# Patient Record
Sex: MALE | Race: WHITE | NOT HISPANIC OR LATINO | Employment: OTHER | URBAN - METROPOLITAN AREA
[De-identification: names, ages, dates, MRNs, and addresses within clinical notes are randomized per-mention and may not be internally consistent; named-entity substitution may affect disease eponyms.]

---

## 2017-03-28 ENCOUNTER — HISTORICAL (OUTPATIENT)
Dept: ADMINISTRATIVE | Facility: HOSPITAL | Age: 82
End: 2017-03-28

## 2017-03-28 LAB
% SATURATION: 27 %
ALBUMIN SERPL-MCNC: 3.5 G/DL (ref 3.1–4.7)
ALP SERPL-CCNC: 87 IU/L (ref 40–104)
ALT (SGPT): 16 IU/L (ref 3–33)
AST SERPL-CCNC: 24 IU/L (ref 10–40)
BILIRUB SERPL-MCNC: 0.9 MG/DL (ref 0.3–1)
BUN SERPL-MCNC: 16 MG/DL (ref 8–20)
CALCIUM SERPL-MCNC: 8.7 MG/DL (ref 7.7–10.4)
CHLORIDE: 103 MMOL/L (ref 98–110)
CO2 SERPL-SCNC: 29.8 MMOL/L (ref 22.8–31.6)
CREATININE: 0.94 MG/DL (ref 0.6–1.4)
FERRITIN SERPL-MCNC: 86 NG/ML (ref 37–201)
GLUCOSE: 91 MG/DL (ref 70–99)
IRON: 69 MCG/DL (ref 32–176)
POTASSIUM SERPL-SCNC: 3.8 MMOL/L (ref 3.5–5)
PROT SERPL-MCNC: 7.1 G/DL (ref 6–8.2)
SODIUM: 138 MMOL/L (ref 134–144)
TOTAL IRON BINDING CAPACITY: 258 MCG/DL (ref 177–435)

## 2017-07-18 ENCOUNTER — HISTORICAL (OUTPATIENT)
Dept: ADMINISTRATIVE | Facility: HOSPITAL | Age: 82
End: 2017-07-18

## 2017-07-18 ENCOUNTER — OFFICE VISIT (OUTPATIENT)
Dept: HEMATOLOGY/ONCOLOGY | Facility: CLINIC | Age: 82
End: 2017-07-18
Payer: MEDICARE

## 2017-07-18 VITALS
HEART RATE: 87 BPM | SYSTOLIC BLOOD PRESSURE: 170 MMHG | HEIGHT: 66 IN | WEIGHT: 146.69 LBS | RESPIRATION RATE: 18 BRPM | DIASTOLIC BLOOD PRESSURE: 89 MMHG | BODY MASS INDEX: 23.58 KG/M2 | TEMPERATURE: 98 F

## 2017-07-18 DIAGNOSIS — N18.2 ANEMIA IN STAGE 2 CHRONIC KIDNEY DISEASE: ICD-10-CM

## 2017-07-18 DIAGNOSIS — D63.1 ANEMIA IN STAGE 2 CHRONIC KIDNEY DISEASE: ICD-10-CM

## 2017-07-18 DIAGNOSIS — D63.8 ANEMIA OF OTHER CHRONIC DISEASE: ICD-10-CM

## 2017-07-18 DIAGNOSIS — D50.0 IRON DEFICIENCY ANEMIA SECONDARY TO BLOOD LOSS (CHRONIC): ICD-10-CM

## 2017-07-18 DIAGNOSIS — K92.2 HEMORRHAGE OF GASTROINTESTINAL TRACT, UNSPECIFIED: ICD-10-CM

## 2017-07-18 DIAGNOSIS — D50.9 IRON DEFICIENCY ANEMIA, UNSPECIFIED: ICD-10-CM

## 2017-07-18 LAB
% SATURATION: 27 %
ALBUMIN SERPL-MCNC: 3.4 G/DL (ref 3.1–4.7)
ALP SERPL-CCNC: 100 IU/L (ref 40–104)
ALT (SGPT): 14 IU/L (ref 3–33)
AST SERPL-CCNC: 20 IU/L (ref 10–40)
BASOPHILS NFR BLD: 0.1 K/UL (ref 0–0.2)
BASOPHILS NFR BLD: 1 %
BILIRUB SERPL-MCNC: 0.9 MG/DL (ref 0.3–1)
BUN SERPL-MCNC: 18 MG/DL (ref 8–20)
CALCIUM SERPL-MCNC: 8.7 MG/DL (ref 7.7–10.4)
CHLORIDE: 102 MMOL/L (ref 98–110)
CO2 SERPL-SCNC: 29.7 MMOL/L (ref 22.8–31.6)
CREATININE: 0.85 MG/DL (ref 0.6–1.4)
EOSINOPHIL NFR BLD: 0.3 K/UL (ref 0–0.7)
EOSINOPHIL NFR BLD: 3.6 %
ERYTHROCYTE [DISTWIDTH] IN BLOOD BY AUTOMATED COUNT: 13.8 % (ref 12.5–14.5)
FERRITIN SERPL-MCNC: 103 NG/ML (ref 37–201)
GLUCOSE: 98 MG/DL (ref 70–99)
GRAN #: 3.7 K/UL (ref 1.4–6.5)
GRAN%: 51.8 %
HCT VFR BLD AUTO: 43.8 % (ref 39–55)
HGB BLD-MCNC: 14.4 G/DL (ref 14–16)
IMMATURE GRANS (ABS): 0 K/UL (ref 0–1)
IMMATURE GRANULOCYTES: 0.1 %
IRON: 65 MCG/DL (ref 32–176)
LYMPH #: 2.2 K/UL (ref 1.2–3.4)
LYMPH%: 31.1 %
MCH RBC QN AUTO: 32.3 PG (ref 25–35)
MCHC RBC AUTO-ENTMCNC: 32.9 G/DL (ref 31–36)
MCV RBC AUTO: 98.2 FL (ref 80–100)
MONO #: 0.9 K/UL (ref 0.1–0.6)
MONO%: 12.4 %
NUCLEATED RBCS: 0 %
NUCLEATED RED BLOOD CELLS: 0 /100 WBC
PERFORMED BY:: ABNORMAL
PLATELET # BLD AUTO: 195 K/UL (ref 140–440)
PMV BLD AUTO: 9.4 FL (ref 8.8–12.7)
POTASSIUM SERPL-SCNC: 3.8 MMOL/L (ref 3.5–5)
PROT SERPL-MCNC: 7 G/DL (ref 6–8.2)
RBC # BLD AUTO: 4.46 M/UL (ref 4.3–5.9)
SODIUM: 139 MMOL/L (ref 134–144)
TOTAL IRON BINDING CAPACITY: 238 MCG/DL (ref 177–435)
WBC # BLD: 7.2 K/UL (ref 5–10)

## 2017-07-18 PROCEDURE — 1126F AMNT PAIN NOTED NONE PRSNT: CPT | Mod: ,,, | Performed by: INTERNAL MEDICINE

## 2017-07-18 PROCEDURE — 99213 OFFICE O/P EST LOW 20 MIN: CPT | Mod: ,,, | Performed by: INTERNAL MEDICINE

## 2017-07-18 PROCEDURE — 1159F MED LIST DOCD IN RCRD: CPT | Mod: ,,, | Performed by: INTERNAL MEDICINE

## 2017-07-18 RX ORDER — ISOSORBIDE MONONITRATE 60 MG/1
TABLET, EXTENDED RELEASE ORAL
COMMUNITY
Start: 2017-06-22 | End: 2019-08-28

## 2017-07-18 NOTE — PROGRESS NOTES
"   [facility] Hematology/Oncology  PROGRESS NOTE      Subjective:       Patient ID:   NAME: Marck Ramos : 1921     89 y.o. male    Referring Doc: Hood Gannon MD  Other Physicians: Ramesh Mares Albright    Chief Complaint:  Anemia f/u    History of Present Illness:     Patient returns today for a regularly scheduled follow-up visit.  The patient is doing ok with no new issues. He is feeling "pretty good"; no CP, SOB, HA's or N/V            ROS:   GEN: normal without any fever, night sweats or weight loss  HEENT: normal with no HA's, sore throat, stiff neck, changes in vision  CV: normal with no CP, SOB, PND, CARRERO or orthopnea  PULM: normal with no SOB, cough, hemoptysis, sputum or pleuritic pain  GI: normal with no abdominal pain, nausea, vomiting, constipation, diarrhea, melanotic stools, BRBPR, or hematemesis  : normal with no hematuria, dysuria  BREAST: normal with no mass, discharge, pain  SKIN: normal with no rash, erythema, bruising, or swelling    Allergies:  Review of patient's allergies indicates:   Allergen Reactions    Sulfa (sulfonamide antibiotics)      Patient can not recall reaction        Medications:    Current Outpatient Prescriptions:     amlodipine (NORVASC) 5 MG tablet, Take 5 mg by mouth once daily., Disp: , Rfl:     aspirin (ECOTRIN) 81 MG EC tablet, Take 81 mg by mouth once daily.  , Disp: , Rfl:     betaxolol (KERLONE) 10 mg tablet, 10 mg once daily. , Disp: , Rfl:     clopidogrel (PLAVIX) 75 mg tablet, Take 75 mg by mouth once daily., Disp: , Rfl:     ferrous sulfate 325 mg (65 mg iron) Tab tablet, Take 325 mg by mouth daily with breakfast., Disp: , Rfl:     isosorbide dinitrate (ISORDIL) 20 MG tablet, Take 20 mg by mouth 2 (two) times daily. 1/2 tablet twice daily, Disp: , Rfl:     isosorbide mononitrate (IMDUR) 60 MG 24 hr tablet, , Disp: , Rfl:     KLOR-CON M20 20 mEq tablet, Take 20 mEq by mouth once daily. , Disp: , Rfl:     losartan (COZAAR) 50 MG tablet, " "Take 50 mg by mouth 2 (two) times daily., Disp: , Rfl:     magnesium 250 mg Tab, Take 1 tablet by mouth once daily., Disp: , Rfl:     pantoprazole (PROTONIX) 40 MG tablet, Take 40 mg by mouth once daily., Disp: , Rfl:     simvastatin (ZOCOR) 20 MG tablet, Take 20 mg by mouth every evening. , Disp: , Rfl:     sotalol (BETAPACE) 80 MG tablet, Take 80 mg by mouth 2 (two) times daily. , Disp: , Rfl:     tamsulosin (FLOMAX) 0.4 mg Cp24, Take 0.4 mg by mouth once daily., Disp: , Rfl:     PMHx/PSHx Updates:  See patient's last visit with me on 3/28/17.  See H&P on  1/20/16        Pathology:  No matching staging information was found for the patient.          Objective:     Vitals:  Blood pressure (!) 170/89, pulse 87, temperature 97.5 °F (36.4 °C), resp. rate 18, height 5' 6" (1.676 m), weight 66.5 kg (146 lb 11.2 oz).    Physical Examination:   GEN: no apparent distress, comfortable; AAOx3  HEAD: atraumatic and normocephalic  EYES: no pallor, no icterus, PERRLA  ENT: OMM, no pharyngeal erythema, external ears WNL; no nasal discharge; no thrush  NECK: no masses, thyroid normal, trachea midline, no LAD/LN's, supple  CV: RRR with no murmur; normal pulse; normal S1 and S2; no pedal edema  CHEST: Normal respiratory effort; CTAB; normal breath sounds; no wheeze or crackles  ABDOM: nontender and nondistended; soft; normal bowel sounds; no rebound/guarding  MUSC/Skeletal: ROM normal; no crepitus; joints normal; no deformities or arthropathy  EXTREM: no clubbing, cyanosis, inflammation or swelling  SKIN: no rashes, lesions, ulcers, petechiae or subcutaneous nodules  : no juarez  NEURO: grossly intact; motor/sensory WNL; AAOx3; no tremors  PSYCH: normal mood, affect and behavior  LYMPH: normal cervical, supraclavicular, axillary and groin LN's            Labs:           Sodium   Date Value Ref Range Status   03/28/2017 138 134 - 144 mmol/L      Potassium   Date Value Ref Range Status   03/28/2017 3.8 3.5 - 5.0 mmol/L  "     Chloride   Date Value Ref Range Status   03/28/2017 103 98 - 110 mmol/L      CO2   Date Value Ref Range Status   03/28/2017 29.8 22.8 - 31.6 mmol/L      Glucose   Date Value Ref Range Status   03/28/2017 91 70 - 99 mg/dL      BUN, Bld   Date Value Ref Range Status   03/28/2017 16 8 - 20 mg/dL      Creatinine   Date Value Ref Range Status   03/28/2017 0.94 0.60 - 1.40 mg/dL      Calcium   Date Value Ref Range Status   03/28/2017 8.7 7.7 - 10.4 mg/dL      Total Protein   Date Value Ref Range Status   03/28/2017 7.1 6.0 - 8.2 g/dL      Albumin   Date Value Ref Range Status   03/28/2017 3.5 3.1 - 4.7 g/dL      Total Bilirubin   Date Value Ref Range Status   03/28/2017 0.9 0.3 - 1.0 mg/dL      Alkaline Phosphatase   Date Value Ref Range Status   03/28/2017 87 40 - 104 IU/L      AST   Date Value Ref Range Status   03/28/2017 24 10 - 40 IU/L            Radiology/Diagnostic Studies:        I have reviewed all available lab results and radiology reports.    Assessment/Plan:   (1) 89 y.o. male with diagnosis of chronic anemia  - multifactorial in etiology  - underlying iron deficiency issues, anemia of chronic disorders and anemia of chronic renal  - prior chronic GI blood loss issues as well  - s/p IV iron in past  - followed by Dr Batista with GI  - last available labs are from 3/2017 and hgb was normal as was the iron      (2) CAD and CHF - followed by Dr Mares    (3) Noncompliance issues with blood work    PLAN:  1. Check up to date labs and encouraged checking it every 6 months  2. F/u with PCP and GI  3. RTC 6 months  Fax note to Suzan Piper and Jagdeep    I spent over 15 mins with the patient, of which over half was face to face with the patient. Reviewing materials, labs, reports and studies. Making treatment and analytical decisions. Ordering necessary labs, tests and studies.      Discussion:     I have explained all of the above in detail and the patient understands all of the current  recommendation(s). I have answered all of their questions to the best of my ability and to their complete satisfaction.   The patient is to continue with the current management plan.    RTC in  6 months          Electronically signed by Teodoro Mueller MD

## 2017-07-18 NOTE — LETTER
July 18, 2017      Hood Gannon MD  [address]  [address]  [facility] [address]           [facility] Hematology Oncology  [address]  [address]  [facility] [address]  Phone: [phone]  Fax: [phone]          Patient: Marck Ramos   MR Number: [id number]   YOB: 1921   Date of Visit: 7/18/2017       Dear Dr. Hood Gannon:    Thank you for referring Marck Ramos to me for evaluation. Attached you will find relevant portions of my assessment and plan of care.    If you have questions, please do not hesitate to call me. I look forward to following Marck Ramos along with you.    Sincerely,    Teodoro Mueller MD    Enclosure  CC:  No Recipients    If you would like to receive this communication electronically, please contact [email or web address] or [phone] to request more information on [employer] Link access.    For providers and/or their staff who would like to refer a patient to [facility], please contact us through our one-stop-shop provider referral line, [facility], at [phone].    If you feel you have received this communication in error or would no longer like to receive these types of communications, please e-mail [email or web address]

## 2017-08-21 ENCOUNTER — HISTORICAL (OUTPATIENT)
Dept: ADMINISTRATIVE | Facility: HOSPITAL | Age: 82
End: 2017-08-21

## 2017-08-21 LAB
% SATURATION: 25 %
ALBUMIN SERPL-MCNC: 3.5 G/DL (ref 3.1–4.7)
ALP SERPL-CCNC: 81 IU/L (ref 40–104)
ALT (SGPT): 13 IU/L (ref 3–33)
AST SERPL-CCNC: 20 IU/L (ref 10–40)
BASOPHILS NFR BLD: 0.1 K/UL (ref 0–0.2)
BASOPHILS NFR BLD: 0.9 %
BILIRUB SERPL-MCNC: 0.7 MG/DL (ref 0.3–1)
BUN SERPL-MCNC: 17 MG/DL (ref 8–20)
CALCIUM SERPL-MCNC: 8.4 MG/DL (ref 7.7–10.4)
CHLORIDE: 105 MMOL/L (ref 98–110)
CO2 SERPL-SCNC: 26 MMOL/L (ref 22.8–31.6)
CREATININE: 0.86 MG/DL (ref 0.6–1.4)
EOSINOPHIL NFR BLD: 0.3 K/UL (ref 0–0.7)
EOSINOPHIL NFR BLD: 4.4 %
ERYTHROCYTE [DISTWIDTH] IN BLOOD BY AUTOMATED COUNT: 13.8 % (ref 12.5–14.5)
FERRITIN SERPL-MCNC: 81 NG/ML (ref 37–201)
GLUCOSE: 160 MG/DL (ref 70–99)
GRAN #: 4.3 K/UL (ref 1.4–6.5)
GRAN%: 55.7 %
HCT VFR BLD AUTO: 44.2 % (ref 39–55)
HGB BLD-MCNC: 14.9 G/DL (ref 14–16)
IMMATURE GRANS (ABS): 0 K/UL (ref 0–1)
IMMATURE GRANULOCYTES: 0.3 %
IRON: 59 MCG/DL (ref 32–176)
LYMPH #: 2.4 K/UL (ref 1.2–3.4)
LYMPH%: 30.6 %
MCH RBC QN AUTO: 32.7 PG (ref 25–35)
MCHC RBC AUTO-ENTMCNC: 33.7 G/DL (ref 31–36)
MCV RBC AUTO: 97.1 FL (ref 80–100)
MONO #: 0.6 K/UL (ref 0.1–0.6)
MONO%: 8.1 %
NUCLEATED RBCS: 0 %
NUCLEATED RED BLOOD CELLS: 0 /100 WBC
PERFORMED BY:: NORMAL
PLATELET # BLD AUTO: 189 K/UL (ref 140–440)
PMV BLD AUTO: 9.7 FL (ref 8.8–12.7)
POTASSIUM SERPL-SCNC: 3.8 MMOL/L (ref 3.5–5)
PROT SERPL-MCNC: 6.8 G/DL (ref 6–8.2)
RBC # BLD AUTO: 4.55 M/UL (ref 4.3–5.9)
SODIUM: 138 MMOL/L (ref 134–144)
TOTAL IRON BINDING CAPACITY: 234 MCG/DL (ref 177–435)
WBC # BLD: 7.8 K/UL (ref 5–10)

## 2017-09-21 ENCOUNTER — HISTORICAL (OUTPATIENT)
Dept: ADMINISTRATIVE | Facility: HOSPITAL | Age: 82
End: 2017-09-21

## 2017-09-21 LAB
% SATURATION: 25 %
ALBUMIN SERPL-MCNC: 3.3 G/DL (ref 3.1–4.7)
ALP SERPL-CCNC: 82 IU/L (ref 40–104)
ALT (SGPT): 13 IU/L (ref 3–33)
AST SERPL-CCNC: 19 IU/L (ref 10–40)
BASOPHILS NFR BLD: 0.1 K/UL (ref 0–0.2)
BASOPHILS NFR BLD: 0.8 %
BILIRUB SERPL-MCNC: 0.8 MG/DL (ref 0.3–1)
BUN SERPL-MCNC: 18 MG/DL (ref 8–20)
CALCIUM SERPL-MCNC: 8.5 MG/DL (ref 7.7–10.4)
CHLORIDE: 104 MMOL/L (ref 98–110)
CO2 SERPL-SCNC: 30 MMOL/L (ref 22.8–31.6)
CREATININE: 0.88 MG/DL (ref 0.6–1.4)
EOSINOPHIL NFR BLD: 0.3 K/UL (ref 0–0.7)
EOSINOPHIL NFR BLD: 4.4 %
ERYTHROCYTE [DISTWIDTH] IN BLOOD BY AUTOMATED COUNT: 14.3 % (ref 12.5–14.5)
FERRITIN SERPL-MCNC: 84 NG/ML (ref 37–201)
GLUCOSE: 104 MG/DL (ref 70–99)
GRAN #: 3.5 K/UL (ref 1.4–6.5)
GRAN%: 47.5 %
HCT VFR BLD AUTO: 41.8 % (ref 39–55)
HGB BLD-MCNC: 13.9 G/DL (ref 14–16)
IMMATURE GRANS (ABS): 0 K/UL (ref 0–1)
IMMATURE GRANULOCYTES: 0.1 %
IRON: 58 MCG/DL (ref 32–176)
LYMPH #: 2.7 K/UL (ref 1.2–3.4)
LYMPH%: 35.9 %
MCH RBC QN AUTO: 32.6 PG (ref 25–35)
MCHC RBC AUTO-ENTMCNC: 33.3 G/DL (ref 31–36)
MCV RBC AUTO: 97.9 FL (ref 80–100)
MONO #: 0.8 K/UL (ref 0.1–0.6)
MONO%: 11.3 %
NUCLEATED RBCS: 0 %
NUCLEATED RED BLOOD CELLS: 0 /100 WBC
PERFORMED BY:: ABNORMAL
PLATELET # BLD AUTO: 197 K/UL (ref 140–440)
PMV BLD AUTO: 9.6 FL (ref 8.8–12.7)
POTASSIUM SERPL-SCNC: 4 MMOL/L (ref 3.5–5)
PROT SERPL-MCNC: 6.8 G/DL (ref 6–8.2)
RBC # BLD AUTO: 4.27 M/UL (ref 4.3–5.9)
SODIUM: 139 MMOL/L (ref 134–144)
TOTAL IRON BINDING CAPACITY: 234 MCG/DL (ref 177–435)
WBC # BLD: 7.4 K/UL (ref 5–10)

## 2017-10-27 ENCOUNTER — HISTORICAL (OUTPATIENT)
Dept: ADMINISTRATIVE | Facility: HOSPITAL | Age: 82
End: 2017-10-27

## 2017-10-27 LAB
% SATURATION: 22 %
ALBUMIN SERPL-MCNC: 3.1 G/DL (ref 3.1–4.7)
ALP SERPL-CCNC: 74 IU/L (ref 40–104)
ALT (SGPT): 11 IU/L (ref 3–33)
AST SERPL-CCNC: 22 IU/L (ref 10–40)
BASOPHILS NFR BLD: 0.1 K/UL (ref 0–0.2)
BASOPHILS NFR BLD: 0.6 %
BILIRUB SERPL-MCNC: 0.8 MG/DL (ref 0.3–1)
BUN SERPL-MCNC: 21 MG/DL (ref 8–20)
CALCIUM SERPL-MCNC: 8.4 MG/DL (ref 7.7–10.4)
CHLORIDE: 103 MMOL/L (ref 98–110)
CO2 SERPL-SCNC: 25.9 MMOL/L (ref 22.8–31.6)
CREATININE: 1.01 MG/DL (ref 0.6–1.4)
EOSINOPHIL NFR BLD: 0.5 K/UL (ref 0–0.7)
EOSINOPHIL NFR BLD: 6.3 %
ERYTHROCYTE [DISTWIDTH] IN BLOOD BY AUTOMATED COUNT: 13.8 % (ref 12.5–14.5)
GLUCOSE: 172 MG/DL (ref 70–99)
GRAN #: 4.6 K/UL (ref 1.4–6.5)
GRAN%: 57.5 %
HCT VFR BLD AUTO: 40.8 % (ref 39–55)
HGB BLD-MCNC: 13.7 G/DL (ref 14–16)
IMMATURE GRANS (ABS): 0 K/UL (ref 0–1)
IMMATURE GRANULOCYTES: 0.3 %
IRON: 47 MCG/DL (ref 32–176)
LYMPH #: 2 K/UL (ref 1.2–3.4)
LYMPH%: 24.7 %
MCH RBC QN AUTO: 32.5 PG (ref 25–35)
MCHC RBC AUTO-ENTMCNC: 33.6 G/DL (ref 31–36)
MCV RBC AUTO: 96.7 FL (ref 80–100)
MONO #: 0.8 K/UL (ref 0.1–0.6)
MONO%: 10.6 %
NUCLEATED RBCS: 0 %
NUCLEATED RED BLOOD CELLS: 0 /100 WBC
PERFORMED BY:: ABNORMAL
PLATELET # BLD AUTO: 244 K/UL (ref 140–440)
PMV BLD AUTO: 9.1 FL (ref 8.8–12.7)
POTASSIUM SERPL-SCNC: 3.6 MMOL/L (ref 3.5–5)
PROT SERPL-MCNC: 6.8 G/DL (ref 6–8.2)
RBC # BLD AUTO: 4.22 M/UL (ref 4.3–5.9)
SODIUM: 138 MMOL/L (ref 134–144)
TOTAL IRON BINDING CAPACITY: 211 MCG/DL (ref 177–435)
WBC # BLD: 7.9 K/UL (ref 5–10)

## 2017-11-21 LAB
ALBUMIN SERPL-MCNC: 3.1 G/DL (ref 3.1–4.7)
ALP SERPL-CCNC: 74 IU/L (ref 40–104)
ALT (SGPT): 12 IU/L (ref 3–33)
AST SERPL-CCNC: 20 IU/L (ref 10–40)
BASOPHILS NFR BLD: 0.1 K/UL (ref 0–0.2)
BASOPHILS NFR BLD: 0.8 %
BILIRUB SERPL-MCNC: 0.7 MG/DL (ref 0.3–1)
BUN SERPL-MCNC: 13 MG/DL (ref 8–20)
CALCIUM SERPL-MCNC: 8.2 MG/DL (ref 7.7–10.4)
CHLORIDE: 103 MMOL/L (ref 98–110)
CO2 SERPL-SCNC: 26 MMOL/L (ref 22.8–31.6)
CREATININE: 0.92 MG/DL (ref 0.6–1.4)
EOSINOPHIL NFR BLD: 0.4 K/UL (ref 0–0.7)
EOSINOPHIL NFR BLD: 5.6 %
ERYTHROCYTE [DISTWIDTH] IN BLOOD BY AUTOMATED COUNT: 14.4 % (ref 12.5–14.5)
FERRITIN SERPL-MCNC: 97 NG/ML (ref 37–201)
GLUCOSE: 128 MG/DL (ref 70–99)
GRAN #: 3.7 K/UL (ref 1.4–6.5)
GRAN%: 48.4 %
HCT VFR BLD AUTO: 43.1 % (ref 39–55)
HGB BLD-MCNC: 14.4 G/DL (ref 14–16)
IMMATURE GRANS (ABS): 0 K/UL (ref 0–1)
IMMATURE GRANULOCYTES: 0.4 %
IRON: 51 MCG/DL (ref 32–176)
LYMPH #: 2.6 K/UL (ref 1.2–3.4)
LYMPH%: 34.3 %
MCH RBC QN AUTO: 32.7 PG (ref 25–35)
MCHC RBC AUTO-ENTMCNC: 33.4 G/DL (ref 31–36)
MCV RBC AUTO: 98 FL (ref 80–100)
MONO #: 0.8 K/UL (ref 0.1–0.6)
MONO%: 10.5 %
NUCLEATED RBCS: 0 %
NUCLEATED RED BLOOD CELLS: 0 /100 WBC
PERFORMED BY:: ABNORMAL
PLATELET # BLD AUTO: 163 K/UL (ref 140–440)
PMV BLD AUTO: 10.5 FL (ref 8.8–12.7)
POTASSIUM SERPL-SCNC: 3.3 MMOL/L (ref 3.5–5)
PROT SERPL-MCNC: 6.4 G/DL (ref 6–8.2)
RBC # BLD AUTO: 4.4 M/UL (ref 4.3–5.9)
SODIUM: 138 MMOL/L (ref 134–144)
WBC # BLD: 7.7 K/UL (ref 5–10)

## 2018-01-16 ENCOUNTER — OFFICE VISIT (OUTPATIENT)
Dept: HEMATOLOGY/ONCOLOGY | Facility: CLINIC | Age: 83
End: 2018-01-16
Payer: MEDICARE

## 2018-01-16 VITALS
HEART RATE: 76 BPM | DIASTOLIC BLOOD PRESSURE: 86 MMHG | WEIGHT: 144.63 LBS | RESPIRATION RATE: 18 BRPM | SYSTOLIC BLOOD PRESSURE: 175 MMHG | HEIGHT: 66 IN | BODY MASS INDEX: 23.24 KG/M2 | TEMPERATURE: 98 F

## 2018-01-16 DIAGNOSIS — D63.8 ANEMIA, CHRONIC DISEASE: Primary | ICD-10-CM

## 2018-01-16 DIAGNOSIS — D63.1 ANEMIA IN STAGE 2 CHRONIC KIDNEY DISEASE: ICD-10-CM

## 2018-01-16 DIAGNOSIS — N18.2 ANEMIA IN STAGE 2 CHRONIC KIDNEY DISEASE: ICD-10-CM

## 2018-01-16 DIAGNOSIS — D50.9 IRON DEFICIENCY ANEMIA, UNSPECIFIED IRON DEFICIENCY ANEMIA TYPE: ICD-10-CM

## 2018-01-16 DIAGNOSIS — K92.2 GASTROINTESTINAL HEMORRHAGE, UNSPECIFIED GASTROINTESTINAL HEMORRHAGE TYPE: ICD-10-CM

## 2018-01-16 DIAGNOSIS — D50.0 IRON DEFICIENCY ANEMIA SECONDARY TO BLOOD LOSS (CHRONIC): ICD-10-CM

## 2018-01-16 PROCEDURE — 99213 OFFICE O/P EST LOW 20 MIN: CPT | Mod: ,,, | Performed by: INTERNAL MEDICINE

## 2018-01-16 NOTE — LETTER
January 16, 2018      Hood Gannon MD  [address]  [address]  [facility] [address]           [facility] Hematology Oncology  [address]  [address]  [facility] [address]  Phone: [phone]  Fax: [phone]          Patient: Marck Ramos   MR Number: [id number]   YOB: 1921   Date of Visit: 1/16/2018       Dear Dr. Hood Gannon:    Thank you for referring Marck Ramos to me for evaluation. Attached you will find relevant portions of my assessment and plan of care.    If you have questions, please do not hesitate to call me. I look forward to following Marck Ramos along with you.    Sincerely,    Teodoro Mueller MD    Enclosure  CC:  No Recipients    If you would like to receive this communication electronically, please contact [email or web address] or [phone] to request more information on [employer] Link access.    For providers and/or their staff who would like to refer a patient to [facility], please contact us through our one-stop-shop provider referral line, [facility], at [phone].    If you feel you have received this communication in error or would no longer like to receive these types of communications, please e-mail [email or web address]

## 2018-01-16 NOTE — PROGRESS NOTES
"   [facility] Hematology/Oncology  PROGRESS NOTE      Subjective:       Patient ID:   NAME: Marck Ramos : 1921     89 y.o. male    Referring Doc: Jagdeep  Other Physicians: Ramesh Mares Albright    Chief Complaint:  Anemia f/u    History of Present Illness:     Patient returns today for a regularly scheduled follow-up visit.  The patient is doing ok with no new issues. He is feeling "good"; no CP, SOB, HA's or N/V. He was hospitalized since last visit with dehydration. He is here by himself.             ROS:   GEN: normal without any fever, night sweats or weight loss  HEENT: normal with no HA's, sore throat, stiff neck, changes in vision  CV: normal with no CP, SOB, PND, CARRERO or orthopnea  PULM: normal with no SOB, cough, hemoptysis, sputum or pleuritic pain  GI: normal with no abdominal pain, nausea, vomiting, constipation, diarrhea, melanotic stools, BRBPR, or hematemesis  : normal with no hematuria, dysuria  BREAST: normal with no mass, discharge, pain  SKIN: normal with no rash, erythema, bruising, or swelling    Allergies:  Review of patient's allergies indicates:   Allergen Reactions    Sulfa (sulfonamide antibiotics)      Patient can not recall reaction        Medications:    Current Outpatient Prescriptions:     amlodipine (NORVASC) 5 MG tablet, Take 5 mg by mouth once daily., Disp: , Rfl:     aspirin (ECOTRIN) 81 MG EC tablet, Take 81 mg by mouth once daily.  , Disp: , Rfl:     betaxolol (KERLONE) 10 mg tablet, 10 mg once daily. , Disp: , Rfl:     clopidogrel (PLAVIX) 75 mg tablet, Take 75 mg by mouth once daily., Disp: , Rfl:     ferrous sulfate 325 mg (65 mg iron) Tab tablet, Take 325 mg by mouth daily with breakfast., Disp: , Rfl:     isosorbide dinitrate (ISORDIL) 20 MG tablet, Take 20 mg by mouth 2 (two) times daily. 1/2 tablet twice daily, Disp: , Rfl:     isosorbide mononitrate (IMDUR) 60 MG 24 hr tablet, , Disp: , Rfl:     KLOR-CON M20 20 mEq tablet, Take 20 mEq by mouth once " "daily. , Disp: , Rfl:     losartan (COZAAR) 50 MG tablet, Take 50 mg by mouth 2 (two) times daily., Disp: , Rfl:     magnesium 250 mg Tab, Take 1 tablet by mouth once daily., Disp: , Rfl:     pantoprazole (PROTONIX) 40 MG tablet, Take 40 mg by mouth once daily., Disp: , Rfl:     simvastatin (ZOCOR) 20 MG tablet, Take 20 mg by mouth every evening. , Disp: , Rfl:     sotalol (BETAPACE) 80 MG tablet, Take 80 mg by mouth 2 (two) times daily. , Disp: , Rfl:     tamsulosin (FLOMAX) 0.4 mg Cp24, Take 0.4 mg by mouth once daily., Disp: , Rfl:     PMHx/PSHx Updates:  See patient's last visit with me on 7/18/17.  See H&P on  1/20/16        Pathology:  No matching staging information was found for the patient.          Objective:     Vitals:  Blood pressure (!) 175/86, pulse 76, temperature 97.6 °F (36.4 °C), resp. rate 18, height 5' 6" (1.676 m), weight 65.6 kg (144 lb 9.6 oz).    Physical Examination:   GEN: no apparent distress, comfortable; AAOx3  HEAD: atraumatic and normocephalic  EYES: no pallor, no icterus, PERRLA  ENT: OMM, no pharyngeal erythema, external ears WNL; no nasal discharge; no thrush  NECK: no masses, thyroid normal, trachea midline, no LAD/LN's, supple  CV: RRR with no murmur; normal pulse; normal S1 and S2; no pedal edema  CHEST: Normal respiratory effort; CTAB; normal breath sounds; no wheeze or crackles  ABDOM: nontender and nondistended; soft; normal bowel sounds; no rebound/guarding  MUSC/Skeletal: ROM normal; no crepitus; joints normal; no deformities or arthropathy  EXTREM: no clubbing, cyanosis, inflammation or swelling  SKIN: no rashes, lesions, ulcers, petechiae or subcutaneous nodules  : no juarez  NEURO: grossly intact; motor/sensory WNL; AAOx3; no tremors  PSYCH: normal mood, affect and behavior  LYMPH: normal cervical, supraclavicular, axillary and groin LN's            Labs:     12/28/2017      Sodium   Date Value Ref Range Status   11/21/2017 138 134 - 144 mmol/L      Potassium "   Date Value Ref Range Status   11/21/2017 3.3 (L) 3.5 - 5.0 mmol/L      Chloride   Date Value Ref Range Status   11/21/2017 103 98 - 110 mmol/L      CO2   Date Value Ref Range Status   11/21/2017 26.0 22.8 - 31.6 mmol/L      Glucose   Date Value Ref Range Status   11/21/2017 128 (H) 70 - 99 mg/dL      BUN, Bld   Date Value Ref Range Status   11/21/2017 13 8 - 20 mg/dL      Creatinine   Date Value Ref Range Status   11/21/2017 0.92 0.60 - 1.40 mg/dL      Calcium   Date Value Ref Range Status   11/21/2017 8.2 7.7 - 10.4 mg/dL      Total Protein   Date Value Ref Range Status   11/21/2017 6.4 6.0 - 8.2 g/dL      Albumin   Date Value Ref Range Status   11/21/2017 3.1 3.1 - 4.7 g/dL      Total Bilirubin   Date Value Ref Range Status   11/21/2017 0.7 0.3 - 1.0 mg/dL      Alkaline Phosphatase   Date Value Ref Range Status   11/21/2017 74 40 - 104 IU/L      AST   Date Value Ref Range Status   11/21/2017 20 10 - 40 IU/L      Lab Results   Component Value Date    IRON 51 11/21/2017    TIBC 211 10/27/2017    FERRITIN 97 11/21/2017           Radiology/Diagnostic Studies:        I have reviewed all available lab results and radiology reports.    Assessment/Plan:   (1) 89 y.o. male with diagnosis of chronic anemia  - multifactorial in etiology  - underlying iron deficiency issues, anemia of chronic disorders and anemia of chronic renal  - prior chronic GI blood loss issues as well  - s/p IV iron in past  - followed by Dr Batista with GI  - last available labs are from 12/28/2017 and hgb was at 12.6 and fairly adequate and was normal at 14.3 just prior to him getting IVF  - last iron was 51 in Nov 2017    (2) CAD and CHF - followed by Dr Mares    (3) Noncompliance issues with blood work    PLAN:  1. Check up to date labs and encouraged checking it every 3 months  2. F/u with PCP and GI  3. RTC 6 months  Fax note to Dr Batista, Suzan and Jagdeep    I spent over 15 mins with the patient, of which over half was face to face with  the patient. Reviewing materials, labs, reports and studies. Making treatment and analytical decisions. Ordering necessary labs, tests and studies.      Discussion:     I have explained all of the above in detail and the patient understands all of the current recommendation(s). I have answered all of their questions to the best of my ability and to their complete satisfaction.   The patient is to continue with the current management plan.    RTC in  6 months          Electronically signed by Teodoro Mueller MD

## 2018-01-19 LAB
% SATURATION: 27 %
ALBUMIN SERPL-MCNC: 3.4 G/DL (ref 3.1–4.7)
ALP SERPL-CCNC: 84 IU/L (ref 40–104)
ALT (SGPT): 13 IU/L (ref 3–33)
AST SERPL-CCNC: 22 IU/L (ref 10–40)
BASOPHILS NFR BLD: 0.1 K/UL (ref 0–0.2)
BASOPHILS NFR BLD: 1 %
BILIRUB SERPL-MCNC: 0.6 MG/DL (ref 0.3–1)
BUN SERPL-MCNC: 18 MG/DL (ref 8–20)
CALCIUM SERPL-MCNC: 8.4 MG/DL (ref 7.7–10.4)
CHLORIDE: 103 MMOL/L (ref 98–110)
CO2 SERPL-SCNC: 26.7 MMOL/L (ref 22.8–31.6)
CREATININE: 0.9 MG/DL (ref 0.6–1.4)
EOSINOPHIL NFR BLD: 0.3 K/UL (ref 0–0.7)
EOSINOPHIL NFR BLD: 4.2 %
ERYTHROCYTE [DISTWIDTH] IN BLOOD BY AUTOMATED COUNT: 14.5 % (ref 11.7–14.9)
FERRITIN SERPL-MCNC: 53 NG/ML (ref 37–201)
GLUCOSE: 141 MG/DL (ref 70–99)
GRAN #: 3.9 K/UL (ref 1.4–6.5)
GRAN%: 47.8 %
HCT VFR BLD AUTO: 41.4 % (ref 39–55)
HGB BLD-MCNC: 13.7 G/DL (ref 14–16)
IMMATURE GRANS (ABS): 0 K/UL (ref 0–1)
IMMATURE GRANULOCYTES: 0.2 %
IRON: 73 MCG/DL (ref 32–176)
LYMPH #: 3 K/UL (ref 1.2–3.4)
LYMPH%: 36.1 %
MCH RBC QN AUTO: 32.1 PG (ref 25–35)
MCHC RBC AUTO-ENTMCNC: 33.1 G/DL (ref 31–36)
MCV RBC AUTO: 97 FL (ref 80–100)
MONO #: 0.9 K/UL (ref 0.1–0.6)
MONO%: 10.7 %
NUCLEATED RBCS: 0 %
PLATELET # BLD AUTO: 198 K/UL (ref 140–440)
PMV BLD AUTO: 9.8 FL (ref 8.8–12.7)
POTASSIUM SERPL-SCNC: 3.9 MMOL/L (ref 3.5–5)
PROT SERPL-MCNC: 6.8 G/DL (ref 6–8.2)
RBC # BLD AUTO: 4.27 M/UL (ref 4.3–5.9)
SODIUM: 137 MMOL/L (ref 134–144)
TOTAL IRON BINDING CAPACITY: 267 MCG/DL (ref 177–435)
WBC # BLD AUTO: 8.2 K/UL (ref 5–10)

## 2018-02-19 LAB
% SATURATION: 32 %
ALBUMIN SERPL-MCNC: 3.4 G/DL (ref 3.1–4.7)
ALP SERPL-CCNC: 72 IU/L (ref 40–104)
ALT (SGPT): 11 IU/L (ref 3–33)
AST SERPL-CCNC: 22 IU/L (ref 10–40)
BASOPHILS NFR BLD: 0.1 K/UL (ref 0–0.2)
BASOPHILS NFR BLD: 0.9 %
BILIRUB SERPL-MCNC: 1 MG/DL (ref 0.3–1)
BUN SERPL-MCNC: 17 MG/DL (ref 8–20)
CALCIUM SERPL-MCNC: 8.4 MG/DL (ref 7.7–10.4)
CHLORIDE: 102 MMOL/L (ref 98–110)
CO2 SERPL-SCNC: 25.1 MMOL/L (ref 22.8–31.6)
CREATININE: 0.87 MG/DL (ref 0.6–1.4)
EOSINOPHIL NFR BLD: 0.3 K/UL (ref 0–0.7)
EOSINOPHIL NFR BLD: 4.9 %
ERYTHROCYTE [DISTWIDTH] IN BLOOD BY AUTOMATED COUNT: 14.4 % (ref 11.7–14.9)
FERRITIN SERPL-MCNC: 57 NG/ML (ref 37–201)
GLUCOSE: 165 MG/DL (ref 70–99)
GRAN #: 3.6 K/UL (ref 1.4–6.5)
GRAN%: 52.8 %
HCT VFR BLD AUTO: 44.2 % (ref 39–55)
HGB BLD-MCNC: 14.6 G/DL (ref 14–16)
IMMATURE GRANS (ABS): 0 K/UL (ref 0–1)
IMMATURE GRANULOCYTES: 0.4 %
IRON: 81 MCG/DL (ref 32–176)
LYMPH #: 2.2 K/UL (ref 1.2–3.4)
LYMPH%: 31.6 %
MCH RBC QN AUTO: 31.9 PG (ref 25–35)
MCHC RBC AUTO-ENTMCNC: 33 G/DL (ref 31–36)
MCV RBC AUTO: 96.7 FL (ref 80–100)
MONO #: 0.6 K/UL (ref 0.1–0.6)
MONO%: 9.4 %
NUCLEATED RBCS: 0 %
PLATELET # BLD AUTO: 190 K/UL (ref 140–440)
PMV BLD AUTO: 9.8 FL (ref 8.8–12.7)
POTASSIUM SERPL-SCNC: 3.4 MMOL/L (ref 3.5–5)
PROT SERPL-MCNC: 6.9 G/DL (ref 6–8.2)
RBC # BLD AUTO: 4.57 M/UL (ref 4.3–5.9)
SODIUM: 136 MMOL/L (ref 134–144)
TOTAL IRON BINDING CAPACITY: 257 MCG/DL (ref 177–435)
WBC # BLD AUTO: 6.8 K/UL (ref 5–10)

## 2018-03-19 LAB
% SATURATION: 40 %
ALBUMIN SERPL-MCNC: 2.9 G/DL (ref 3.1–4.7)
ALP SERPL-CCNC: 65 IU/L (ref 40–104)
ALT (SGPT): 11 IU/L (ref 3–33)
AST SERPL-CCNC: 18 IU/L (ref 10–40)
BASOPHILS NFR BLD: 0 K/UL (ref 0–0.2)
BASOPHILS NFR BLD: 0.5 %
BILIRUB SERPL-MCNC: 0.7 MG/DL (ref 0.3–1)
BUN SERPL-MCNC: 25 MG/DL (ref 8–20)
CALCIUM SERPL-MCNC: 8.5 MG/DL (ref 7.7–10.4)
CHLORIDE: 103 MMOL/L (ref 98–110)
CO2 SERPL-SCNC: 29.1 MMOL/L (ref 22.8–31.6)
CREATININE: 1.01 MG/DL (ref 0.6–1.4)
EOSINOPHIL NFR BLD: 0.4 K/UL (ref 0–0.7)
EOSINOPHIL NFR BLD: 4.7 %
ERYTHROCYTE [DISTWIDTH] IN BLOOD BY AUTOMATED COUNT: 14.1 % (ref 11.7–14.9)
FERRITIN SERPL-MCNC: 73 NG/ML (ref 37–201)
GLUCOSE: 144 MG/DL (ref 70–99)
GRAN #: 4.4 K/UL (ref 1.4–6.5)
GRAN%: 53.5 %
HCT VFR BLD AUTO: 42.8 % (ref 39–55)
HGB BLD-MCNC: 14 G/DL (ref 14–16)
IMMATURE GRANS (ABS): 0 K/UL (ref 0–1)
IMMATURE GRANULOCYTES: 0.2 %
IRON: 92 MCG/DL (ref 32–176)
LYMPH #: 2.5 K/UL (ref 1.2–3.4)
LYMPH%: 30.5 %
MCH RBC QN AUTO: 32.3 PG (ref 25–35)
MCHC RBC AUTO-ENTMCNC: 32.7 G/DL (ref 31–36)
MCV RBC AUTO: 98.8 FL (ref 80–100)
MONO #: 0.9 K/UL (ref 0.1–0.6)
MONO%: 10.6 %
NUCLEATED RBCS: 0 %
PLATELET # BLD AUTO: 187 K/UL (ref 140–440)
PMV BLD AUTO: 10 FL (ref 8.8–12.7)
POTASSIUM SERPL-SCNC: 3.5 MMOL/L (ref 3.5–5)
PROT SERPL-MCNC: 6.4 G/DL (ref 6–8.2)
RBC # BLD AUTO: 4.33 M/UL (ref 4.3–5.9)
SODIUM: 139 MMOL/L (ref 134–144)
TOTAL IRON BINDING CAPACITY: 229 MCG/DL (ref 177–435)
WBC # BLD AUTO: 8.3 K/UL (ref 5–10)

## 2018-04-30 LAB
HCT VFR BLD AUTO: 44.6 % (ref 39–55)
HGB BLD-MCNC: 14.8 G/DL (ref 14–16)
MCH RBC QN AUTO: 32.9 PG (ref 25–35)
MCHC RBC AUTO-ENTMCNC: 33.2 G/DL (ref 31–36)
MCV RBC AUTO: 99.1 FL (ref 80–100)
NUCLEATED RBCS: 0 %
PLATELET # BLD AUTO: 201 K/UL (ref 140–440)
RBC # BLD AUTO: 4.5 M/UL (ref 4.3–5.9)
WBC # BLD AUTO: 7.5 K/UL (ref 5–10)

## 2018-07-30 ENCOUNTER — OFFICE VISIT (OUTPATIENT)
Dept: HEMATOLOGY/ONCOLOGY | Facility: CLINIC | Age: 83
End: 2018-07-30
Payer: MEDICARE

## 2018-07-30 VITALS
HEART RATE: 86 BPM | TEMPERATURE: 98 F | DIASTOLIC BLOOD PRESSURE: 91 MMHG | SYSTOLIC BLOOD PRESSURE: 176 MMHG | HEIGHT: 65 IN | BODY MASS INDEX: 23.27 KG/M2 | WEIGHT: 139.69 LBS

## 2018-07-30 DIAGNOSIS — D63.1 ANEMIA IN STAGE 2 CHRONIC KIDNEY DISEASE: ICD-10-CM

## 2018-07-30 DIAGNOSIS — D50.9 IRON DEFICIENCY ANEMIA, UNSPECIFIED IRON DEFICIENCY ANEMIA TYPE: Primary | ICD-10-CM

## 2018-07-30 DIAGNOSIS — D63.8 ANEMIA, CHRONIC DISEASE: ICD-10-CM

## 2018-07-30 DIAGNOSIS — N18.2 ANEMIA IN STAGE 2 CHRONIC KIDNEY DISEASE: ICD-10-CM

## 2018-07-30 DIAGNOSIS — D50.0 IRON DEFICIENCY ANEMIA SECONDARY TO BLOOD LOSS (CHRONIC): ICD-10-CM

## 2018-07-30 DIAGNOSIS — K92.2 GASTROINTESTINAL HEMORRHAGE, UNSPECIFIED GASTROINTESTINAL HEMORRHAGE TYPE: ICD-10-CM

## 2018-07-30 LAB
% SATURATION: 26 %
ALBUMIN SERPL-MCNC: 3.3 G/DL (ref 3.1–4.7)
ALP SERPL-CCNC: 78 IU/L (ref 40–104)
ALT (SGPT): 13 IU/L (ref 3–33)
AST SERPL-CCNC: 20 IU/L (ref 10–40)
BASOPHILS NFR BLD: 0.1 K/UL (ref 0–0.2)
BASOPHILS NFR BLD: 0.6 %
BILIRUB SERPL-MCNC: 0.9 MG/DL (ref 0.3–1)
BUN SERPL-MCNC: 19 MG/DL (ref 8–20)
CALCIUM SERPL-MCNC: 8.6 MG/DL (ref 7.7–10.4)
CHLORIDE: 97 MMOL/L (ref 98–110)
CO2 SERPL-SCNC: 28.1 MMOL/L (ref 22.8–31.6)
CREATININE: 0.93 MG/DL (ref 0.6–1.4)
EOSINOPHIL NFR BLD: 0.3 K/UL (ref 0–0.7)
EOSINOPHIL NFR BLD: 3.7 %
ERYTHROCYTE [DISTWIDTH] IN BLOOD BY AUTOMATED COUNT: 13.4 % (ref 11.7–14.9)
FERRITIN SERPL-MCNC: 83 NG/ML (ref 37–201)
GLUCOSE: 97 MG/DL (ref 70–99)
GRAN #: 4.4 K/UL (ref 1.4–6.5)
GRAN%: 52.5 %
HCT VFR BLD AUTO: 41.7 % (ref 39–55)
HGB BLD-MCNC: 14 G/DL (ref 14–16)
IMMATURE GRANS (ABS): 0 K/UL (ref 0–1)
IMMATURE GRANULOCYTES: 0.4 %
IRON: 60 MCG/DL (ref 32–176)
LYMPH #: 2.7 K/UL (ref 1.2–3.4)
LYMPH%: 31.7 %
MCH RBC QN AUTO: 33.3 PG (ref 25–35)
MCHC RBC AUTO-ENTMCNC: 33.6 G/DL (ref 31–36)
MCV RBC AUTO: 99 FL (ref 80–100)
MONO #: 0.9 K/UL (ref 0.1–0.6)
MONO%: 11.1 %
NUCLEATED RBCS: 0 %
PLATELET # BLD AUTO: 210 K/UL (ref 140–440)
PMV BLD AUTO: 9.9 FL (ref 8.8–12.7)
POTASSIUM SERPL-SCNC: 3.9 MMOL/L (ref 3.5–5)
PROT SERPL-MCNC: 6.9 G/DL (ref 6–8.2)
RBC # BLD AUTO: 4.21 M/UL (ref 4.3–5.9)
SODIUM: 136 MMOL/L (ref 134–144)
TOTAL IRON BINDING CAPACITY: 236 MCG/DL (ref 177–435)
WBC # BLD AUTO: 8.5 K/UL (ref 5–10)

## 2018-07-30 PROCEDURE — 99213 OFFICE O/P EST LOW 20 MIN: CPT | Mod: ,,, | Performed by: INTERNAL MEDICINE

## 2018-07-30 NOTE — PROGRESS NOTES
"   [facility] Hematology/Oncology  PROGRESS NOTE      Subjective:       Patient ID:   NAME: Marck Ramos : 1921     89 y.o. male    Referring Doc: Jagdeep  Other Physicians: Ramesh Mares Albright    Chief Complaint:  Anemia f/u    History of Present Illness:     Patient returns today for a regularly scheduled follow-up visit.  The patient is doing ok with no new issues. He is feeling "good for 89"; no CP, SOB, HA's or N/V. He is here by himself.             ROS:   GEN: normal without any fever, night sweats or weight loss  HEENT: normal with no HA's, sore throat, stiff neck, changes in vision  CV: normal with no CP, SOB, PND, CARRERO or orthopnea  PULM: normal with no SOB, cough, hemoptysis, sputum or pleuritic pain  GI: normal with no abdominal pain, nausea, vomiting, constipation, diarrhea, melanotic stools, BRBPR, or hematemesis  : normal with no hematuria, dysuria  BREAST: normal with no mass, discharge, pain  SKIN: normal with no rash, erythema, bruising, or swelling    Allergies:  Review of patient's allergies indicates:   Allergen Reactions    Sulfa (sulfonamide antibiotics)      Patient can not recall reaction        Medications:    Current Outpatient Prescriptions:     amlodipine (NORVASC) 5 MG tablet, Take 5 mg by mouth once daily., Disp: , Rfl:     aspirin (ECOTRIN) 81 MG EC tablet, Take 81 mg by mouth once daily.  , Disp: , Rfl:     betaxolol (KERLONE) 10 mg tablet, 10 mg once daily. , Disp: , Rfl:     clopidogrel (PLAVIX) 75 mg tablet, Take 75 mg by mouth once daily., Disp: , Rfl:     ferrous sulfate 325 mg (65 mg iron) Tab tablet, Take 325 mg by mouth daily with breakfast., Disp: , Rfl:     isosorbide dinitrate (ISORDIL) 20 MG tablet, Take 20 mg by mouth 2 (two) times daily. 1/2 tablet twice daily, Disp: , Rfl:     isosorbide mononitrate (IMDUR) 60 MG 24 hr tablet, , Disp: , Rfl:     KLOR-CON M20 20 mEq tablet, Take 20 mEq by mouth once daily. , Disp: , Rfl:     losartan (COZAAR) 50 MG " "tablet, Take 50 mg by mouth 2 (two) times daily., Disp: , Rfl:     magnesium 250 mg Tab, Take 1 tablet by mouth once daily., Disp: , Rfl:     pantoprazole (PROTONIX) 40 MG tablet, Take 40 mg by mouth once daily., Disp: , Rfl:     simvastatin (ZOCOR) 20 MG tablet, Take 20 mg by mouth every evening. , Disp: , Rfl:     sotalol (BETAPACE) 80 MG tablet, Take 80 mg by mouth 2 (two) times daily. , Disp: , Rfl:     tamsulosin (FLOMAX) 0.4 mg Cp24, Take 0.4 mg by mouth once daily., Disp: , Rfl:     PMHx/PSHx Updates:  See patient's last visit with me on 1/16/2018  See H&P on  1/20/16        Pathology:  Cancer Staging  No matching staging information was found for the patient.          Objective:     Vitals:  Blood pressure (!) 176/91, pulse 86, temperature 97.5 °F (36.4 °C), height 5' 5" (1.651 m), weight 63.4 kg (139 lb 11.2 oz).    Physical Examination:   GEN: no apparent distress, comfortable; AAOx3  HEAD: atraumatic and normocephalic  EYES: no pallor, no icterus, PERRLA  ENT: OMM, no pharyngeal erythema, external ears WNL; no nasal discharge; no thrush  NECK: no masses, thyroid normal, trachea midline, no LAD/LN's, supple  CV: RRR with no murmur; normal pulse; normal S1 and S2; no pedal edema  CHEST: Normal respiratory effort; CTAB; normal breath sounds; no wheeze or crackles  ABDOM: nontender and nondistended; soft; normal bowel sounds; no rebound/guarding  MUSC/Skeletal: ROM normal; no crepitus; joints normal; no deformities or arthropathy  EXTREM: no clubbing, cyanosis, inflammation or swelling  SKIN: no rashes, lesions, ulcers, petechiae or subcutaneous nodules  : no juarez  NEURO: grossly intact; motor/sensory WNL; AAOx3; no tremors  PSYCH: normal mood, affect and behavior  LYMPH: normal cervical, supraclavicular, axillary and groin LN's            Labs:       5/14/2018 on chart      4/30/2018      Sodium   Date Value Ref Range Status   04/30/2018 138 134 - 144 mmol/L      Potassium   Date Value Ref Range " Status   04/30/2018 3.5 3.5 - 5.0 mmol/L      Chloride   Date Value Ref Range Status   04/30/2018 100 98 - 110 mmol/L      CO2   Date Value Ref Range Status   04/30/2018 31.7 (H) 22.8 - 31.6 mmol/L      Glucose   Date Value Ref Range Status   04/30/2018 171 (H) 70 - 99 mg/dL      BUN, Bld   Date Value Ref Range Status   04/30/2018 18 8 - 20 mg/dL      Creatinine   Date Value Ref Range Status   04/30/2018 0.92 0.60 - 1.40 mg/dL      Calcium   Date Value Ref Range Status   04/30/2018 8.4 7.7 - 10.4 mg/dL      Total Protein   Date Value Ref Range Status   04/30/2018 6.6 6.0 - 8.2 g/dL      Albumin   Date Value Ref Range Status   04/30/2018 3.4 3.1 - 4.7 g/dL      Total Bilirubin   Date Value Ref Range Status   04/30/2018 0.9 0.3 - 1.0 mg/dL      Alkaline Phosphatase   Date Value Ref Range Status   04/30/2018 73 40 - 104 IU/L      AST   Date Value Ref Range Status   04/30/2018 19 10 - 40 IU/L      Lab Results   Component Value Date    IRON 95 04/30/2018    TIBC 250 04/30/2018    FERRITIN 73 03/19/2018     Lab Results   Component Value Date    WBC 7.5 04/30/2018    HGB 14.8 04/30/2018    HCT 44.6 04/30/2018    MCV 99.1 04/30/2018     04/30/2018           Radiology/Diagnostic Studies:        I have reviewed all available lab results and radiology reports.    Assessment/Plan:   (1) 89 y.o. male with diagnosis of chronic anemia  - multifactorial in etiology  - underlying iron deficiency issues, anemia of chronic disorders and anemia of chronic renal  - prior chronic GI blood loss issues as well  - s/p IV iron in past  - followed by Dr Batista with GI  - last available labs are from May 2018 and hgb was at 13.0 and fairly adequate   - last iron was 95    (2) CAD and CHF - followed by Dr Mares    (3) Noncompliance issues with blood work    1. 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 Anemia, chronic disease     4. 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 Gastrointestinal hemorrhage, unspecified gastrointestinal hemorrhage type           PLAN:  1. Check up to date labs and encouraged checking it every 3 months - due Aug 2018  2. F/u with PCP and GI  3. RTC 6 months    Fax note to Suzan Piper and Jagdeep    I spent over 15 mins with the patient, of which over half was face to face with the patient. Reviewing materials, labs, reports and studies. Making treatment and analytical decisions. Ordering necessary labs, tests and studies.      Discussion:     I have explained all of the above in detail and the patient understands all of the current recommendation(s). I have answered all of their questions to the best of my ability and to their complete satisfaction.   The patient is to continue with the current management plan.    RTC in  6 months          Electronically signed by Teodoro Mueller MD

## 2018-07-30 NOTE — LETTER
July 30, 2018      Hood Gannon MD  [address]  [address]  [facility]  [place] [address]           [facility] - Hematology Oncology  [address]  [address]  [place] [address]  Phone: [phone]  Fax: [phone]          Patient: Marck Ramos   MR Number: [id number]   YOB: 1921   Date of Visit: 7/30/2018       Dear Dr. Hood Gannon:    Thank you for referring Marck Ramos to me for evaluation. Attached you will find relevant portions of my assessment and plan of care.    If you have questions, please do not hesitate to call me. I look forward to following Marck Ramos along with you.    Sincerely,    Teodoro Mueller MD    Enclosure  CC:  No Recipients    If you would like to receive this communication electronically, please contact [email or web address] or [phone] to request more information on [employer] Link access.    For providers and/or their staff who would like to refer a patient to [facility], please contact us through our one-stop-shop provider referral line, [facility], at [phone].    If you feel you have received this communication in error or would no longer like to receive these types of communications, please e-mail [email or web address]

## 2018-08-28 ENCOUNTER — HISTORICAL (OUTPATIENT)
Dept: ADMINISTRATIVE | Facility: HOSPITAL | Age: 83
End: 2018-08-28

## 2018-08-29 LAB
HCT VFR BLD AUTO: 30.7 % (ref 39–55)
HGB BLD-MCNC: 10.5 G/DL (ref 14–16)

## 2018-08-30 ENCOUNTER — OUTSIDE PLACE OF SERVICE (OUTPATIENT)
Dept: UROLOGY | Facility: CLINIC | Age: 83
End: 2018-08-30
Payer: MEDICARE

## 2018-08-30 LAB
BUN SERPL-MCNC: 24 MG/DL (ref 8–20)
CALCIUM SERPL-MCNC: 7.1 MG/DL (ref 7.7–10.4)
CHLORIDE: 98 MMOL/L (ref 98–110)
CO2 SERPL-SCNC: 22.6 MMOL/L (ref 22.8–31.6)
CREATININE: 1 MG/DL (ref 0.6–1.4)
GLUCOSE: 118 MG/DL (ref 70–99)
HCT VFR BLD AUTO: 24.4 % (ref 39–55)
HGB BLD-MCNC: 8.2 G/DL (ref 14–16)
MCH RBC QN AUTO: 32.9 PG (ref 25–35)
MCHC RBC AUTO-ENTMCNC: 33.6 G/DL (ref 31–36)
MCV RBC AUTO: 98 FL (ref 80–100)
NUCLEATED RBCS: 0 %
PLATELET # BLD AUTO: 130 K/UL (ref 140–440)
POTASSIUM SERPL-SCNC: 3.4 MMOL/L (ref 3.5–5)
RBC # BLD AUTO: 2.49 M/UL (ref 4.3–5.9)
SODIUM: 132 MMOL/L (ref 134–144)
WBC # BLD AUTO: 9.7 K/UL (ref 5–10)

## 2018-08-30 PROCEDURE — 99356 PR PROLONGED SERV,INPATIENT,1ST HR: CPT | Mod: ,,, | Performed by: UROLOGY

## 2018-08-30 PROCEDURE — 52000 CYSTOURETHROSCOPY: CPT | Mod: ,,, | Performed by: UROLOGY

## 2018-08-30 PROCEDURE — 99223 1ST HOSP IP/OBS HIGH 75: CPT | Mod: 25,,, | Performed by: UROLOGY

## 2018-08-31 ENCOUNTER — OUTSIDE PLACE OF SERVICE (OUTPATIENT)
Dept: UROLOGY | Facility: CLINIC | Age: 83
End: 2018-08-31
Payer: MEDICARE

## 2018-08-31 ENCOUNTER — TELEPHONE (OUTPATIENT)
Dept: UROLOGY | Facility: CLINIC | Age: 83
End: 2018-08-31

## 2018-08-31 LAB
HCT VFR BLD AUTO: 24.8 % (ref 39–55)
HGB BLD-MCNC: 8.7 G/DL (ref 14–16)
MCH RBC QN AUTO: 33.3 PG (ref 25–35)
MCHC RBC AUTO-ENTMCNC: 35.1 G/DL (ref 31–36)
MCV RBC AUTO: 95 FL (ref 80–100)
NUCLEATED RBCS: 0 %
PLATELET # BLD AUTO: 127 K/UL (ref 140–440)
RBC # BLD AUTO: 2.61 M/UL (ref 4.3–5.9)
WBC # BLD AUTO: 11.6 K/UL (ref 5–10)

## 2018-08-31 PROCEDURE — 99233 SBSQ HOSP IP/OBS HIGH 50: CPT | Mod: ,,, | Performed by: UROLOGY

## 2018-08-31 NOTE — TELEPHONE ENCOUNTER
Follow up appts scheduled.  Called [facility].  appt dates/time/location given to Skip/Nurse.

## 2018-08-31 NOTE — TELEPHONE ENCOUNTER
Pt had postop retention after L hip fracture repair 8/29 at [facility]. Reportedly on 8/27 presentation to er from fall, juarez placement caused significant gross hematuria. When removed 8/30 also noted to be bloody. Pt chronically anticoagulated. Nursing attempted juarez replacement multiple times but could not pass juarez.    Required bedside cystoscopic juarez placement 2/2 prostatic urethral trauma/false passage to place juarez on 8/30/18. Juarez should remain minimum 7-10 days. Can be scheduled for nurse visit for juarez removal at/after 10 days, and MD follow up 3-4 weeks later to reassess voiding

## 2018-09-01 LAB
HCT VFR BLD AUTO: 24 % (ref 39–55)
HGB BLD-MCNC: 8.4 G/DL (ref 14–16)
MCH RBC QN AUTO: 33.5 PG (ref 25–35)
MCHC RBC AUTO-ENTMCNC: 35 G/DL (ref 31–36)
MCV RBC AUTO: 95.6 FL (ref 80–100)
NUCLEATED RBCS: 0 %
PLATELET # BLD AUTO: 147 K/UL (ref 140–440)
RBC # BLD AUTO: 2.51 M/UL (ref 4.3–5.9)
WBC # BLD AUTO: 9.3 K/UL (ref 5–10)

## 2018-09-05 ENCOUNTER — HOSPITAL ENCOUNTER (INPATIENT)
Facility: HOSPITAL | Age: 83
LOS: 15 days | Discharge: HOME | DRG: 559 | End: 2018-09-20
Attending: PHYSICAL MEDICINE & REHABILITATION | Admitting: PHYSICAL MEDICINE & REHABILITATION
Payer: MEDICARE

## 2018-09-05 DIAGNOSIS — S72.92XA FRACTURE OF LEFT FEMUR: ICD-10-CM

## 2018-09-05 PROCEDURE — 12800000 HC REHAB SEMI-PRIVATE ROOM

## 2018-09-05 PROCEDURE — 25000003 PHARM REV CODE 250: Performed by: PHYSICAL MEDICINE & REHABILITATION

## 2018-09-05 PROCEDURE — 99900035 HC TECH TIME PER 15 MIN (STAT)

## 2018-09-05 RX ORDER — TAMSULOSIN HYDROCHLORIDE 0.4 MG/1
0.4 CAPSULE ORAL DAILY
Status: DISCONTINUED | OUTPATIENT
Start: 2018-09-06 | End: 2018-09-20 | Stop reason: HOSPADM

## 2018-09-05 RX ORDER — PANTOPRAZOLE SODIUM 40 MG/1
40 TABLET, DELAYED RELEASE ORAL DAILY
Status: DISCONTINUED | OUTPATIENT
Start: 2018-09-06 | End: 2018-09-20 | Stop reason: HOSPADM

## 2018-09-05 RX ORDER — POTASSIUM CHLORIDE 20 MEQ/1
20 TABLET, EXTENDED RELEASE ORAL DAILY
Status: DISCONTINUED | OUTPATIENT
Start: 2018-09-06 | End: 2018-09-20 | Stop reason: HOSPADM

## 2018-09-05 RX ORDER — ALBUTEROL SULFATE 2.5 MG/.5ML
2.5 SOLUTION RESPIRATORY (INHALATION) EVERY 4 HOURS PRN
Status: DISCONTINUED | OUTPATIENT
Start: 2018-09-05 | End: 2018-09-20 | Stop reason: HOSPADM

## 2018-09-05 RX ORDER — FERROUS SULFATE 325(65) MG
325 TABLET, DELAYED RELEASE (ENTERIC COATED) ORAL 2 TIMES DAILY
Status: DISCONTINUED | OUTPATIENT
Start: 2018-09-05 | End: 2018-09-20 | Stop reason: HOSPADM

## 2018-09-05 RX ORDER — AMLODIPINE BESYLATE 5 MG/1
5 TABLET ORAL DAILY
Status: DISCONTINUED | OUTPATIENT
Start: 2018-09-06 | End: 2018-09-13

## 2018-09-05 RX ORDER — TRAMADOL HYDROCHLORIDE 50 MG/1
50 TABLET ORAL EVERY 8 HOURS PRN
Status: DISCONTINUED | OUTPATIENT
Start: 2018-09-05 | End: 2018-09-20 | Stop reason: HOSPADM

## 2018-09-05 RX ORDER — ONDANSETRON 4 MG/1
4 TABLET, ORALLY DISINTEGRATING ORAL EVERY 6 HOURS PRN
Status: DISCONTINUED | OUTPATIENT
Start: 2018-09-05 | End: 2018-09-20 | Stop reason: HOSPADM

## 2018-09-05 RX ORDER — DOCUSATE SODIUM 100 MG/1
100 CAPSULE, LIQUID FILLED ORAL 2 TIMES DAILY
Status: DISCONTINUED | OUTPATIENT
Start: 2018-09-05 | End: 2018-09-20 | Stop reason: HOSPADM

## 2018-09-05 RX ORDER — ALBUTEROL SULFATE 90 UG/1
2 AEROSOL, METERED RESPIRATORY (INHALATION) EVERY 4 HOURS PRN
Status: DISCONTINUED | OUTPATIENT
Start: 2018-09-05 | End: 2018-09-05 | Stop reason: CLARIF

## 2018-09-05 RX ORDER — ASPIRIN 81 MG/1
81 TABLET ORAL DAILY
Status: DISCONTINUED | OUTPATIENT
Start: 2018-09-06 | End: 2018-09-12

## 2018-09-05 RX ORDER — LANOLIN ALCOHOL/MO/W.PET/CERES
400 CREAM (GRAM) TOPICAL ONCE
Status: DISCONTINUED | OUTPATIENT
Start: 2018-09-06 | End: 2018-09-06

## 2018-09-05 RX ORDER — CLOPIDOGREL BISULFATE 75 MG/1
75 TABLET ORAL DAILY
Status: DISCONTINUED | OUTPATIENT
Start: 2018-09-06 | End: 2018-09-20 | Stop reason: HOSPADM

## 2018-09-05 RX ORDER — SOTALOL HYDROCHLORIDE 80 MG/1
80 TABLET ORAL 2 TIMES DAILY
Status: DISCONTINUED | OUTPATIENT
Start: 2018-09-05 | End: 2018-09-20 | Stop reason: HOSPADM

## 2018-09-05 RX ORDER — ISOSORBIDE MONONITRATE 30 MG/1
60 TABLET, EXTENDED RELEASE ORAL DAILY
Status: DISCONTINUED | OUTPATIENT
Start: 2018-09-06 | End: 2018-09-20 | Stop reason: HOSPADM

## 2018-09-05 RX ORDER — FUROSEMIDE 40 MG/1
40 TABLET ORAL DAILY
Status: DISCONTINUED | OUTPATIENT
Start: 2018-09-06 | End: 2018-09-20 | Stop reason: HOSPADM

## 2018-09-05 RX ORDER — SIMVASTATIN 10 MG/1
20 TABLET, FILM COATED ORAL NIGHTLY
Status: DISCONTINUED | OUTPATIENT
Start: 2018-09-05 | End: 2018-09-20 | Stop reason: HOSPADM

## 2018-09-05 RX ORDER — LOSARTAN POTASSIUM 25 MG/1
50 TABLET ORAL 2 TIMES DAILY
Status: DISCONTINUED | OUTPATIENT
Start: 2018-09-05 | End: 2018-09-20 | Stop reason: HOSPADM

## 2018-09-05 RX ORDER — LANOLIN ALCOHOL/MO/W.PET/CERES
400 CREAM (GRAM) TOPICAL ONCE
Status: DISCONTINUED | OUTPATIENT
Start: 2018-09-06 | End: 2018-09-05

## 2018-09-05 RX ADMIN — SIMVASTATIN 20 MG: 10 TABLET, FILM COATED ORAL at 08:09

## 2018-09-05 RX ADMIN — FERROUS SULFATE TAB EC 325 MG (65 MG FE EQUIVALENT) 325 MG: 325 (65 FE) TABLET DELAYED RESPONSE at 08:09

## 2018-09-05 RX ADMIN — SOTALOL HYDROCHLORIDE 80 MG: 80 TABLET ORAL at 08:09

## 2018-09-05 RX ADMIN — LOSARTAN POTASSIUM 50 MG: 25 TABLET, FILM COATED ORAL at 08:09

## 2018-09-05 RX ADMIN — DOCUSATE SODIUM 100 MG: 100 CAPSULE, LIQUID FILLED ORAL at 08:09

## 2018-09-05 NOTE — NURSING
Alert, oriented, family at bedside rehab unit and fall prevention explained. Verbalized understanding

## 2018-09-06 PROCEDURE — 12800000 HC REHAB SEMI-PRIVATE ROOM

## 2018-09-06 PROCEDURE — 97162 PT EVAL MOD COMPLEX 30 MIN: CPT

## 2018-09-06 PROCEDURE — 97165 OT EVAL LOW COMPLEX 30 MIN: CPT

## 2018-09-06 PROCEDURE — 97535 SELF CARE MNGMENT TRAINING: CPT

## 2018-09-06 PROCEDURE — 97110 THERAPEUTIC EXERCISES: CPT

## 2018-09-06 PROCEDURE — 25000003 PHARM REV CODE 250: Performed by: PHYSICAL MEDICINE & REHABILITATION

## 2018-09-06 PROCEDURE — 99900035 HC TECH TIME PER 15 MIN (STAT)

## 2018-09-06 RX ORDER — LANOLIN ALCOHOL/MO/W.PET/CERES
400 CREAM (GRAM) TOPICAL DAILY
Status: DISCONTINUED | OUTPATIENT
Start: 2018-09-06 | End: 2018-09-20 | Stop reason: HOSPADM

## 2018-09-06 RX ORDER — POLYETHYLENE GLYCOL 3350 17 G/17G
17 POWDER, FOR SOLUTION ORAL DAILY
Status: DISCONTINUED | OUTPATIENT
Start: 2018-09-07 | End: 2018-09-20 | Stop reason: HOSPADM

## 2018-09-06 RX ORDER — FUROSEMIDE 20 MG/1
20 TABLET ORAL DAILY
Status: ON HOLD | COMMUNITY
End: 2018-09-19 | Stop reason: HOSPADM

## 2018-09-06 RX ADMIN — SIMVASTATIN 20 MG: 10 TABLET, FILM COATED ORAL at 09:09

## 2018-09-06 RX ADMIN — LOSARTAN POTASSIUM 50 MG: 25 TABLET, FILM COATED ORAL at 08:09

## 2018-09-06 RX ADMIN — Medication 400 MG: at 08:09

## 2018-09-06 RX ADMIN — TAMSULOSIN HYDROCHLORIDE 0.4 MG: 0.4 CAPSULE ORAL at 08:09

## 2018-09-06 RX ADMIN — ASPIRIN 81 MG: 81 TABLET, COATED ORAL at 08:09

## 2018-09-06 RX ADMIN — FERROUS SULFATE TAB EC 325 MG (65 MG FE EQUIVALENT) 325 MG: 325 (65 FE) TABLET DELAYED RESPONSE at 09:09

## 2018-09-06 RX ADMIN — DOCUSATE SODIUM 100 MG: 100 CAPSULE, LIQUID FILLED ORAL at 08:09

## 2018-09-06 RX ADMIN — PANTOPRAZOLE SODIUM 40 MG: 40 TABLET, DELAYED RELEASE ORAL at 08:09

## 2018-09-06 RX ADMIN — ISOSORBIDE MONONITRATE 60 MG: 30 TABLET, EXTENDED RELEASE ORAL at 08:09

## 2018-09-06 RX ADMIN — CLOPIDOGREL BISULFATE 75 MG: 75 TABLET ORAL at 08:09

## 2018-09-06 RX ADMIN — AMLODIPINE BESYLATE 5 MG: 5 TABLET ORAL at 08:09

## 2018-09-06 RX ADMIN — FUROSEMIDE 40 MG: 40 TABLET ORAL at 08:09

## 2018-09-06 RX ADMIN — SOTALOL HYDROCHLORIDE 80 MG: 80 TABLET ORAL at 09:09

## 2018-09-06 RX ADMIN — FERROUS SULFATE TAB EC 325 MG (65 MG FE EQUIVALENT) 325 MG: 325 (65 FE) TABLET DELAYED RESPONSE at 08:09

## 2018-09-06 RX ADMIN — SOTALOL HYDROCHLORIDE 80 MG: 80 TABLET ORAL at 08:09

## 2018-09-06 RX ADMIN — POTASSIUM CHLORIDE 20 MEQ: 1500 TABLET, EXTENDED RELEASE ORAL at 08:09

## 2018-09-06 RX ADMIN — LOSARTAN POTASSIUM 50 MG: 25 TABLET, FILM COATED ORAL at 09:09

## 2018-09-06 RX ADMIN — DOCUSATE SODIUM 100 MG: 100 CAPSULE, LIQUID FILLED ORAL at 09:09

## 2018-09-06 NOTE — PT/OT/SLP EVAL
Occupational Therapy  Evaluation    Marck Ramos   MRN: [id number]   Admitting Diagnosis: Left hip intertrochanteric fracture needing ORIF.        OT Date of Treatment: 09/06/18   OT Start Time: 0812  OT Stop Time: 0952  OT Total Time (min): 100 min    Billable Minutes:  Evaluation 10 and Self Care/Home Management 90  Total Minutes: 100      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    Iron deficiency anemia, unspecified 7/18/2017      Past Surgical History:   Procedure Laterality Date    FRACTURE SURGERY         Referring physician: Dr. Gonzalez  Date referred to OT: 9/6/2018    General Precautions: Standard, fall  Orthopedic Precautions: LLE partial weight bearing  Braces: N/A          Patient History:  Living Environment  Lives With: child(niki), adult  Living Arrangements: house  Home Accessibility: stairs to enter home  Home Layout: Able to live on 1st floor  Number of Stairs to Enter Home: 1  Stair Railings at Home: none  Transportation Available: family or friend will provide  Living Environment Comment: Pt lives with 71 y/o daughter in [facility].   Equipment Currently Used at Home: bath bench, walker, rolling    Prior level of function:   Bed Mobility/Transfers: independent  Grooming: independent  Bathing: independent  Upper Body Dressing: independent  Lower Body Dressing: independent  Toileting: independent  Home Management Skills: independent  Driving License: Yes  Mode of Transportation: Car, Family  Occupation: Retired     Dominant hand: right    Subjective:  Communicated with nurse Smith prior to session.    Chief Complaint: pain in L hip  Patient/Family stated goals: to get home  soon    Pain/Comfort  Pain Rating 1: 0/10  Location - Side 1: Left  Location - Orientation 1: generalized  Location 1: hip  Pain Addressed 1: Nurse notified, Distraction, Reposition  Pain Rating Post-Intervention 1: 6/10    Objective:  Patient found with: juarez catheter    Cognitive Exam:  Oriented to: x3  Follows Commands/attention: Follows two-step commands  Communication: clear/fluent  Memory:  No Deficits noted  Safety awareness/insight to disability: intact  Coping skills/emotional control: Appropriate to situation    Visual/perceptual:  Intact    Physical Exam:  Postural examination/scapula alignment:    -       Rounded shoulders  -       Forward head  Skin integrity: wound dressing to L hip  Edema: None noted     Sensation:      -       Intact    Upper Extremity Range of Motion:  Right Upper Extremity: WFL  Left Upper Extremity: WFL    Upper Extremity Strength:  Right Upper Extremity: 3+/5  Left Upper Extremity: 3+/5   Strength: 3+/5      Fine motor coordination:      -       Intact    Gross motor coordination: WFL     Functional Mobility:  Bed Mobility:   Supine to sit: Moderate Assistance   Sit to supine: Activity did not occur   Rolling: Minimal Assistance   Scooting: Contact Guard Assistance    Transfers:  Sit>Stand: Min A with RW  Bed>Wheechair: Min A with RW  Toilet Transfer: Min A with RW  Shower Bench: Mod A with RW    Feeding:  Patient performed feeding with Independent with.    Grooming:  Patient performed face washing with Supervision or Set-up Assistance at sitting at sink.  Patient performed oral hygeine with Supervision or Set-up Assistance at sitting at sink.    Bathing:  Patient performed bathing with Moderate Assistance with grab bar, Handheld shower head while seated on shower chair.    UE Dressing:  Patient performed UE Dressing with Modified Independent seated in Wheelchair.    LE Dressing:  Patient don/doffed socks with Maximum Assistance while supine in bed, and again in w/c after  shower.  Patient performed don/doffed adult brief with Moderate Assistance.  Patient performed don/doffed pants with Moderate Assistance.    Toileting:  Pt performed toileting with Stand-by Assistance with Drop arm commode at toilet.    Balance:   Static Sit: GOOD-: Takes MODERATE challenges from all directions but inconsistently  Dynamic Sit:  FAIR+: Maintains balance through MINIMAL excursions of active trunk motion  Static Stand: POOR+: Needs MINIMAL assist to maintain  Dynamic stand: POOR+: Needs MIN (minimal ) assist during gait      Additional Treatment:  OT instructed patient on observing TTWB precautions during transfers and ambulation.   OT ed patient on safety with walker use for functional mobility with cues for hand placement & sequencing.       Patient left up in chair with all lines intact, call button in reach, chair alarm on and nurse notified    Assessment:  Marck Ramos is a 89 y.o. male. Patient required min A for most transfers, requiring cues for weight bearing on LLE. Patient was very cooperative and motivated to participate in evaluation. Patient required additional cueing for thorough washing and rinsing during showering.     Rehab potential is good    Activity tolerance: Good    Discharge recommendations: home     Equipment recommendations: none     GOALS:   Multidisciplinary Problems     Occupational Therapy Goals        Problem: Occupational Therapy Goal    Goal Priority Disciplines Outcome Interventions   Occupational Therapy Goal     OT, PT/OT Ongoing (interventions implemented as appropriate)    Description:  Goals to be met by: 9/25/2018     Patient will increase functional independence with ADLs by performing:    LE Dressing with Modified [place] and Assistive Devices as needed.  Grooming while standing at sink with Modified [place].  Toileting from toilet with [place] for hygiene and clothing management.   Bathing from  shower chair/bench with Modified  [place].  Supine to sit with Modified [place].  Stand pivot transfers with Modified [place] and maintaining weight-bearing precaution(s).  Toilet transfer to toilet with Modified [place] and maintaining weight-bearing precaution(s).  Upper extremity exercise program x15 reps per handout, with independence.                      PLAN: Patient to be seen 5 x/week to address the above listed problems via self-care/home management, therapeutic activities, therapeutic exercises, community/work re-entry  Plan of Care expires: 09/25/18  Plan of Care reviewed with: patient         Larry Chambers, OT  09/06/2018

## 2018-09-06 NOTE — PT/OT/SLP EVAL
PhysicalTherapy   Evaluation    Marck Ramos   MRN: [id number]     PT Received On: 09/06/18  PT Start Time: 1300     PT Stop Time: 1430    PT Total Time (min): 90 min       Billable Minutes:  Evaluation 60 and Therapeutic Exercise 30  Total Minutes: 90    Diagnosis: Left hip intertrochanteric fracture needing ORIF    PT Diagnosis: Impaired functional mobility and gait disturbance  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    Iron deficiency anemia, unspecified 7/18/2017      Past Surgical History:   Procedure Laterality Date    FRACTURE SURGERY         Referring physician: Dr. Gonzalez  Date referred to PT: 9/5/2018    General Precautions: Standard, fall  Orthopedic Precautions: LLE partial weight bearing   Braces: N/A          Patient History:  Lives With: child(niki), adult(daughter)  Living Arrangements: house  Home Accessibility: stairs to enter home  Home Layout: Able to live on 1st floor  Number of Stairs to Enter Home: 1  Transportation Available: car, family or friend will provide  Equipment Currently Used at Home: shower chair    DME owned (not currently used): rolling walker    Previous Level of Function:  Ambulation Skills: independent  Transfer Skills: independent  ADL Skills: independent  Work/Leisure Activity: independent    Subjective:  Communicated with nurse Luis and ZEINAB Juarez prior to session.    Chief Complaint: L hip pain with movement  Patient goals: to get back to walking and get home  Family goals: Not present    Pain/Comfort  Pain Rating 1: 7/10(during gait assessment)  Location - Side 1: Left  Location 1: hip  Pain Addressed 1: Reposition, Distraction, Cessation of  Activity  Pain Rating Post-Intervention 1: 0/10    Objective:  Patient found in bed with HOB slightly elevated.   Patient found with: juarez catheter    Cognitive Exam:  Oriented to: Person, Place and Situation  Follows Commands/attention: Follows multistep  commands  Communication: clear/fluent  Safety awareness/insight to disability: intact    Physical Exam:  Postural examination/scapula alignment:    -       Rounded shoulders  -       Forward head    Skin integrity: Visible skin intact  Edema: Mild L LE    Sensation:      -       Intact  light/touch L LE    Upper Extremity Range of Motion:  Refer to OT evaluation for UE ROM.    Upper Extremity Strength:  Refer to OT evaluation for UE strength.    Lower Extremity Range of Motion:  Right Lower Extremity: WFL  Left Lower Extremity: impaired approximately 50% hip flexion    Lower Extremity Strength:  Right Lower Extremity: WFL  Left Lower Extremity: grossly 3-/5     Fine motor coordination:  See OT exam    Gross motor coordination: WFL    Functional Mobility:    Bed Mobility :   Supine to sit: Maximum Assistance   Sit to supine: Maximum Assistance   Rolling: Moderate Assistance   Scooting: Moderate Assistance    Transfers:  Sit to stand:Maximum Assistance with Rolling Walker  vc  Bed <> Chair:  Step Transfer with Moderate Assistance with Rolling Walker & vc    Wheelchair:  Pt propelled Standard wheelchair x 300 feet on Level tile with  Bilateral upper extremity with Stand-by Assistance.     Gait:  Patient ambulated PWB: left lower extremity 20 & 15  feet on level tile with Rolling Walker with Minimal Assistance.  Pt with demonstarting a  3-point gait and swing-to gait with decreased alondra, increased time in double stance, decreased velocity of limb motion, decreased step length, decreased stride length, decreased swing-to-stance ratio and decreased weight-shifting ability.Impairments contributing to gait deviations include impaired balance, pain and decreased  strength    Stairs:  Not Tested due to PWB L LE      Balance:   Static Sit: GOOD-: Takes MODERATE challenges from all directions but inconsistently  Dynamic Sit:  FAIR+: Maintains balance through MINIMAL excursions of active trunk motion  Static Stand: 0: Needs MAXIMAL assist to maintain   Dynamic stand: POOR: Needs MOD (moderate) assist during gait    Additional Treatment:  Pt performed B LE there ex sitting x 20 reps with 2#R wt and green TB, with vc for proper execution and joint protection: ap, laq, marching, hip abd/add, gs/qs, ham curls (red TB).      Patient left HOB elevated with call button in reach, bed alarm on, nurse notified and CNA  present.    Assessment:  Marck Ramos is a 89 y.o. male with a medical diagnosis of Left hip intertrochanteric fracture needing ORIF.  He presents with impaired functional mobility, impaired activity tolerance, gait disturbance due to PWB L LE, pain and impaired strength.  He would benefit from inpatient PT to increase safety and independence with transfers and gait prior to discharge home.    Rehab potential is good.    Activity tolerance: Fair    Plan: bed mobility initiated, transfer training iniated, gait training, balance training, ROM, stretching, strengthening, postural re-education, wheelchair management/propulsion, cold pack and group therapeutic procedures.    Discharge recommendations: home, outpatient PT     Equipment recommendations: (To be determined)    GOALS:   Multidisciplinary Problems     Physical Therapy Goals        Problem: Physical Therapy Goal    Goal Priority Disciplines Outcome Goal Variances Interventions   Physical Therapy Goal     PT, PT/OT Ongoing (interventions implemented as appropriate)     Description:  Goals to be met by: 2018     Patient will increase functional independence with mobility by performin. Supine to sit with Stand-by Assistance  2. Sit to supine with Stand-by Assistance  3. Sit to stand transfer with Stand-by  Assistance  4. Bed to chair transfer with Stand-by Assistance using Rolling Walker  5. Gait  x 150 feet with Stand-by Assistance using Rolling Walker.   6. Wheelchair propulsion x 200 feet with Modified [place] using bilateral uppper extremities  7. Ascend/Descend 6 inch curb step with Contact Guard Assistance using Rolling Walker.                      PLAN:    Patient to be seen daily to address the above listed problems via gait training, therapeutic activities, therapeutic exercises, therapeutic groups, wheelchair management/training  Plan of Care expires: 09/21/18  Plan of Care reviewed with: patient          Patricia Mack, PT 9/6/2018

## 2018-09-06 NOTE — PLAN OF CARE
09/06/18 0940   Aerosol Therapy   $ Aerosol Therapy Charges PRN treatment not required   Respiratory Treatment Status PRN treatment not required

## 2018-09-06 NOTE — PLAN OF CARE
Problem: Physical Therapy Goal  Goal: Physical Therapy Goal  Goals to be met by: 2018     Patient will increase functional independence with mobility by performin. Supine to sit with Stand-by Assistance  2. Sit to supine with Stand-by Assistance  3. Sit to stand transfer with Stand-by Assistance  4. Bed to chair transfer with Stand-by Assistance using Rolling Walker  5. Gait  x 150 feet with Stand-by Assistance using Rolling Walker.   6. Wheelchair propulsion x 200 feet with Modified [place] using bilateral uppper extremities  7. Ascend/Descend 6 inch curb step with Contact Guard Assistance using Rolling Walker.    Outcome: Ongoing (interventions implemented as appropriate)    PT evaluation completed and POC initiated.

## 2018-09-06 NOTE — PLAN OF CARE
Problem: Fall Risk (Adult)  Goal: Absence of Falls  Patient will demonstrate the desired outcomes by discharge/transition of care.  Outcome: Ongoing (interventions implemented as appropriate)  No incident of injury or fall.

## 2018-09-06 NOTE — PLAN OF CARE
Problem: Patient Care Overview  Goal: Plan of Care Review  Outcome: Ongoing (interventions implemented as appropriate)  Pain is well controlled, safety maintained, slept well. No overnight issues or events.

## 2018-09-06 NOTE — H&P
[facility]  Physical Medicine & Rehab  History & Physical    Patient Name: Marck Ramos  MRN: [id number]  Admission Date: 9/5/2018  Attending Physician: Grey Gonzalez MD   Primary Care Provider: Hood Gannon MD    Subjective:     Principal Problem:  Left hip intertrochanteric fracture needing ORIF.    HPI:   89-year-old gentleman fell in his  yard trying to hold his dog lay down there for  few hours before he was found by his neighbors, called the ambulance and presented to the emergency room.  Patient had a workup done consistent with a left hip intertrochanteric fracture needing a left hip ORIF done on 09/29/2018 by Dr. Kuhn.  Patient's postop course complicated with acute blood loss anemia needing blood transfusion, urinary retention needing cystoscopy with catheter placement, hematuria, encephalopathy, pain control issues,dependency with  ADLs, transfers, mobility is transferred down to us for further rehabilitation.    Past Medical History:  Significant for coronary artery disease, cardiac arrhythmia needing pacemaker implant, hypertension, hyperlipidemia, peptic ulcer disease, paroxysmal atrial fibrillation, peripheral vascular disease, cataract surgery.    Social History:   Lives in a house with one-step entry along with his daughter was very independent and active ambulating without any assistive devices, taking care of himself, driving.    Family History:  Non-contributory.    Review of Systems: Non-Contributory.    Current Medications:   Current Facility-Administered Medications:     albuterol sulfate nebulizer solution 2.5 mg, 2.5 mg, Nebulization, Q4H PRN, Grey Gonzalez MD    amLODIPine tablet 5 mg, 5 mg, Oral, Daily, Grey Gonzalez MD, 5 mg at 09/06/18 0841    aspirin EC tablet 81 mg, 81 mg, Oral, Daily, Grey Gonzalez MD, 81 mg at 09/06/18 0841    clopidogrel tablet 75 mg, 75 mg, Oral, Daily, Grey Gonzalez MD, 75 mg at 09/06/18 0840     docusate sodium capsule 100 mg, 100 mg, Oral, BID, Gollamudi H. MD Carlos, 100 mg at 09/06/18 0840    ferrous sulfate EC tablet 325 mg, 325 mg, Oral, BID, Gollamudi H. MD Carlos, 325 mg at 09/06/18 0840    furosemide tablet 40 mg, 40 mg, Oral, Daily, Gollamudi H. MD Carlos, 40 mg at 09/06/18 0841    isosorbide mononitrate 24 hr tablet 60 mg, 60 mg, Oral, Daily, Gollamudi H. MD Carlos, 60 mg at 09/06/18 0840    losartan tablet 50 mg, 50 mg, Oral, BID, Gollamudi H. MD Carlos, 50 mg at 09/06/18 0839    magnesium oxide tablet 400 mg, 400 mg, Oral, Daily, Gollamudi H. MD Carlos, 400 mg at 09/06/18 0840    ondansetron disintegrating tablet 4 mg, 4 mg, Oral, Q6H PRN, Gollamudi H. MD Carlos    pantoprazole EC tablet 40 mg, 40 mg, Oral, Daily, Gollamudi H. MD Carlos, 40 mg at 09/06/18 0841    potassium chloride SA CR tablet 20 mEq, 20 mEq, Oral, Daily, Gollamudi H. MD Carlos, 20 mEq at 09/06/18 0839    simvastatin tablet 20 mg, 20 mg, Oral, QHS, Gollamudi H. MD Carlos, 20 mg at 09/05/18 2054    sotalol tablet 80 mg, 80 mg, Oral, BID, Gollamudi H. MD Carlos, 80 mg at 09/06/18 0840    tamsulosin 24 hr capsule 0.4 mg, 0.4 mg, Oral, Daily, Gollamudi H. MD Carlos, 0.4 mg at 09/06/18 0839    traMADol tablet 50 mg, 50 mg, Oral, Q8H PRN, Gollamudi H. MD Carlos    Allergies:    Review of patient's allergies indicates:   Allergen Reactions    Sulfa (sulfonamide antibiotics)      Patient can not recall reaction        Physical Exam:   Alert, oriented x3, voices no complaints, follows commands well.  Head and neck is atraumatic, normocephalic, no jugular venous distention noted.    Lungs are clear to auscultation.    Heart with a regular rate and rhythm.    Abdomen is soft, nontender, bowel sounds noted.  Bilateral upper and right lower extremity with functional active to passive range of motion with a good motor strength noted.  Left lower extremity hip incision with postsurgical dressing in place.  Neurovascular intact.   Minimal edema noted.  No calf tenderness noted.  Functional examination wise patient needs assistance with bed mobility, transfers, ADLs, ambulation.      Assessment/Plan:     Marck Ramos is a 89 y.o. male being admitted to inpatient rehabilitation on 9/5/2018 for   Left hip fractures needing ORIF with impaired mobility and ADLs.    postop acute blood loss anemia.    Postop encephalopathy.    Postop urinary retention needing Garcia catheter placement.  Hematuria.  Hypertension.    Paroxysmal atrial fibrillation.    Cardiac arrhythmia.    Peripheral vascular disease.    Coronary artery disease.        Plan: will have PT, OT, nursing evaluation 3 hr per day for range of motion, PREs bed mobility, safe functional transfers, ADL, cognitive,   Adaptive device evaluation, monitor his cardiac arrhythmia, bladder and bowel training, pain control, family training, gait training and be able to discharge him home.        Grey Gonzalez MD  Department of Physical Medicine & Rehab   [facility]    Post Admission Assessment:    Marck Ramos is a 89 y.o. male being admitted to inpatient rehabilitation on 9/5/2018 for  Left hip fractures with impaired mobility and ADLs.   Patient is appropriate for inpatient rehabilitation and is expected to tolerate 3 hours of therapy a day or 15 hours of therapy a week.  Patient is expected to benefit from the following therapy services: Physical Therapy, Occupational Therapy  and nursing evaluation.  Goals:  Supervision to Mod I with Mobility, ADLs, Transfers using LRAD   Precautions:  Fall.  ELOS: 10-14 days   Disposition: Home with family     I have had the opportunity to examine the patient within 24 hours of admission and have reviewed the Pre-Admission Assessment and find it consistent with my examination and evaluation of the patient. I confirm that this patient is appropriate for admission and treatment in this inpatient rehabilitation hospital, needs intense  interdisciplinary rehabilitation care under my direction and is expected to achieve meaningful goals within a reasonable period of time that are consistent with the planned discharge disposition.     The patient will be admitted for inpatient comprehensive interdisciplinary rehabilitation to address the impairments and medical conditions listed above while assessing equipment needs and compensatory strategies, with coordinated interdisciplinary services that will include physical therapy, occupational therapy, and close monitoring and treatment with 24-hour rehabilitative nursing. This interdisciplinary program will be performed under the direction of a physiatrist.

## 2018-09-07 PROCEDURE — 97535 SELF CARE MNGMENT TRAINING: CPT

## 2018-09-07 PROCEDURE — 97530 THERAPEUTIC ACTIVITIES: CPT

## 2018-09-07 PROCEDURE — 97110 THERAPEUTIC EXERCISES: CPT

## 2018-09-07 PROCEDURE — 97116 GAIT TRAINING THERAPY: CPT

## 2018-09-07 PROCEDURE — 25000003 PHARM REV CODE 250: Performed by: PHYSICAL MEDICINE & REHABILITATION

## 2018-09-07 PROCEDURE — 99900035 HC TECH TIME PER 15 MIN (STAT)

## 2018-09-07 PROCEDURE — 12800000 HC REHAB SEMI-PRIVATE ROOM

## 2018-09-07 PROCEDURE — 97542 WHEELCHAIR MNGMENT TRAINING: CPT

## 2018-09-07 RX ORDER — LACTULOSE 10 G/15ML
20 SOLUTION ORAL 2 TIMES DAILY PRN
Status: DISCONTINUED | OUTPATIENT
Start: 2018-09-07 | End: 2018-09-20 | Stop reason: HOSPADM

## 2018-09-07 RX ADMIN — CLOPIDOGREL BISULFATE 75 MG: 75 TABLET ORAL at 09:09

## 2018-09-07 RX ADMIN — LOSARTAN POTASSIUM 50 MG: 25 TABLET, FILM COATED ORAL at 08:09

## 2018-09-07 RX ADMIN — ASPIRIN 81 MG: 81 TABLET, COATED ORAL at 09:09

## 2018-09-07 RX ADMIN — FERROUS SULFATE TAB EC 325 MG (65 MG FE EQUIVALENT) 325 MG: 325 (65 FE) TABLET DELAYED RESPONSE at 08:09

## 2018-09-07 RX ADMIN — LOSARTAN POTASSIUM 50 MG: 25 TABLET, FILM COATED ORAL at 09:09

## 2018-09-07 RX ADMIN — SIMVASTATIN 20 MG: 10 TABLET, FILM COATED ORAL at 08:09

## 2018-09-07 RX ADMIN — TAMSULOSIN HYDROCHLORIDE 0.4 MG: 0.4 CAPSULE ORAL at 09:09

## 2018-09-07 RX ADMIN — ISOSORBIDE MONONITRATE 60 MG: 30 TABLET, EXTENDED RELEASE ORAL at 09:09

## 2018-09-07 RX ADMIN — PANTOPRAZOLE SODIUM 40 MG: 40 TABLET, DELAYED RELEASE ORAL at 09:09

## 2018-09-07 RX ADMIN — AMLODIPINE BESYLATE 5 MG: 5 TABLET ORAL at 09:09

## 2018-09-07 RX ADMIN — SOTALOL HYDROCHLORIDE 80 MG: 80 TABLET ORAL at 09:09

## 2018-09-07 RX ADMIN — FERROUS SULFATE TAB EC 325 MG (65 MG FE EQUIVALENT) 325 MG: 325 (65 FE) TABLET DELAYED RESPONSE at 09:09

## 2018-09-07 RX ADMIN — POLYETHYLENE GLYCOL (3350) 17 G: 17 POWDER, FOR SOLUTION ORAL at 09:09

## 2018-09-07 RX ADMIN — DOCUSATE SODIUM 100 MG: 100 CAPSULE, LIQUID FILLED ORAL at 09:09

## 2018-09-07 RX ADMIN — LACTULOSE 20 G: 20 SOLUTION ORAL at 04:09

## 2018-09-07 RX ADMIN — Medication 400 MG: at 09:09

## 2018-09-07 RX ADMIN — FUROSEMIDE 40 MG: 40 TABLET ORAL at 09:09

## 2018-09-07 RX ADMIN — SOTALOL HYDROCHLORIDE 80 MG: 80 TABLET ORAL at 08:09

## 2018-09-07 RX ADMIN — POTASSIUM CHLORIDE 20 MEQ: 1500 TABLET, EXTENDED RELEASE ORAL at 09:09

## 2018-09-07 RX ADMIN — DOCUSATE SODIUM 100 MG: 100 CAPSULE, LIQUID FILLED ORAL at 08:09

## 2018-09-07 NOTE — PT/OT/SLP PROGRESS
Physical Therapy         Treatment        Marck Ramos   MRN: [id number]     PT Received On: 09/07/18  Total Time (min): 75        Billable Minutes:  Gait Training 25, Therapeutic Activity 15, Therapeutic Exercise 20 and Train/Wheelchair Management 15  Total Minutes: 70    Treatment Type: Treatment  PT/PTA: PTA             General Precautions: Standard, fall  Orthopedic Precautions: Orthopedic Precautions : LLE partial weight bearing   Braces: Braces: N/A         Subjective:  Communicated with nurse prior to session.    Pain/Comfort  Pain Rating 1: 0/10    Objective:  Patient found in WC at bedside, with Patient found with: juarez catheter    Balance:   Static Stand: POOR+: Needs MINIMAL assist to maintain with RW support  Dynamic stand: POOR+: Needs MIN (minimal ) assist during gait with RW support    Transfer Training:  Sit to stand:Minimal Assistance and Moderate Assistance with Rolling Walker x6    Wheelchair Training:  Pt propelled Standard wheelchair x 2x150' feet on Level tile with  Bilateral upper extremity with Stand-by Assistance.     Gait Training:  Patient gait trained PWB: left lower extremity 2x55'  feet on level tile with Rolling Walker with Minimal Assistance.  Pt with demonstarting a  swing-to gait with decreased alondra, increased time in double stance, decreased velocity of limb motion, decreased step length, decreased stride length and decreased swing-to-stance ratio.Impairments contributing to gait deviations include impaired balance, decreased strength and weight bear restrictions of PWB L    Additional Treatment:  TA for donning L FRANKO hose in sit and in stand with RW use.  SciFit Lv1 5' with UE to decrease L LE use  Sit ex 3x10 2# R: LAQ, march, heel raises, toe raises, ball squeeze, hip abd with Yellow T-Band    Activity Tolerance:  Patient limited by fatigue    Patient left up in chair with call button in reach, chair alarm on and nurse notified.    Assessment:  Marck Ramos is a 89 y.o.  male with a medical diagnosis of <principal problem not specified>. He presents with improved mobility.    Rehab potential is .    Activity tolerance: Good    Discharge recommendations:       Equipment recommendations:       GOALS:   Multidisciplinary Problems     Physical Therapy Goals        Problem: Physical Therapy Goal    Goal Priority Disciplines Outcome Goal Variances Interventions   Physical Therapy Goal     PT, PT/OT Ongoing (interventions implemented as appropriate)     Description:  Goals to be met by: 2018     Patient will increase functional independence with mobility by performin. Supine to sit with Stand-by Assistance  2. Sit to supine with Stand-by Assistance  3. Sit to stand transfer with Stand-by Assistance  4. Bed to chair transfer with Stand-by Assistance using Rolling Walker  5. Gait  x 150 feet with Stand-by Assistance using Rolling Walker.   6. Wheelchair propulsion x 200 feet with Modified [place] using bilateral uppper extremities  7. Ascend/Descend 6 inch curb step with Contact Guard Assistance using Rolling Walker.                      PLAN:    Patient to be seen daily  to address the above listed problems via gait training, therapeutic activities, therapeutic exercises, therapeutic groups, wheelchair management/training  Plan of Care expires: 18  Plan of Care reviewed with: patient         Casi ROUSSEAU Adelaida, PTA 2018

## 2018-09-07 NOTE — PLAN OF CARE
Problem: Patient Care Overview  Goal: Plan of Care Review  Outcome: Ongoing (interventions implemented as appropriate)  Sleeping on rounds. Able to shift weight. No c/o pain. Will monitor.

## 2018-09-07 NOTE — PROGRESS NOTES
Spoke with patient to complete assessment.  Patient is a  and lives at home with his daughter living with him. Patient was independent and still driving PTA.  His daughter is retired and in fair health and should be able to help patient as needed.  Patient also has a supportive son living nearby who is retired and supportive. Patient has a rolling walker and shower bench at home. He has not had any home health in the past 2 years. Patient plans to return home at discharge. SW will follow and assist with discharge planning as needed.

## 2018-09-07 NOTE — PLAN OF CARE
Problem: Physical Therapy Goal  Goal: Physical Therapy Goal  Goals to be met by: 2018     Patient will increase functional independence with mobility by performin. Supine to sit with Stand-by Assistance  2. Sit to supine with Stand-by Assistance  3. Sit to stand transfer with Stand-by Assistance  4. Bed to chair transfer with Stand-by Assistance using Rolling Walker  5. Gait  x 150 feet with Stand-by Assistance using Rolling Walker.   6. Wheelchair propulsion x 200 feet with Modified [place] using bilateral uppper extremities  7. Ascend/Descend 6 inch curb step with Contact Guard Assistance using Rolling Walker.     Outcome: Ongoing (interventions implemented as appropriate)  Amb with RW L PWB 2x55' min assist/CGA with WC to follow

## 2018-09-07 NOTE — PATIENT CARE CONFERENCE
Weekly Staffing Report      Date Admitted: 9/5/2018 :   Staffing Date: 9/7/2018     Patient Active Problem List   Diagnosis    Hypertension    Hyperlipidemia    Iron deficiency anemia, unspecified    Iron deficiency anemia secondary to blood loss (chronic)    Anemia in stage 2 chronic kidney disease    Hemorrhage of gastrointestinal tract    Anemia, chronic disease    Fracture of left femur          Team Members Present:  Physician Team Member: Dr Gonzalez  Nursing Team Member: Alessandro Tello RN  Case Management Team Member: Maddie Valdez CM/SW  PT Team Member: Patricia Mack PT  OT Team Member: Laney Washington OT    Nursing  Skin: Intact  Bowel: Continent  Bladder:juarez catheter  Diet: Regular  Appetite: good   Pain Level: 0/10    Physical Therapy  Supine to Sit:  maximal assist  Sit to Stand: maximal assist  Gait: 0-25 feet moderate assist Rolling walker  Wheelchair Mobility: 300 feet supervision  Stairs: N/A      Occupational Therapy  Feeding: independent  Grooming:supervision  UED: supervision  LED: maximal assist  Bathing:moderate assist   Toileting:standy by assistance  Toilet Transfer:  minimal assist  Tub Transfer:  moderate assist            Goals:  Sup. To SBA.      Tolerates 3 hours of therapy: Yes    Discharge Destination:   Home with outpatient therapy.    Estimated Length of Stay:  2 weeks.

## 2018-09-07 NOTE — PT/OT/SLP PROGRESS
"Occupational Therapy  Treatment    Marck Ramos   MRN: [id number]   Admitting Diagnosis: Left hip intertrochanteric fracture         OT Date of Treatment: 09/07/18   Total Time (min): 90 min      Billable Minutes:  Self Care/Home Management 55, Therapeutic Activity 20 and Therapeutic Exercise 15  Total Minutes: 90    General Precautions: Standard, fall  Orthopedic Precautions: LLE partial weight bearing  Braces: N/A         Subjective:  Communicated with RN prior to session.  Pt seated in wheelchair upon arrival.  Pt stated "I really don't feel for a shower today."    Pain/Comfort  Pain Rating 1: 0/10  Pain Rating Post-Intervention 1: 0/10    Objective:  Patient found with: juarez catheter    Functional Mobility:    Transfer Training:  Sit<>Stand: Min A with RW     Grooming:  Patient performed oral hygeine with Supervision or Set-up Assistance seated in wheelchair at sink.    UE Dressing:  Patient doffed scrub shirt and donned T-shirt with mod I while seated in w/c.    LE Dressing:  Pt required mod A to doff scrub pants and don pajama pants. Pt required more assistance mainly because juarez catheter. OT assisted pt with pulling feet through underwear and pants hole and raising up to his knees. From there, pt was able to pull clothing to upper thighs. Pt then stood with min A with RW, requiring min A to maintain balance while pt pulled underwear and pants over hips.     Pt donned/doffed socks seated in w/c with sock aid and reacher requiring min A and demonstration for technique.            Balance:   Static Sit: GOOD-: Takes MODERATE challenges from all directions but inconsistently  Dynamic Sit:  FAIR+: Maintains balance through MINIMAL excursions of active trunk motion  Static Stand: POOR+: Needs MINIMAL assist to maintain  Dynamic stand: POOR+: Needs MIN (minimal ) assist during gait    Additional Treatment:  Pt performed the following BUE exercises:  -Elbow flexion (3x10) using medium grade theraband  -Shoulder " flexion (3x10) using 3lbs weight bar    OT educated pt on use of adaptive equipment for LB dressing & safe item retrieval with reacher with demonstration provided.       Patient left up in chair with all lines intact and call button in reach    ASSESSMENT:  Marck Ramos is a 89 y.o. male. Pt demonstrated good effort and activity tolerance with BUE exercises. Good retention with sequencing of LE dressing with AD after receiving demonstration and cues. Pt would continue to benefit from skilled OT to increase independence with ADLs.     Rehab potential is good    Activity tolerance: Good    Discharge recommendations: home, outpatient PT     Equipment recommendations: (TBD)     GOALS:   Multidisciplinary Problems     Occupational Therapy Goals        Problem: Occupational Therapy Goal    Goal Priority Disciplines Outcome Interventions   Occupational Therapy Goal     OT, PT/OT Ongoing (interventions implemented as appropriate)    Description:  Goals to be met by: 9/25/2018     Patient will increase functional independence with ADLs by performing:    LE Dressing with Modified [place] and Assistive Devices as needed.  Grooming while standing at sink with Modified [place].  Toileting from toilet with [place] for hygiene and clothing management.   Bathing from  shower chair/bench with Modified [place].  Supine to sit with Modified [place].  Stand pivot transfers with Modified [place] and maintaining weight-bearing precaution(s).  Toilet transfer to toilet with Modified [place] and maintaining weight-bearing precaution(s).  Upper extremity exercise program x15 reps per handout, with independence.                      Plan:  Patient to be seen 6 x/week to address the above listed problems via self-care/home management, therapeutic activities, therapeutic exercises, community/work re-entry  Plan of Care expires: 09/25/18  Plan of Care reviewed with: patient         Larry Chambers,  OT  09/07/2018

## 2018-09-07 NOTE — PROGRESS NOTES
"REHAB FOLLOWUP NOTE    Marck Ramos is a 89 y.o. male patient.    Chief Complaint:   Left hip intertrochanteric fracture needing ORIF.    History: 89-year-old gentleman fell in his  yard trying to hold his dog lay down there for  few hours before he was found by his neighbors, called the ambulance and presented to the emergency room.  Patient had a workup done consistent with a left hip intertrochanteric fracture needing a left hip ORIF done on 09/29/2018 by Dr. Kuhn.  Patient's postop course complicated with acute blood loss anemia needing blood transfusion, urinary retention needing cystoscopy with catheter placement, hematuria, encephalopathy, pain control issues,dependency with  ADLs, transfers, mobility is transferred down to us for further rehabilitation.         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    Iron deficiency anemia, unspecified 7/18/2017       Temp: 97.3 °F (36.3 °C) (09/07/18 0547)  Pulse: 61 (09/07/18 0547)  Resp: 18 (09/07/18 0547)  BP: (!) 163/67 (09/07/18 0547)  SpO2: 97 % (09/07/18 0547)  Weight: 66.1 kg (145 lb 11.2 oz) (09/05/18 1800)  Height: 5' 6" (167.6 cm) (09/05/18 1800)    Lab Results   Component Value Date    WBC 9.3 09/01/2018    HGB 8.4 (L) 09/01/2018    HCT 24.0 (L) 09/01/2018     09/01/2018    AST 20 07/30/2018     (L) 08/30/2018    K 3.4 (L) 08/30/2018    CL 98 08/30/2018    CREATININE 1.00 08/30/2018    BUN 24 (H) 08/30/2018    CO2 22.6 (L) 08/30/2018    PSA 1.88 06/29/2012       Physical Examination:  Alert, oriented x3,   Voices no complaints.   lungs are clear to auscultation.  Heart with a regular rate and rhythm.    Bilateral upper and " right lower extremity with functional active to passive range of motion with good motor strength noted.  Left lower extremity hip incision dressing changed, clean and dry, hematoma noted around the incision site.  Continent of bowel movement.  Garcia catheter in place.  Needs assistance with basic ADLs, transfers, mobility.      Impression:  Left hip intertrochanteric fracture needing ORIF.  Postop acute blood loss anemia.    Postop encephalopathy.    Postop urinary retention needing Garcia catheter placement.    Hypertension.    Paroxysmal atrial fibrillation.    Peripheral vascular disease.    Coronary artery disease.    Recommendations:    Continue current PT, OT, nursing care.          Grey Gonzalez MD  9/7/2018

## 2018-09-07 NOTE — PLAN OF CARE
Overall Plan of Care      Marck Ramos   MRN: [id number]  Admit Date/Time: 9/5/2018  5:21 PM   Admitting Diagnosis:   Left hip intertrochanteric fracture needing ORIF.    Estimated Length of Stay (days):   Two weeks.    1.  Medical Prognosis:     I have reviewed each team member's Treatment Plan and the current list of barriers/impairments and limitations for this patient.  I approve the goals and recommendations outlined in the Transdisciplinary Plan of Care     1. Assessment:      Medical Prognosis       [ x  ]  Improvement expected in admitting condition, with associated             Functional improvement       [   ] Medical condition is chronic or deteriorating, but the patient          Should have functional improvement       [   ]  Other:        Medical/Nursing Interventions:    [ x ]  Fall Prevention Program                                 [x  ]  Adequate Nutrition/Hydration    [x  ]  Monitor Skin Condition                                   [ x ]  Adaptive Equipment    [x  ]  Implement Rehab Therapy Goals                  [x  ]  Bowel and Bladder Program    [x  ]  Monitor Surgical Site Healing                         [  ]  Manage Swallowing disorder    [  ]  Manage Swallowing Disorder                         [ x ]  Pain Management    [x  ]  Effectiveness of Medications                          [x  ]  Patient / Family Education    [  ]  Manage Risk of UTI (Garcia Care)                   [  ]  Diabetic Teaching, Blood Sugar                                                                                      Checks / Insulin Administration    [  ]  Peg Tube Feeds                                             [x  ]  Frequent Vitals/Neuro                                                                                       Assessments and/or Changes     [  ] Trach Care/Education           [  ]  Ostomy Care/Education      Therapy Plan:    Physical Therapy:  Intensity (hrs/day): 1-1.5  Frequency (day/wk:) 7  Estimated  Discharge Date: 09/21/18     PT Treatment Plan:  Plan: gait training, therapeutic activities, therapeutic exercises, therapeutic groups, wheelchair management/training      Occupational Therapy:  Intensity (hrs/day): 1-1.5  Frequency (day/wk:) 7  Estimated Discharge Date:  09/25/18    OT Treatment Plan:  OT Plan: self-care/home management, therapeutic activities, therapeutic exercises, community/work re-entry        Therapy Recommendations:    Therapy Discharge Recommendations: home, outpatient PT     Therapy Equipment Recommendations: (To be determined)          Anticipated Functional Outcome:    [  ]  Independent    [  ]  Modified Independent    [x  ]  Requiring Supervision / Assistance      Discharge Planning:    Discharge Disposition:    [  ]  Home with Home Health    [x  ]  Home with Oupatient    [  ]  Assisted Living Facility      Team Goal:    To enable the patient's safe return to the home or a community based environment upon discharge.       Grey Gonzalez MD  09/07/2018  8:54 AM

## 2018-09-07 NOTE — PLAN OF CARE
Problem: Occupational Therapy Goal  Goal: Occupational Therapy Goal  Goals to be met by: 9/25/2018     Patient will increase functional independence with ADLs by performing:    LE Dressing with Modified [place] and Assistive Devices as needed.  Grooming while standing at sink with Modified [place].  Toileting from toilet with [place] for hygiene and clothing management.   Bathing from  shower chair/bench with Modified [place].  Supine to sit with Modified [place].  Stand pivot transfers with Modified [place] and maintaining weight-bearing precaution(s).  Toilet transfer to toilet with Modified [place] and maintaining weight-bearing precaution(s).  Upper extremity exercise program x15 reps per handout, with independence.     Outcome: Ongoing (interventions implemented as appropriate)  Goals remain appropriate.

## 2018-09-07 NOTE — PROGRESS NOTES
"REHAB FOLLOWUP NOTE    Marck Ramos is a 89 y.o. male patient.    Chief Complaint:   Left hip intertrochanteric fracture needing ORIF.    History: 89-year-old gentleman fell in his  yard trying to hold his dog lay down there for  few hours before he was found by his neighbors, called the ambulance and presented to the emergency room.  Patient had a workup done consistent with a left hip intertrochanteric fracture needing a left hip ORIF done on 09/29/2018 by Dr. Kuhn.  Patient's postop course complicated with acute blood loss anemia needing blood transfusion, urinary retention needing cystoscopy with catheter placement, hematuria, encephalopathy, pain control issues,dependency with  ADLs, transfers, mobility is transferred down to us for further rehabilitation.         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    Iron deficiency anemia, unspecified 7/18/2017       Temp: 97.5 °F (36.4 °C) (09/07/18 0912)  Pulse: 73 (09/07/18 0912)  Resp: 20 (09/07/18 0912)  BP: (!) 150/72 (09/07/18 0912)  SpO2: 96 % (09/07/18 0912)  Weight: 66.1 kg (145 lb 11.2 oz) (09/05/18 1800)  Height: 5' 6" (167.6 cm) (09/05/18 1800)    Lab Results   Component Value Date    WBC 9.3 09/01/2018    HGB 8.4 (L) 09/01/2018    HCT 24.0 (L) 09/01/2018     09/01/2018    AST 20 07/30/2018     (L) 08/30/2018    K 3.4 (L) 08/30/2018    CL 98 08/30/2018    CREATININE 1.00 08/30/2018    BUN 24 (H) 08/30/2018    CO2 22.6 (L) 08/30/2018    PSA 1.88 06/29/2012       Physical Examination:  Alert, oriented x3,   Voices no complaints.   lungs are clear to auscultation.  Heart with a regular rate and rhythm.    Bilateral upper and " right lower extremity with functional active to passive range of motion with good motor strength noted.  Left lower extremity hip incision dressing changed, clean and dry, hematoma noted around the incision site.  Continent of bowel movement.  Garcia catheter in place.  Needs assistance with basic ADLs, transfers, mobility.      Impression:  Left hip intertrochanteric fracture needing ORIF.  Postop acute blood loss anemia.    Postop encephalopathy.    Postop urinary retention needing Garcia catheter placement.    Hypertension.    Paroxysmal atrial fibrillation.    Peripheral vascular disease.    Coronary artery disease.    Recommendations:    Continue current PT, OT, nursing care.    Check labs on Monday.        Grey Gonzalez MD  9/7/2018

## 2018-09-07 NOTE — NURSING
Alert and oriented. No c/o pain. Has original dressing to left hip. TFN nailing on 9/1. Due to fracture of left femur. Bruises and cuts noted to arms which resulted from fall. +1 edema noted to left lower leg and foot. + pedal pulses. Took po meds. Has juarez in place. Will monitor.

## 2018-09-08 LAB
ALBUMIN SERPL BCP-MCNC: 2.3 G/DL
ALP SERPL-CCNC: 77 U/L
ALT SERPL W/O P-5'-P-CCNC: 10 U/L
ANION GAP SERPL CALC-SCNC: 9 MMOL/L
AST SERPL-CCNC: 25 U/L
BACTERIA #/AREA URNS HPF: ABNORMAL /HPF
BASOPHILS # BLD AUTO: 0.1 K/UL
BASOPHILS NFR BLD: 1.1 %
BILIRUB SERPL-MCNC: 1.4 MG/DL
BILIRUB UR QL STRIP: NEGATIVE
BUN SERPL-MCNC: 13 MG/DL
CALCIUM SERPL-MCNC: 8 MG/DL
CHLORIDE SERPL-SCNC: 93 MMOL/L
CLARITY UR: ABNORMAL
CO2 SERPL-SCNC: 25 MMOL/L
COLOR UR: ABNORMAL
CREAT SERPL-MCNC: 0.7 MG/DL
DIFFERENTIAL METHOD: ABNORMAL
EOSINOPHIL # BLD AUTO: 0.3 K/UL
EOSINOPHIL NFR BLD: 4.5 %
ERYTHROCYTE [DISTWIDTH] IN BLOOD BY AUTOMATED COUNT: 16.2 %
EST. GFR  (AFRICAN AMERICAN): >60 ML/MIN/1.73 M^2
EST. GFR  (NON AFRICAN AMERICAN): >60 ML/MIN/1.73 M^2
GLUCOSE SERPL-MCNC: 128 MG/DL
GLUCOSE UR QL STRIP: NEGATIVE
HCT VFR BLD AUTO: 31.7 %
HGB BLD-MCNC: 10.6 G/DL
HGB UR QL STRIP: ABNORMAL
KETONES UR QL STRIP: NEGATIVE
LEUKOCYTE ESTERASE UR QL STRIP: ABNORMAL
LYMPHOCYTES # BLD AUTO: 1.6 K/UL
LYMPHOCYTES NFR BLD: 21.3 %
MCH RBC QN AUTO: 33 PG
MCHC RBC AUTO-ENTMCNC: 33.5 G/DL
MCV RBC AUTO: 98 FL
MICROSCOPIC COMMENT: ABNORMAL
MONOCYTES # BLD AUTO: 0.9 K/UL
MONOCYTES NFR BLD: 11.8 %
NEUTROPHILS # BLD AUTO: 4.5 K/UL
NEUTROPHILS NFR BLD: 61.3 %
NITRITE UR QL STRIP: NEGATIVE
OSMOLALITY SERPL: 266 MOSM/KG
PH UR STRIP: 7 [PH] (ref 5–8)
PLATELET # BLD AUTO: 400 K/UL
PMV BLD AUTO: 7.2 FL
POTASSIUM SERPL-SCNC: 3.7 MMOL/L
PROT SERPL-MCNC: 5.4 G/DL
PROT UR QL STRIP: NEGATIVE
RBC # BLD AUTO: 3.22 M/UL
RBC #/AREA URNS HPF: >100 /HPF (ref 0–4)
SODIUM SERPL-SCNC: 127 MMOL/L
SODIUM UR-SCNC: 78 MMOL/L
SP GR UR STRIP: 1.01 (ref 1–1.03)
URN SPEC COLLECT METH UR: ABNORMAL
UROBILINOGEN UR STRIP-ACNC: NEGATIVE EU/DL
WBC # BLD AUTO: 7.4 K/UL
WBC #/AREA URNS HPF: 3 /HPF (ref 0–5)

## 2018-09-08 PROCEDURE — 97530 THERAPEUTIC ACTIVITIES: CPT

## 2018-09-08 PROCEDURE — 81000 URINALYSIS NONAUTO W/SCOPE: CPT

## 2018-09-08 PROCEDURE — 97110 THERAPEUTIC EXERCISES: CPT

## 2018-09-08 PROCEDURE — 83935 ASSAY OF URINE OSMOLALITY: CPT

## 2018-09-08 PROCEDURE — 83930 ASSAY OF BLOOD OSMOLALITY: CPT

## 2018-09-08 PROCEDURE — 99900035 HC TECH TIME PER 15 MIN (STAT)

## 2018-09-08 PROCEDURE — 12800000 HC REHAB SEMI-PRIVATE ROOM

## 2018-09-08 PROCEDURE — 97116 GAIT TRAINING THERAPY: CPT

## 2018-09-08 PROCEDURE — 80053 COMPREHEN METABOLIC PANEL: CPT

## 2018-09-08 PROCEDURE — 84300 ASSAY OF URINE SODIUM: CPT

## 2018-09-08 PROCEDURE — 85025 COMPLETE CBC W/AUTO DIFF WBC: CPT

## 2018-09-08 PROCEDURE — 97542 WHEELCHAIR MNGMENT TRAINING: CPT

## 2018-09-08 PROCEDURE — 25000003 PHARM REV CODE 250: Performed by: PHYSICAL MEDICINE & REHABILITATION

## 2018-09-08 PROCEDURE — 36415 COLL VENOUS BLD VENIPUNCTURE: CPT

## 2018-09-08 PROCEDURE — 97535 SELF CARE MNGMENT TRAINING: CPT

## 2018-09-08 RX ADMIN — POLYETHYLENE GLYCOL (3350) 17 G: 17 POWDER, FOR SOLUTION ORAL at 08:09

## 2018-09-08 RX ADMIN — DOCUSATE SODIUM 100 MG: 100 CAPSULE, LIQUID FILLED ORAL at 08:09

## 2018-09-08 RX ADMIN — LOSARTAN POTASSIUM 50 MG: 25 TABLET, FILM COATED ORAL at 08:09

## 2018-09-08 RX ADMIN — SOTALOL HYDROCHLORIDE 80 MG: 80 TABLET ORAL at 08:09

## 2018-09-08 RX ADMIN — FERROUS SULFATE TAB EC 325 MG (65 MG FE EQUIVALENT) 325 MG: 325 (65 FE) TABLET DELAYED RESPONSE at 08:09

## 2018-09-08 RX ADMIN — Medication 400 MG: at 08:09

## 2018-09-08 RX ADMIN — TAMSULOSIN HYDROCHLORIDE 0.4 MG: 0.4 CAPSULE ORAL at 08:09

## 2018-09-08 RX ADMIN — FUROSEMIDE 40 MG: 40 TABLET ORAL at 08:09

## 2018-09-08 RX ADMIN — AMLODIPINE BESYLATE 5 MG: 5 TABLET ORAL at 08:09

## 2018-09-08 RX ADMIN — PANTOPRAZOLE SODIUM 40 MG: 40 TABLET, DELAYED RELEASE ORAL at 08:09

## 2018-09-08 RX ADMIN — CLOPIDOGREL BISULFATE 75 MG: 75 TABLET ORAL at 08:09

## 2018-09-08 RX ADMIN — POTASSIUM CHLORIDE 20 MEQ: 1500 TABLET, EXTENDED RELEASE ORAL at 08:09

## 2018-09-08 RX ADMIN — ASPIRIN 81 MG: 81 TABLET, COATED ORAL at 08:09

## 2018-09-08 RX ADMIN — ISOSORBIDE MONONITRATE 60 MG: 30 TABLET, EXTENDED RELEASE ORAL at 08:09

## 2018-09-08 RX ADMIN — SIMVASTATIN 20 MG: 10 TABLET, FILM COATED ORAL at 08:09

## 2018-09-08 NOTE — PT/OT/SLP PROGRESS
Physical Therapy         Treatment        Marck Ramos   MRN: [id number]     PT Received On: 09/08/18  Total Time (min): 120        Billable Minutes:  Gait [id number], Therapeutic Activity 20, Therapeutic Exercise 55 and Train/Wheelchair Management 10, MHP 10  Total Minutes: 120    Treatment Type: Treatment  PT/PTA: PTA     PTA Visit Number: 2       General Precautions: Standard, fall  Orthopedic Precautions: Orthopedic Precautions : LLE partial weight bearing   Braces:           Subjective:  Communicated with patient and nursing prior to session.    Pain/Comfort  Pain Rating 1: 6/10  Location - Side 1: Left  Location - Orientation 1: transverse  Location 1: hip  Pain Addressed 1: Reposition, Distraction(hot pack after therex)  Pain Rating Post-Intervention 1: 4/10    Objective:  Patient found supine in bed, agreeable to treatment, with      Functional Mobility:  Bed Mobility:   Supine to sit: Minimal Assistance, with A for L LE   Sit to supine: Activity did not occur   Rolling: Minimal Assistance to roll to R side   Scooting: Moderate Assistance with A for L side    Balance:   Static Sit: FAIR: Maintains without assist, but unable to take any challenges   Dynamic Sit:  FAIR+: Maintains balance through MINIMAL excursions of active trunk motion  Static Stand: POOR+: Needs MINIMAL assist to maintain  Dynamic stand: POOR+: Needs MIN (minimal ) assist during gait    Transfer Training:  Sit to stand:Minimal Assistance and Moderate Assistance with Rolling Walker    Bed <> Chair:  Stand Pivot with Minimal Assistance with Rolling Walker VC's for PWB on L LE  Chair <> Mat: Stand Pivot with Minimal Assistance with  Rolling Walker x2    Wheelchair Training:  Pt propelled Standard wheelchair x 300 feet on Level tile with  Bilateral upper extremity and Right lower extremity with Contact Guard Assistance.     Gait Training:  Patient gait trained PWB: left lower extremity 85+100ft    on level tile with Rolling Walker with Minimal  Assistance.  Pt with demonstarting a  swing-to gait with decreased laondra, increased time in double stance, decreased velocity of limb motion, decreased step length, decreased toe-to-floor clearance and decreased weight-shifting ability.Impairments contributing to gait deviations include impaired coordination, decreased flexibility, impaired motor control, pain, decreased ROM, decreased strength and UE fatigue; several standing rests with GT.      Additional Treatment:  SciFIt L1 10' with B UE's and cues to PWB thru L LE  Manual hamstring stretch and calf stretch in sitting 3/30s R/L  Sitting TE, 3x10, 2# R LE/0# L LE, RTB: AP, LAQ, march, hip abd, Hip ER, ball squeeze  Supine TE: Heel slide and hip abd/add with sliding board for L LE x 10, no sliding board for R LE, SAQ 3 x 10  Standing TE: x 10: hip flex, marching, hip abd/add, hip ext, HS curl    Activity Tolerance:  Patient tolerated treatment well    Patient left up in chair with OT present.    Assessment:  Marck Ramos is a 89 y.o. male with a medical diagnosis of <principal problem not specified>. He presents with decrease cognitive status, decreased functional strength, decreased functional mobility, decreased weight bearing L LE, decreased endurance, decrease safety awareness.    Rehab potential is good.    Activity tolerance: Good    Discharge recommendations:       Equipment recommendations:       GOALS:   Multidisciplinary Problems     Physical Therapy Goals        Problem: Physical Therapy Goal    Goal Priority Disciplines Outcome Goal Variances Interventions   Physical Therapy Goal     PT, PT/OT Ongoing (interventions implemented as appropriate)     Description:  Goals to be met by: 2018     Patient will increase functional independence with mobility by performin. Supine to sit with Stand-by Assistance  2. Sit to supine with Stand-by Assistance  3. Sit to stand transfer with Stand-by Assistance  4. Bed to chair transfer with Stand-by  Assistance using Rolling Walker  5. Gait  x 150 feet with Stand-by Assistance using Rolling Walker.   6. Wheelchair propulsion x 200 feet with Modified [place] using bilateral uppper extremities  7. Ascend/Descend 6 inch curb step with Contact Guard Assistance using Rolling Walker.                      PLAN:    Patient to be seen daily  to address the above listed problems via gait training, therapeutic activities, therapeutic exercises, therapeutic groups, wheelchair management/training  Plan of Care expires: 09/21/18  Plan of Care reviewed with: patient         Kareybrandan Diasey, PTA 9/8/2018

## 2018-09-08 NOTE — PLAN OF CARE
Problem: Patient Care Overview  Goal: Plan of Care Review  Outcome: Ongoing (interventions implemented as appropriate)  Patient alert and oriented x 4.  Continent of bowel, large bm passed.  Garcia catheter intact and patent.  Denies pain or complaint.  Moderate assist to turn and reposition.  Resting quietly through shift.  NAD noted, free of falls.

## 2018-09-08 NOTE — PT/OT/SLP PROGRESS
Occupational Therapy  Treatment    Marck Ramos   MRN: [id number]   Admitting Diagnosis: s/p L hip ORIF     OT Date of Treatment: 09/08/18   Total Time (min): 65 min    Billable Minutes:  Self Care/Home Management 65  Total Minutes: 65    General Precautions: Standard, fall  Orthopedic Precautions: LLE partial weight bearing  Braces: N/A    Subjective:  Communicated with nsg (Pat) prior to session.    Pain/Comfort  Pain Rating 1: 0/10  Location - Side 1: Left  Location 1: hip  Pain Rating Post-Intervention 1: 0/10    Objective:  Patient found with: juarez catheter(chair alarm)    Functional Mobility:  Bed Mobility: Min A sit<>supine for controlling the L LE     Transfer Training:   CGA for w/c<>TTB and w/c<>bed    Feeding:  Mod I as presented on tray, including opening of utensils, packets, etc.    Bathing: mod A seated on TTB in shower - assist for washing and drying marina LE and jovani-care    UE Dressing: doff/don t-shirt w/ CGA (to smooth out wrinkles); min A for doff/don button-up long-sleeve shirt.    LE Dressing:  Doffed shoes w/ max A (attempted using reacher, but unable). Did not don shoes as end of session.  Doffed socks w/ reacher and mod A; total A for L LE FRANKO.  Doffed pants w/ reacher and min A (CGA for stand balance to pull down pants and underwear), also assist for managing juarez.  After showering, donned boxers w/ reacher and mod A (for threading feet; CGA to hike).  Donned FRANKO w/ total A, OT donned socks w/ total A due to time constraints.  Pt donned pants w/ reacher and min A.    Balance:   Static Sit: FAIR+: Able to take MINIMAL challenges from all directions in shower and at EOB, though he did require cues to sit up tall at EOB  Static Stand: CGA w/ RW.    Patient left up in chair eating lunch with all lines intact, call button in reach, chair alarm on and nsg ([facility]) notified    ASSESSMENT:  Marck Ramos is a 89 y.o. male with a medical diagnosis of L hip fx, s/p ORIF. He presented needing min-mod  A for ADLs today w/ difficulty in problem-solving how to use AE for LBD and fatigue after showering and dressing. Nonetheless, good participation and effort given. Mr. Ramos can continue to benefit from skilled OT as part of a multidisciplinary approach to increase his independence and safety with all activities for best potential to return to his PLOF.     Rehab potential is good    Activity tolerance: Fair    Discharge recommendations:       Equipment recommendations: (TBD)     GOALS:   Multidisciplinary Problems     Occupational Therapy Goals        Problem: Occupational Therapy Goal    Goal Priority Disciplines Outcome Interventions   Occupational Therapy Goal     OT, PT/OT Ongoing (interventions implemented as appropriate)    Description:  Goals to be met by: 9/25/2018     Patient will increase functional independence with ADLs by performing:    LE Dressing with Modified Angie and Assistive Devices as needed.  Grooming while standing at sink with Modified Angie.  Toileting from toilet with Angie for hygiene and clothing management.   Bathing from  shower chair/bench with Modified Angie.  Supine to sit with Modified Angie.  Stand pivot transfers with Modified Angie and maintaining weight-bearing precaution(s).  Toilet transfer to toilet with Modified Angie and maintaining weight-bearing precaution(s).  Upper extremity exercise program x15 reps per handout, with independence.                      Plan:  Patient to be seen 6 x/week to address the above listed problems via self-care/home management, community/work re-entry, therapeutic activities, therapeutic exercises, therapeutic groups, cognitive retraining, wheelchair management/training  Plan of Care expires: 09/25/18  Plan of Care reviewed with: patient         Summer Judd, OT  09/08/2018

## 2018-09-08 NOTE — PLAN OF CARE
Problem: Physical Therapy Goal  Goal: Physical Therapy Goal  Goals to be met by: 2018     Patient will increase functional independence with mobility by performin. Supine to sit with Stand-by Assistance  2. Sit to supine with Stand-by Assistance  3. Sit to stand transfer with Stand-by Assistance  4. Bed to chair transfer with Stand-by Assistance using Rolling Walker  5. Gait  x 150 feet with Stand-by Assistance using Rolling Walker.   6. Wheelchair propulsion x 200 feet with Modified [place] using bilateral uppper extremities  7. Ascend/Descend 6 inch curb step with Contact Guard Assistance using Rolling Walker.     Outcome: Ongoing (interventions implemented as appropriate)  Patient performed transfers, strengthening therex, WC mobility, and gait training to meet goals set in POC

## 2018-09-09 LAB
BNP SERPL-MCNC: 637 PG/ML
OSMOLALITY UR: 357 MOSM/KG

## 2018-09-09 PROCEDURE — 25000003 PHARM REV CODE 250: Performed by: PHYSICAL MEDICINE & REHABILITATION

## 2018-09-09 PROCEDURE — 12800000 HC REHAB SEMI-PRIVATE ROOM

## 2018-09-09 PROCEDURE — 99900035 HC TECH TIME PER 15 MIN (STAT)

## 2018-09-09 PROCEDURE — 83880 ASSAY OF NATRIURETIC PEPTIDE: CPT

## 2018-09-09 PROCEDURE — 36415 COLL VENOUS BLD VENIPUNCTURE: CPT

## 2018-09-09 PROCEDURE — 97110 THERAPEUTIC EXERCISES: CPT

## 2018-09-09 PROCEDURE — 97530 THERAPEUTIC ACTIVITIES: CPT

## 2018-09-09 PROCEDURE — 25000003 PHARM REV CODE 250: Performed by: INTERNAL MEDICINE

## 2018-09-09 PROCEDURE — 97542 WHEELCHAIR MNGMENT TRAINING: CPT

## 2018-09-09 RX ORDER — SODIUM CHLORIDE 9 MG/ML
INJECTION, SOLUTION INTRAVENOUS CONTINUOUS
Status: DISCONTINUED | OUTPATIENT
Start: 2018-09-09 | End: 2018-09-10

## 2018-09-09 RX ADMIN — LOSARTAN POTASSIUM 50 MG: 25 TABLET, FILM COATED ORAL at 08:09

## 2018-09-09 RX ADMIN — TAMSULOSIN HYDROCHLORIDE 0.4 MG: 0.4 CAPSULE ORAL at 08:09

## 2018-09-09 RX ADMIN — ASPIRIN 81 MG: 81 TABLET, COATED ORAL at 08:09

## 2018-09-09 RX ADMIN — TRAMADOL HYDROCHLORIDE 50 MG: 50 TABLET, FILM COATED ORAL at 11:09

## 2018-09-09 RX ADMIN — ISOSORBIDE MONONITRATE 60 MG: 30 TABLET, EXTENDED RELEASE ORAL at 08:09

## 2018-09-09 RX ADMIN — SOTALOL HYDROCHLORIDE 80 MG: 80 TABLET ORAL at 08:09

## 2018-09-09 RX ADMIN — POTASSIUM CHLORIDE 20 MEQ: 1500 TABLET, EXTENDED RELEASE ORAL at 08:09

## 2018-09-09 RX ADMIN — CLOPIDOGREL BISULFATE 75 MG: 75 TABLET ORAL at 08:09

## 2018-09-09 RX ADMIN — FUROSEMIDE 40 MG: 40 TABLET ORAL at 08:09

## 2018-09-09 RX ADMIN — FERROUS SULFATE TAB EC 325 MG (65 MG FE EQUIVALENT) 325 MG: 325 (65 FE) TABLET DELAYED RESPONSE at 08:09

## 2018-09-09 RX ADMIN — SODIUM CHLORIDE: 0.9 INJECTION, SOLUTION INTRAVENOUS at 03:09

## 2018-09-09 RX ADMIN — Medication 400 MG: at 08:09

## 2018-09-09 RX ADMIN — DOCUSATE SODIUM 100 MG: 100 CAPSULE, LIQUID FILLED ORAL at 08:09

## 2018-09-09 RX ADMIN — AMLODIPINE BESYLATE 5 MG: 5 TABLET ORAL at 08:09

## 2018-09-09 RX ADMIN — SIMVASTATIN 20 MG: 10 TABLET, FILM COATED ORAL at 08:09

## 2018-09-09 RX ADMIN — POLYETHYLENE GLYCOL (3350) 17 G: 17 POWDER, FOR SOLUTION ORAL at 08:09

## 2018-09-09 RX ADMIN — PANTOPRAZOLE SODIUM 40 MG: 40 TABLET, DELAYED RELEASE ORAL at 08:09

## 2018-09-09 RX ADMIN — ONDANSETRON 4 MG: 4 TABLET, ORALLY DISINTEGRATING ORAL at 05:09

## 2018-09-09 NOTE — PROGRESS NOTES
[facility]  Physical Medicine & Rehab  Progress Note    Patient Name: Marck Ramos  MRN: [id number]  Patient Class: IP- Rehab   Admission Date: 9/5/2018  Length of Stay: 4 days  Attending Physician: Grey Gonzalez MD    Subjective:     Principal Problem:  S/p ORIF left femur    Interval History: 9/9/2018:  Patient is seen for follow-up rehab evaluation and recommendations: pt is 89 y.o male  S/p ORIF left femur, particiapting with therapy. Workup of hyponatremia in progress.    HPI, Past Medical, Family, and Social History remains the same as documented in the initial encounter.    Scheduled Medications:    amLODIPine  5 mg Oral Daily    aspirin  81 mg Oral Daily    clopidogrel  75 mg Oral Daily    docusate sodium  100 mg Oral BID    ferrous sulfate  325 mg Oral BID    furosemide  40 mg Oral Daily    isosorbide mononitrate  60 mg Oral Daily    losartan  50 mg Oral BID    magnesium oxide  400 mg Oral Daily    pantoprazole  40 mg Oral Daily    polyethylene glycol  17 g Oral Daily    potassium chloride  20 mEq Oral Daily    simvastatin  20 mg Oral QHS    sotalol  80 mg Oral BID    tamsulosin  0.4 mg Oral Daily       PRN Medications: albuterol sulfate, lactulose, ondansetron, traMADol    Review of Systems   Constitutional: Negative.    HENT: Negative.    Eyes: Negative.    Respiratory: Negative.    Cardiovascular: Negative.    Gastrointestinal: Negative.    Endocrine: Negative.    Genitourinary: Negative.    Musculoskeletal: Negative.    Skin: Negative.    Allergic/Immunologic: Negative.    Neurological: Negative.    Hematological: Negative.    Psychiatric/Behavioral: Negative.      Objective:     Vital Signs (Most Recent):  Temp: 97.6 °F (36.4 °C) (09/09/18 0600)  Pulse: 62 (09/09/18 0600)  Resp: 18 (09/09/18 0600)  BP: (!) 157/66 (09/09/18 0834)  SpO2: 95 % (09/09/18 0600)    Vital Signs (24h Range):  Temp:  [97.6 °F (36.4 °C)] 97.6 °F (36.4 °C)  Pulse:  [62-66] 62  Resp:  [18]  18  SpO2:  [95 %-96 %] 95 %  BP: (155-159)/(62-66) 157/66     Physical Exam   Constitutional: He is oriented to person, place, and time. He appears well-developed and well-nourished. No distress.   HENT:   Head: Normocephalic and atraumatic.   Right Ear: External ear normal.   Left Ear: External ear normal.   Nose: Nose normal.   Mouth/Throat: Oropharynx is clear and moist. No oropharyngeal exudate.   Eyes: Conjunctivae and EOM are normal. Pupils are equal, round, and reactive to light. Right eye exhibits no discharge. Left eye exhibits no discharge. No scleral icterus.   Neck: Normal range of motion. Neck supple. No JVD present. No tracheal deviation present. No thyromegaly present.   Cardiovascular: Normal rate, regular rhythm and intact distal pulses. Exam reveals no gallop and no friction rub.   No murmur heard.  Pulmonary/Chest: Effort normal and breath sounds normal. No stridor. No respiratory distress. He has no wheezes. He has no rales. He exhibits no tenderness.   Genitourinary:   Genitourinary Comments: deferred   Musculoskeletal: Normal range of motion. He exhibits no edema, tenderness or deformity.   Lymphadenopathy:     He has no cervical adenopathy.   Neurological: He is alert and oriented to person, place, and time. No cranial nerve deficit. He exhibits normal muscle tone. Coordination normal.   Skin: No rash noted. He is not diaphoretic. No erythema. No pallor.   Incision stable    Psychiatric: He has a normal mood and affect. His behavior is normal.     NEUROLOGICAL EXAMINATION:     MENTAL STATUS   Oriented to person, place, and time.     CRANIAL NERVES     CN III, IV, VI   Pupils are equal, round, and reactive to light.  Extraocular motions are normal.       Assessment/Plan:      Marck Ramos is a 89 y.o. male admitted to inpatient rehabilitation on 9/5/2018 for <principal problem not specified> with impaired mobility and ADLs. Patient remains appropriate for PT, OT, and as required Speech  therapy. Patient continues to require 24 hour nursing care as well as daily Physician assessment.    Active Diagnoses:    Diagnosis Date Noted POA    Fracture of left femur [S72.92XA] 09/05/2018 Yes      Problems Resolved During this Admission:     S/p ORIF femur, cont therapy  Hyponatremia, workup in progress  HTN, vitals noted, cont current med  Anticoagulation, cont asa & Plavix     DISCHARGE PLANNING:  Tentative Discharge Date:     Future Appointments   Date Time Provider Department Center   9/10/2018  8:00 AM UROLOGY, NURSE [facility] UROLOGY [place]   10/8/2018  9:00 AM Hood Collins MD [facility] UROLOGY [place]   1/28/2019 10:15 AM Teodoro Mueller MD [facility] HEM ONC [facility] Rob Chan MD  Department of Physical Medicine & Rehab  [facility]

## 2018-09-09 NOTE — CONSULTS
INPATIENT NEPHROLOGY CONSULT   [facility] NEPHROLOGY    Marck JERICHO Richard  09/09/2018    Reason for consultation:    hyponatremia    Chief Complaint: No chief complaint on file.         History of Present Illness:     89-year-old gentleman fell in his  yard trying to hold his dog lay down there for  few hours before he was found by his neighbors, called the ambulance and presented to the emergency room.  Patient had a workup done consistent with a left hip intertrochanteric fracture needing a left hip ORIF done on 09/29/2018 by Dr. Kuhn.  Patient's postop course complicated with acute blood loss anemia needing blood transfusion, urinary retention needing cystoscopy with catheter placement, hematuria, encephalopathy, pain control issues,dependency with  ADLs, transfers, mobility is transferred down to us for further rehabilitation.    9/9  Pt feels very weak.  Not eating well.  No fever, chills, nausea, vomiting, chest pain, sob, urinary or bowel complaints, rash, syncope            Plan of Care:       Assessment:    Hyponatremia likely hypovolemia  Hypertension  Anorexia  S/p femur fraction    Plan:    Urine osm  Normal saline 50cc/hour for 20 hours  No ssri antidepressants or thiazide diuretics  Push nutrition  Will follow    Thank you for allowing us to participate in this patient's care. We will continue to follow.    Vital Signs:  Temp Readings from Last 3 Encounters:   09/09/18 97.6 °F (36.4 °C) (Oral)   07/30/18 97.5 °F (36.4 °C)   01/16/18 97.6 °F (36.4 °C)       Pulse Readings from Last 3 Encounters:   09/09/18 62   07/30/18 86   01/16/18 76       BP Readings from Last 3 Encounters:   09/09/18 (!) 157/66   07/30/18 (!) 176/91   01/16/18 (!) 175/86       Weight:  Wt Readings from Last 3 Encounters:   09/05/18 66.1 kg (145 lb 11.2 oz)   07/30/18 63.4 kg (139 lb 11.2 oz)   01/16/18 65.6 kg (144 lb 9.6 oz)       Past Medical & Surgical History:  Past Medical History:   Diagnosis Date    Anemia in stage 2  chronic kidney disease 7/18/2017    Anemia of other chronic disease 7/18/2017    Anticoagulant long-term use     CHF (congestive heart failure)     Hemorrhage of gastrointestinal tract, unspecified 7/18/2017    Hypertension     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    Iron deficiency anemia, unspecified 7/18/2017       Past Surgical History:   Procedure Laterality Date    FRACTURE SURGERY         Past Social History:  Social History     Socioeconomic History    Marital status:      Spouse name: None    Number of children: None    Years of education: None    Highest education level: None   Social Needs    Financial resource strain: None    Food insecurity - worry: None    Food insecurity - inability: None    Transportation needs - medical: None    Transportation needs - non-medical: None   Occupational History    None   Tobacco Use    Smoking status: Former Smoker     Types: Cigarettes    Smokeless tobacco: Never Used   Substance and Sexual Activity    Alcohol use: No    Drug use: No    Sexual activity: No   Other Topics Concern    None   Social History Narrative    None       Medications:  No current facility-administered medications on file prior to encounter.      Current Outpatient Medications on File Prior to Encounter   Medication Sig Dispense Refill    amlodipine (NORVASC) 5 MG tablet Take 5 mg by mouth once daily.      aspirin (ECOTRIN) 81 MG EC tablet Take 81 mg by mouth once daily.        clopidogrel (PLAVIX) 75 mg tablet Take 75 mg by mouth once daily.      ferrous sulfate 325 mg (65 mg iron) Tab tablet Take 325 mg by mouth daily with breakfast.      furosemide (LASIX) 20 MG tablet Take 20 mg by mouth once daily.      isosorbide mononitrate (IMDUR) 60 MG 24 hr tablet       KLOR-CON M20 20 mEq tablet Take 20 mEq by mouth once daily.       losartan (COZAAR) 50 MG tablet Take 50 mg by mouth 2 (two) times daily.      magnesium 250 mg Tab  "Take 1 tablet by mouth once daily.      pantoprazole (PROTONIX) 40 MG tablet Take 40 mg by mouth once daily.      simvastatin (ZOCOR) 20 MG tablet Take 20 mg by mouth every evening.       sotalol (BETAPACE) 80 MG tablet Take 80 mg by mouth 2 (two) times daily.       tamsulosin (FLOMAX) 0.4 mg Cp24 Take 0.4 mg by mouth once daily.      isosorbide dinitrate (ISORDIL) 20 MG tablet Take 30 mg by mouth once daily.        Scheduled Meds:   amLODIPine  5 mg Oral Daily    aspirin  81 mg Oral Daily    clopidogrel  75 mg Oral Daily    docusate sodium  100 mg Oral BID    ferrous sulfate  325 mg Oral BID    furosemide  40 mg Oral Daily    isosorbide mononitrate  60 mg Oral Daily    losartan  50 mg Oral BID    magnesium oxide  400 mg Oral Daily    pantoprazole  40 mg Oral Daily    polyethylene glycol  17 g Oral Daily    potassium chloride  20 mEq Oral Daily    simvastatin  20 mg Oral QHS    sotalol  80 mg Oral BID    tamsulosin  0.4 mg Oral Daily     Continuous Infusions:   sodium chloride 0.9%       PRN Meds:.albuterol sulfate, lactulose, ondansetron, traMADol    Allergies:  Sulfa (sulfonamide antibiotics)    Past Family History:  Reviewed; refer to Hospitalist Admission Note    Review of Systems:  Review of Systems - All 14 systems reviewed and negative, except as noted in HPI    Physical Exam:    BP (!) 157/66   Pulse 62   Temp 97.6 °F (36.4 °C) (Oral)   Resp 18   Ht 5' 6" (1.676 m)   Wt 66.1 kg (145 lb 11.2 oz)   SpO2 95%   BMI 23.52 kg/m²     General Appearance:    Alert, cooperative, no distress, appears stated age   Head:    Normocephalic, without obvious abnormality, atraumatic   Eyes:    PER, conjunctiva/corneas clear, EOM's intact in both eyes        Throat:   Lips, mucosa, and tongue normal; teeth and gums normal   Back:     Symmetric, no curvature, ROM normal, no CVA tenderness   Lungs:     Clear to auscultation bilaterally, respirations unlabored   Chest wall:    No tenderness or " deformity   Heart:    Regular rate and rhythm, S1 and S2 normal, no murmur, rub   or gallop   Abdomen:     Soft, non-tender, bowel sounds active all four quadrants,     no masses, no organomegaly   Extremities:   Extremities normal, atraumatic, no cyanosis or edema   Pulses:   2+ and symmetric all extremities   MSK:   No joint or muscle swelling, tenderness or deformity   Skin:   Skin color, texture, turgor normal, no rashes or lesions   Neurologic:   CNII-XII intact, normal strength and sensation       Throughout.  No flap     Results:  Lab Results   Component Value Date     (L) 09/08/2018    K 3.7 09/08/2018    CL 93 (L) 09/08/2018    CO2 25 09/08/2018    BUN 13 09/08/2018    CREATININE 0.7 09/08/2018    CALCIUM 8.0 (L) 09/08/2018    ANIONGAP 9 09/08/2018    ESTGFRAFRICA >60 09/08/2018    EGFRNONAA >60 09/08/2018       Lab Results   Component Value Date    CALCIUM 8.0 (L) 09/08/2018       No results for input(s): WBC, RBC, HGB, HCT, PLT, MCV, MCH, MCHC in the last 24 hours.    I have personally reviewed pertinent radiological imaging and reports.

## 2018-09-09 NOTE — PT/OT/SLP PROGRESS
Physical Therapy         Treatment        Markc Ramos   MRN: [id number]     PT Received On: 09/09/18  Total Time (min): 90        Billable Minutes:  Therapeutic Activity 15, Therapeutic Exercise 60 and Train/Wheelchair Management 15  Total Minutes: 90    Treatment Type: Treatment  PT/PTA: PTA     PTA Visit Number: 2       General Precautions: Standard, fall  Orthopedic Precautions: Orthopedic Precautions : LLE partial weight bearing   Braces:           Subjective:  Communicated with patient and nursing prior to session.    Pain/Comfort  Pain Rating 1: 9/10  Location - Side 1: Left  Location - Orientation 1: lateral  Location 1: hip  Pain Addressed 1: Reposition, Distraction, Nurse notified  Pain Rating Post-Intervention 1: 9/10    Objective:  Patient found finishing OT tx session in the gym, with Patient found with: juarez catheter    Functional Mobility:  Bed Mobility:   Supine to sit: Moderate Assistance   Sit to supine: Moderate Assistance   Rolling: Minimal Assistance   Scooting: Moderate Assistance    Balance:   Static Sit: FAIR+: Able to take MINIMAL challenges from all directions  Dynamic Sit:  FAIR+: Maintains balance through MINIMAL excursions of active trunk motion  Static Stand: POOR+: Needs MINIMAL assist to maintain  Dynamic stand: POOR: Needs MOD (moderate) assist during gait    Transfer Training:  Sit to stand:Moderate Assistance with Rolling Walker x 4  Chair <> Mat: Stand Pivot with Moderate Assistance with  Rolling Walker 5 steps, increased pain with ambulation    Wheelchair Training:  Pt propelled Standard wheelchair x 150 feet on Level tile with  Bilateral upper extremity with Stand-by Assistance.     Gait Training:  Pain level too high    Stair Training:        Additional Treatment:  SciFit 10' L1  Supine TE x 30: AP, QS, GS, HS, SLR with A L, Hip abd with A L  Sitting TE x 30: HR/TR, abd/add, LAQ, marching, Hip ER with RTB, ball squeeze  Standing TE on L LE x 10 March, hip flexion, HS  curl    MHP x 10' prior to and after therex to L hip, reports minimal decrease in pain    Activity Tolerance:  Patient limited by pain    Patient left up in chair with all lines intact, call button in reach, chair alarm on and nursing notified.    Assessment:  Marck Ramos is a 89 y.o. male with a medical diagnosis of <principal problem not specified>. He presents with decreased safety awareness, decreased cognition, decreased functional mobility, decreased LE function.    Rehab potential is good.    Activity tolerance: Fair    Discharge recommendations:       Equipment recommendations:       GOALS:   Multidisciplinary Problems     Physical Therapy Goals        Problem: Physical Therapy Goal    Goal Priority Disciplines Outcome Goal Variances Interventions   Physical Therapy Goal     PT, PT/OT Ongoing (interventions implemented as appropriate)     Description:  Goals to be met by: 2018     Patient will increase functional independence with mobility by performin. Supine to sit with Stand-by Assistance  2. Sit to supine with Stand-by Assistance  3. Sit to stand transfer with Stand-by Assistance  4. Bed to chair transfer with Stand-by Assistance using Rolling Walker  5. Gait  x 150 feet with Stand-by Assistance using Rolling Walker.   6. Wheelchair propulsion x 200 feet with Modified [place] using bilateral uppper extremities  7. Ascend/Descend 6 inch curb step with Contact Guard Assistance using Rolling Walker.                      PLAN:    Patient to be seen daily  to address the above listed problems via gait training, therapeutic activities, therapeutic exercises, therapeutic groups, wheelchair management/training  Plan of Care expires: 18  Plan of Care reviewed with: patient         Karey Finch, PTA 2018

## 2018-09-09 NOTE — PT/OT/SLP PROGRESS
Occupational Therapy  Treatment    Marck Ramos   MRN: [id number]   Admitting Diagnosis: s/p L hip ORIF     OT Date of Treatment: 09/09/18   Total Time (min): 90 min      Billable Minutes:  Therapeutic Exercise 90  Total Minutes: 90    General Precautions: Standard, fall  Orthopedic Precautions: LLE partial weight bearing    Subjective:  Communicated with nurse prior to session.    Pain/Comfort  Pain Rating 1: 0/10    Objective:  Patient found with: juarez catheter    Functional Mobility:    Transfer Training:  Chair <> Mat: stand pivot with mod A and verbal cues for L LE weight bearing precautions      Balance:   Static Sit: FAIR+: Able to take MINIMAL challenges from all directions  Dynamic Sit:  FAIR+: Maintains balance through MINIMAL excursions of active trunk motion  Static Stand: POOR+: Needs MINIMAL assist to maintain  Dynamic stand: POOR: Needs MOD (moderate) assist during gait    Additional Treatment:  Wheelchair propulsion 150 feet with standby A and verbal cues  UBE 4 x 5 minutes  B ghassan with 4 lbs. 3 x 10  Light resistance flex bar supination/pronation 3 x 10  B hand exerciser 3 x 10    Patient left supine with PTA present    ASSESSMENT:  Patient required frequent rest breaks with UE exercises. Patient presents with self care skills deficits and transfer skills deficits. He would benefit from continued skilled occupational therapy services to improve his independence and mobility.    Rehab potential is good    Activity tolerance: good      GOALS:   Multidisciplinary Problems     Occupational Therapy Goals        Problem: Occupational Therapy Goal    Goal Priority Disciplines Outcome Interventions   Occupational Therapy Goal     OT, PT/OT Ongoing (interventions implemented as appropriate)    Description:  Goals to be met by: 9/25/2018     Patient will increase functional independence with ADLs by performing:    LE Dressing with Modified [place] and Assistive Devices as needed.  Grooming while  standing at sink with Modified [place].  Toileting from toilet with [place] for hygiene and clothing management.   Bathing from  shower chair/bench with Modified [place].  Supine to sit with Modified [place].  Stand pivot transfers with Modified [place] and maintaining weight-bearing precaution(s).  Toilet transfer to toilet with Modified [place] and maintaining weight-bearing precaution(s).  Upper extremity exercise program x15 reps per handout, with independence.                      Plan:  Patient to be seen 6 x/week to address the above listed problems via self-care/home management, community/work re-entry, therapeutic activities, therapeutic exercises  Plan of Care expires: 09/25/18  Plan of Care reviewed with: patient      KENNEDI Bray    9/9/2018

## 2018-09-09 NOTE — PLAN OF CARE
Problem: Physical Therapy Goal  Goal: Physical Therapy Goal  Goals to be met by: 2018     Patient will increase functional independence with mobility by performin. Supine to sit with Stand-by Assistance  2. Sit to supine with Stand-by Assistance  3. Sit to stand transfer with Stand-by Assistance  4. Bed to chair transfer with Stand-by Assistance using Rolling Walker  5. Gait  x 150 feet with Stand-by Assistance using Rolling Walker.   6. Wheelchair propulsion x 200 feet with Modified [place] using bilateral uppper extremities  7. Ascend/Descend 6 inch curb step with Contact Guard Assistance using Rolling Walker.     Outcome: Ongoing (interventions implemented as appropriate)  Patient performed therex, t/fs, and WC mobility to increase strength and stability to meet goals set in POC.  Patient with increased pain today, DNT GT.

## 2018-09-09 NOTE — PLAN OF CARE
Problem: Patient Care Overview  Goal: Plan of Care Review  Outcome: Ongoing (interventions implemented as appropriate)  Patient alert and oriented x 4.  Garcia intact and patent.  Denies pain or complaint.  Moderate assist to turn and reposition.  NAD noted, free of falls.

## 2018-09-09 NOTE — PLAN OF CARE
Problem: Patient Care Overview  Goal: Plan of Care Review  Outcome: Ongoing (interventions implemented as appropriate)  V/S stable, therapy per schedule, PRN pain med x1 this am. Appetite poor. Dr Buckner here to see patient, new orders noted.

## 2018-09-10 LAB
ALBUMIN SERPL BCP-MCNC: 2.2 G/DL
ANION GAP SERPL CALC-SCNC: 7 MMOL/L
BUN SERPL-MCNC: 16 MG/DL
CALCIUM SERPL-MCNC: 8 MG/DL
CHLORIDE SERPL-SCNC: 97 MMOL/L
CO2 SERPL-SCNC: 23 MMOL/L
CREAT SERPL-MCNC: 0.7 MG/DL
EST. GFR  (AFRICAN AMERICAN): >60 ML/MIN/1.73 M^2
EST. GFR  (NON AFRICAN AMERICAN): >60 ML/MIN/1.73 M^2
GLUCOSE SERPL-MCNC: 83 MG/DL
PHOSPHATE SERPL-MCNC: 2.8 MG/DL
POTASSIUM SERPL-SCNC: 4.3 MMOL/L
SODIUM SERPL-SCNC: 127 MMOL/L

## 2018-09-10 PROCEDURE — 97530 THERAPEUTIC ACTIVITIES: CPT

## 2018-09-10 PROCEDURE — 25000003 PHARM REV CODE 250: Performed by: PHYSICAL MEDICINE & REHABILITATION

## 2018-09-10 PROCEDURE — 97110 THERAPEUTIC EXERCISES: CPT

## 2018-09-10 PROCEDURE — 97542 WHEELCHAIR MNGMENT TRAINING: CPT

## 2018-09-10 PROCEDURE — 12800000 HC REHAB SEMI-PRIVATE ROOM

## 2018-09-10 PROCEDURE — 80069 RENAL FUNCTION PANEL: CPT

## 2018-09-10 PROCEDURE — 97116 GAIT TRAINING THERAPY: CPT

## 2018-09-10 PROCEDURE — 36415 COLL VENOUS BLD VENIPUNCTURE: CPT

## 2018-09-10 PROCEDURE — 99900035 HC TECH TIME PER 15 MIN (STAT)

## 2018-09-10 RX ADMIN — DOCUSATE SODIUM 100 MG: 100 CAPSULE, LIQUID FILLED ORAL at 08:09

## 2018-09-10 RX ADMIN — SOTALOL HYDROCHLORIDE 80 MG: 80 TABLET ORAL at 08:09

## 2018-09-10 RX ADMIN — FUROSEMIDE 40 MG: 40 TABLET ORAL at 08:09

## 2018-09-10 RX ADMIN — AMLODIPINE BESYLATE 5 MG: 5 TABLET ORAL at 08:09

## 2018-09-10 RX ADMIN — TRAMADOL HYDROCHLORIDE 50 MG: 50 TABLET, FILM COATED ORAL at 08:09

## 2018-09-10 RX ADMIN — ISOSORBIDE MONONITRATE 60 MG: 30 TABLET, EXTENDED RELEASE ORAL at 08:09

## 2018-09-10 RX ADMIN — POLYETHYLENE GLYCOL (3350) 17 G: 17 POWDER, FOR SOLUTION ORAL at 08:09

## 2018-09-10 RX ADMIN — PANTOPRAZOLE SODIUM 40 MG: 40 TABLET, DELAYED RELEASE ORAL at 08:09

## 2018-09-10 RX ADMIN — TAMSULOSIN HYDROCHLORIDE 0.4 MG: 0.4 CAPSULE ORAL at 08:09

## 2018-09-10 RX ADMIN — FERROUS SULFATE TAB EC 325 MG (65 MG FE EQUIVALENT) 325 MG: 325 (65 FE) TABLET DELAYED RESPONSE at 08:09

## 2018-09-10 RX ADMIN — Medication 400 MG: at 08:09

## 2018-09-10 RX ADMIN — SIMVASTATIN 20 MG: 10 TABLET, FILM COATED ORAL at 08:09

## 2018-09-10 RX ADMIN — LOSARTAN POTASSIUM 50 MG: 25 TABLET, FILM COATED ORAL at 08:09

## 2018-09-10 RX ADMIN — SOTALOL HYDROCHLORIDE 80 MG: 80 TABLET ORAL at 09:09

## 2018-09-10 RX ADMIN — POTASSIUM CHLORIDE 20 MEQ: 1500 TABLET, EXTENDED RELEASE ORAL at 08:09

## 2018-09-10 RX ADMIN — CLOPIDOGREL BISULFATE 75 MG: 75 TABLET ORAL at 08:09

## 2018-09-10 RX ADMIN — ASPIRIN 81 MG: 81 TABLET, COATED ORAL at 08:09

## 2018-09-10 NOTE — PT/OT/SLP PROGRESS
Occupational Therapy  Treatment    Marck Ramos   MRN: [id number]   Admitting Diagnosis: <principal problem not specified>    OT Date of Treatment: 09/10/18   Total Time (min): 90 min      Billable Minutes:  Therapeutic Activity 45 and Therapeutic Exercise 45  Total Minutes: 90     General Precautions: Standard, fall  Orthopedic Precautions: LLE partial weight bearing  Braces: N/A         Subjective:  Communicated with nurse Winnie prior to session.    Pain/Comfort  Pain Rating 1: 0/10  Location - Side 1: Left  Location - Orientation 1: lateral  Location 1: thigh  Pain Addressed 1: Distraction, Reposition  Pain Rating Post-Intervention 1: (pt did not rate)    Objective:  Patient found with: juarez catheter   Pt seated in chair with w/c alarm on upon arrival to room.    Functional Mobility:    Transfer Training:  Sit>Stand: Min A with RW  Toilet Transfer: Min A with RW    LE Dressing:  Pt performed LE dressing with min A and AD seated in w/c. Pt used reacher and and sock aid to don/doff socks. Pt required physical and verbal cues for problem solving and technique. Some demonstration was provided for better retention. Pt donned/doffed shoes with min A and long-handled shoe horn. Pt was able unable to bend far enough to tie/un-tie shoes, requiring max A to tie/un-tie shoes.    Toilet Training:  Patient performed toilet transfer with min A and RW. Pt required cues with hand placement.    Balance:   Static Sit: FAIR+: Able to take MINIMAL challenges from all directions  Dynamic Sit:  FAIR+: Maintains balance through MINIMAL excursions of active trunk motion  Static Stand: FAIR+: Takes MINIMAL challenges from all directions  Dynamic stand: FAIR: Needs CONTACT GUARD during gait    Additional Treatment:  Pt performed BUE exercise using arm bike x 3 trials with 2 min rest breaks in between each trial:  -1st trial, peddled for 3 min  -2nd trial,peddled for 2.5 min  -3rd trial, peddled for 2 min  Pt reported that his RUE was  becoming more fatigued than his LUE.     Pt participated in static/dynamic standing balance activity using long mirror. Pt was instructed stand with RW and reach across midline to place pieces on board in front of him. Pt required CGA during standing activity.     Patient left up in chair with all lines intact, call button in reach and chair alarm on    ASSESSMENT:  Marck Ramos is a 89 y.o. male. Pt's activity tolerance with BUE exercise seemed to diminish after each rest break. Pt's RUE fatigue could have contributed to his decreased quality of performance. Pt did well with standing activity, requiring no additional assist to maintain dynamic standing balance. Pt is progressing with LE dressing. Pt would benefit from continued skilled OT services to increase independence with ADLs.     Rehab potential is good    Activity tolerance: Fair    Discharge recommendations: home, outpatient PT     Equipment recommendations: (TBD)     GOALS:   Multidisciplinary Problems     Occupational Therapy Goals        Problem: Occupational Therapy Goal    Goal Priority Disciplines Outcome Interventions   Occupational Therapy Goal     OT, PT/OT Ongoing (interventions implemented as appropriate)    Description:  Goals to be met by: 9/25/2018     Patient will increase functional independence with ADLs by performing:    LE Dressing with Modified [place] and Assistive Devices as needed.  Grooming while standing at sink with Modified [place].  Toileting from toilet with [place] for hygiene and clothing management.   Bathing from  shower chair/bench with Modified [place].  Supine to sit with Modified [place].  Stand pivot transfers with Modified [place] and maintaining weight-bearing precaution(s).  Toilet transfer to toilet with Modified [place] and maintaining weight-bearing precaution(s).  Upper extremity exercise program x15 reps per handout, with independence.                      Plan:  Patient to  be seen 6 x/week to address the above listed problems via self-care/home management, community/work re-entry, therapeutic activities, therapeutic exercises  Plan of Care expires: 09/25/18  Plan of Care reviewed with: patient         Larry Chambers, OT  09/10/2018

## 2018-09-10 NOTE — PT/OT/SLP PROGRESS
Physical Therapy         Treatment        Marck Ramos   MRN: [id number]     PT Received On: 09/10/18  Total Time (min): 95        Billable Minutes:  Gait [id number], Therapeutic Activity 20, Therapeutic Exercise 50 and Train/Wheelchair Management 10  Total Minutes: 95    Treatment Type: Treatment  PT/PTA: PT     PTA Visit Number: 2       General Precautions: Standard, fall  Orthopedic Precautions: Orthopedic Precautions : LLE partial weight bearing   Braces: Braces: N/A         Subjective:  Communicated with Dr. Gonzalez and nurse Winnie prior to session and nurse Pat again during session regarding pt's pain complaints during there ex..    Pain/Comfort  Pain Rating 1: 6/10  Location - Side 1: Left  Location 1: thigh  Pain Addressed 1: Nurse notified    Objective:  Patient found sitting up in w/c in room shaving.   Patient found with: juarez catheter, peripheral IV    Functional Mobility:  Transfer Training:  Sit to stand:Independent and Moderate Assistance with Rolling Walker & vc for technique  Chair <> Mat: Step Transfer with Contact Guard Assistance with  Rolling Walker & vc   Supine <> Sit with moderate Assistance     Wheelchair Training:  Pt propelled Standard wheelchair x 2 x 175 feet on Level tile with  Bilateral upper extremity with Stand-by > Supervision Assistance.     Gait Training:  Gait training with Rolling Walker on level tile with CGA for safety x 50', 95' & 45'.    Additional Treatment:  Pt performed B LE there ex sitting x 30 reps with 2# wt and green TB, with vc for proper execution and joint protection: ap, laq, marching, hip abd/add, gs/qs, ham curls.    Pt performed B LE there ex supine x 20 reps each with vc for proper execution:  hs, qs, TKE, hip abd/add.    SciFit stepper x 5 min on level 1        Activity Tolerance:  Patient tolerated treatment well, Patient limited by fatigue and Patient limited by pain    Patient left up in chair with call button in reach, chair alarm on and nurse  notified.    Assessment:  Marck Ramos is a 89 y.o. male with a medical diagnosis of Left hip intertrochanteric fracture needing ORIF.  He presents with impaired functional mobility, impaired activity tolerance, gait disturbance due to PWB L LE, pain and impaired strength.  He would benefit from inpatient PT to increase safety and independence with transfers and gait prior to discharge home.  Pt persisting with difficulty with sit <> stand and supine <> sit transfers, but progressing with gait tolerance.         Rehab potential is good.    Activity tolerance: Fair    Discharge recommendations: Discharge Facility/Level Of Care Needs: home, outpatient PT     Equipment recommendations: Equipment Needed After Discharge: (To be determined)     GOALS:   Multidisciplinary Problems     Physical Therapy Goals        Problem: Physical Therapy Goal    Goal Priority Disciplines Outcome Goal Variances Interventions   Physical Therapy Goal     PT, PT/OT Ongoing (interventions implemented as appropriate)     Description:  Goals to be met by: 2018     Patient will increase functional independence with mobility by performin. Supine to sit with Stand-by Assistance  2. Sit to supine with Stand-by Assistance  3. Sit to stand transfer with Stand-by Assistance  4. Bed to chair transfer with Stand-by Assistance using Rolling Walker  5. Gait  x 150 feet with Stand-by Assistance using Rolling Walker.   6. Wheelchair propulsion x 200 feet with Modified [place] using bilateral uppper extremities  7. Ascend/Descend 6 inch curb step with Contact Guard Assistance using Rolling Walker.                      PLAN:    Patient to be seen daily  to address the above listed problems via gait training, therapeutic activities, therapeutic exercises, therapeutic groups, wheelchair management/training  Plan of Care expires: 18  Plan of Care reviewed with: patient         Patricia Mack, PT 9/10/2018

## 2018-09-10 NOTE — PLAN OF CARE
Problem: Occupational Therapy Goal  Goal: Occupational Therapy Goal  Goals to be met by: 9/25/2018     Patient will increase functional independence with ADLs by performing:    LE Dressing with Modified [place] and Assistive Devices as needed.  Grooming while standing at sink with Modified [place].  Toileting from toilet with [place] for hygiene and clothing management.   Bathing from  shower chair/bench with Modified [place].  Supine to sit with Modified [place].  Stand pivot transfers with Modified [place] and maintaining weight-bearing precaution(s).  Toilet transfer to toilet with Modified [place] and maintaining weight-bearing precaution(s).  Upper extremity exercise program x15 reps per handout, with independence.     Outcome: Ongoing (interventions implemented as appropriate)  Patient is progressing toward goals.

## 2018-09-10 NOTE — PLAN OF CARE
Problem: Patient Care Overview  Goal: Plan of Care Review  Outcome: Ongoing (interventions implemented as appropriate)  Patient without complaint of nausea.  Garcia  intact and patent.  IV intact and infusing.  Denies pain, NAD noted.

## 2018-09-10 NOTE — PLAN OF CARE
Problem: Physical Therapy Goal  Goal: Physical Therapy Goal  Goals to be met by: 2018     Patient will increase functional independence with mobility by performin. Supine to sit with Stand-by Assistance  2. Sit to supine with Stand-by Assistance  3. Sit to stand transfer with Stand-by Assistance  4. Bed to chair transfer with Stand-by Assistance using Rolling Walker  5. Gait  x 150 feet with Stand-by Assistance using Rolling Walker.   6. Wheelchair propulsion x 200 feet with Modified [place] using bilateral uppper extremities  7. Ascend/Descend 6 inch curb step with Contact Guard Assistance using Rolling Walker.     Outcome: Ongoing (interventions implemented as appropriate)    PT for there ex, w/c, gait and activity.

## 2018-09-10 NOTE — PROGRESS NOTES
"REHAB FOLLOWUP NOTE    Marck Ramos is a 89 y.o. male patient.    Chief Complaint:   Left hip intertrochanteric fracture needing ORIF.    History: 89-year-old gentleman fell in his  yard trying to hold his dog lay down there for  few hours before he was found by his neighbors, called the ambulance and presented to the emergency room.  Patient had a workup done consistent with a left hip intertrochanteric fracture needing a left hip ORIF done on 09/29/2018 by Dr. Kuhn.  Patient's postop course complicated with acute blood loss anemia needing blood transfusion, urinary retention needing cystoscopy with catheter placement, hematuria, encephalopathy, pain control issues,dependency with  ADLs, transfers, mobility is transferred down to us for further rehabilitation.         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    Iron deficiency anemia, unspecified 7/18/2017       Temp: 97.9 °F (36.6 °C) (09/10/18 0559)  Pulse: 66 (09/10/18 0559)  Resp: 18 (09/10/18 0559)  BP: (!) 162/74 (09/10/18 0559)  SpO2: 98 % (09/10/18 0559)  Weight: 63.8 kg (140 lb 10.5 oz) (09/10/18 0559)  Height: 5' 6" (167.6 cm) (09/05/18 1800)    Lab Results   Component Value Date    WBC 7.40 09/08/2018    HGB 10.6 (L) 09/08/2018    HCT 31.7 (L) 09/08/2018     (H) 09/08/2018    ALT 10 09/08/2018    AST 25 09/08/2018     (L) 09/10/2018    K 4.3 09/10/2018    CL 97 09/10/2018    CREATININE 0.7 09/10/2018    BUN 16 09/10/2018    CO2 23 09/10/2018    PSA 1.88 06/29/2012       Physical Examination:  Alert, oriented x3,   Voices no complaints.   lungs are clear to auscultation.  Heart with a regular rate and rhythm.  "   Bilateral upper and right lower extremity with functional active to passive range of motion with good motor strength noted.  Left lower extremity hip incision clean and dry.  Continent of bowel movement.  Garcia catheter in place.  Needs assistance with basic ADLs, transfers, mobility.  Hyponatremia monitor.  Anemia improving.    Impression:  Left hip intertrochanteric fracture needing ORIF.  Postop acute blood loss anemia.    Postop encephalopathy.    Postop urinary retention needing Garcia catheter placement.    Hypertension.    Paroxysmal atrial fibrillation.    Peripheral vascular disease.    Coronary artery disease.  Hyponatremia.    Recommendations:    Continue current PT, OT, nursing care.          Grey Gonzalez MD  9/10/2018

## 2018-09-11 PROCEDURE — 99900035 HC TECH TIME PER 15 MIN (STAT)

## 2018-09-11 PROCEDURE — 97110 THERAPEUTIC EXERCISES: CPT | Performed by: PHYSICAL THERAPIST

## 2018-09-11 PROCEDURE — 12800000 HC REHAB SEMI-PRIVATE ROOM

## 2018-09-11 PROCEDURE — 97535 SELF CARE MNGMENT TRAINING: CPT

## 2018-09-11 PROCEDURE — 97110 THERAPEUTIC EXERCISES: CPT

## 2018-09-11 PROCEDURE — 97530 THERAPEUTIC ACTIVITIES: CPT | Performed by: PHYSICAL THERAPIST

## 2018-09-11 PROCEDURE — 97542 WHEELCHAIR MNGMENT TRAINING: CPT

## 2018-09-11 PROCEDURE — 97116 GAIT TRAINING THERAPY: CPT | Performed by: PHYSICAL THERAPIST

## 2018-09-11 PROCEDURE — 25000003 PHARM REV CODE 250: Performed by: PHYSICAL MEDICINE & REHABILITATION

## 2018-09-11 RX ADMIN — SOTALOL HYDROCHLORIDE 80 MG: 80 TABLET ORAL at 08:09

## 2018-09-11 RX ADMIN — DOCUSATE SODIUM 100 MG: 100 CAPSULE, LIQUID FILLED ORAL at 09:09

## 2018-09-11 RX ADMIN — POTASSIUM CHLORIDE 20 MEQ: 1500 TABLET, EXTENDED RELEASE ORAL at 09:09

## 2018-09-11 RX ADMIN — SOTALOL HYDROCHLORIDE 80 MG: 80 TABLET ORAL at 09:09

## 2018-09-11 RX ADMIN — ISOSORBIDE MONONITRATE 60 MG: 30 TABLET, EXTENDED RELEASE ORAL at 09:09

## 2018-09-11 RX ADMIN — FERROUS SULFATE TAB EC 325 MG (65 MG FE EQUIVALENT) 325 MG: 325 (65 FE) TABLET DELAYED RESPONSE at 08:09

## 2018-09-11 RX ADMIN — POLYETHYLENE GLYCOL (3350) 17 G: 17 POWDER, FOR SOLUTION ORAL at 09:09

## 2018-09-11 RX ADMIN — SIMVASTATIN 20 MG: 10 TABLET, FILM COATED ORAL at 08:09

## 2018-09-11 RX ADMIN — LOSARTAN POTASSIUM 50 MG: 25 TABLET, FILM COATED ORAL at 08:09

## 2018-09-11 RX ADMIN — Medication 400 MG: at 09:09

## 2018-09-11 RX ADMIN — DOCUSATE SODIUM 100 MG: 100 CAPSULE, LIQUID FILLED ORAL at 08:09

## 2018-09-11 RX ADMIN — TRAMADOL HYDROCHLORIDE 50 MG: 50 TABLET, FILM COATED ORAL at 03:09

## 2018-09-11 RX ADMIN — FERROUS SULFATE TAB EC 325 MG (65 MG FE EQUIVALENT) 325 MG: 325 (65 FE) TABLET DELAYED RESPONSE at 09:09

## 2018-09-11 RX ADMIN — AMLODIPINE BESYLATE 5 MG: 5 TABLET ORAL at 09:09

## 2018-09-11 RX ADMIN — PANTOPRAZOLE SODIUM 40 MG: 40 TABLET, DELAYED RELEASE ORAL at 09:09

## 2018-09-11 RX ADMIN — TAMSULOSIN HYDROCHLORIDE 0.4 MG: 0.4 CAPSULE ORAL at 09:09

## 2018-09-11 RX ADMIN — FUROSEMIDE 40 MG: 40 TABLET ORAL at 09:09

## 2018-09-11 RX ADMIN — LOSARTAN POTASSIUM 50 MG: 25 TABLET, FILM COATED ORAL at 09:09

## 2018-09-11 RX ADMIN — ASPIRIN 81 MG: 81 TABLET, COATED ORAL at 09:09

## 2018-09-11 RX ADMIN — CLOPIDOGREL BISULFATE 75 MG: 75 TABLET ORAL at 09:09

## 2018-09-11 NOTE — PT/OT/SLP PROGRESS
Occupational Therapy  Treatment    Marck Ramos   MRN: [id number]   Admitting Diagnosis: s/p L hip ORIF     OT Date of Treatment: 09/11/18   Total Time (min): 90 min      Billable Minutes:  Self Care/Home Management 90  Total Minutes: 90    General Precautions: Standard, fall  Orthopedic Precautions:    Braces: N/A    Do you have any cultural, spiritual, Evangelical conflicts, given your current situation?: no    Subjective:  Communicated with nursing prior to session.    Pain/Comfort  Pain Rating 1: 0/10    Objective:  Patient found with: peripheral IV    Transfer Training:   Patient performed shower transfer Stand Pivot with Stand-by Assistance with Rolling Walker and Grab bars.    Grooming:  Patient peformed hand washing with Modified [place] at chair.  Patient performed face washing with Modified [place] at chair.  Patient performed oral hygeine with Supervision or Set-up Assistance at sitting at sink.  Patient performe hair grooming with Modified [place] at sitting at sink.    Bathing:  Patient performed bathing with Stand-by Assistance with grab bar, Handheld shower head, long-handled sponge and tub bench at Shower.    UE Dressing:  Patient performed UE Dressing with Stand-by Assistance with Reacher and Sock aid  at Wheelchair.    LE Dressing:  Patient don/doffed socks with Supervision or Set-up Assistance, Patient performed don/doffed adult brief with Minimal Assistance and Patient performed don/doffed pants with Minimal Assistance    Additional Treatment:  Transfer safety and fall prevention discussed.    Patient left up in chair with all lines intact, call button in reach, chair alarm on and nursing notified    ASSESSMENT:  Marck Ramos is a 89 y.o. male with a medical diagnosis of s/p L hip ORIF       Rehab potential is excellent    Activity tolerance: Good      GOALS:   Multidisciplinary Problems     Occupational Therapy Goals        Problem: Occupational Therapy Goal    Goal Priority  Disciplines Outcome Interventions   Occupational Therapy Goal     OT, PT/OT Ongoing (interventions implemented as appropriate)    Description:  Goals to be met by: 9/25/2018     Patient will increase functional independence with ADLs by performing:    LE Dressing with Modified [place] and Assistive Devices as needed.  Grooming while standing at sink with Modified [place].  Toileting from toilet with [place] for hygiene and clothing management.   Bathing from  shower chair/bench with Modified [place].  Supine to sit with Modified [place].  Stand pivot transfers with Modified [place] and maintaining weight-bearing precaution(s).  Toilet transfer to toilet with Modified [place] and maintaining weight-bearing precaution(s).  Upper extremity exercise program x15 reps per handout, with independence.                      Plan:  Patient to be seen 6 x/week to address the above listed problems via self-care/home management, community/work re-entry, therapeutic activities, therapeutic exercises  Plan of Care expires: 09/25/18  Plan of Care reviewed with: patient         DAVID Herndon  09/11/2018

## 2018-09-11 NOTE — PLAN OF CARE
Problem: Patient Care Overview  Goal: Plan of Care Review  Patient is compliant with care, juarez removed today, continent of urine x3 , Therapies per schedule. Staples removed left outer leg/ hip area.

## 2018-09-11 NOTE — PLAN OF CARE
Problem: Patient Care Overview  Goal: Plan of Care Review  Outcome: Ongoing (interventions implemented as appropriate)  Patient awake/alert.Generalized weakness noted.Floey catheter in place. Free from fa;;s. Plan of care continued

## 2018-09-11 NOTE — PLAN OF CARE
Problem: Physical Therapy Goal  Goal: Physical Therapy Goal  Goals to be met by: 2018     Patient will increase functional independence with mobility by performin. Supine to sit with Stand-by Assistance  2. Sit to supine with Stand-by Assistance  3. Sit to stand transfer with Stand-by Assistance  4. Bed to chair transfer with Stand-by Assistance using Rolling Walker  5. Gait  x 150 feet with Stand-by Assistance using Rolling Walker.   6. Wheelchair propulsion x 200 feet with Modified [place] using bilateral uppper extremities  7. Ascend/Descend 6 inch curb step with Contact Guard Assistance using Rolling Walker.     Outcome: Ongoing (interventions implemented as appropriate)  WC to bed transfer min assist, to supine min assist for LE's

## 2018-09-11 NOTE — PT/OT/SLP PROGRESS
Physical Therapy         Treatment        Marck Ramos   MRN: [id number]     PT Received On: 18  Total Time (min): 30        Billable Minutes:  Therapeutic Exercise 22 and Train/Wheelchair Management 8  Total Minutes: 30    Treatment Type: Treatment  PT/PTA: PTA     PTA Visit Number: 1       General Precautions: Standard, fall  Orthopedic Precautions: Orthopedic Precautions : LLE partial weight bearing   Braces: Braces: N/A         Subjective:  Communicated with nurse prior to session.         Objective:  Patient found sitting at bedside in WC, with  chair alarm on    Functional Mobility:  Bed Mobility:   Supine to sit: Activity did not occur   Sit to supine: Minimal Assistance   Rolling: Minimal Assistance   Scooting: Activity did not occur    Transfer Training:  Bed <> Chair:  Stand Pivot with Minimal Assistance with No Assistive Device x1    Wheelchair Training:  Pt propelled Standard wheelchair x 140 feet on Level tile with  Bilateral upper extremity with Stand-by Assistance.     Additional Treatment:  Supine ex x30: AP, quad sets, glut sets, heel slide, hip abd/add with A on L    Activity Tolerance:  Patient limited by fatigue    Patient left supine with call button in reach, bed alarm on and nurse notified.    Assessment:  Marck Ramos is a 89 y.o. male with a medical diagnosis of <principal problem not specified>. He presents with complaints of fatigue after having a shower with OT.    Rehab potential is .    Activity tolerance: Fair    Discharge recommendations:       Equipment recommendations:       GOALS:   Multidisciplinary Problems     Physical Therapy Goals        Problem: Physical Therapy Goal    Goal Priority Disciplines Outcome Goal Variances Interventions   Physical Therapy Goal     PT, PT/OT Ongoing (interventions implemented as appropriate)     Description:  Goals to be met by: 2018     Patient will increase functional independence with mobility by performin. Supine to sit  with Stand-by Assistance  2. Sit to supine with Stand-by Assistance  3. Sit to stand transfer with Stand-by Assistance  4. Bed to chair transfer with Stand-by Assistance using Rolling Walker  5. Gait  x 150 feet with Stand-by Assistance using Rolling Walker.   6. Wheelchair propulsion x 200 feet with Modified [place] using bilateral uppper extremities  7. Ascend/Descend 6 inch curb step with Contact Guard Assistance using Rolling Walker.                      PLAN:    Patient to be seen daily  to address the above listed problems via gait training, therapeutic activities, therapeutic exercises, therapeutic groups, wheelchair management/training  Plan of Care expires: 09/21/18  Plan of Care reviewed with: patient         Casi Son, PTA 9/11/2018

## 2018-09-11 NOTE — PT/OT/SLP PROGRESS
Physical Therapy         Treatment        Marck Ramos   MRN: [id number]     PT Received On: 09/11/18  Total Time (min): 60        Billable Minutes:  Gait [id number], Therapeutic Activity 15 and Therapeutic Exercise 30  Total Minutes: 60    Treatment Type: Treatment  PT/PTA: PT     PTA Visit Number: 2       General Precautions: Standard, fall  Orthopedic Precautions: Orthopedic Precautions : LLE partial weight bearing   Braces: Braces: N/A         Subjective:  Communicated with nursing prior to session.    Pain/Comfort  Pain Rating 1: 0/10    Objective:  Patient found supine, with nursing present      Functional Mobility:  Bed Mobility:   Supine to sit: Minimal Assistance   Sit to supine: Minimal Assistance   Rolling: Activity did not occur   Scooting: Activity did not occur    Transfer Training:  Sit to stand:Minimal Assistance with Rolling Walker .    Gait Training:  Patient ambulated 50 feet PWB LLE and CGA with FWW then 100 feet  PWB LLE and CGA with FWW      Additional Treatment:  Quad sets x 30 B  Gluteal sets x 30 B  LAQ 3 x 10 2# on R 0# on L  Marching 3 x 10 2#  Adductor squeeze 3 x 10  Hip abduction with GTB 3 x 10  Seated HR x 30  Seated TR x 30  Ankle dorsiflexion with RTB 3 x 10 B  Seated HS curls 3 x 10 with RTB B    Activity Tolerance:  Patient tolerated treatment well    Patient left supine with call button in reach.    Assessment:  Marck Ramos is a 89 y.o. male with a medical diagnosis of <principal problem not specified>. He presents with decreased endurance.    Rehab potential is good.    Activity tolerance: Good    Discharge recommendations:       Equipment recommendations:       GOALS:   Multidisciplinary Problems     Physical Therapy Goals        Problem: Physical Therapy Goal    Goal Priority Disciplines Outcome Goal Variances Interventions   Physical Therapy Goal     PT, PT/OT Ongoing (interventions implemented as appropriate)     Description:  Goals to be met by: 9/21/2018     Patient  will increase functional independence with mobility by performin. Supine to sit with Stand-by Assistance  2. Sit to supine with Stand-by Assistance  3. Sit to stand transfer with Stand-by Assistance  4. Bed to chair transfer with Stand-by Assistance using Rolling Walker  5. Gait  x 150 feet with Stand-by Assistance using Rolling Walker.   6. Wheelchair propulsion x 200 feet with Modified [place] using bilateral uppper extremities  7. Ascend/Descend 6 inch curb step with Contact Guard Assistance using Rolling Walker.                      PLAN:    Patient to be seen daily  to address the above listed problems via gait training, therapeutic activities, therapeutic exercises, therapeutic groups, wheelchair management/training  Plan of Care expires: 18  Plan of Care reviewed with: patient         Jass Mcdonough, PT 2018

## 2018-09-11 NOTE — PLAN OF CARE
Problem: Physical Therapy Goal  Goal: Physical Therapy Goal  Goals to be met by: 2018     Patient will increase functional independence with mobility by performin. Supine to sit with Stand-by Assistance  2. Sit to supine with Stand-by Assistance  3. Sit to stand transfer with Stand-by Assistance  4. Bed to chair transfer with Stand-by Assistance using Rolling Walker  5. Gait  x 150 feet with Stand-by Assistance using Rolling Walker.   6. Wheelchair propulsion x 200 feet with Modified [place] using bilateral uppper extremities  7. Ascend/Descend 6 inch curb step with Contact Guard Assistance using Rolling Walker.     Outcome: Ongoing (interventions implemented as appropriate)  Patient amb 50 feet then 100 feet PWB LLE using a FWW and GCA

## 2018-09-11 NOTE — PROGRESS NOTES
"REHAB FOLLOWUP NOTE    Marck Ramos is a 89 y.o. male patient.    Chief Complaint:   Left hip intertrochanteric fracture needing ORIF.    History: 89-year-old gentleman fell in his  yard trying to hold his dog lay down there for  few hours before he was found by his neighbors, called the ambulance and presented to the emergency room.  Patient had a workup done consistent with a left hip intertrochanteric fracture needing a left hip ORIF done on 09/29/2018 by Dr. Kuhn.  Patient's postop course complicated with acute blood loss anemia needing blood transfusion, urinary retention needing cystoscopy with catheter placement, hematuria, encephalopathy, pain control issues,dependency with  ADLs, transfers, mobility is transferred down to us for further rehabilitation.         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    Iron deficiency anemia, unspecified 7/18/2017       Temp: 98.3 °F (36.8 °C) (09/11/18 0524)  Pulse: 63 (09/11/18 0524)  Resp: 16 (09/11/18 0524)  BP: (!) 141/55 (09/11/18 0524)  SpO2: (!) 94 % (09/11/18 0524)  Weight: 63.8 kg (140 lb 10.5 oz) (09/10/18 0559)  Height: 5' 6" (167.6 cm) (09/05/18 1800)    Lab Results   Component Value Date    WBC 7.40 09/08/2018    HGB 10.6 (L) 09/08/2018    HCT 31.7 (L) 09/08/2018     (H) 09/08/2018    ALT 10 09/08/2018    AST 25 09/08/2018     (L) 09/10/2018    K 4.3 09/10/2018    CL 97 09/10/2018    CREATININE 0.7 09/10/2018    BUN 16 09/10/2018    CO2 23 09/10/2018    PSA 1.88 06/29/2012       Physical Examination:  Alert, oriented x3,   Voices no complaints.   lungs are clear to auscultation.  Heart with a regular rate and rhythm.  "   Bilateral upper and right lower extremity with functional active to passive range of motion with good motor strength noted.  Left lower extremity hip incision clean and dry.  Garcia catheter out.  Continent of BM.  Voiding trial.    Impression:  Left hip intertrochanteric fracture needing ORIF.  Postop acute blood loss anemia.    Postop encephalopathy.    Postop urinary retention needing Garcia catheter placement.    Hypertension.    Paroxysmal atrial fibrillation.    Peripheral vascular disease.    Coronary artery disease.  Hyponatremia.    Recommendations:    Continue current PT, OT, nursing care.          Grey Gonzalez MD  9/11/2018

## 2018-09-12 LAB
ALBUMIN SERPL BCP-MCNC: 2.6 G/DL
ANION GAP SERPL CALC-SCNC: 7 MMOL/L
BUN SERPL-MCNC: 14 MG/DL
CALCIUM SERPL-MCNC: 8.6 MG/DL
CHLORIDE SERPL-SCNC: 94 MMOL/L
CO2 SERPL-SCNC: 25 MMOL/L
CREAT SERPL-MCNC: 0.7 MG/DL
EST. GFR  (AFRICAN AMERICAN): >60 ML/MIN/1.73 M^2
EST. GFR  (NON AFRICAN AMERICAN): >60 ML/MIN/1.73 M^2
GLUCOSE SERPL-MCNC: 93 MG/DL
PHOSPHATE SERPL-MCNC: 2.8 MG/DL
POTASSIUM SERPL-SCNC: 4 MMOL/L
SODIUM SERPL-SCNC: 126 MMOL/L

## 2018-09-12 PROCEDURE — 97110 THERAPEUTIC EXERCISES: CPT

## 2018-09-12 PROCEDURE — 12800000 HC REHAB SEMI-PRIVATE ROOM

## 2018-09-12 PROCEDURE — 97116 GAIT TRAINING THERAPY: CPT

## 2018-09-12 PROCEDURE — 36415 COLL VENOUS BLD VENIPUNCTURE: CPT

## 2018-09-12 PROCEDURE — 80069 RENAL FUNCTION PANEL: CPT

## 2018-09-12 PROCEDURE — 97542 WHEELCHAIR MNGMENT TRAINING: CPT

## 2018-09-12 PROCEDURE — 99900035 HC TECH TIME PER 15 MIN (STAT)

## 2018-09-12 PROCEDURE — 25000003 PHARM REV CODE 250: Performed by: PHYSICAL MEDICINE & REHABILITATION

## 2018-09-12 RX ORDER — ASPIRIN 325 MG
325 TABLET, DELAYED RELEASE (ENTERIC COATED) ORAL 2 TIMES DAILY
Status: DISCONTINUED | OUTPATIENT
Start: 2018-09-12 | End: 2018-09-20 | Stop reason: HOSPADM

## 2018-09-12 RX ADMIN — AMLODIPINE BESYLATE 5 MG: 5 TABLET ORAL at 09:09

## 2018-09-12 RX ADMIN — LOSARTAN POTASSIUM 50 MG: 25 TABLET, FILM COATED ORAL at 09:09

## 2018-09-12 RX ADMIN — DOCUSATE SODIUM 100 MG: 100 CAPSULE, LIQUID FILLED ORAL at 08:09

## 2018-09-12 RX ADMIN — Medication 400 MG: at 09:09

## 2018-09-12 RX ADMIN — FUROSEMIDE 40 MG: 40 TABLET ORAL at 09:09

## 2018-09-12 RX ADMIN — FERROUS SULFATE TAB EC 325 MG (65 MG FE EQUIVALENT) 325 MG: 325 (65 FE) TABLET DELAYED RESPONSE at 08:09

## 2018-09-12 RX ADMIN — TAMSULOSIN HYDROCHLORIDE 0.4 MG: 0.4 CAPSULE ORAL at 09:09

## 2018-09-12 RX ADMIN — SIMVASTATIN 20 MG: 10 TABLET, FILM COATED ORAL at 08:09

## 2018-09-12 RX ADMIN — PANTOPRAZOLE SODIUM 40 MG: 40 TABLET, DELAYED RELEASE ORAL at 09:09

## 2018-09-12 RX ADMIN — DOCUSATE SODIUM 100 MG: 100 CAPSULE, LIQUID FILLED ORAL at 09:09

## 2018-09-12 RX ADMIN — FERROUS SULFATE TAB EC 325 MG (65 MG FE EQUIVALENT) 325 MG: 325 (65 FE) TABLET DELAYED RESPONSE at 09:09

## 2018-09-12 RX ADMIN — ISOSORBIDE MONONITRATE 60 MG: 30 TABLET, EXTENDED RELEASE ORAL at 09:09

## 2018-09-12 RX ADMIN — POTASSIUM CHLORIDE 20 MEQ: 1500 TABLET, EXTENDED RELEASE ORAL at 09:09

## 2018-09-12 RX ADMIN — ASPIRIN 325 MG: 325 TABLET, DELAYED RELEASE ORAL at 08:09

## 2018-09-12 RX ADMIN — SOTALOL HYDROCHLORIDE 80 MG: 80 TABLET ORAL at 09:09

## 2018-09-12 RX ADMIN — LOSARTAN POTASSIUM 50 MG: 25 TABLET, FILM COATED ORAL at 08:09

## 2018-09-12 RX ADMIN — SOTALOL HYDROCHLORIDE 80 MG: 80 TABLET ORAL at 08:09

## 2018-09-12 RX ADMIN — CLOPIDOGREL BISULFATE 75 MG: 75 TABLET ORAL at 09:09

## 2018-09-12 NOTE — PT/OT/SLP PROGRESS
Occupational Therapy  Treatment    Marck Ramos   MRN: [id number]   Admitting Diagnosis:  L ORIF    OT Date of Treatment: 09/12/18   Total Time (min): 45 min      Billable Minutes:  Therapeutic Exercise 45  Total Minutes: 45    General Precautions: Standard, fall  Orthopedic Precautions: LLE partial weight bearing  Braces: N/A         Subjective:  Communicated with nurse prior to session.  Pt stated that he doesn't feel all that well today and the reason is mostly that he is 89. He doesn't know when the pain in his hip will stop but he hopes it will subside and he can get bck home and back to normal.     Objective:  Patient found with: (chair alarm)    Additional Treatment:  Therapeutic Exercise occurred seated in w/c:  3 trials on UEB w/ 3 min rest between trials.  1st trial @ 3.5 min  2nd trial 5.5 min  3rd trial 1.5 min  Shoulder shrugs 3 sets of 10 w/ 1 min rest between sets.  B abd @ chest level w/ yellow tband 2 sets of 15 with 3 min rest between sets.  Endurance and B coordination activity with unweighted ball- tossing to SOTA and catching from SOTA  - 1st trial 45 tosses then 5 min rest  - 2nd trial 35 tosses then 5 min rest  - 3rd trial 25 tosses then 5 min rest    Patient left up in w/c with nurse present    ASSESSMENT:  Marck Ramos is a 89 y.o. male with a medical diagnosis s/p L ORIF and is following weight bearing precautions. Impaired ADLs, mobility and safety and will continue to benefit from skilled OT.    Rehab potential is good    Activity tolerance: Fair    Discharge recommendations:       Equipment recommendations:       GOALS:   Multidisciplinary Problems     Occupational Therapy Goals        Problem: Occupational Therapy Goal    Goal Priority Disciplines Outcome Interventions   Occupational Therapy Goal     OT, PT/OT Ongoing (interventions implemented as appropriate)    Description:  Goals to be met by: 9/25/2018     Patient will increase functional independence with ADLs by  performing:    LE Dressing with Modified [place] and Assistive Devices as needed.  Grooming while standing at sink with Modified [place].  Toileting from toilet with [place] for hygiene and clothing management.   Bathing from  shower chair/bench with Modified [place].  Supine to sit with Modified [place].  Stand pivot transfers with Modified [place] and maintaining weight-bearing precaution(s).  Toilet transfer to toilet with Modified [place] and maintaining weight-bearing precaution(s).  Upper extremity exercise program x15 reps per handout, with independence.                      Plan:  Patient to be seen 6 x/week to address the above listed problems via self-care/home management, community/work re-entry, therapeutic activities, therapeutic exercises  Plan of Care expires: 09/22/18  Plan of Care reviewed with: patient     KENNEDI Faulkner   I certify that I was present in the room directing the student in service delivery and guiding them using my skilled judgment. As the co-signing therapist I have reviewed the students documentation and am responsible for the treatment, assessment, and plan.     09/12/2018

## 2018-09-12 NOTE — PROGRESS NOTES
"REHAB FOLLOWUP NOTE    Marck Ramos is a 89 y.o. male patient.    Chief Complaint:   Left hip intertrochanteric fracture needing ORIF.    History: 89-year-old gentleman fell in his  yard trying to hold his dog lay down there for  few hours before he was found by his neighbors, called the ambulance and presented to the emergency room.  Patient had a workup done consistent with a left hip intertrochanteric fracture needing a left hip ORIF done on 09/29/2018 by Dr. Kuhn.  Patient's postop course complicated with acute blood loss anemia needing blood transfusion, urinary retention needing cystoscopy with catheter placement, hematuria, encephalopathy, pain control issues,dependency with  ADLs, transfers, mobility is transferred down to us for further rehabilitation.         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    Iron deficiency anemia, unspecified 7/18/2017       Temp: 97.2 °F (36.2 °C) (09/12/18 0607)  Pulse: 68 (09/12/18 0607)  Resp: 16 (09/12/18 0607)  BP: 134/61 (09/12/18 0607)  SpO2: (!) 94 % (09/12/18 0607)  Weight: 63.8 kg (140 lb 10.5 oz) (09/10/18 0559)  Height: 5' 6" (167.6 cm) (09/05/18 1800)    Lab Results   Component Value Date    WBC 7.40 09/08/2018    HGB 10.6 (L) 09/08/2018    HCT 31.7 (L) 09/08/2018     (H) 09/08/2018    ALT 10 09/08/2018    AST 25 09/08/2018     (L) 09/12/2018    K 4.0 09/12/2018    CL 94 (L) 09/12/2018    CREATININE 0.7 09/12/2018    BUN 14 09/12/2018    CO2 25 09/12/2018    PSA 1.88 06/29/2012       Physical Examination:  Alert, oriented x3,   Voices no complaints.  Lungs are clear to auscultation.  Heart with a regular rate and rhythm.  "   Bilateral upper and right lower extremity with functional active to passive range of motion with good motor strength noted.  Left lower extremity hip incision clean and dry.  Staples removed.  Garcia catheter out.  Continent of BM.  Voiding on his own.  Will check postvoid residuals.    Impression:  Left hip intertrochanteric fracture needing ORIF.  Postop acute blood loss anemia.    Postop encephalopathy.    Postop urinary retention needing Garcia catheter placement.    Hypertension.    Paroxysmal atrial fibrillation.    Peripheral vascular disease.    Coronary artery disease.  Hyponatremia.    Recommendations:    Continue current PT, OT, nursing care.          Grey Gonzalez MD  9/12/2018

## 2018-09-12 NOTE — PT/OT/SLP PROGRESS
Physical Therapy         Treatment        Marck Ramos   MRN: [id number]     PT Received On: 09/12/18  Total Time (min): 50        Billable Minutes:  Gait [id number] and Therapeutic Exercise 40  Total Minutes: 50    Treatment Type: Treatment  PT/PTA: PTA     PTA Visit Number: 2       General Precautions: Standard, fall  Orthopedic Precautions: Orthopedic Precautions : LLE partial weight bearing   Braces:           Subjective:  Communicated with patient and nursing prior to session.    Pain/Comfort  Pain Rating 1: 0/10  Pain Rating Post-Intervention 1: 0/10    Objective:  Patient found sitting in WC in room, agreeable to PT, with Patient found with: peripheral IV    Functional Mobility:  Balance:   Static Sit: POOR+: Needs MINIMAL assist to maintain  Dynamic Sit:  GOOD-: Incosistently Maintains balance through MODERATE excursions of active trunk movement,     Static Stand: POOR+: Needs MINIMAL assist to maintain  Dynamic stand: POOR: Needs MOD (moderate) assist during gait    Transfer Training:  Sit to stand:Minimal Assistance and Moderate Assistance with Rolling Walker x5    Wheelchair Training:  Pt propelled Standard wheelchair x 150 feet on Level tile with  Bilateral upper extremity and Right lower extremity with Supervision or Set-up Assistance.     Gait Training:  Patient gait trained PWB: left lower extremity 90  feet on level tile with Rolling Walker with Contact Guard Assistance.  Pt with demonstarting a  swing-through gait with decreased alondra, decreased velocity of limb motion, decreased step length, decreased stride length, decreased swing-to-stance ratio, decreased toe-to-floor clearance and decreased weight-shifting ability.Impairments contributing to gait deviations include impaired coordination, decreased flexibility, impaired motor control, decreased sensation, impaired sensory feedback and decreased strength    Stair Training:    Additional Treatment:  LAQ 30, Marching 30, HR/TR, Hip abd/add, hip  ER with 4# on R LE    Activity Tolerance:  Patient tolerated treatment well    Patient left up in chair with all lines intact with Casi Junior, PTA continuing tx.    Assessment:  Marck Ramos is a 89 y.o. male with a medical diagnosis of <principal problem not specified>. He presents with decrease cognition, decreased functional mobility, decreased functional stability, decrease strength in B LE's.    Rehab potential is fair.    Activity tolerance: Fair    Discharge recommendations:       Equipment recommendations:       GOALS:   Multidisciplinary Problems     Physical Therapy Goals        Problem: Physical Therapy Goal    Goal Priority Disciplines Outcome Goal Variances Interventions   Physical Therapy Goal     PT, PT/OT Ongoing (interventions implemented as appropriate)     Description:  Goals to be met by: 2018     Patient will increase functional independence with mobility by performin. Supine to sit with Stand-by Assistance  2. Sit to supine with Stand-by Assistance  3. Sit to stand transfer with Stand-by Assistance  4. Bed to chair transfer with Stand-by Assistance using Rolling Walker  5. Gait  x 150 feet with Stand-by Assistance using Rolling Walker.   6. Wheelchair propulsion x 200 feet with Modified [place] using bilateral uppper extremities  7. Ascend/Descend 6 inch curb step with Contact Guard Assistance using Rolling Walker.                      PLAN:    Patient to be seen daily  to address the above listed problems via gait training, therapeutic activities, therapeutic exercises, therapeutic groups, wheelchair management/training  Plan of Care expires: 18  Plan of Care reviewed with: patient         Kareybrandan Finch, PTA 2018

## 2018-09-12 NOTE — PLAN OF CARE
Problem: Physical Therapy Goal  Goal: Physical Therapy Goal  Goals to be met by: 2018     Patient will increase functional independence with mobility by performin. Supine to sit with Stand-by Assistance  2. Sit to supine with Stand-by Assistance  3. Sit to stand transfer with Stand-by Assistance  4. Bed to chair transfer with Stand-by Assistance using Rolling Walker  5. Gait  x 150 feet with Stand-by Assistance using Rolling Walker.   6. Wheelchair propulsion x 200 feet with Modified [place] using bilateral uppper extremities  7. Ascend/Descend 6 inch curb step with Contact Guard Assistance using Rolling Walker.     Outcome: Ongoing (interventions implemented as appropriate)  Patient performed therex, gait training, and transfer training to increase strength and stability to meet goals set in POC

## 2018-09-12 NOTE — PLAN OF CARE
09/12/18 0913   Patient Assessment/Suction   Level of Consciousness (AVPU) alert   Respiratory Effort Unlabored   All Lung Fields Breath Sounds clear   [id number]   O2 Device (Oxygen Therapy) room air   SpO2 95 %   Pulse Oximetry Type Intermittent   Aerosol Therapy   $ Aerosol Therapy Charges PRN treatment not required

## 2018-09-12 NOTE — PLAN OF CARE
Problem: Patient Care Overview  Goal: Plan of Care Review  Outcome: Ongoing (interventions implemented as appropriate)  Patient awake/alert.Generalized weakness noted.Voiding without difficulty. Free from falls. Plan of care continued

## 2018-09-12 NOTE — PLAN OF CARE
Problem: Physical Therapy Goal  Goal: Physical Therapy Goal  Goals to be met by: 2018     Patient will increase functional independence with mobility by performin. Supine to sit with Stand-by Assistance  2. Sit to supine with Stand-by Assistance  3. Sit to stand transfer with Stand-by Assistance  4. Bed to chair transfer with Stand-by Assistance using Rolling Walker  5. Gait  x 150 feet with Stand-by Assistance using Rolling Walker.   6. Wheelchair propulsion x 200 feet with Modified [place] using bilateral uppper extremities  7. Ascend/Descend 6 inch curb step with Contact Guard Assistance using Rolling Walker.     Outcome: Ongoing (interventions implemented as appropriate)  Amb with RW L ' with min A 2nd to occasional post LOB.

## 2018-09-12 NOTE — PLAN OF CARE
Problem: Occupational Therapy Goal  Goal: Occupational Therapy Goal  Goals to be met by: 9/25/2018     Patient will increase functional independence with ADLs by performing:    LE Dressing with Modified [place] and Assistive Devices as needed.  Grooming while standing at sink with Modified [place].  Toileting from toilet with [place] for hygiene and clothing management.   Bathing from  shower chair/bench with Modified [place].  Supine to sit with Modified [place].  Stand pivot transfers with Modified [place] and maintaining weight-bearing precaution(s).  Toilet transfer to toilet with Modified [place] and maintaining weight-bearing precaution(s).  Upper extremity exercise program x15 reps per handout, with independence.     Goals remain appropriate.

## 2018-09-12 NOTE — PT/OT/SLP PROGRESS
Physical Therapy         Treatment        Marck Ramos   MRN: [id number]     PT Received On: 09/12/18  Total Time (min): 40        Billable Minutes:  Gait Training8, Therapeutic Exercise 24 and Train/Wheelchair Management 8  Total Minutes: 40    Treatment Type: Treatment  PT/PTA: PTA     PTA Visit Number: 2       General Precautions: Standard, fall  Orthopedic Precautions: Orthopedic Precautions : LLE partial weight bearing   Braces:           Subjective:  Communicated with nurse prior to session.    Pain/Comfort  Pain Rating 1: 0/10  Pain Rating Post-Intervention 1: 0/10    Objective:  Patient found in Rehab gym in .    Functional Mobility:  Bed Mobility:   Supine to sit: Minimal Assistance L LE   Sit to supine: Minimal Assistance L LE   Rolling: Standby Assistance with cues for direction   Scooting: Supervision or Set-up Assistance     Balance:   Static Sit: FAIR+: Able to take MINIMAL challenges from all directions  Dynamic Sit:  FAIR+: Maintains balance through MINIMAL excursions of active trunk motion  Static Stand: POOR+: Needs MINIMAL assist to maintain with RW support  Dynamic stand: POOR+: Needs MIN (minimal ) assist during gait 2nd to occasional posterior LOB with RW support    Transfer Training:  Sit to stand:Minimal Assistance and Moderate Assistance with Rolling Walker and VC/TC for proper hand placement x6    Wheelchair Training:  Pt propelled Standard wheelchair x 130 feet on Level tile with  Bilateral upper extremity with Stand-by Assistance and L foot rest on.     Gait Training:  Patient gait trained PWB: left lower extremity 115  feet on level tile with Rolling Walker with Minimal Assistance.  Pt with demonstarting a  swing-to gait with decreased alondra, increased time in double stance, decreased velocity of limb motion, decreased step length, decreased stride length, decreased swing-to-stance ratio, decreased toe-to-floor clearance and decreased weight-shifting ability.Impairments contributing  to gait deviations include impaired balance, decreased flexibility, impaired motor control, impaired postural control, decreased ROM, decreased strength and L PWB    Additional Treatment:  Supine ex 3x10: quad sets, glut sets, heel slides, hip abd (Asst on L), SAQ   Supine ex 4x5: SLR (Asst B)    Activity Tolerance:  Patient limited by fatigue    Patient left up in chair with call button in reach, chair alarm on and nurse notified.    Assessment:  Marck Ramos is a 89 y.o. male with a medical diagnosis of <principal problem not specified>. He presents with continued poor hand placement with sit<>stand.    Rehab potential is .    Activity tolerance: Fair    Discharge recommendations:       Equipment recommendations:       GOALS:   Multidisciplinary Problems     Physical Therapy Goals        Problem: Physical Therapy Goal    Goal Priority Disciplines Outcome Goal Variances Interventions   Physical Therapy Goal     PT, PT/OT Ongoing (interventions implemented as appropriate)     Description:  Goals to be met by: 2018     Patient will increase functional independence with mobility by performin. Supine to sit with Stand-by Assistance  2. Sit to supine with Stand-by Assistance  3. Sit to stand transfer with Stand-by Assistance  4. Bed to chair transfer with Stand-by Assistance using Rolling Walker  5. Gait  x 150 feet with Stand-by Assistance using Rolling Walker.   6. Wheelchair propulsion x 200 feet with Modified [place] using bilateral uppper extremities  7. Ascend/Descend 6 inch curb step with Contact Guard Assistance using Rolling Walker.                      PLAN:    Patient to be seen daily  to address the above listed problems via gait training, therapeutic activities, therapeutic exercises, therapeutic groups, wheelchair management/training  Plan of Care expires: 18  Plan of Care reviewed with: patient         Casi SOHAM Son, PTA 2018

## 2018-09-12 NOTE — PT/OT/SLP PROGRESS
Occupational Therapy  Treatment    Marck Ramos   MRN: [id number]   Admitting Diagnosis: L ORIF    OT Date of Treatment: 09/12/18   Total Time (min): 45 min      Billable Minutes:  Therapeutic Exercise 45  Total Minutes: 45    General Precautions: Standard, fall  Orthopedic Precautions: LLE partial weight bearing  Braces: N/A      Subjective:  Communicated with nurse prior to session.  Patient reported he was tired.    Pain/Comfort  Pain Rating 1: 0/10    Objective:    Feeding:  Patient performed feeding with set up A.    Additional Treatment:  UBE 2 x 5 minutes  Wheelchair propulsion 150 feet with standby A and verbal cues.    Patient left up in chair with call button in reach and chair alarm on    ASSESSMENT:  Patient showed fatigue and required frequent rest breaks. He would benefit from continued skilled occupational therapy services to improve his independence and mobility.    Rehab potential is good    Activity tolerance: fair      GOALS:   Multidisciplinary Problems     Occupational Therapy Goals        Problem: Occupational Therapy Goal    Goal Priority Disciplines Outcome Interventions   Occupational Therapy Goal     OT, PT/OT Ongoing (interventions implemented as appropriate)    Description:  Goals to be met by: 9/25/2018     Patient will increase functional independence with ADLs by performing:    LE Dressing with Modified [place] and Assistive Devices as needed.  Grooming while standing at sink with Modified [place].  Toileting from toilet with [place] for hygiene and clothing management.   Bathing from  shower chair/bench with Modified [place].  Supine to sit with Modified [place].  Stand pivot transfers with Modified [place] and maintaining weight-bearing precaution(s).  Toilet transfer to toilet with Modified [place] and maintaining weight-bearing precaution(s).  Upper extremity exercise program x15 reps per handout, with independence.                       Plan:  Patient to be seen 6 x/week to address the above listed problems via self-care/home management, community/work re-entry, therapeutic activities, therapeutic exercises  Plan of Care expires: 09/22/18  Plan of Care reviewed with: patient      KENNEDI Bray    9/12/2018

## 2018-09-13 PROCEDURE — 99900035 HC TECH TIME PER 15 MIN (STAT)

## 2018-09-13 PROCEDURE — 97110 THERAPEUTIC EXERCISES: CPT

## 2018-09-13 PROCEDURE — 25000003 PHARM REV CODE 250: Performed by: PHYSICAL MEDICINE & REHABILITATION

## 2018-09-13 PROCEDURE — 97116 GAIT TRAINING THERAPY: CPT

## 2018-09-13 PROCEDURE — 12800000 HC REHAB SEMI-PRIVATE ROOM

## 2018-09-13 PROCEDURE — 97535 SELF CARE MNGMENT TRAINING: CPT

## 2018-09-13 RX ORDER — AMLODIPINE BESYLATE 5 MG/1
10 TABLET ORAL DAILY
Status: DISCONTINUED | OUTPATIENT
Start: 2018-09-13 | End: 2018-09-20 | Stop reason: HOSPADM

## 2018-09-13 RX ADMIN — ISOSORBIDE MONONITRATE 60 MG: 30 TABLET, EXTENDED RELEASE ORAL at 10:09

## 2018-09-13 RX ADMIN — AMLODIPINE BESYLATE 10 MG: 5 TABLET ORAL at 10:09

## 2018-09-13 RX ADMIN — LOSARTAN POTASSIUM 50 MG: 25 TABLET, FILM COATED ORAL at 08:09

## 2018-09-13 RX ADMIN — DOCUSATE SODIUM 100 MG: 100 CAPSULE, LIQUID FILLED ORAL at 10:09

## 2018-09-13 RX ADMIN — SOTALOL HYDROCHLORIDE 80 MG: 80 TABLET ORAL at 10:09

## 2018-09-13 RX ADMIN — SOTALOL HYDROCHLORIDE 80 MG: 80 TABLET ORAL at 08:09

## 2018-09-13 RX ADMIN — PANTOPRAZOLE SODIUM 40 MG: 40 TABLET, DELAYED RELEASE ORAL at 10:09

## 2018-09-13 RX ADMIN — FERROUS SULFATE TAB EC 325 MG (65 MG FE EQUIVALENT) 325 MG: 325 (65 FE) TABLET DELAYED RESPONSE at 08:09

## 2018-09-13 RX ADMIN — ASPIRIN 325 MG: 325 TABLET, DELAYED RELEASE ORAL at 08:09

## 2018-09-13 RX ADMIN — DOCUSATE SODIUM 100 MG: 100 CAPSULE, LIQUID FILLED ORAL at 08:09

## 2018-09-13 RX ADMIN — ASPIRIN 325 MG: 325 TABLET, DELAYED RELEASE ORAL at 10:09

## 2018-09-13 RX ADMIN — CLOPIDOGREL BISULFATE 75 MG: 75 TABLET ORAL at 10:09

## 2018-09-13 RX ADMIN — Medication 400 MG: at 10:09

## 2018-09-13 RX ADMIN — TAMSULOSIN HYDROCHLORIDE 0.4 MG: 0.4 CAPSULE ORAL at 10:09

## 2018-09-13 RX ADMIN — FUROSEMIDE 40 MG: 40 TABLET ORAL at 10:09

## 2018-09-13 RX ADMIN — POTASSIUM CHLORIDE 20 MEQ: 1500 TABLET, EXTENDED RELEASE ORAL at 10:09

## 2018-09-13 RX ADMIN — SIMVASTATIN 20 MG: 10 TABLET, FILM COATED ORAL at 08:09

## 2018-09-13 RX ADMIN — LOSARTAN POTASSIUM 50 MG: 25 TABLET, FILM COATED ORAL at 10:09

## 2018-09-13 RX ADMIN — FERROUS SULFATE TAB EC 325 MG (65 MG FE EQUIVALENT) 325 MG: 325 (65 FE) TABLET DELAYED RESPONSE at 10:09

## 2018-09-13 NOTE — PLAN OF CARE
Problem: Patient Care Overview  Goal: Plan of Care Review  Outcome: Ongoing (interventions implemented as appropriate)  Patient awake/alert. Generalized weakness noted. Remains continent. Free from fall. Plan of care continued

## 2018-09-13 NOTE — PT/OT/SLP PROGRESS
Physical Therapy         Treatment        Marck Ramos   MRN: [id number]     PT Received On: 09/13/18  Total Time (min): 45        Billable Minutes:  Gait [id number] and Therapeutic Exercise 35  Total Minutes: 45    Treatment Type: Treatment  PT/PTA: PTA     PTA Visit Number: 2       General Precautions: Standard, fall, hearing impaired  Orthopedic Precautions: Orthopedic Precautions : LLE partial weight bearing   Braces: Braces: N/A         Subjective:  Communicated with patient and nursing prior to session.    Pain/Comfort  Pain Rating 1: 0/10  Pain Rating Post-Intervention 1: 0/10    Objective:  Patient found sitting in WC upon arrival, with      Functional Mobility:  Bed Mobility:   Supine to sit: Activity did not occur   Sit to supine: Activity did not occur   Rolling: Activity did not occur   Scooting: Activity did not occur    Balance:   Static Sit: FAIR: Maintains without assist, but unable to take any challenges   Dynamic Sit:  FAIR+: Maintains balance through MINIMAL excursions of active trunk motion  Static Stand: FAIR: Maintains without assist but unable to take challenges  Dynamic stand: POOR+: Needs MIN (minimal ) assist during gait    Transfer Training:  Sit to stand:Minimal Assistance with Rolling Walker x5    Wheelchair Training:  Pt propelled Standard wheelchair x 150 feet on Level tile with  Bilateral upper extremity with Supervision or Set-up Assistance.     Gait Training:  Patient gait trained PWB: left lower extremity 212  feet on level tile with Rolling Walker with Minimal Assistance.  Pt with demonstarting a  swing-to gait with decreased alondra, increased time in double stance, decreased velocity of limb motion, decreased step length, decreased stride length, decreased toe-to-floor clearance and decreased weight-shifting ability.Impairments contributing to gait deviations include impaired balance, decreased flexibility, impaired motor control, pain, decreased sensation, impaired sensory  feedback and decreased strength    Stair Training:    Additional Treatment:  Seated TE x 30, 3# R, 0# L:  HR/TR, LAQ, marching, abd/add    Activity Tolerance:  Patient tolerated treatment well    Patient left up in chair with all lines intact, Patricia, PT  present and  to take over tx session.    Assessment:  Marck Ramos is a 89 y.o. male with a medical diagnosis of <principal problem not specified>. He presents with decreased functional mobility, decreased LE strength, decreased WB on L LE.    Rehab potential is good.    Activity tolerance: Good    Discharge recommendations:       Equipment recommendations:       GOALS:   Multidisciplinary Problems     Physical Therapy Goals        Problem: Physical Therapy Goal    Goal Priority Disciplines Outcome Goal Variances Interventions   Physical Therapy Goal     PT, PT/OT Ongoing (interventions implemented as appropriate)     Description:  Goals to be met by: 2018     Patient will increase functional independence with mobility by performin. Supine to sit with Stand-by Assistance  2. Sit to supine with Stand-by Assistance  3. Sit to stand transfer with Stand-by Assistance  4. Bed to chair transfer with Stand-by Assistance using Rolling Walker  5. Gait  x 150 feet with Stand-by Assistance using Rolling Walker.   6. Wheelchair propulsion x 200 feet with Modified [place] using bilateral uppper extremities  7. Ascend/Descend 6 inch curb step with Contact Guard Assistance using Rolling Walker.                      PLAN:    Patient to be seen daily  to address the above listed problems via gait training, therapeutic exercises, wheelchair management/training  Plan of Care expires: 18  Plan of Care reviewed with: patient         Kaery Finch, PTA 2018

## 2018-09-13 NOTE — PROGRESS NOTES
Team conference attended and patient discussed. Spoke with patient to discuss treatment plan, goals and ELOS.   Spoke with patient's daughter to provide an update and discuss discharge plan.  SW will follow and assist with discharge planning as needed.

## 2018-09-13 NOTE — PLAN OF CARE
Problem: Occupational Therapy Goal  Goal: Occupational Therapy Goal  Goals to be met by: 9/25/2018     Patient will increase functional independence with ADLs by performing:    LE Dressing with Modified [place] and Assistive Devices as needed.  Grooming while standing at sink with Modified [place].  Toileting from toilet with [place] for hygiene and clothing management.   Bathing from  shower chair/bench with Modified [place].  Supine to sit with Modified [place].  Stand pivot transfers with Modified [place] and maintaining weight-bearing precaution(s).  Toilet transfer to toilet with Modified [place] and maintaining weight-bearing precaution(s).  Upper extremity exercise program x15 reps per handout, with independence.     Outcome: Ongoing (interventions implemented as appropriate)  Patient progressing toward goals.

## 2018-09-13 NOTE — PATIENT CARE CONFERENCE
Weekly Staffing Report      Date Admitted: 9/5/2018 :   Staffing Date: 9/13/2018     Patient Active Problem List   Diagnosis    Hypertension    Hyperlipidemia    Iron deficiency anemia, unspecified    Iron deficiency anemia secondary to blood loss (chronic)    Anemia in stage 2 chronic kidney disease    Hemorrhage of gastrointestinal tract    Anemia, chronic disease    Fracture of left femur          Team Members Present:  Physician Team Member: Dr Gonzalez  Nursing Team Member: Alessandro Tello RN  Case Management Team Member: Maddie Valdez CM/Sw  PT Team Member: Patricia Mack PT  OT Team Member: Laney Washington OT    Nursing  Skin: Intact  Bowel: Continent  Bladder:continent  Diet: Regular  Appetite: good   Pain Level: 8/10    Physical Therapy  Supine to Sit:  minimal assist  Sit to Stand: minimal assist  Gait: 100-125 feet contact guard Rolling walker  Wheelchair Mobility: 150-175 feet supervision  Stairs: N/A      Occupational Therapy  Feeding: modified independent  Grooming:modified independent  UED: supervision  LED: supervision  Bathing:supervision   Toileting:supervision  Toilet Transfer:  supervision  Tub Transfer:  supervision      Goals: SBA.      Tolerates 3 hours of therapy: Yes    Discharge Destination: Home with out pt.    Estimated Length of Stay: 9/20/18.

## 2018-09-13 NOTE — PROGRESS NOTES
"REHAB FOLLOWUP NOTE    Marck Ramos is a 89 y.o. male patient.    Chief Complaint:   Left hip intertrochanteric fracture needing ORIF.    History: 89-year-old gentleman fell in his  yard trying to hold his dog lay down there for  few hours before he was found by his neighbors, called the ambulance and presented to the emergency room.  Patient had a workup done consistent with a left hip intertrochanteric fracture needing a left hip ORIF done on 09/29/2018 by Dr. Kuhn.  Patient's postop course complicated with acute blood loss anemia needing blood transfusion, urinary retention needing cystoscopy with catheter placement, hematuria, encephalopathy, pain control issues,dependency with  ADLs, transfers, mobility is transferred down to us for further rehabilitation.         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    Iron deficiency anemia, unspecified 7/18/2017       Temp: 97.7 °F (36.5 °C) (09/12/18 2038)  Pulse: 67 (09/12/18 2038)  Resp: 18 (09/12/18 2038)  BP: (!) 170/72 (09/12/18 2038)  SpO2: 95 % (09/12/18 2038)  Weight: 63.8 kg (140 lb 10.5 oz) (09/10/18 0559)  Height: 5' 6" (167.6 cm) (09/05/18 1800)    Lab Results   Component Value Date    WBC 7.40 09/08/2018    HGB 10.6 (L) 09/08/2018    HCT 31.7 (L) 09/08/2018     (H) 09/08/2018    ALT 10 09/08/2018    AST 25 09/08/2018     (L) 09/12/2018    K 4.0 09/12/2018    CL 94 (L) 09/12/2018    CREATININE 0.7 09/12/2018    BUN 14 09/12/2018    CO2 25 09/12/2018    PSA 1.88 06/29/2012       Physical Examination:  Alert, oriented x3,   Voices no complaints.  Lungs are clear to auscultation.  Heart with a regular rate and rhythm.  "   Bilateral upper and right lower extremity with functional active to passive range of motion with good motor strength noted.  Left lower extremity hip incision clean and dry.  Staples removed.  Garcia catheter out.  Continent of BM.  Voiding on his own.      Impression:  Left hip intertrochanteric fracture needing ORIF.  Postop acute blood loss anemia.    Postop encephalopathy.    Postop urinary retention needing Garcia catheter placement.    Hypertension.    Paroxysmal atrial fibrillation.    Peripheral vascular disease.    Coronary artery disease.  Hyponatremia.    Recommendations:    Continue current PT, OT, nursing care.          Grey Gonzalez MD  9/13/2018

## 2018-09-13 NOTE — PLAN OF CARE
Problem: Patient Care Overview  Goal: Plan of Care Review  Outcome: Ongoing (interventions implemented as appropriate)  AAOx3 vss denies pain tolerating therapy well no acute distress noted

## 2018-09-13 NOTE — PT/OT/SLP PROGRESS
Occupational Therapy  Treatment    Marck Ramos   MRN: [id number]   Admitting Diagnosis: <principal problem not specified>    OT Date of Treatment: 09/13/18   Total Time (min): 90 min      Billable Minutes:  Self Care/Home Management 65 and Therapeutic Exercise 25  Total Minutes: 90    General Precautions: Standard, fall  Orthopedic Precautions: LLE partial weight bearing  Braces: N/A         Subjective:  Communicated with nurse Rabago prior to session.    Pain/Comfort  Pain Rating 1: 0/10  Pain Rating Post-Intervention 1: 0/10    Objective:  Patient found with: (chair alarm)    Functional Mobility:    Transfer Training:  Sit>Stand: CGA with RW   Toilet Transfer: CGA and RW  Shower Bench Transfer: CGA and RW     Grooming:  Patient performed face washing with Supervision or Set-up Assistance at sitting at sink.  Patient performed oral hygeine with Supervision or Set-up Assistance at sitting at sink.    Bathing:  Patient performed bathing with Minimal Assistance with grab bar, Handheld shower head, long-handled sponge seated on shower chair in bathroom. Pt could wash/rinse/dry all parts except his feet. Pt required min A using grab bar to stand while he washed/rinsed/dried his buttocks. Pt given cues to perform task thoroughly.     UE Dressing:  Patient performed UE Dressing with Modified Independent with no AD seated in wheelchair.    LE Dressing:  Patient don/doffed socks with Stand-by Assistance while seated in shower chair using sock aid.  Patient don/doffed compression hose with Moderate Assistance while seated, then in standing with RW.  Patient performed don/doffed underwear with Minimal Assistance in standing using RW.  Patient performed don/doffed pants with Minimal Assistance in standing using RW.    Toilet Training:  Pt performed toileting with Supervision or Set-up Assistance with Grab  bar seated on drop arm commode.    Balance:   Static Sit: FAIR+: Able to take MINIMAL challenges from all  directions  Dynamic Sit:  FAIR+: Maintains balance through MINIMAL excursions of active trunk motion  Static Stand: FAIR: Maintains without assist but unable to take challenges  Dynamic stand: FAIR: Needs CONTACT GUARD during gait    Additional Treatment:  Pt performed BUE exercises with medium grade theraband seated in w/c:  -Elbow flex (3x10)  -Elbow extension (3x10)  Rest breaks taken in between sets.     Patient left up in chair with call button in reach and chair alarm on    ASSESSMENT:  Marck Ramos is a 89 y.o. male. Pt demonstrated improvements with static standing when performing LE dressing. Pt also demonstrated increased independence using AD with LE dressing. Pt would continue to benefit from continued OT services to increase independence with ADLs.     Rehab potential is good    Activity tolerance: Good    Discharge recommendations: home     Equipment recommendations: (TBD)     GOALS:   Multidisciplinary Problems     Occupational Therapy Goals        Problem: Occupational Therapy Goal    Goal Priority Disciplines Outcome Interventions   Occupational Therapy Goal     OT, PT/OT Ongoing (interventions implemented as appropriate)    Description:  Goals to be met by: 9/25/2018     Patient will increase functional independence with ADLs by performing:    LE Dressing with Modified [place] and Assistive Devices as needed.  Grooming while standing at sink with Modified [place].  Toileting from toilet with [place] for hygiene and clothing management.   Bathing from  shower chair/bench with Modified [place].  Supine to sit with Modified [place].  Stand pivot transfers with Modified [place] and maintaining weight-bearing precaution(s).  Toilet transfer to toilet with Modified [place] and maintaining weight-bearing precaution(s).  Upper extremity exercise program x15 reps per handout, with independence.                      Plan:  Patient to be seen 6 x/week to address the above  listed problems via self-care/home management, community/work re-entry, therapeutic activities, therapeutic exercises  Plan of Care expires: 09/22/18  Plan of Care reviewed with: patient         Larry SOHAM Chambers, OT  09/13/2018

## 2018-09-13 NOTE — PLAN OF CARE
Problem: Patient Care Overview  Goal: Plan of Care Review  Outcome: Ongoing (interventions implemented as appropriate)  No PRN Tx needed.

## 2018-09-13 NOTE — PT/OT/SLP PROGRESS
Physical Therapy         Treatment        Marck Ramos   MRN: [id number]     PT Received On: 09/13/18  Total Time (min): 45        Billable Minutes:  Gait [id number], Therapeutic Exercise 30 and Therapeutic Activity  Total Minutes: 45    Treatment Type: Treatment  PT/PTA: PT     PTA Visit Number: 2       General Precautions: Standard, fall, hearing impaired  Orthopedic Precautions: Orthopedic Precautions : LLE partial weight bearing   Braces: Braces: N/A         Subjective:  Communicated with PTA Karey prior to session.         Objective:  Patient found sitting up in w/c in gym after completing partial treatment with PTA.       Functional Mobility:  Bed Mobility:   Supine to sit: Minimal Assistance   Sit to supine: Minimal Assistance   Rolling: Activity did not occur   Scooting: Standby Assistance    Transfer Training:  Sit to stand:Minimal Assistance with Rolling Walker & vc  Chair <> Mat: Step Transfer with Contact Guard Assistance with  Rolling Walker & vc    Gait Training:  Gait training with Rolling Walker on level tile x 100 feet with CGA & vc for PWB L LE    Additional Treatment:  Pt performed B LE there ex sitting x 30 reps with green/blue TB, with vc for proper execution and joint protection: Hip add/abd gs/qs, ham curls.    Pt performed B LE there ex supine x 30 reps each with vc for proper execution: hs, TKE, hip abd/add.    Pt stood at RW to use urinal at end of session with CGA for safety.  L thigh high FRANKO removed.    Activity Tolerance:  Patient tolerated treatment well and Patient limited by fatigue    Patient left up in chair with call button in reach, chair alarm on and nurse notified.    Assessment:  Marck Ramos is a 89 y.o. male with a medical diagnosis of <principal problem not specified>. He presents with Left hip intertrochanteric fracture needing ORIF.  He presents with impaired functional mobility, impaired activity tolerance, gait disturbance due to PWB L LE, pain and impaired  strength.  He would benefit from inpatient PT to increase safety and independence with transfers and gait prior to discharge home.  Pt improving with transfers and activity tolerance slowly.         Rehab potential is good.    Activity tolerance: Fair    Discharge recommendations: Discharge Facility/Level Of Care Needs: home, outpatient PT     Equipment recommendations: Equipment Needed After Discharge: (To be determined)     GOALS:   Multidisciplinary Problems     Physical Therapy Goals        Problem: Physical Therapy Goal    Goal Priority Disciplines Outcome Goal Variances Interventions   Physical Therapy Goal     PT, PT/OT Ongoing (interventions implemented as appropriate)     Description:  Goals to be met by: 2018     Patient will increase functional independence with mobility by performin. Supine to sit with Stand-by Assistance  2. Sit to supine with Stand-by Assistance  3. Sit to stand transfer with Stand-by Assistance  4. Bed to chair transfer with Stand-by Assistance using Rolling Walker  5. Gait  x 150 feet with Stand-by Assistance using Rolling Walker.   6. Wheelchair propulsion x 200 feet with Modified [place] using bilateral uppper extremities  7. Ascend/Descend 6 inch curb step with Contact Guard Assistance using Rolling Walker.                      PLAN:    Patient to be seen daily  to address the above listed problems via gait training, therapeutic exercises, wheelchair management/training  Plan of Care expires: 18  Plan of Care reviewed with: patient         Patricia Mack, PT 2018

## 2018-09-13 NOTE — PLAN OF CARE
Problem: Physical Therapy Goal  Goal: Physical Therapy Goal  Goals to be met by: 2018     Patient will increase functional independence with mobility by performin. Supine to sit with Stand-by Assistance  2. Sit to supine with Stand-by Assistance  3. Sit to stand transfer with Stand-by Assistance  4. Bed to chair transfer with Stand-by Assistance using Rolling Walker  5. Gait  x 150 feet with Stand-by Assistance using Rolling Walker.   6. Wheelchair propulsion x 200 feet with Modified [place] using bilateral uppper extremities  7. Ascend/Descend 6 inch curb step with Contact Guard Assistance using Rolling Walker.     Outcome: Ongoing (interventions implemented as appropriate)  Patient performed therex and gait training to increase strength and meet goals set in POC

## 2018-09-13 NOTE — PLAN OF CARE
Problem: Physical Therapy Goal  Goal: Physical Therapy Goal  Goals to be met by: 2018     Patient will increase functional independence with mobility by performin. Supine to sit with Stand-by Assistance  2. Sit to supine with Stand-by Assistance  3. Sit to stand transfer with Stand-by Assistance  4. Bed to chair transfer with Stand-by Assistance using Rolling Walker  5. Gait  x 150 feet with Stand-by Assistance using Rolling Walker.   6. Wheelchair propulsion x 200 feet with Modified [place] using bilateral uppper extremities  7. Ascend/Descend 6 inch curb step with Contact Guard Assistance using Rolling Walker.     Outcome: Ongoing (interventions implemented as appropriate)    PT for there ex, gait and w/c.

## 2018-09-14 PROCEDURE — 97116 GAIT TRAINING THERAPY: CPT

## 2018-09-14 PROCEDURE — 97802 MEDICAL NUTRITION INDIV IN: CPT

## 2018-09-14 PROCEDURE — 99900035 HC TECH TIME PER 15 MIN (STAT)

## 2018-09-14 PROCEDURE — 97542 WHEELCHAIR MNGMENT TRAINING: CPT

## 2018-09-14 PROCEDURE — 12800000 HC REHAB SEMI-PRIVATE ROOM

## 2018-09-14 PROCEDURE — 25000003 PHARM REV CODE 250: Performed by: PHYSICAL MEDICINE & REHABILITATION

## 2018-09-14 PROCEDURE — 97110 THERAPEUTIC EXERCISES: CPT

## 2018-09-14 PROCEDURE — 97535 SELF CARE MNGMENT TRAINING: CPT

## 2018-09-14 RX ADMIN — TAMSULOSIN HYDROCHLORIDE 0.4 MG: 0.4 CAPSULE ORAL at 10:09

## 2018-09-14 RX ADMIN — FERROUS SULFATE TAB EC 325 MG (65 MG FE EQUIVALENT) 325 MG: 325 (65 FE) TABLET DELAYED RESPONSE at 10:09

## 2018-09-14 RX ADMIN — ASPIRIN 325 MG: 325 TABLET, DELAYED RELEASE ORAL at 08:09

## 2018-09-14 RX ADMIN — SOTALOL HYDROCHLORIDE 80 MG: 80 TABLET ORAL at 10:09

## 2018-09-14 RX ADMIN — Medication 400 MG: at 10:09

## 2018-09-14 RX ADMIN — CLOPIDOGREL BISULFATE 75 MG: 75 TABLET ORAL at 10:09

## 2018-09-14 RX ADMIN — SIMVASTATIN 20 MG: 10 TABLET, FILM COATED ORAL at 08:09

## 2018-09-14 RX ADMIN — ASPIRIN 325 MG: 325 TABLET, DELAYED RELEASE ORAL at 10:09

## 2018-09-14 RX ADMIN — DOCUSATE SODIUM 100 MG: 100 CAPSULE, LIQUID FILLED ORAL at 08:09

## 2018-09-14 RX ADMIN — PANTOPRAZOLE SODIUM 40 MG: 40 TABLET, DELAYED RELEASE ORAL at 10:09

## 2018-09-14 RX ADMIN — DOCUSATE SODIUM 100 MG: 100 CAPSULE, LIQUID FILLED ORAL at 10:09

## 2018-09-14 RX ADMIN — FERROUS SULFATE TAB EC 325 MG (65 MG FE EQUIVALENT) 325 MG: 325 (65 FE) TABLET DELAYED RESPONSE at 08:09

## 2018-09-14 RX ADMIN — FUROSEMIDE 40 MG: 40 TABLET ORAL at 10:09

## 2018-09-14 RX ADMIN — ISOSORBIDE MONONITRATE 60 MG: 30 TABLET, EXTENDED RELEASE ORAL at 10:09

## 2018-09-14 RX ADMIN — AMLODIPINE BESYLATE 10 MG: 5 TABLET ORAL at 10:09

## 2018-09-14 RX ADMIN — LOSARTAN POTASSIUM 50 MG: 25 TABLET, FILM COATED ORAL at 08:09

## 2018-09-14 RX ADMIN — POTASSIUM CHLORIDE 20 MEQ: 1500 TABLET, EXTENDED RELEASE ORAL at 10:09

## 2018-09-14 RX ADMIN — LOSARTAN POTASSIUM 50 MG: 25 TABLET, FILM COATED ORAL at 10:09

## 2018-09-14 RX ADMIN — SOTALOL HYDROCHLORIDE 80 MG: 80 TABLET ORAL at 08:09

## 2018-09-14 NOTE — PLAN OF CARE
09/14/18 0818   Patient Assessment/Suction   Level of Consciousness (AVPU) alert   Respiratory Effort Unlabored;Normal   All Lung Fields Breath Sounds clear   PRE-TX-O2-ETCO2   O2 Device (Oxygen Therapy) room air   SpO2 95 %   Pulse Oximetry Type Intermittent   Aerosol Therapy   $ Aerosol Therapy Charges PRN treatment not required

## 2018-09-14 NOTE — PT/OT/SLP PROGRESS
Occupational Therapy  Treatment    Marck Ramos   MRN: [id number]   Admitting Diagnosis: <principal problem not specified>    OT Date of Treatment: 09/14/18   Total Time (min): 90 min      Billable Minutes:  Self Care/Home Management 90  Total Minutes: 90    General Precautions: Standard, fall, hearing impaired  Orthopedic Precautions: LLE partial weight bearing  Braces: N/A         Subjective:  Communicated with RN prior to session.    Pain/Comfort  Pain Rating 1: 0/10  Pain Rating Post-Intervention 1: 0/10    Objective:  Patient found with: (chair alarm)    Functional Mobility:    Transfer Training:  Sit>Stand: Min A > SBA with RW  Toilet Transfer: SBA with RW  Shower Bench Transfer: CGA with RW    Grooming:  Patient performed face washing with Modified Independent at sitting at sink.  Patient performed oral hygeine with Modified Independent at sitting at sink.  Patient performe hair grooming with Modified Independent at sitting at sink.    Bathing:  Patient performed bathing with Minimal Assistance with grab bar, Handheld shower head, long-handled sponge seated on shower chair in shower.    UE Dressing:  Patient performed UE Dressing with Modified Independent don/doffing shirt while seated on shower bench.     LE Dressing:  Patient don/doffed socks with Minimal Assistance, Patient don/doffed compression hose with Maximum Assistance, Patient performed don/doffed adult brief with Minimal Assistance and Patient performed don/doffed pants with Minimal Assistance    Toilet Training:  Pt performed toileting with Supervision or Set-up Assistance with Grab Bar seated on toilet.    Balance:   Static Sit: GOOD-: Takes MODERATE challenges from all directions but inconsistently  Dynamic Sit:  FAIR+: Maintains balance through MINIMAL excursions of active trunk motion  Static Stand: FAIR: Maintains without assist but unable to take challenges  Dynamic stand: FAIR: Needs CONTACT GUARD during gait    Additional Treatment:  OT ed  patient on safety with walker use for functional mobility with cues for hand placement & sequencing.       Patient left up in chair with call button in reach    ASSESSMENT:  Marck Ramos is a 89 y.o. male. Pt demonstrated increased independence with standing balance in between transfers. Pt still requires some assistance with LE dressing and bathing as to ensure safety throughout task performance. Pt would continue to benefit from continued skilled OT services to maximize independence with ADLs.     Rehab potential is good    Activity tolerance: Good    Discharge recommendations: home     Equipment recommendations: (TBD)     GOALS:   Multidisciplinary Problems     Occupational Therapy Goals        Problem: Occupational Therapy Goal    Goal Priority Disciplines Outcome Interventions   Occupational Therapy Goal     OT, PT/OT Ongoing (interventions implemented as appropriate)    Description:  Goals to be met by: 9/25/2018     Patient will increase functional independence with ADLs by performing:    LE Dressing with Modified [place] and Assistive Devices as needed.  Grooming while standing at sink with Modified [place].  Toileting from toilet with [place] for hygiene and clothing management.   Bathing from  shower chair/bench with Modified [place].  Supine to sit with Modified [place].  Stand pivot transfers with Modified [place] and maintaining weight-bearing precaution(s).  Toilet transfer to toilet with Modified [place] and maintaining weight-bearing precaution(s).  Upper extremity exercise program x15 reps per handout, with independence.                      Plan:  Patient to be seen 6 x/week to address the above listed problems via self-care/home management, community/work re-entry, therapeutic activities, therapeutic exercises  Plan of Care expires: 09/22/18  Plan of Care reviewed with: patient         Larry SOHAM Chambers, OT  09/14/2018

## 2018-09-14 NOTE — PT/OT/SLP PROGRESS
Physical Therapy         Treatment        Marck Ramos   MRN: [id number]     PT Received On: 09/14/18  Total Time (min): 90        Billable Minutes:  Gait Training 30, Therapeutic Exercise 45 and Train/Wheelchair Management 15  Total Minutes: 90    Treatment Type: Treatment  PT/PTA: PTA     PTA Visit Number: 3       General Precautions: Standard, fall, hearing impaired  Orthopedic Precautions: Orthopedic Precautions : LLE partial weight bearing   Braces:           Subjective:  Communicated with nurse prior to session.    Pain/Comfort  Pain Rating 1: 0/10    Objective:  Patient found sitting in WC at bedsice, with  WC alarm on    Functional Mobility:  Bed Mobility:   Supine to sit: Minimal Assistance for L LE   Sit to supine: Minimal Assistance for L LE   Rolling: Contact Guard Assistance with VC for direction   Scooting: Contact Guard Assistance    Balance:   Static Sit: FAIR+: Able to take MINIMAL challenges from all directions  Dynamic Sit:  FAIR+: Maintains balance through MINIMAL excursions of active trunk motion  Static Stand: FAIR: Maintains without assist but unable to take challenges with RW support  Dynamic stand: FAIR: Needs CONTACT GUARD during gait with RW support    Transfer Training:  Sit to stand:Contact Guard Assistance with Rolling Walker x4    Wheelchair Training:  Pt propelled Standard wheelchair x 130' x2 feet on Level tile with  Bilateral upper extremity with Supervision or Set-up Assistance.     Gait Training:  Patient gait trained PWB: left lower extremity 100' x2  feet on level tile with Rolling Walker with Contact Guard Assistance.  Pt with demonstarting a  swing-to gait with decreased alondra, increased time in double stance, decreased velocity of limb motion, decreased step length, decreased stride length and decreased swing-to-stance ratio.Impairments contributing to gait deviations include impaired balance, decreased flexibility, decreased strength and L PWB    Additional Treatment:  Sit  ex x50: ball squeeze, ankle pumps, hip abd Red Tband  Sit ex 3x10 with 2#: LAQ and march R/L  Supine ex 3x10: quad sets, glut sets, heel slide, hip abd/add (asst on L), SLR (asst on L)    Activity Tolerance:  Patient limited by fatigue    Patient left up in chair with call button in reach, chair alarm on and nurse notified.    Assessment:  Marck Ramos is a 89 y.o. male with a medical diagnosis of <principal problem not specified>. He presents with fatigue with all activities, needing rest breaks.    Rehab potential is .    Activity tolerance: Fair    Discharge recommendations:       Equipment recommendations:       GOALS:   Multidisciplinary Problems     Physical Therapy Goals        Problem: Physical Therapy Goal    Goal Priority Disciplines Outcome Goal Variances Interventions   Physical Therapy Goal     PT, PT/OT Ongoing (interventions implemented as appropriate)     Description:  Goals to be met by: 2018     Patient will increase functional independence with mobility by performin. Supine to sit with Stand-by Assistance  2. Sit to supine with Stand-by Assistance  3. Sit to stand transfer with Stand-by Assistance  4. Bed to chair transfer with Stand-by Assistance using Rolling Walker  5. Gait  x 150 feet with Stand-by Assistance using Rolling Walker.   6. Wheelchair propulsion x 200 feet with Modified [place] using bilateral uppper extremities  7. Ascend/Descend 6 inch curb step with Contact Guard Assistance using Rolling Walker.                      PLAN:    Patient to be seen daily  to address the above listed problems via gait training, therapeutic exercises, wheelchair management/training  Plan of Care expires: 18  Plan of Care reviewed with: patient         Casi L Adelaida, PTA 2018

## 2018-09-14 NOTE — PLAN OF CARE
Problem: Patient Care Overview  Goal: Plan of Care Review  Noted bruises to arms and left leg. Incision healing well. Open to air. Trace edema noted to marina lower legs. No c/o pain. Using urinal and is continent.  Able to verbalized needs well. Sleeping on rounds. Cont poc

## 2018-09-14 NOTE — PROGRESS NOTES
"REHAB FOLLOWUP NOTE    Marck Ramos is a 89 y.o. male patient.    Chief Complaint:   Left hip intertrochanteric fracture needing ORIF.    History: 89-year-old gentleman fell in his  yard trying to hold his dog lay down there for  few hours before he was found by his neighbors, called the ambulance and presented to the emergency room.  Patient had a workup done consistent with a left hip intertrochanteric fracture needing a left hip ORIF done on 09/29/2018 by Dr. Kuhn.  Patient's postop course complicated with acute blood loss anemia needing blood transfusion, urinary retention needing cystoscopy with catheter placement, hematuria, encephalopathy, pain control issues,dependency with  ADLs, transfers, mobility is transferred down to us for further rehabilitation.         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    Iron deficiency anemia, unspecified 7/18/2017       Temp: 97.3 °F (36.3 °C) (09/14/18 0531)  Pulse: 64 (09/14/18 0531)  Resp: 18 (09/14/18 0531)  BP: (!) 160/70 (09/14/18 0531)  SpO2: 95 % (09/14/18 0818)  Weight: 63.8 kg (140 lb 10.5 oz) (09/10/18 0559)  Height: 5' 6" (167.6 cm) (09/05/18 1800)    Lab Results   Component Value Date    WBC 7.40 09/08/2018    HGB 10.6 (L) 09/08/2018    HCT 31.7 (L) 09/08/2018     (H) 09/08/2018    ALT 10 09/08/2018    AST 25 09/08/2018     (L) 09/12/2018    K 4.0 09/12/2018    CL 94 (L) 09/12/2018    CREATININE 0.7 09/12/2018    BUN 14 09/12/2018    CO2 25 09/12/2018    PSA 1.88 06/29/2012       Physical Examination:  Alert, oriented x3,   Voices no complaints.  Lungs are clear to auscultation.  Heart with a regular rate and rhythm.  "   Bilateral upper and right lower extremity with functional active to passive range of motion with good motor strength noted.  Left lower extremity hip incision clean and dry.  Staples removed.  Minimal edema in bilateral lower extremities.  No calf tenderness noted.  Blood pressures fairly well controlled.  Discussed with his cardiologist, recommended to monitor.  Steady progress.    Impression:  Left hip intertrochanteric fracture needing ORIF.  Postop acute blood loss anemia.    Postop encephalopathy.    Postop urinary retention needing Garcia catheter placement.    Hypertension.    Paroxysmal atrial fibrillation.    Peripheral vascular disease.    Coronary artery disease.  Hyponatremia.    Recommendations:    Continue current PT, OT, nursing care.          Grey Gonzalez MD  9/14/2018

## 2018-09-14 NOTE — CONSULTS
"  [facility]  Adult Nutrition  Consult Note    SUMMARY     Recommendations    Recommendation/Intervention: 1) continue regular diet 2) add Boost Plus, tid 3) request updated weight   Goals: Pt will consume >=85% EEN during admit.   Nutrition Goal Status: new  Communication of RD Recs: (POC, sticky note)    Reason for Assessment    Reason for Assessment: consult  Relevant Medical History: CAD, HTN, HLD, PUD, PVD  General Information Comments: Admitted s/p lt hip orif for rehab. Pt alert. Wt loss since admit noted. Pt agreed to try Boost Plus, tid.   Nutrition Discharge Planning: Regular     Malnutrition Assessment:  NFPE MARTINA. To be completed on follow up.  Appears thin, frail.   Weight Loss of 3% since admit from 9/5 to 9/10.  Accuracy?      Nutrition Risk Screen    Nutrition Risk Screen: no indicators present    Nutrition/Diet History    Typical Food/Fluid Intake: Reports 6 small meals daily at home as he cannot tolerate full meals, and sometimes he only eats 3 small meals.   Do you have any cultural, spiritual, Ep[employer]alian conflicts, given your current situation?: no  Food Allergies: NKFA(No intolerances reported. )  Factors Affecting Nutritional Intake: (early satiety)    Anthropometrics    Temp: 97 °F (36.1 °C)  Height Method: Stated  Height: 5' 6"  Height (inches): 66 in  Weight Method: Bed Scale  Weight: 63.8 kg (140 lb 10.5 oz)  Weight (lb): 140.65 lb  Ideal Body Weight (IBW), Male: 142 lb  % Ideal Body Weight, Male (lb): 99.05 lb  BMI (Calculated): 22.7  BMI Grade: (Underweight (BMI <23 in >65 yr old))       Lab/Procedures/Meds    Pertinent Labs Reviewed: reviewed  Pertinent Medications Reviewed: reviewed  Pertinent Medications Comments: statin, pantroprazole, furosemide, ferrous sulfate    Physical Findings/Assessment    Overall Physical Appearance: advanced age, loss of muscle mass, loss of subcutaneous fat  Oral/Mouth Cavity: tooth/teeth missing  Skin: (Ronak score " 18)    Estimated/Assessed Needs    Weight Used For Calorie Calculations: 63.8 kg (140 lb 10.5 oz)  Energy Calorie Requirements (kcal): 4990-6749 (1.3-1.5 for weight gain)  Energy Need Method: Sedgwick-St Jeor  Protein Requirements: 77-96 (1.2-1.5 for wt gain)  Weight Used For Protein Calculations: 63.8 kg (140 lb 10.5 oz)     Fluid Need Method: RDA Method(or per MD rec)  RDA Method (mL): 1574  CHO Requirement: n/a      Nutrition Prescription Ordered    Current Diet Order: regular    Evaluation of Received Nutrient/Fluid Intake    Energy Calories Required: not meeting needs  Protein Required: not meeting needs  Fluid Required: not meeting needs    Intake/Output Summary (Last 24 hours) at 9/14/2018 1413  Last data filed at 9/14/2018 0500  Gross per 24 hour   Intake 360 ml   Output 550 ml   Net -190 ml     Tolerance: tolerating  % Intake of Estimated Energy Needs: 50 - 75 %  % Meal Intake: 25 - 50 % Average 36% of last 18 meals.     Nutrition Risk    Level of Risk/Frequency of Follow-up: (2 x wkly)     Assessment and Plan    Inadequate energy intake r/t poor appetite as evidenced by EEN 50-75%.     Monitor and Evaluation    Food and Nutrient Intake: energy intake  Food and Nutrient Adminstration: diet order  Physical Activity and Function: nutrition-related ADLs and IADLs  Anthropometric Measurements: weight, weight change  Biochemical Data, Medical Tests and Procedures: inflammatory profile  Nutrition-Focused Physical Findings: overall appearance, skin     Nutrition Follow-Up  yes

## 2018-09-14 NOTE — PLAN OF CARE
Problem: Nutrition, Imbalanced: Inadequate Oral Intake (Adult)  Goal: Improved Oral Intake  Patient will demonstrate the desired outcomes by discharge/transition of care.  Recommendation/Intervention: 1) continue regular diet 2) add Boost Plus, tid 3) request updated weight   Goals: Pt will consume >=85% EEN during admit.   Nutrition Goal Status: new  Communication of RD Recs: (POC, sticky note)

## 2018-09-14 NOTE — PLAN OF CARE
Problem: Occupational Therapy Goal  Goal: Occupational Therapy Goal  Goals to be met by: 9/25/2018     Patient will increase functional independence with ADLs by performing:    LE Dressing with Modified [place] and Assistive Devices as needed.  Grooming while standing at sink with Modified [place].  Toileting from toilet with [place] for hygiene and clothing management.   Bathing from  shower chair/bench with Modified [place].  Supine to sit with Modified [place].  Stand pivot transfers with Modified [place] and maintaining weight-bearing precaution(s).  Toilet transfer to toilet with Modified [place] and maintaining weight-bearing precaution(s).  Upper extremity exercise program x15 reps per handout, with independence.     Outcome: Ongoing (interventions implemented as appropriate)  Pt is progressing toward goals.

## 2018-09-14 NOTE — PLAN OF CARE
Problem: Physical Therapy Goal  Goal: Physical Therapy Goal  Goals to be met by: 2018     Patient will increase functional independence with mobility by performin. Supine to sit with Stand-by Assistance  2. Sit to supine with Stand-by Assistance  3. Sit to stand transfer with Stand-by Assistance  4. Bed to chair transfer with Stand-by Assistance using Rolling Walker  5. Gait  x 150 feet with Stand-by Assistance using Rolling Walker.   6. Wheelchair propulsion x 200 feet with Modified [place] using bilateral uppper extremities  7. Ascend/Descend 6 inch curb step with Contact Guard Assistance using Rolling Walker.     Outcome: Ongoing (interventions implemented as appropriate)  Gait with RW 2x100' CGA L PWB.

## 2018-09-14 NOTE — PLAN OF CARE
Problem: Patient Care Overview  Goal: Plan of Care Review  Outcome: Ongoing (interventions implemented as appropriate)  AAOx3 vss denies apin no acute distress noted left hip incision well approximated and healing

## 2018-09-15 PROCEDURE — 12800000 HC REHAB SEMI-PRIVATE ROOM

## 2018-09-15 PROCEDURE — 97530 THERAPEUTIC ACTIVITIES: CPT

## 2018-09-15 PROCEDURE — 25000003 PHARM REV CODE 250: Performed by: PHYSICAL MEDICINE & REHABILITATION

## 2018-09-15 PROCEDURE — 99900035 HC TECH TIME PER 15 MIN (STAT)

## 2018-09-15 PROCEDURE — 97110 THERAPEUTIC EXERCISES: CPT

## 2018-09-15 PROCEDURE — 97116 GAIT TRAINING THERAPY: CPT

## 2018-09-15 RX ADMIN — SOTALOL HYDROCHLORIDE 80 MG: 80 TABLET ORAL at 09:09

## 2018-09-15 RX ADMIN — LOSARTAN POTASSIUM 50 MG: 25 TABLET, FILM COATED ORAL at 10:09

## 2018-09-15 RX ADMIN — AMLODIPINE BESYLATE 10 MG: 5 TABLET ORAL at 10:09

## 2018-09-15 RX ADMIN — POTASSIUM CHLORIDE 20 MEQ: 1500 TABLET, EXTENDED RELEASE ORAL at 10:09

## 2018-09-15 RX ADMIN — Medication 400 MG: at 10:09

## 2018-09-15 RX ADMIN — CLOPIDOGREL BISULFATE 75 MG: 75 TABLET ORAL at 10:09

## 2018-09-15 RX ADMIN — SIMVASTATIN 20 MG: 10 TABLET, FILM COATED ORAL at 09:09

## 2018-09-15 RX ADMIN — FERROUS SULFATE TAB EC 325 MG (65 MG FE EQUIVALENT) 325 MG: 325 (65 FE) TABLET DELAYED RESPONSE at 09:09

## 2018-09-15 RX ADMIN — ASPIRIN 325 MG: 325 TABLET, DELAYED RELEASE ORAL at 10:09

## 2018-09-15 RX ADMIN — FERROUS SULFATE TAB EC 325 MG (65 MG FE EQUIVALENT) 325 MG: 325 (65 FE) TABLET DELAYED RESPONSE at 10:09

## 2018-09-15 RX ADMIN — ASPIRIN 325 MG: 325 TABLET, DELAYED RELEASE ORAL at 09:09

## 2018-09-15 RX ADMIN — DOCUSATE SODIUM 100 MG: 100 CAPSULE, LIQUID FILLED ORAL at 09:09

## 2018-09-15 RX ADMIN — TAMSULOSIN HYDROCHLORIDE 0.4 MG: 0.4 CAPSULE ORAL at 10:09

## 2018-09-15 RX ADMIN — SOTALOL HYDROCHLORIDE 80 MG: 80 TABLET ORAL at 10:09

## 2018-09-15 RX ADMIN — PANTOPRAZOLE SODIUM 40 MG: 40 TABLET, DELAYED RELEASE ORAL at 10:09

## 2018-09-15 RX ADMIN — FUROSEMIDE 40 MG: 40 TABLET ORAL at 10:09

## 2018-09-15 RX ADMIN — ISOSORBIDE MONONITRATE 60 MG: 30 TABLET, EXTENDED RELEASE ORAL at 10:09

## 2018-09-15 NOTE — PROGRESS NOTES
"REHAB FOLLOWUP NOTE    Marck Ramos is a 89 y.o. male patient.    Chief Complaint:   Left hip intertrochanteric fracture needing ORIF.    History: 89-year-old gentleman fell in his  yard trying to hold his dog lay down there for  few hours before he was found by his neighbors, called the ambulance and presented to the emergency room.  Patient had a workup done consistent with a left hip intertrochanteric fracture needing a left hip ORIF done on 09/29/2018 by Dr. Kuhn.  Patient's postop course complicated with acute blood loss anemia needing blood transfusion, urinary retention needing cystoscopy with catheter placement, hematuria, encephalopathy, pain control issues,dependency with  ADLs, transfers, mobility is transferred down to us for further rehabilitation.         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    Iron deficiency anemia, unspecified 7/18/2017       Temp: 96.4 °F (35.8 °C) (09/15/18 1006)  Pulse: 70 (09/15/18 1006)  Resp: 18 (09/15/18 1006)  BP: (!) 168/77 (09/15/18 1006)  SpO2: 96 % (09/15/18 1006)  Weight: 63.8 kg (140 lb 10.5 oz) (09/14/18 1400)  Height: 5' 6" (167.6 cm) (09/14/18 1405)    Lab Results   Component Value Date    WBC 7.40 09/08/2018    HGB 10.6 (L) 09/08/2018    HCT 31.7 (L) 09/08/2018     (H) 09/08/2018    ALT 10 09/08/2018    AST 25 09/08/2018     (L) 09/12/2018    K 4.0 09/12/2018    CL 94 (L) 09/12/2018    CREATININE 0.7 09/12/2018    BUN 14 09/12/2018    CO2 25 09/12/2018    PSA 1.88 06/29/2012       Physical Examination:  Alert, oriented x3,   Voices no complaints.  Lungs are clear to auscultation.  Heart with a regular rate and rhythm.  "   Bilateral upper and right lower extremity with functional active to passive range of motion with good motor strength noted.  Left lower extremity hip incision clean and dry.  Staples removed.  Minimal edema in bilateral lower extremities.  No calf tenderness noted.  Blood pressures monitor.  Steady progress.    Impression:  Left hip intertrochanteric fracture needing ORIF.  Postop acute blood loss anemia.    Postop encephalopathy.    Postop urinary retention needing Garcia catheter placement.    Hypertension.    Paroxysmal atrial fibrillation.    Peripheral vascular disease.    Coronary artery disease.  Hyponatremia.    Recommendations:    Continue current PT, OT, nursing care.          Grey Gonzalez MD  9/15/2018

## 2018-09-15 NOTE — PT/OT/SLP PROGRESS
Occupational Therapy  Treatment    Marck Ramos   MRN: [id number]   Admitting Diagnosis: L ORIF    OT Date of Treatment: 09/15/18   Total Time (min): 30 min      Billable Minutes:  Therapeutic Exercise 30  Total Minutes: 30    General Precautions: Standard, fall, hearing impaired  Orthopedic Precautions: LLE partial weight bearing    Subjective:  Communicated with nurse prior to session.    Pain/Comfort  Pain Rating 1: 0/10    Objective:    Wheelchair propulsion x 50 feet with min A  UBE 3 x 5 minutes  B hand exerciser 3 x 10    Patient left up in chair with call button in reach and chair alarm on    ASSESSMENT:  Patient is progressing towards goals.    Rehab potential is good    Activity tolerance: good      GOALS:   Multidisciplinary Problems     Occupational Therapy Goals        Problem: Occupational Therapy Goal    Goal Priority Disciplines Outcome Interventions   Occupational Therapy Goal     OT, PT/OT Ongoing (interventions implemented as appropriate)    Description:  Goals to be met by: 9/25/2018     Patient will increase functional independence with ADLs by performing:    LE Dressing with Modified [place] and Assistive Devices as needed.  Grooming while standing at sink with Modified [place].  Toileting from toilet with [place] for hygiene and clothing management.   Bathing from  shower chair/bench with Modified [place].  Supine to sit with Modified [place].  Stand pivot transfers with Modified [place] and maintaining weight-bearing precaution(s).  Toilet transfer to toilet with Modified [place] and maintaining weight-bearing precaution(s).  Upper extremity exercise program x15 reps per handout, with independence.                      Plan:  Patient to be seen 6 x/week to address the above listed problems via self-care/home management, community/work re-entry, therapeutic activities, therapeutic exercises  Plan of Care expires: 09/22/18  Plan of Care reviewed with:  patient      Alexandrea Aaron HERNANDEZ, LOTR    9/15/2018

## 2018-09-15 NOTE — PLAN OF CARE
Problem: Physical Therapy Goal  Goal: Physical Therapy Goal  Goals to be met by: 2018     Patient will increase functional independence with mobility by performin. Supine to sit with Stand-by Assistance  2. Sit to supine with Stand-by Assistance  3. Sit to stand transfer with Stand-by Assistance  4. Bed to chair transfer with Stand-by Assistance using Rolling Walker  5. Gait  x 150 feet with Stand-by Assistance using Rolling Walker.   6. Wheelchair propulsion x 200 feet with Modified [place] using bilateral uppper extremities  7. Ascend/Descend 6 inch curb step with Contact Guard Assistance using Rolling Walker.     Outcome: Ongoing (interventions implemented as appropriate)  Participating in PT RX per POC for GT, TE, TA

## 2018-09-15 NOTE — PLAN OF CARE
Problem: Patient Care Overview  Goal: Plan of Care Review  Outcome: Ongoing (interventions implemented as appropriate)  Patient awake/alert. Remains continent. Genera;ized weakness present. No c/o pain noted. Free from falls. Plan of care continued

## 2018-09-15 NOTE — PLAN OF CARE
Problem: Patient Care Overview  Goal: Plan of Care Review  Outcome: Ongoing (interventions implemented as appropriate)  Pt on room air with Q4 PRN albuterol treatments, no treatment needed at this time

## 2018-09-15 NOTE — PT/OT/SLP PROGRESS
Physical Therapy         Treatment        Marck Ramos   MRN: [id number]     PT Received On: 09/15/18  Total Time (min): 30        Billable Minutes:  Gait [id number], Therapeutic Activity 10 and Therapeutic Exercise 10  Total Minutes: 30    Treatment Type: Treatment  PT/PTA: PTA     PTA Visit Number: 4       General Precautions: Standard, fall, hearing impaired  Orthopedic Precautions: Orthopedic Precautions : LLE partial weight bearing   Braces: Braces: N/A         Subjective:  Communicated with ELY Rabago prior to session.    Pain/Comfort  Pain Rating 1: 0/10  Pain Rating Post-Intervention 1: 0/10(reports 5/10 L hip discomfort with gait training which resolves with rest. )    Objective:  Patient found supine in bed in NAD, with Patient found with: bed alarm    Functional Mobility:  Bed Mobility:   Supine to sit: Moderate Assistance   Sit to supine: Minimal Assistance    Transfer Training:  Sit to stand:Minimal Assistance with Rolling Walker .  Bed <> Chair:  Stand Pivot with Minimal Assistance with Rolling Walker .    Gait Training:  Patient gait trained PWB: left lower extremity 100  feet on level tile with Rolling Walker with Contact Guard Assistance.  Pt with demonstarting a  3-point gait with decreased alondra, increased time in double stance, decreased velocity of limb motion, decreased step length, decreased toe-to-floor clearance and decreased weight-shifting ability.Impairments contributing to gait deviations include impaired balance, pain, decreased sensation and decreased strength      Additional Treatment:  Seated TE 2# R, 0# L 10/3: LAQ, AP, ballsqueeze    Activity Tolerance:  Patient tolerated treatment well    Patient left supine with all lines intact, call button in reach, bed alarm on and RN notified.    Assessment:  Marck Ramos is a 89 y.o. male     GOALS:   Multidisciplinary Problems     Physical Therapy Goals        Problem: Physical Therapy Goal    Goal Priority Disciplines Outcome Goal  Variances Interventions   Physical Therapy Goal     PT, PT/OT Ongoing (interventions implemented as appropriate)     Description:  Goals to be met by: 2018     Patient will increase functional independence with mobility by performin. Supine to sit with Stand-by Assistance  2. Sit to supine with Stand-by Assistance  3. Sit to stand transfer with Stand-by Assistance  4. Bed to chair transfer with Stand-by Assistance using Rolling Walker  5. Gait  x 150 feet with Stand-by Assistance using Rolling Walker.   6. Wheelchair propulsion x 200 feet with Modified [place] using bilateral uppper extremities  7. Ascend/Descend 6 inch curb step with Contact Guard Assistance using Rolling Walker.                      PLAN:    Patient to be seen daily  to address the above listed problems via gait training, therapeutic exercises, wheelchair management/training  Plan of Care expires: 18  Plan of Care reviewed with: patient         Tavia Footeon, PTA 9/15/2018

## 2018-09-15 NOTE — PLAN OF CARE
Problem: Patient Care Overview  Goal: Plan of Care Review  Outcome: Ongoing (interventions implemented as appropriate)  AAOx3 vss left hip incision well approximated and healing

## 2018-09-16 PROCEDURE — 97110 THERAPEUTIC EXERCISES: CPT

## 2018-09-16 PROCEDURE — 12800000 HC REHAB SEMI-PRIVATE ROOM

## 2018-09-16 PROCEDURE — 97542 WHEELCHAIR MNGMENT TRAINING: CPT

## 2018-09-16 PROCEDURE — 99900035 HC TECH TIME PER 15 MIN (STAT)

## 2018-09-16 PROCEDURE — 97116 GAIT TRAINING THERAPY: CPT

## 2018-09-16 PROCEDURE — 25000003 PHARM REV CODE 250: Performed by: PHYSICAL MEDICINE & REHABILITATION

## 2018-09-16 RX ADMIN — DOCUSATE SODIUM 100 MG: 100 CAPSULE, LIQUID FILLED ORAL at 08:09

## 2018-09-16 RX ADMIN — LOSARTAN POTASSIUM 50 MG: 25 TABLET, FILM COATED ORAL at 08:09

## 2018-09-16 RX ADMIN — Medication 400 MG: at 09:09

## 2018-09-16 RX ADMIN — SIMVASTATIN 20 MG: 10 TABLET, FILM COATED ORAL at 08:09

## 2018-09-16 RX ADMIN — TAMSULOSIN HYDROCHLORIDE 0.4 MG: 0.4 CAPSULE ORAL at 09:09

## 2018-09-16 RX ADMIN — FUROSEMIDE 40 MG: 40 TABLET ORAL at 09:09

## 2018-09-16 RX ADMIN — POTASSIUM CHLORIDE 20 MEQ: 1500 TABLET, EXTENDED RELEASE ORAL at 09:09

## 2018-09-16 RX ADMIN — DOCUSATE SODIUM 100 MG: 100 CAPSULE, LIQUID FILLED ORAL at 09:09

## 2018-09-16 RX ADMIN — ISOSORBIDE MONONITRATE 60 MG: 30 TABLET, EXTENDED RELEASE ORAL at 09:09

## 2018-09-16 RX ADMIN — CLOPIDOGREL BISULFATE 75 MG: 75 TABLET ORAL at 09:09

## 2018-09-16 RX ADMIN — FERROUS SULFATE TAB EC 325 MG (65 MG FE EQUIVALENT) 325 MG: 325 (65 FE) TABLET DELAYED RESPONSE at 09:09

## 2018-09-16 RX ADMIN — AMLODIPINE BESYLATE 10 MG: 5 TABLET ORAL at 09:09

## 2018-09-16 RX ADMIN — SOTALOL HYDROCHLORIDE 80 MG: 80 TABLET ORAL at 08:09

## 2018-09-16 RX ADMIN — LOSARTAN POTASSIUM 50 MG: 25 TABLET, FILM COATED ORAL at 09:09

## 2018-09-16 RX ADMIN — PANTOPRAZOLE SODIUM 40 MG: 40 TABLET, DELAYED RELEASE ORAL at 09:09

## 2018-09-16 RX ADMIN — ASPIRIN 325 MG: 325 TABLET, DELAYED RELEASE ORAL at 09:09

## 2018-09-16 RX ADMIN — FERROUS SULFATE TAB EC 325 MG (65 MG FE EQUIVALENT) 325 MG: 325 (65 FE) TABLET DELAYED RESPONSE at 08:09

## 2018-09-16 RX ADMIN — SOTALOL HYDROCHLORIDE 80 MG: 80 TABLET ORAL at 09:09

## 2018-09-16 RX ADMIN — ASPIRIN 325 MG: 325 TABLET, DELAYED RELEASE ORAL at 08:09

## 2018-09-16 NOTE — PT/OT/SLP PROGRESS
Physical Therapy         Treatment        Marck Ramos   MRN: [id number]     PT Received On: 09/16/18  Total Time (min): 30        Billable Minutes:  Gait Training 10, Therapeutic Exercise 10 and Train/Wheelchair Management 10  Total Minutes: 30    Treatment Type: Treatment  PT/PTA: PTA     PTA Visit Number: 5       General Precautions: Standard, fall, hearing impaired  Orthopedic Precautions: Orthopedic Precautions : LLE partial weight bearing   Braces:           Subjective:  Communicated with nurse prior to session.    Pain/Comfort  Pain Rating 1: 0/10    Objective:  Patient found in WC at bedside, with Patient found with: (chair alarm)    Transfer Training:  Sit to stand:Contact Guard Assistance with Rolling Walker x2    Wheelchair Training:  Pt propelled Standard wheelchair x 2x130' feet on Level tile with  Bilateral upper extremity with Stand-by Assistance.     Gait Training:  Patient gait trained PWB: left lower extremity 200  feet on level tile with Rolling Walker with Contact Guard Assistance.  Pt with demonstarting a  swing-to gait with decreased alondra, increased time in double stance, decreased velocity of limb motion, decreased step length, decreased stride length, decreased swing-to-stance ratio, decreased toe-to-floor clearance and decreased weight-shifting ability.Impairments contributing to gait deviations include impaired balance, decreased flexibility, decreased strength and L PWB    Additional Treatment:  Sit ex 3x10: LAQ and march R/L    Activity Tolerance:  Patient tolerated treatment well    Patient left up in chair with call button in reach, chair alarm on and nurse notified.    Assessment:  Marck Ramos is a 89 y.o. male with a medical diagnosis of <principal problem not specified>. He presents with improved gait with RW.    Rehab potential is .    Activity tolerance: Fair    Discharge recommendations:       Equipment recommendations:       GOALS:   Multidisciplinary Problems     Physical  Therapy Goals        Problem: Physical Therapy Goal    Goal Priority Disciplines Outcome Goal Variances Interventions   Physical Therapy Goal     PT, PT/OT Ongoing (interventions implemented as appropriate)     Description:  Goals to be met by: 2018     Patient will increase functional independence with mobility by performin. Supine to sit with Stand-by Assistance  2. Sit to supine with Stand-by Assistance  3. Sit to stand transfer with Stand-by Assistance  4. Bed to chair transfer with Stand-by Assistance using Rolling Walker  5. Gait  x 150 feet with Stand-by Assistance using Rolling Walker.   6. Wheelchair propulsion x 200 feet with Modified [place] using bilateral uppper extremities  7. Ascend/Descend 6 inch curb step with Contact Guard Assistance using Rolling Walker.                      PLAN:    Patient to be seen daily  to address the above listed problems via gait training, therapeutic exercises, wheelchair management/training  Plan of Care expires: 18  Plan of Care reviewed with: patient         Casi Son, PTA 2018

## 2018-09-16 NOTE — PLAN OF CARE
Problem: Patient Care Overview  Goal: Plan of Care Review  Alert and oriented, continent of B&B, denies pain, incision to Left hip healing no s/s infection; plan of care ongoing

## 2018-09-16 NOTE — PT/OT/SLP PROGRESS
Occupational Therapy  Treatment    Marck Ramos   MRN: [id number]   Admitting Diagnosis: <principal problem not specified>    OT Date of Treatment: 09/16/18   Total Time (min): 30 min      Billable Minutes:  Therapeutic Activity 7 and Therapeutic Exercise 23  Total Minutes: 30      General Precautions: Standard, fall, hearing impaired  Orthopedic Precautions: LLE partial weight bearing  Braces: N/A         Subjective:  Communicated with nurse prior to session.    Pain/Comfort  Pain Rating 1: 0/10  Pain Rating Post-Intervention 1: 0/10    Objective:  Patient found with: (chair alarm)    Functional Mobility:  Bed Mobility:   Supine to sit: Activity did not occur   Sit to supine: Minimal Assistance   Rolling: Activity did not occur   Scooting: Contact Guard Assistance    Transfer Training:   Sit to stand:Contact Guard Assistance with Rolling Walker and cues for hand placement    Bed <> Chair:  Stand Pivot with Contact Guard Assistance with Rolling Walker     Cues needed to maintain L LE PWB    LE Dressing:  Patient don/doffed shoes with Moderate Assistance and Patient performed don/doffed pants with Moderate Assistance at EOB with no AD.    Balance:     Static Stand: CGA  Dynamic stand: CGA    Additional Treatment:  Arm bike MOD resistance x 6 minutes   Pt performed B UE resistive exercise with 3# dowel x 15 reps each shoulder flexion, shoulder press, chest press and bicep curls with rest breaks taken between each exercise.  Rickshaw 10# x 20 reps    Patient left supine with all lines intact, call button in reach, bed alarm on and Cristela, PCT present    ASSESSMENT:  Marck Ramos is a 89 y.o. male with a medical diagnosis of <principal problem not specified> and presents with fair to good tolerance for UE resistive exercises. Pt put forth good effort with all treatment activities. Continue OT treatment to maximize safety & independence with ADLs.       Rehab potential is good    Activity tolerance: Fair to Good,  especially for his age.    Discharge recommendations: home     Equipment recommendations: (TBD)     GOALS:   Multidisciplinary Problems     Occupational Therapy Goals        Problem: Occupational Therapy Goal    Goal Priority Disciplines Outcome Interventions   Occupational Therapy Goal     OT, PT/OT Ongoing (interventions implemented as appropriate)    Description:  Goals to be met by: 9/25/2018     Patient will increase functional independence with ADLs by performing:    LE Dressing with Modified [place] and Assistive Devices as needed.  Grooming while standing at sink with Modified [place].  Toileting from toilet with [place] for hygiene and clothing management.   Bathing from  shower chair/bench with Modified [place].  Supine to sit with Modified [place].  Stand pivot transfers with Modified [place] and maintaining weight-bearing precaution(s).  Toilet transfer to toilet with Modified [place] and maintaining weight-bearing precaution(s).  Upper extremity exercise program x15 reps per handout, with independence.                      Plan:  Patient to be seen 6 x/week to address the above listed problems via self-care/home management, therapeutic activities, community/work re-entry, therapeutic exercises, therapeutic groups, wheelchair management/training  Plan of Care expires: 09/22/18  Plan of Care reviewed with: patient         KENNEDI Delatorre  09/16/2018

## 2018-09-16 NOTE — PLAN OF CARE
Problem: Fall Risk (Adult)  Intervention: Safety Promotion/Fall Prevention  Safety maintained no injury or falls; reinforced fall precautions pt verbalized understanding

## 2018-09-16 NOTE — PLAN OF CARE
Problem: Occupational Therapy Goal  Goal: Occupational Therapy Goal  Goals to be met by: 9/25/2018     Patient will increase functional independence with ADLs by performing:    LE Dressing with Modified [place] and Assistive Devices as needed.  Grooming while standing at sink with Modified [place].  Toileting from toilet with [place] for hygiene and clothing management.   Bathing from  shower chair/bench with Modified [place].  Supine to sit with Modified [place].  Stand pivot transfers with Modified [place] and maintaining weight-bearing precaution(s).  Toilet transfer to toilet with Modified [place] and maintaining weight-bearing precaution(s).  Upper extremity exercise program x15 reps per handout, with independence.     Outcome: Ongoing (interventions implemented as appropriate)  Patient is making progress. Goals remain appropriate. Continue OT plan of care.  KENNEDI Ford  9/16/2018

## 2018-09-16 NOTE — PLAN OF CARE
Problem: Physical Therapy Goal  Goal: Physical Therapy Goal  Goals to be met by: 2018     Patient will increase functional independence with mobility by performin. Supine to sit with Stand-by Assistance  2. Sit to supine with Stand-by Assistance  3. Sit to stand transfer with Stand-by Assistance  4. Bed to chair transfer with Stand-by Assistance using Rolling Walker  5. Gait  x 150 feet with Stand-by Assistance using Rolling Walker.   6. Wheelchair propulsion x 200 feet with Modified [place] using bilateral uppper extremities  7. Ascend/Descend 6 inch curb step with Contact Guard Assistance using Rolling Walker.     Outcome: Ongoing (interventions implemented as appropriate)  Amb with RW L ' CGA

## 2018-09-16 NOTE — PLAN OF CARE
Problem: Patient Care Overview  Goal: Plan of Care Review  Outcome: Ongoing (interventions implemented as appropriate)  Left hip incision well approximated and healing tolerating therapy well

## 2018-09-17 PROBLEM — E43 SEVERE MALNUTRITION: Status: ACTIVE | Noted: 2018-09-17

## 2018-09-17 LAB
ANION GAP SERPL CALC-SCNC: 10 MMOL/L
BASOPHILS # BLD AUTO: 0 K/UL
BASOPHILS NFR BLD: 0.5 %
BUN SERPL-MCNC: 13 MG/DL
CALCIUM SERPL-MCNC: 8.1 MG/DL
CHLORIDE SERPL-SCNC: 98 MMOL/L
CO2 SERPL-SCNC: 27 MMOL/L
CREAT SERPL-MCNC: 0.7 MG/DL
DIFFERENTIAL METHOD: ABNORMAL
EOSINOPHIL # BLD AUTO: 0.3 K/UL
EOSINOPHIL NFR BLD: 3.9 %
ERYTHROCYTE [DISTWIDTH] IN BLOOD BY AUTOMATED COUNT: 17.4 %
EST. GFR  (AFRICAN AMERICAN): >60 ML/MIN/1.73 M^2
EST. GFR  (NON AFRICAN AMERICAN): >60 ML/MIN/1.73 M^2
GLUCOSE SERPL-MCNC: 102 MG/DL
HCT VFR BLD AUTO: 32 %
HGB BLD-MCNC: 10.6 G/DL
LYMPHOCYTES # BLD AUTO: 2.5 K/UL
LYMPHOCYTES NFR BLD: 31.7 %
MCH RBC QN AUTO: 32.9 PG
MCHC RBC AUTO-ENTMCNC: 33 G/DL
MCV RBC AUTO: 100 FL
MONOCYTES # BLD AUTO: 1 K/UL
MONOCYTES NFR BLD: 12.3 %
NEUTROPHILS # BLD AUTO: 4 K/UL
NEUTROPHILS NFR BLD: 51.6 %
PLATELET # BLD AUTO: 356 K/UL
PMV BLD AUTO: 7.1 FL
POTASSIUM SERPL-SCNC: 3.3 MMOL/L
RBC # BLD AUTO: 3.21 M/UL
SODIUM SERPL-SCNC: 135 MMOL/L
WBC # BLD AUTO: 7.7 K/UL

## 2018-09-17 PROCEDURE — 97116 GAIT TRAINING THERAPY: CPT

## 2018-09-17 PROCEDURE — 25000003 PHARM REV CODE 250: Performed by: PHYSICAL MEDICINE & REHABILITATION

## 2018-09-17 PROCEDURE — 97530 THERAPEUTIC ACTIVITIES: CPT

## 2018-09-17 PROCEDURE — 99900035 HC TECH TIME PER 15 MIN (STAT)

## 2018-09-17 PROCEDURE — 85025 COMPLETE CBC W/AUTO DIFF WBC: CPT

## 2018-09-17 PROCEDURE — 80048 BASIC METABOLIC PNL TOTAL CA: CPT

## 2018-09-17 PROCEDURE — 36415 COLL VENOUS BLD VENIPUNCTURE: CPT

## 2018-09-17 PROCEDURE — 97803 MED NUTRITION INDIV SUBSEQ: CPT

## 2018-09-17 PROCEDURE — 12800000 HC REHAB SEMI-PRIVATE ROOM

## 2018-09-17 RX ORDER — POTASSIUM CHLORIDE 20 MEQ/1
40 TABLET, EXTENDED RELEASE ORAL ONCE
Status: COMPLETED | OUTPATIENT
Start: 2018-09-17 | End: 2018-09-17

## 2018-09-17 RX ADMIN — POLYETHYLENE GLYCOL (3350) 17 G: 17 POWDER, FOR SOLUTION ORAL at 08:09

## 2018-09-17 RX ADMIN — POTASSIUM CHLORIDE 40 MEQ: 20 TABLET, EXTENDED RELEASE ORAL at 10:09

## 2018-09-17 RX ADMIN — SIMVASTATIN 20 MG: 10 TABLET, FILM COATED ORAL at 08:09

## 2018-09-17 RX ADMIN — ASPIRIN 325 MG: 325 TABLET, DELAYED RELEASE ORAL at 08:09

## 2018-09-17 RX ADMIN — LOSARTAN POTASSIUM 50 MG: 25 TABLET, FILM COATED ORAL at 08:09

## 2018-09-17 RX ADMIN — POTASSIUM CHLORIDE 20 MEQ: 1500 TABLET, EXTENDED RELEASE ORAL at 08:09

## 2018-09-17 RX ADMIN — Medication 400 MG: at 08:09

## 2018-09-17 RX ADMIN — CLOPIDOGREL BISULFATE 75 MG: 75 TABLET ORAL at 08:09

## 2018-09-17 RX ADMIN — DOCUSATE SODIUM 100 MG: 100 CAPSULE, LIQUID FILLED ORAL at 08:09

## 2018-09-17 RX ADMIN — SOTALOL HYDROCHLORIDE 80 MG: 80 TABLET ORAL at 08:09

## 2018-09-17 RX ADMIN — AMLODIPINE BESYLATE 10 MG: 5 TABLET ORAL at 08:09

## 2018-09-17 RX ADMIN — FERROUS SULFATE TAB EC 325 MG (65 MG FE EQUIVALENT) 325 MG: 325 (65 FE) TABLET DELAYED RESPONSE at 08:09

## 2018-09-17 RX ADMIN — FUROSEMIDE 40 MG: 40 TABLET ORAL at 08:09

## 2018-09-17 RX ADMIN — PANTOPRAZOLE SODIUM 40 MG: 40 TABLET, DELAYED RELEASE ORAL at 08:09

## 2018-09-17 RX ADMIN — TAMSULOSIN HYDROCHLORIDE 0.4 MG: 0.4 CAPSULE ORAL at 08:09

## 2018-09-17 RX ADMIN — ISOSORBIDE MONONITRATE 60 MG: 30 TABLET, EXTENDED RELEASE ORAL at 08:09

## 2018-09-17 NOTE — PLAN OF CARE
Problem: Nutrition, Imbalanced: Inadequate Oral Intake (Adult)  Goal: Improved Oral Intake  Patient will demonstrate the desired outcomes by discharge/transition of care.  Recommendation/Intervention: 1) continue regular diet 2) add Boost Plus, tid 3) weigh weekly  Goals: 1) Pt will consume >=85% EEN during admit. 2) Pt's weight will stay the same or have wt gain during admit.   Nutrition Goal Status: Continues  Communication of RD Recs: (POC, reviewed with RN)

## 2018-09-17 NOTE — PLAN OF CARE
09/17/18 1025   Aerosol Therapy   $ Aerosol Therapy Charges PRN treatment not required  (Pt with PT at this time.)   Respiratory Treatment Status PRN treatment not required

## 2018-09-17 NOTE — PLAN OF CARE
Problem: Physical Therapy Goal  Goal: Physical Therapy Goal  Goals to be met by: 2018     Patient will increase functional independence with mobility by performin. Supine to sit with Stand-by Assistance  2. Sit to supine with Stand-by Assistance  3. Sit to stand transfer with Stand-by Assistance  4. Bed to chair transfer with Stand-by Assistance using Rolling Walker  5. Gait  x 150 feet with Stand-by Assistance using Rolling Walker.   6. Wheelchair propulsion x 200 feet with Modified [place] using bilateral uppper extremities  7. Ascend/Descend 6 inch curb step with Contact Guard Assistance using Rolling Walker.     Outcome: Ongoing (interventions implemented as appropriate)    PT for family training including gait and transfers.

## 2018-09-17 NOTE — PT/OT/SLP PROGRESS
Physical Therapy      Patient Name:  Marck Ramos   MRN:  [id number]    Patient not seen today secondary to Patient fatigue, nsg just returned to bed and patient already asleep, c/o being up since 7am and very tired. Will follow-up 09/18/2018.    Karey Finch PTA

## 2018-09-17 NOTE — PT/OT/SLP PROGRESS
Physical Therapy         Treatment        Marck Ramos   MRN: [id number]     PT Received On: 09/17/18  Total Time (min): 40        Billable Minutes:  Gait [id number] and Therapeutic Activity 30  Total Minutes: 40    Treatment Type: Treatment  PT/PTA: PT     PTA Visit Number: 5       General Precautions: Standard, fall, hearing impaired  Orthopedic Precautions: Orthopedic Precautions : LLE partial weight bearing   Braces: Braces: N/A         Subjective:  Communicated with OT Patricia prior to session.  Pt in agreement with his son & daughter being present for family training session in preparation for discharge.  Pt's son acknowledged that w/c will work in pt's home and he and his sister will take turns assisting pt for maximum safety.    Pain/Comfort  Pain Rating 1: 0/10  Pain Rating Post-Intervention 1: 0/10    Objective:  Patient found sitting up in w/c in room with son & daughter present.        Functional Mobility:  Bed Mobility:   Supine to sit: Standby Assistance x 2 trials at lowest bed height and approx 24 in   Sit to supine: Standby Assistance x 2 trials at lowest bed height and approx 24 in   Rolling: Standby Assistance   Scooting: Standby Assistance    Balance:   Static Sit: GOOD-: Takes MODERATE challenges from all directions but inconsistently  Dynamic Sit:  FAIR+: Maintains balance through MINIMAL excursions of active trunk motion  Static Stand: FAIR+: Takes MINIMAL challenges from all directions  Dynamic stand: FAIR+: Needs CLOSE SUPERVISION during gait and is able to right self with minor LOB    Transfer Training:  Sit to stand:Contact Guard Assistance with Rolling Walker & vc for hand placement  Bed <> Chair:  Step Transfer with Contact Guard Assistance with Rolling Walker & vc    Gait Training:  Gait training on level tile with Rolling Walker and CGA > close SBA for safety x 150 feet    Additional Treatment:  PT discussed:  pt's current level and progress with PT.  PT advised pt would benefit most  from outpatient PT upon discharge; PT will provide written HEP for performance on non-therapy days at home; family should supervise all transfers and gait with RW until they feel pt is safe walking with RW by himself; therefore, pt will need to use w/c in house for increased safety and due to PWB statis/impaired activity tolerance and for going out in community until Dr. Kuhn clears pt for WBAT status on L LE.  PT discussed bone healing/PWB status and need to walk short distances 3-4 times daily in home.  Pt's son & daughter in agreement with use of w/c and able to assist pt with w/c in house & for community outings (therapy and MD appts).  Pt able to propel lightweight w/c with UEs, but takes extra time and needs rest breaks.    Activity Tolerance:  Patient tolerated treatment well and Patient limited by fatigue    Patient left up in chair with call button in reach, chair alarm on, nurse notified and pt's daughter present.    Assessment:  Marck Ramos is a 89 y.o. male with a medical diagnosis of Left hip intertrochanteric fracture needing ORIF.  He presents with impaired functional mobility, impaired activity tolerance, gait disturbance due to PWB L LE, pain and impaired strength.  He would benefit from inpatient PT to increase safety and independence with transfers and gait prior to discharge home.  Pt not seeming to understand completely the need for PWB status and needs reminders during gait training and unable to stand for very long. Therefore, PT recommending pt use lightweight w/c for ADLs in home for increased safety until able to WBAT on L LE.     Rehab potential is fair.    Activity tolerance: Fair    Discharge recommendations: Discharge Facility/Level Of Care Needs: home, outpatient PT     Equipment recommendations: Equipment Needed After Discharge: wheelchair     GOALS:   Multidisciplinary Problems     Physical Therapy Goals        Problem: Physical Therapy Goal    Goal Priority Disciplines  Outcome Goal Variances Interventions   Physical Therapy Goal     PT, PT/OT Ongoing (interventions implemented as appropriate)     Description:  Goals to be met by: 2018     Patient will increase functional independence with mobility by performin. Supine to sit with Stand-by Assistance  2. Sit to supine with Stand-by Assistance  3. Sit to stand transfer with Stand-by Assistance  4. Bed to chair transfer with Stand-by Assistance using Rolling Walker  5. Gait  x 150 feet with Stand-by Assistance using Rolling Walker.   6. Wheelchair propulsion x 200 feet with Modified [place] using bilateral uppper extremities  7. Ascend/Descend 6 inch curb step with Contact Guard Assistance using Rolling Walker.                      PLAN:    Patient to be seen daily  to address the above listed problems via gait training, therapeutic activities, therapeutic exercises, therapeutic groups, neuromuscular re-education, wheelchair management/training  Plan of Care expires: 18  Plan of Care reviewed with: patient         Patricia Mack, PT 2018

## 2018-09-17 NOTE — ASSESSMENT & PLAN NOTE
Contributing Nutrition Diagnosis  In context of chronic illness    Related to (etiology):   Poor intake     Signs and Symptoms (as evidenced by):   1) Weight loss of 6.2% <2 weeks  2) <75% EEN > 7 days  3) Moderate fat loss noted in upper arm, and around eyes     Interventions/Recommendations (treatment strategy):  1) Continue regular diet 2) Add Boost Plus, tid    Nutrition Diagnosis Status:   continues

## 2018-09-17 NOTE — PROGRESS NOTES
"REHAB FOLLOWUP NOTE    Marck Ramos is a 89 y.o. male patient.    Chief Complaint:   Left hip intertrochanteric fracture needing ORIF.    History: 89-year-old gentleman fell in his  yard trying to hold his dog lay down there for  few hours before he was found by his neighbors, called the ambulance and presented to the emergency room.  Patient had a workup done consistent with a left hip intertrochanteric fracture needing a left hip ORIF done on 09/29/2018 by Dr. Kuhn.  Patient's postop course complicated with acute blood loss anemia needing blood transfusion, urinary retention needing cystoscopy with catheter placement, hematuria, encephalopathy, pain control issues,dependency with  ADLs, transfers, mobility is transferred down to us for further rehabilitation.         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    Iron deficiency anemia, unspecified 7/18/2017       Temp: 96.6 °F (35.9 °C) (09/17/18 0519)  Pulse: 62 (09/17/18 0519)  Resp: 18 (09/17/18 0519)  BP: (!) 159/71 (09/17/18 0519)  SpO2: 96 % (09/17/18 0519)  Weight: 63.8 kg (140 lb 10.5 oz) (09/14/18 1400)  Height: 5' 6" (167.6 cm) (09/14/18 1405)    Lab Results   Component Value Date    WBC 7.70 09/17/2018    HGB 10.6 (L) 09/17/2018    HCT 32.0 (L) 09/17/2018     (H) 09/17/2018    ALT 10 09/08/2018    AST 25 09/08/2018     (L) 09/17/2018    K 3.3 (L) 09/17/2018    CL 98 09/17/2018    CREATININE 0.7 09/17/2018    BUN 13 09/17/2018    CO2 27 09/17/2018    PSA 1.88 06/29/2012       Physical Examination:  Alert, oriented x3,   Voices no complaints.  Lungs are clear to auscultation.  Heart with a regular rate and rhythm.  "   Bilateral upper and right lower extremity with functional active to passive range of motion with good motor strength noted.  Left lower extremity hip incision clean and dry.   Minimal edema in bilateral lower extremities.  No calf tenderness noted.  Blood pressures monitor.  Anemia stable, hypokalemia/ potassium supplements.  Steady progress.     Impression:  Left hip intertrochanteric fracture needing ORIF.  Postop acute blood loss anemia.    Postop encephalopathy.    Postop urinary retention needing Garcia catheter placement.    Hypertension.    Paroxysmal atrial fibrillation.    Peripheral vascular disease.    Coronary artery disease.  Hyponatremia.    Recommendations:    Continue current PT, OT, nursing care.          Grey Gonzalez MD  9/17/2018

## 2018-09-17 NOTE — PLAN OF CARE
Problem: Patient Care Overview  Goal: Plan of Care Review  Outcome: Ongoing (interventions implemented as appropriate)  Alert and oriented, denies pain, continent of B&B, occasional spilling of urine when using urinal, healing left hip incision no s/s infection.

## 2018-09-18 ENCOUNTER — TELEPHONE (OUTPATIENT)
Dept: UROLOGY | Facility: CLINIC | Age: 83
End: 2018-09-18

## 2018-09-18 PROCEDURE — 25000003 PHARM REV CODE 250: Performed by: PHYSICAL MEDICINE & REHABILITATION

## 2018-09-18 PROCEDURE — 97530 THERAPEUTIC ACTIVITIES: CPT

## 2018-09-18 PROCEDURE — 97110 THERAPEUTIC EXERCISES: CPT

## 2018-09-18 PROCEDURE — 99900035 HC TECH TIME PER 15 MIN (STAT)

## 2018-09-18 PROCEDURE — 97116 GAIT TRAINING THERAPY: CPT

## 2018-09-18 PROCEDURE — 12800000 HC REHAB SEMI-PRIVATE ROOM

## 2018-09-18 PROCEDURE — 97542 WHEELCHAIR MNGMENT TRAINING: CPT

## 2018-09-18 PROCEDURE — 97535 SELF CARE MNGMENT TRAINING: CPT

## 2018-09-18 RX ADMIN — LOSARTAN POTASSIUM 50 MG: 25 TABLET, FILM COATED ORAL at 09:09

## 2018-09-18 RX ADMIN — ASPIRIN 325 MG: 325 TABLET, DELAYED RELEASE ORAL at 09:09

## 2018-09-18 RX ADMIN — AMLODIPINE BESYLATE 10 MG: 5 TABLET ORAL at 09:09

## 2018-09-18 RX ADMIN — DOCUSATE SODIUM 100 MG: 100 CAPSULE, LIQUID FILLED ORAL at 09:09

## 2018-09-18 RX ADMIN — SOTALOL HYDROCHLORIDE 80 MG: 80 TABLET ORAL at 09:09

## 2018-09-18 RX ADMIN — ISOSORBIDE MONONITRATE 60 MG: 30 TABLET, EXTENDED RELEASE ORAL at 09:09

## 2018-09-18 RX ADMIN — FUROSEMIDE 40 MG: 40 TABLET ORAL at 09:09

## 2018-09-18 RX ADMIN — PANTOPRAZOLE SODIUM 40 MG: 40 TABLET, DELAYED RELEASE ORAL at 09:09

## 2018-09-18 RX ADMIN — FERROUS SULFATE TAB EC 325 MG (65 MG FE EQUIVALENT) 325 MG: 325 (65 FE) TABLET DELAYED RESPONSE at 09:09

## 2018-09-18 RX ADMIN — TAMSULOSIN HYDROCHLORIDE 0.4 MG: 0.4 CAPSULE ORAL at 09:09

## 2018-09-18 RX ADMIN — SIMVASTATIN 20 MG: 10 TABLET, FILM COATED ORAL at 09:09

## 2018-09-18 RX ADMIN — Medication 400 MG: at 09:09

## 2018-09-18 RX ADMIN — CLOPIDOGREL BISULFATE 75 MG: 75 TABLET ORAL at 09:09

## 2018-09-18 RX ADMIN — POTASSIUM CHLORIDE 20 MEQ: 1500 TABLET, EXTENDED RELEASE ORAL at 09:09

## 2018-09-18 NOTE — PLAN OF CARE
Problem: Physical Therapy Goal  Goal: Physical Therapy Goal  Goals to be met by: 2018     Patient will increase functional independence with mobility by performin. Supine to sit with Stand-by Assistance  2. Sit to supine with Stand-by Assistance  3. Sit to stand transfer with Stand-by Assistance  4. Bed to chair transfer with Stand-by Assistance using Rolling Walker  5. Gait  x 150 feet with Stand-by Assistance using Rolling Walker.   6. Wheelchair propulsion x 200 feet with Modified [place] using bilateral uppper extremities  7. Ascend/Descend 6 inch curb step with Contact Guard Assistance using Rolling Walker.     Outcome: Ongoing (interventions implemented as appropriate)  PT for functional mob training, progressing

## 2018-09-18 NOTE — PT/OT/SLP PROGRESS
Occupational Therapy  Treatment    Marck Ramos   MRN: [id number]   Admitting Diagnosis: <principal problem not specified>    OT Date of Treatment: 09/18/18   Total Time (min): 90 min      Billable Minutes:  Self Care/Home Management 60, Therapeutic Activity 15 and Therapeutic Exercise 15  Total Minutes: 90    General Precautions: Standard, fall, hearing impaired  Orthopedic Precautions: LLE partial weight bearing  Braces: N/A         Subjective:  Communicated with RN prior to session.    Pain/Comfort  Pain Rating 1: 0/10  Pain Rating Post-Intervention 1: 0/10    Objective:  Patient found with: (chair alarm)    Functional Mobility:    Transfer Training:  Sit>Stand: SBA with RW  Toilet Transfer: SBA with RW  Shower Bench Transfer: SBA with RW    Bathing:  Patient performed bathing with Stand-by Assistance with grab bar, Handheld shower head, long-handled sponge seated on shower chair in Shower.    UE Dressing:  Patient performed UE Dressing with Supervision or Set-up Assistance with no AD at Wheelchair.    LE Dressing:  Patient don/doffed socks with Stand-by Assistance and using sock aid and reacher, Patient don/doffed compression hose with Moderate Assistance, Patient performed don/doffed adult brief with Minimal Assistance and Patient performed don/doffed pants with Minimal Assistance    Toilet Training:  Pt performed toileting with Supervision or Set-up Assistance with Grab  bar and Drop arm commode while seated on toilet.    Balance:   Static Sit: GOOD-: Takes MODERATE challenges from all directions but inconsistently  Dynamic Sit:  FAIR+: Maintains balance through MINIMAL excursions of active trunk motion  Static Stand: FAIR+: Takes MINIMAL challenges from all directions  Dynamic stand: FAIR+: Needs CLOSE SUPERVISION during gait and is able to right self with minor LOB    Additional Treatment:  BUE Exercises seated:  -Elbow ext with 2lbs hand weights (3x10)  -Elbow flex with 3lbs hand weights (3x15)      Patient  left up in chair with call button in reach and chair alarm on    ASSESSMENT:  Marck Ramos is a 89 y.o. male. Pt demonstrated increased independence with static/dyanmic standing balance during dressing and when ambulating. Pt would continue to benefit from skilled OT to maximize independence with ADLs.     Rehab potential is good    Activity tolerance: Good    Discharge recommendations: home, outpatient PT     Equipment recommendations: wheelchair     GOALS:   Multidisciplinary Problems     Occupational Therapy Goals        Problem: Occupational Therapy Goal    Goal Priority Disciplines Outcome Interventions   Occupational Therapy Goal     OT, PT/OT Ongoing (interventions implemented as appropriate)    Description:  Goals to be met by: 9/25/2018     Patient will increase functional independence with ADLs by performing:    LE Dressing with Modified [place] and Assistive Devices as needed.  Grooming while standing at sink with Modified [place].  Toileting from toilet with [place] for hygiene and clothing management.   Bathing from  shower chair/bench with Modified [place].  Supine to sit with Modified [place].  Stand pivot transfers with Modified [place] and maintaining weight-bearing precaution(s).  Toilet transfer to toilet with Modified [place] and maintaining weight-bearing precaution(s).  Upper extremity exercise program x15 reps per handout, with independence.                      Plan:  Patient to be seen 6 x/week to address the above listed problems via self-care/home management, therapeutic activities, therapeutic exercises, community/work re-entry, wheelchair management/training, therapeutic groups  Plan of Care expires: 09/22/18  Plan of Care reviewed with: patient         Larry ROUSSEAU Reyes, OT  09/18/2018

## 2018-09-18 NOTE — PROGRESS NOTES
"REHAB FOLLOWUP NOTE    Marck Ramos is a 89 y.o. male patient.    Chief Complaint:   Left hip intertrochanteric fracture needing ORIF.    History: 89-year-old gentleman fell in his  yard trying to hold his dog, lay down there for  few hours before he was found by his neighbors, called the ambulance and presented to the emergency room.  Patient had a workup done consistent with a left hip intertrochanteric fracture needing a left hip ORIF done on 09/29/2018 by Dr. Kuhn.  Patient's postop course complicated with acute blood loss anemia needing blood transfusion, urinary retention needing cystoscopy with catheter placement, hematuria, encephalopathy, pain control issues,dependency with  ADLs, transfers, mobility is transferred down to us for further rehabilitation.         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    Iron deficiency anemia, unspecified 7/18/2017       Temp: 96.7 °F (35.9 °C) (09/18/18 0552)  Pulse: 62 (09/18/18 0552)  Resp: 18 (09/18/18 0552)  BP: (!) 167/72 (09/18/18 0552)  SpO2: 95 % (09/18/18 0552)  Weight: 62 kg (136 lb 11 oz)(on 9/5/18) (09/17/18 1053)  Height: 5' 6" (167.6 cm) (09/14/18 1405)    Lab Results   Component Value Date    WBC 7.70 09/17/2018    HGB 10.6 (L) 09/17/2018    HCT 32.0 (L) 09/17/2018     (H) 09/17/2018    ALT 10 09/08/2018    AST 25 09/08/2018     (L) 09/17/2018    K 3.3 (L) 09/17/2018    CL 98 09/17/2018    CREATININE 0.7 09/17/2018    BUN 13 09/17/2018    CO2 27 09/17/2018    PSA 1.88 06/29/2012       Physical Examination:  Alert, oriented x3,   Voices no complaints.  Lungs are clear to auscultation.  Heart with a regular rate and rhythm.  "   Bilateral upper and right lower extremity with functional active to passive range of motion with good motor strength noted.  Left lower extremity hip incision clean and dry.   No edema, no calf tenderness noted.  Blood pressures monitor.  Anemia stable, hypokalemia/ potassium supplements.  Steady progress.     Impression:  Left hip intertrochanteric fracture needing ORIF.  Postop acute blood loss anemia.    Postop encephalopathy.    Postop urinary retention needing Garcia catheter placement.    Hypertension.    Paroxysmal atrial fibrillation.    Peripheral vascular disease.    Coronary artery disease.  Hyponatremia.    Recommendations:    Continue current PT, OT, nursing care.          Grey Gonzalez MD  9/18/2018

## 2018-09-18 NOTE — PLAN OF CARE
Problem: Occupational Therapy Goal  Goal: Occupational Therapy Goal  Goals to be met by: 9/25/2018     Patient will increase functional independence with ADLs by performing:    LE Dressing with Modified [place] and Assistive Devices as needed.  Grooming while standing at sink with Modified [place].  Toileting from toilet with [place] for hygiene and clothing management.   Bathing from  shower chair/bench with Modified [place].  Supine to sit with Modified [place].  Stand pivot transfers with Modified [place] and maintaining weight-bearing precaution(s).  Toilet transfer to toilet with Modified [place] and maintaining weight-bearing precaution(s).  Upper extremity exercise program x15 reps per handout, with independence.     Outcome: Ongoing (interventions implemented as appropriate)  Pt progressing toward goals.

## 2018-09-18 NOTE — PLAN OF CARE
Problem: Patient Care Overview  Goal: Plan of Care Review  Outcome: Ongoing (interventions implemented as appropriate)  resp txs PRN for wheezing.

## 2018-09-18 NOTE — TELEPHONE ENCOUNTER
Catheter removed 9/11. Patient voiding on his own.  Nurse, Hima, says PVR is 0 last week.    Will call patient's daughter to reschedule him in 4-6 weeks.   Tried to call.  No answer.

## 2018-09-18 NOTE — TELEPHONE ENCOUNTER
Did they ever try to remove it? Or just change it?  If mobile enough and pending discharge from rehab and has been on flomax, would recommend fill and pull voiding trial in AM to eval if can spontaneously void prior to DC home.  If able to void with residual 150cc or less would continue flomax and can then follow up 4-6 weeks later.  If has failed/fails voiding trial, r/s with me on a Monday afternoon within 4 weeks of last juarez change/placement

## 2018-09-18 NOTE — PT/OT/SLP PROGRESS
"Physical Therapy         Treatment        Marck Ramos   MRN: [id number]     PT Received On: 09/18/18  Total Time (min): 90        Billable Minutes:  Gait [id number], Therapeutic Activity 20, Therapeutic Exercise 30 and Train/Wheelchair Management 15  Total Minutes: 90    Treatment Type: Treatment  PT/PTA: PT     PTA Visit Number: 5       General Precautions: Standard, fall, hearing impaired  Orthopedic Precautions: Orthopedic Precautions : LLE partial weight bearing   Braces: Braces: N/A         Subjective:  Communicated with RN prior to session.    Pain/Comfort  Pain Rating 1: 0/10  Pain Rating Post-Intervention 1: 0/10    Objective:  Patient found sitting, with Patient found with: (W/C alarm)    Functional Mobility:  Transfer Training:  Sit to stand:Stand-by Assistance with No Assistive Device with pushing from bedrail/ W/C arm rest    Wheelchair Training:  Pt propelled Standard wheelchair x 130x2 feet on Level tile with  Bilateral upper extremity with Stand-by Assistance.     Gait Training:  Patient gait trained PWB: left lower extremity 240  feet on level tile with Rolling Walker with Contact Guard Assistance.  Pt with demonstarting a  3-point gait and partial weightbearing with decreased alondra, decreased velocity of limb motion, decreased step length, decreased swing-to-stance ratio and decreased weight-shifting ability.Impairments contributing to gait deviations include impaired balance, impaired coordination, impaired motor control and decreased strength    Additional Treatment:  Seated Ex: Hip abd with a tellow TB, 10 reps, 3 sets; hip add against a small inflatable ball, 3 sec hold, 10 x3; (L) LAQ with a 3# ankle wt, 10 x 3, hip abd/ add with knee extended, 3# wt, 10 x 3    Standing Ex inside // bars with (B) UE support: Step Taps on a 6" step, standing on (R) LE and tapping with the (L); standing on (R) while kicking rolling ball, ~ 30 reps; Tandem walk, 10 ft, 4 reps, PWB to (L) LE with UE support; " crabwalk without resistance, 10 ft 4 reps PWB to (L) LE with UE support.    Activity Tolerance:  Patient tolerated treatment well    Patient left up in chair with call button in reach, chair alarm on and RN notified.    Assessment:  Marck Ramos is a 89 y.o. male with a medical diagnosis of (L) hip Fx S/P ORIF. He presents with decreased strength, level of functional mob, balance, and tolerance for functional mob/ ex.    Rehab potential is good.    Activity tolerance: Good    Discharge recommendations: Discharge Facility/Level Of Care Needs: home, outpatient PT     Equipment recommendations: Equipment Needed After Discharge: walker, rolling, wheelchair     GOALS:   Multidisciplinary Problems     Physical Therapy Goals        Problem: Physical Therapy Goal    Goal Priority Disciplines Outcome Goal Variances Interventions   Physical Therapy Goal     PT, PT/OT Ongoing (interventions implemented as appropriate)     Description:  Goals to be met by: 2018     Patient will increase functional independence with mobility by performin. Supine to sit with Stand-by Assistance  2. Sit to supine with Stand-by Assistance  3. Sit to stand transfer with Stand-by Assistance  4. Bed to chair transfer with Stand-by Assistance using Rolling Walker  5. Gait  x 150 feet with Stand-by Assistance using Rolling Walker.   6. Wheelchair propulsion x 200 feet with Modified [place] using bilateral uppper extremities  7. Ascend/Descend 6 inch curb step with Contact Guard Assistance using Rolling Walker.                      PLAN:    Patient to be seen daily  to address the above listed problems via gait training, therapeutic activities, therapeutic exercises, wheelchair management/training, neuromuscular re-education  Plan of Care expires: 18  Plan of Care reviewed with: patient         Rosa Yoder, PT 2018

## 2018-09-18 NOTE — PLAN OF CARE
Problem: Patient Care Overview  Goal: Plan of Care Review  Outcome: Ongoing (interventions implemented as appropriate)  Patient awake/alert. No c/o pain noted this shift. Generalized weakness present. Free from falls. Plan of care continued

## 2018-09-19 PROCEDURE — 97110 THERAPEUTIC EXERCISES: CPT

## 2018-09-19 PROCEDURE — 97116 GAIT TRAINING THERAPY: CPT

## 2018-09-19 PROCEDURE — 99900035 HC TECH TIME PER 15 MIN (STAT)

## 2018-09-19 PROCEDURE — 12800000 HC REHAB SEMI-PRIVATE ROOM

## 2018-09-19 PROCEDURE — 25000003 PHARM REV CODE 250: Performed by: PHYSICAL MEDICINE & REHABILITATION

## 2018-09-19 PROCEDURE — 97542 WHEELCHAIR MNGMENT TRAINING: CPT

## 2018-09-19 PROCEDURE — 97530 THERAPEUTIC ACTIVITIES: CPT

## 2018-09-19 RX ORDER — TAMSULOSIN HYDROCHLORIDE 0.4 MG/1
0.4 CAPSULE ORAL DAILY
Qty: 30 CAPSULE | Refills: 0 | Status: SHIPPED | OUTPATIENT
Start: 2018-09-19 | End: 2018-10-29 | Stop reason: SDUPTHER

## 2018-09-19 RX ORDER — FUROSEMIDE 40 MG/1
40 TABLET ORAL DAILY
Qty: 30 TABLET | Refills: 11 | Status: ON HOLD | OUTPATIENT
Start: 2018-09-19 | End: 2020-07-27

## 2018-09-19 RX ORDER — DOCUSATE SODIUM 100 MG/1
100 CAPSULE, LIQUID FILLED ORAL 2 TIMES DAILY
Refills: 0 | COMMUNITY
Start: 2018-09-19 | End: 2019-08-28

## 2018-09-19 RX ORDER — POTASSIUM CHLORIDE 20 MEQ/1
20 TABLET, EXTENDED RELEASE ORAL DAILY
Qty: 30 TABLET | Refills: 0 | Status: SHIPPED | OUTPATIENT
Start: 2018-09-19 | End: 2020-10-08 | Stop reason: SDUPTHER

## 2018-09-19 RX ADMIN — SOTALOL HYDROCHLORIDE 80 MG: 80 TABLET ORAL at 08:09

## 2018-09-19 RX ADMIN — FERROUS SULFATE TAB EC 325 MG (65 MG FE EQUIVALENT) 325 MG: 325 (65 FE) TABLET DELAYED RESPONSE at 10:09

## 2018-09-19 RX ADMIN — DOCUSATE SODIUM 100 MG: 100 CAPSULE, LIQUID FILLED ORAL at 10:09

## 2018-09-19 RX ADMIN — SIMVASTATIN 20 MG: 10 TABLET, FILM COATED ORAL at 08:09

## 2018-09-19 RX ADMIN — Medication 400 MG: at 10:09

## 2018-09-19 RX ADMIN — TAMSULOSIN HYDROCHLORIDE 0.4 MG: 0.4 CAPSULE ORAL at 10:09

## 2018-09-19 RX ADMIN — FUROSEMIDE 40 MG: 40 TABLET ORAL at 10:09

## 2018-09-19 RX ADMIN — SOTALOL HYDROCHLORIDE 80 MG: 80 TABLET ORAL at 10:09

## 2018-09-19 RX ADMIN — CLOPIDOGREL BISULFATE 75 MG: 75 TABLET ORAL at 10:09

## 2018-09-19 RX ADMIN — POTASSIUM CHLORIDE 20 MEQ: 1500 TABLET, EXTENDED RELEASE ORAL at 10:09

## 2018-09-19 RX ADMIN — ASPIRIN 325 MG: 325 TABLET, DELAYED RELEASE ORAL at 10:09

## 2018-09-19 RX ADMIN — LOSARTAN POTASSIUM 50 MG: 25 TABLET, FILM COATED ORAL at 08:09

## 2018-09-19 RX ADMIN — LOSARTAN POTASSIUM 50 MG: 25 TABLET, FILM COATED ORAL at 10:09

## 2018-09-19 RX ADMIN — ISOSORBIDE MONONITRATE 60 MG: 30 TABLET, EXTENDED RELEASE ORAL at 10:09

## 2018-09-19 RX ADMIN — DOCUSATE SODIUM 100 MG: 100 CAPSULE, LIQUID FILLED ORAL at 08:09

## 2018-09-19 RX ADMIN — FERROUS SULFATE TAB EC 325 MG (65 MG FE EQUIVALENT) 325 MG: 325 (65 FE) TABLET DELAYED RESPONSE at 08:09

## 2018-09-19 RX ADMIN — ASPIRIN 325 MG: 325 TABLET, DELAYED RELEASE ORAL at 08:09

## 2018-09-19 RX ADMIN — PANTOPRAZOLE SODIUM 40 MG: 40 TABLET, DELAYED RELEASE ORAL at 10:09

## 2018-09-19 RX ADMIN — AMLODIPINE BESYLATE 10 MG: 5 TABLET ORAL at 10:09

## 2018-09-19 NOTE — PT/OT/SLP PROGRESS
Occupational Therapy  Treatment    Marck Ramos   MRN: [id number]   Admitting Diagnosis:  L ORIF      OT Date of Treatment: 09/19/18   Total Time (min): 90 min      Billable Minutes:  Therapeutic Exercise 90  Total Minutes: 90    General Precautions: Standard, fall      Do you have any cultural, spiritual, Christianity conflicts, given your current situation?: no    Subjective:  Communicated with nursing prior to session.    Pain/Comfort  Pain Rating 1: 0/10    Objective:   Patient was directed with self propelling himself to and from his room and the main therapy gym with escort by staff.  Educated patient on the following BUE exercises to increase strength and  needed for independence with ADL's / transfers:  Bilat Yogesh used for 4 sets of 5 mins with 4 lbs used and rest breaks given, UBE completed for 3 sets of 5 mins each, challenged patient to search through a large cut of yellow thera putty to locate hidden beans and marbles by stretching/ pulling apart putty, all found with extended time.      Patient left up in chair with call button in reach, chair alarm on and nursing notified    ASSESSMENT:  Marck Ramos is a 89 y.o. male with a medical diagnosis of L ORIF       Rehab potential is excellent    Activity tolerance: Excellent        GOALS:   Multidisciplinary Problems     Occupational Therapy Goals        Problem: Occupational Therapy Goal    Goal Priority Disciplines Outcome Interventions   Occupational Therapy Goal     OT, PT/OT Ongoing (interventions implemented as appropriate)    Description:  Goals to be met by: 9/25/2018     Patient will increase functional independence with ADLs by performing:    LE Dressing with Modified [place] and Assistive Devices as needed.  Grooming while standing at sink with Modified [place].  Toileting from toilet with [place] for hygiene and clothing management.   Bathing from  shower chair/bench with Modified [place].  Supine to sit with Modified  [place].  Stand pivot transfers with Modified [place] and maintaining weight-bearing precaution(s).  Toilet transfer to toilet with Modified [place] and maintaining weight-bearing precaution(s).  Upper extremity exercise program x15 reps per handout, with independence.                      Plan:  Patient to be seen 6 x/week to address the above listed problems via self-care/home management, therapeutic activities, therapeutic exercises, community/work re-entry, wheelchair management/training, therapeutic groups  Plan of Care expires: 09/22/18  Plan of Care reviewed with: patient         Kevon Lester, DAVID  09/19/2018

## 2018-09-19 NOTE — PROGRESS NOTES
09/19/18 0047   Aerosol Therapy   $ Aerosol Therapy Charges PRN treatment not required   Respiratory Treatment Status PRN treatment not required

## 2018-09-19 NOTE — PROGRESS NOTES
"REHAB FOLLOWUP NOTE    Marck Ramos is a 89 y.o. male patient.    Chief Complaint:   Left hip intertrochanteric fracture needing ORIF.    History: 89-year-old gentleman fell in his  yard trying to hold his dog, lay down there for  few hours before he was found by his neighbors, called the ambulance and presented to the emergency room.  Patient had a workup done consistent with a left hip intertrochanteric fracture needing a left hip ORIF done on 09/29/2018 by Dr. Kuhn.  Patient's postop course complicated with acute blood loss anemia needing blood transfusion, urinary retention needing cystoscopy with catheter placement, hematuria, encephalopathy, pain control issues,dependency with  ADLs, transfers, mobility is transferred down to us for further rehabilitation.         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    Iron deficiency anemia, unspecified 7/18/2017       Temp: 97.4 °F (36.3 °C) (09/19/18 0506)  Pulse: 61 (09/19/18 0506)  Resp: 18 (09/19/18 0506)  BP: (!) 159/70 (09/19/18 0506)  SpO2: 96 % (09/19/18 0506)  Weight: 62 kg (136 lb 11 oz)(on 9/5/18) (09/17/18 1053)  Height: 5' 6" (167.6 cm) (09/14/18 1405)    Lab Results   Component Value Date    WBC 7.70 09/17/2018    HGB 10.6 (L) 09/17/2018    HCT 32.0 (L) 09/17/2018     (H) 09/17/2018    ALT 10 09/08/2018    AST 25 09/08/2018     (L) 09/17/2018    K 3.3 (L) 09/17/2018    CL 98 09/17/2018    CREATININE 0.7 09/17/2018    BUN 13 09/17/2018    CO2 27 09/17/2018    PSA 1.88 06/29/2012       Physical Examination:  Alert, oriented x3,   Voices no complaints.  Lungs are clear to auscultation.  Heart with a regular rate and rhythm.  "   Bilateral upper and right lower extremity with functional active to passive range of motion with good motor strength noted.  Left lower extremity hip incision clean and dry.   No edema, no calf tenderness noted.  Blood pressures monitor.  Anemia stable, hypokalemia/ potassium supplements.  Steady progress.     Impression:  Left hip intertrochanteric fracture needing ORIF.  Postop acute blood loss anemia.    Postop encephalopathy.    Postop urinary retention needing Garcia catheter placement.    Hypertension.    Paroxysmal atrial fibrillation.    Peripheral vascular disease.    Coronary artery disease.  Hyponatremia.    Recommendations:    Continue current PT, OT, nursing care.  UT home tomorrow with family.        Grey Gonzalez MD  9/19/2018

## 2018-09-20 VITALS
SYSTOLIC BLOOD PRESSURE: 181 MMHG | TEMPERATURE: 97 F | RESPIRATION RATE: 17 BRPM | BODY MASS INDEX: 21.97 KG/M2 | HEART RATE: 65 BPM | WEIGHT: 136.69 LBS | DIASTOLIC BLOOD PRESSURE: 79 MMHG | HEIGHT: 66 IN | OXYGEN SATURATION: 92 %

## 2018-09-20 LAB
ANION GAP SERPL CALC-SCNC: 8 MMOL/L
BASOPHILS # BLD AUTO: 0 K/UL
BASOPHILS NFR BLD: 0.5 %
BUN SERPL-MCNC: 16 MG/DL
CALCIUM SERPL-MCNC: 8.3 MG/DL
CHLORIDE SERPL-SCNC: 99 MMOL/L
CO2 SERPL-SCNC: 26 MMOL/L
CREAT SERPL-MCNC: 0.7 MG/DL
DIFFERENTIAL METHOD: ABNORMAL
EOSINOPHIL # BLD AUTO: 0.4 K/UL
EOSINOPHIL NFR BLD: 6.4 %
ERYTHROCYTE [DISTWIDTH] IN BLOOD BY AUTOMATED COUNT: 16.7 %
EST. GFR  (AFRICAN AMERICAN): >60 ML/MIN/1.73 M^2
EST. GFR  (NON AFRICAN AMERICAN): >60 ML/MIN/1.73 M^2
GLUCOSE SERPL-MCNC: 92 MG/DL
HCT VFR BLD AUTO: 34.6 %
HGB BLD-MCNC: 11.4 G/DL
LYMPHOCYTES # BLD AUTO: 2 K/UL
LYMPHOCYTES NFR BLD: 36.8 %
MCH RBC QN AUTO: 32.8 PG
MCHC RBC AUTO-ENTMCNC: 33 G/DL
MCV RBC AUTO: 100 FL
MONOCYTES # BLD AUTO: 0.7 K/UL
MONOCYTES NFR BLD: 13.1 %
NEUTROPHILS # BLD AUTO: 2.4 K/UL
NEUTROPHILS NFR BLD: 43.2 %
PLATELET # BLD AUTO: 342 K/UL
PMV BLD AUTO: 7 FL
POTASSIUM SERPL-SCNC: 3.5 MMOL/L
RBC # BLD AUTO: 3.47 M/UL
SODIUM SERPL-SCNC: 133 MMOL/L
WBC # BLD AUTO: 5.6 K/UL

## 2018-09-20 PROCEDURE — 97110 THERAPEUTIC EXERCISES: CPT

## 2018-09-20 PROCEDURE — 85025 COMPLETE CBC W/AUTO DIFF WBC: CPT

## 2018-09-20 PROCEDURE — 97116 GAIT TRAINING THERAPY: CPT

## 2018-09-20 PROCEDURE — 97803 MED NUTRITION INDIV SUBSEQ: CPT

## 2018-09-20 PROCEDURE — 25000003 PHARM REV CODE 250: Performed by: PHYSICAL MEDICINE & REHABILITATION

## 2018-09-20 PROCEDURE — 97530 THERAPEUTIC ACTIVITIES: CPT

## 2018-09-20 PROCEDURE — 80048 BASIC METABOLIC PNL TOTAL CA: CPT

## 2018-09-20 PROCEDURE — 99900035 HC TECH TIME PER 15 MIN (STAT)

## 2018-09-20 PROCEDURE — 97542 WHEELCHAIR MNGMENT TRAINING: CPT

## 2018-09-20 PROCEDURE — 36415 COLL VENOUS BLD VENIPUNCTURE: CPT

## 2018-09-20 RX ADMIN — PANTOPRAZOLE SODIUM 40 MG: 40 TABLET, DELAYED RELEASE ORAL at 09:09

## 2018-09-20 RX ADMIN — TAMSULOSIN HYDROCHLORIDE 0.4 MG: 0.4 CAPSULE ORAL at 09:09

## 2018-09-20 RX ADMIN — DOCUSATE SODIUM 100 MG: 100 CAPSULE, LIQUID FILLED ORAL at 09:09

## 2018-09-20 RX ADMIN — LOSARTAN POTASSIUM 50 MG: 25 TABLET, FILM COATED ORAL at 09:09

## 2018-09-20 RX ADMIN — ISOSORBIDE MONONITRATE 60 MG: 30 TABLET, EXTENDED RELEASE ORAL at 09:09

## 2018-09-20 RX ADMIN — POLYETHYLENE GLYCOL (3350) 17 G: 17 POWDER, FOR SOLUTION ORAL at 09:09

## 2018-09-20 RX ADMIN — FUROSEMIDE 40 MG: 40 TABLET ORAL at 09:09

## 2018-09-20 RX ADMIN — SOTALOL HYDROCHLORIDE 80 MG: 80 TABLET ORAL at 09:09

## 2018-09-20 RX ADMIN — FERROUS SULFATE TAB EC 325 MG (65 MG FE EQUIVALENT) 325 MG: 325 (65 FE) TABLET DELAYED RESPONSE at 09:09

## 2018-09-20 RX ADMIN — Medication 400 MG: at 09:09

## 2018-09-20 RX ADMIN — CLOPIDOGREL BISULFATE 75 MG: 75 TABLET ORAL at 09:09

## 2018-09-20 RX ADMIN — AMLODIPINE BESYLATE 10 MG: 5 TABLET ORAL at 09:09

## 2018-09-20 RX ADMIN — ASPIRIN 325 MG: 325 TABLET, DELAYED RELEASE ORAL at 09:09

## 2018-09-20 RX ADMIN — POTASSIUM CHLORIDE 20 MEQ: 1500 TABLET, EXTENDED RELEASE ORAL at 09:09

## 2018-09-20 NOTE — PATIENT CARE CONFERENCE
Weekly Staffing Report      Date Admitted: 9/5/2018 :   Staffing Date: 9/20/2018     Patient Active Problem List   Diagnosis    Hypertension    Hyperlipidemia    Iron deficiency anemia, unspecified    Iron deficiency anemia secondary to blood loss (chronic)    Anemia in stage 2 chronic kidney disease    Hemorrhage of gastrointestinal tract    Anemia, chronic disease    Fracture of left femur    Severe malnutrition          Team Members Present:  Physician Team Member: Dr Gonzalez  Nursing Team Member: Alessandro Tello RN  Case Management Team Member: Maddie Valdez CM/SW  PT Team Member: Patricia Mack PT  OT Team Member: Laney Washington OT    Nursing  Skin: Intact  Bowel: Continent  Bladder:continent  Diet: Regular  Appetite: good   Pain Level: 0/10    Physical Therapy  Supine to Sit:  standy by assistance  Sit to Stand: standy by assistance  Gait: 200 feet standy by assistance Rolling walker  Wheelchair Mobility: 150-175 feet supervision  Stairs: N/A      Occupational Therapy  Feeding: modified independent  Grooming:modified independent  UED: supervision  LED: supervision  Bathing:supervision   Toileting:supervision  Toilet Transfer:  supervision  Tub Transfer:  supervision      Goals:  Supervision.      Tolerates 3 hours of therapy: Yes    Discharge Destination: Home with out pt.    Estimated Length of Stay:9/20/18.

## 2018-09-20 NOTE — CONSULTS
"  [facility]  Adult Nutrition  Consult Note    SUMMARY     Recommendations    Recommendation/Intervention: 1) continue regular diet 2) add Boost Plus, tid 3) weigh weekly  Goals: 1) Pt will consume >=85% EEN during admit. 2) Pt's weight will stay the same or have wt gain during admit.   Nutrition Goal Status: 1) Continues 2) Continues  Communication of RD Recs: (POC, discussed on rounds, reviewed with RN)    Reason for Assessment    Reason for Assessment: , RD follow up  Relevant Medical History: CAD, HTN, HLD, PUD, PVD  General Information Comments: Admitted s/p lt hip orif for rehab. Pt alert. Wt loss since admit noted. Pt agreed to try Boost Plus, tid.   9/17/18: Pt alert. Daughter at bedside. Reports he has a good appetite, but eats small meals with snacks because he has a hernia in esophagus.  NFPE completed. See S&S in PES  9/20/18: Pt alert. Meal intake appears to have improved. Not receiving ONS at this time.  Pt agreeable to try it and in rounds Dr. Gonzalez agreed.  Discussed with nursing. Discharge planned for today.     Nutrition Discharge Planning: Regular with oral supplements daily to support weight gain    Malnutrition Assessment:  NFPE Moderate fat/muscle loss noted around eyes and in upper arm.   Appears thin, frail.   Weight Loss of 3% since admit from 9/5 to 9/10.  Accuracy?  9/17 wt update 62 kg.     Nutrition Risk Screen    Nutrition Risk Screen: no indicators present    Nutrition/Diet History    Typical Food/Fluid Intake: Reports 6 small meals daily at home as he cannot tolerate full meals, and sometimes he only eats 3 small meals.   Do you have any cultural, spiritual, Sabianism conflicts, given your current situation?: no  Food Allergies: NKFA(No intolerances reported. )  Factors Affecting Nutritional Intake: (early satiety)    Anthropometrics    Temp: 97.1 °F (36.2 °C)  Height Method: Stated  Height: 5' 6"  Height (inches): 66 in  Weight Method: Standard Scale  Weight: 62 " kg (136 lb 11 oz)  Weight (lb): 136.69 lb  Ideal Body Weight (IBW), Male: 142 lb  % Ideal Body Weight, Male (lb): 99.05 lb  BMI (Calculated): 22.7  BMI Grade: (Underweight (BMI <23 in >65 yr old))  Weight Loss: unintentional  Usual Body Weight (UBW), k.8 kg  % Usual Body Weight: 97.38  % Weight Change From Usual Weight: -2.82 %   Weight on 18: 66.8 kg.  On 18 weight: 62 kg.  6.2% x <2 weeks         Lab/Procedures/Meds    Pertinent Labs Reviewed: reviewed  Lab Results   Component Value Date    ALBUMIN 2.6 (L) 2018     No results found for: CRP  Lab Results   Component Value Date    WBC 5.60 2018     No results found for: CHOL  No results found for: HDL  No results found for: LDLCALC  No results found for: TRIG  No results found for: CHOLHDL    Pertinent Medications Reviewed: reviewed  Pertinent Medications Comments: statin, pantroprazole, furosemide, ferrous sulfate    Physical Findings/Assessment    Overall Physical Appearance: advanced age, loss of muscle mass, loss of subcutaneous fat  Oral/Mouth Cavity: tooth/teeth missing  Skin: (Ronak score 18)    Estimated/Assessed Needs    Weight Used For Calorie Calculations: 63.8 kg (140 lb 10.5 oz)  Energy Calorie Requirements (kcal): 4000-2935 (1.3-1.5 for weight gain)  Energy Need Method: Peculiar-St Jeor  Protein Requirements: 77-96 (1.2-1.5 for wt gain)  Weight Used For Protein Calculations: 63.8 kg (140 lb 10.5 oz)     Fluid Need Method: RDA Method(or per MD rec)  RDA Method (mL): 1574  CHO Requirement: n/a      Nutrition Prescription Ordered    Current Diet Order: regular    Evaluation of Received Nutrient/Fluid Intake    Energy Calories Required: not meeting needs  Protein Required: not meeting needs  Fluid Required: not meeting needs    Intake/Output Summary (Last 24 hours) at 2018 1101  Last data filed at 2018 0900  Gross per 24 hour   Intake 960 ml   Output 1575 ml   Net -615 ml     Tolerance: tolerating  % Intake of Estimated  Energy Needs: 50 - 75 %  % Meal Intake: 50-75%  Average 58% of last 3 documented meals.     Nutrition Risk    Level of Risk/Frequency of Follow-up: (2 x wkly)     Assessment and Plan  Severe malnutrition  [x]  Unprioritized   Change Dx Resolve      Details  Code: E43  Noted: 09/17/2018  Updated: Today   Jaki Valerio RD       Overview    Current Assessment & Plan Note  Edited:  Jaki Valerio RD  Today  Contributing Nutrition Diagnosis  In context of chronic illness     Related to (etiology):   Poor intake      Signs and Symptoms (as evidenced by):   1) Weight loss of 6.2% <2 weeks  2) <75% EEN > 7 days  3) Moderate fat loss noted in upper arm, and around eyes      Interventions/Recommendations (treatment strategy):  1) Continue regular diet 2) Add Boost Plus, tid     Nutrition Diagnosis Status:   New                   Inadequate energy intake r/t poor appetite as evidenced by EEN 50-75%.     Monitor and Evaluation    Food and Nutrient Intake: energy intake  Food and Nutrient Adminstration: diet order  Physical Activity and Function: nutrition-related ADLs and IADLs  Anthropometric Measurements: weight, weight change  Biochemical Data, Medical Tests and Procedures: inflammatory profile  Nutrition-Focused Physical Findings: overall appearance, skin     Nutrition Follow-Up  yes  RD Follow-up?: Yes

## 2018-09-20 NOTE — PROGRESS NOTES
Team conference attended and patient discussed. Spoke with patient to discuss discharge plans in place.   DME ordered from [employer] and [facility]. OP PT set up at [facility].

## 2018-09-20 NOTE — PLAN OF CARE
Problem: Patient Care Overview  Goal: Plan of Care Review  Outcome: Ongoing (interventions implemented as appropriate)  Plan of care reviewed with patient. AA&O.  L. Hip incision DAVID healed, bruising noted to posterior leg. No complaints of pain during night. Voids per urinal without difficulty. Instructed to call for assistance as needed during night. Verbalized understanding. Call light in reach. Bed in low & locked position.

## 2018-09-20 NOTE — PT/OT/SLP DISCHARGE
Physical Therapy Discharge Summary    Name: Marck Ramos  MRN: [id number]   Principal Problem: (L) Femur Fx     Patient Discharged from acute Physical Therapy on 18.  Please refer to prior PT noted date on 18 for functional status.     Assessment:     Goals partially met. Patient appropriate for care in another setting. Pt has progressed and  reached most goals set for him and will continue to progress at home with family and with OP PT services    Objective:     GOALS:   Multidisciplinary Problems     Physical Therapy Goals     Not on file          Multidisciplinary Problems (Resolved)        Problem: Physical Therapy Goal    Goal Priority Disciplines Outcome Goal Variances Interventions   Physical Therapy Goal   (Resolved)     PT, PT/OT Outcome(s) achieved     Description:  Goals to be met by: 2018     Patient will increase functional independence with mobility by performin. Supine to sit with Stand-by Assistance  2. Sit to supine with Stand-by Assistance  3. Sit to stand transfer with Stand-by Assistance  4. Bed to chair transfer with Stand-by Assistance using Rolling Walker  5. Gait  x 150 feet with Stand-by Assistance using Rolling Walker.   6. Wheelchair propulsion x 200 feet with Modified [place] using bilateral uppper extremities  7. Ascend/Descend 6 inch curb step with Contact Guard Assistance using Rolling Walker.                      Reasons for Discontinuation of Therapy Services  Transfer to alternate level of care.      Plan:     Patient Discharged to: Outpatient Therapy Services.    Rosa Yoder, PT  2018

## 2018-09-20 NOTE — DISCHARGE SUMMARY
[facility]  Physical Medicine & Rehab  Discharge Summary    Patient Name: Marck Ramos  MRN: [id number]  Admission Date: 9/5/2018  Hospital Length of Stay: 15 days  Discharge Date and Time:  09/20/2018   Attending Physician: Grey Gonzalez MD  Discharging Provider: Grey Gonzalez MD  Primary Care Physician: Rob Ferrell MD      Admit Diagnosis:  Fracture of left femur [S72.92XA]    Discharge Diagnosis:  Left hip intertrochanteric fracture needing ORIF.    Secondary Diagnosis:   Active Hospital Problems    Diagnosis  POA    Severe malnutrition [E43]  Yes    Fracture of left femur [S72.92XA]  Yes      Resolved Hospital Problems   No resolved problems to display.       HPI: 89-year-old gentleman fell in his  yard trying to hold his dog lay down there for  few hours before he was found by his neighbors, called the ambulance and presented to the emergency room.  Patient had a workup done consistent with a left hip intertrochanteric fracture needing a left hip ORIF done on 09/29/2018 by Dr. Kuhn.  Patient's postop course complicated with acute blood loss anemia needing blood transfusion, urinary retention needing cystoscopy with catheter placement, hematuria, encephalopathy, pain control issues,dependency with  ADLs, transfers, mobility is transferred down to us for further rehabilitation.      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  Patient's blood pressures were elevated, adjusted the medications, discussed with his  cardiologist recommended to maintain around 160 systolic pressures.  Patient did come in with indwelling Garcia catheter and hematuria were able to discontinue Garcia catheter and void on his own.  Patient incision healed well staples were removed.  Anemia was stable and improving.  Hypokalemia resolved had to give some potassium supplements.  Therapy wise patients skin was intact, has been continent of bladder and bowel, was needing standby assistance with bed mobility, transfers, ambulation 200 ft with the help of a rolling walker, supervision with wheelchair mobility, upper and lower extremity dressing, bathing, toileting, toilet transfers and tub transfers and modified independent with feeding, grooming.  Patient is discharged home today with family to be continued on outpatient PT and to have a follow-up appointment with his cardiologist, orthopedic surgeon, primary care physician in the period of 2-4 weeks.    Discharged Condition: good    Disposition:  Home with outpatient therapy.    Follow Up:  Follow-up Information     Hood Gannon MD In 2 weeks.    Specialty:  Family Medicine  Contact information:  [address]  [address]  [facility] [address]  [phone]             Payam Kuhn Jr, MD In 2 weeks.    Specialties:  Orthopedic Surgery, Surgery  Contact information:  [address]  [address]  [place] [address]  [phone]             Hood Collins MD In 2 weeks.    Specialty:  Urology  Contact information:  [address] [place] [address]  [address]  [facility] [address] [phone]             Payam Kuhn Jr, MD. Schedule an appointment as soon as possible for a visit in 2 weeks.    Specialties:  Orthopedic Surgery, Surgery  Contact information:  [address]  [address]  [place] [address]  [phone]             Pete Mares MD In 2 weeks.    Specialty:  Cardiology  Contact information:  [facility] [place] [address]  [facility]  [facility]  "[id number]  [phone]                   Patient Instructions:      WHEELCHAIR FOR HOME USE     Order Specific Question Answer Comments   Hours in W/C per day: 6    Type of Wheelchair: Lightweight    Patient unable to propel in Standard wheelchair? Yes    Size(Width): 18"(STD adult)    Leg Support: STD footrests    Lap Belt: Velcro    Cushion: Basic    Height: 5' 6" (1.676 m)    Weight: 62 kg (136 lb 11 oz) on 9/5/18   Does patient have medical equipment at home? shower chair    Length of need (1-99 months): 6    Please check all that apply: Caregiver is capable and willing to operate wheelchair safely.    Please check all that apply: Patient's upper body strength is sufficient for propulsion.    Please check all that apply: The patient requires the use of a w/c for activities of daily living within the Home.    Please check all that apply: Patient mobility limitations cannot be sufficiently resolved by the use of other ambulatory therapies.      3 IN 1 COMMODE FOR HOME USE     Order Specific Question Answer Comments   Type: Standard    Height: 5' 6" (1.676 m)    Weight: 62 kg (136 lb 11 oz) on 9/5/18   Does patient have medical equipment at home? shower chair    Length of need (1-99 months): 99      TRANSFER TUB BENCH FOR HOME USE     Order Specific Question Answer Comments   Type of Transfer Tub Bench: Unpadded    Height: 5' 6" (1.676 m)    Weight: 62 kg (136 lb 11 oz) on 9/5/18   Does patient have medical equipment at home? shower chair    Length of need (1-99 months): 99      Referral to OutPatient  Physical therapy   Referral Priority: Routine Referral Type: Physical Medicine   Referral Reason: Specialty Services Required   Requested Specialty: Physical Therapy   Number of Visits Requested: 1       Medications:  Reconciled Home Medications:      Medication List      START taking these medications    docusate sodium 100 MG capsule  Commonly known as:  COLACE  Take 1 capsule (100 mg total) by mouth 2 (two) times " daily.        CHANGE how you take these medications    furosemide 40 MG tablet  Commonly known as:  LASIX  Take 1 tablet (40 mg total) by mouth once daily.  What changed:    · medication strength  · how much to take        CONTINUE taking these medications    amLODIPine 5 MG tablet  Commonly known as:  NORVASC  Take 5 mg by mouth once daily.     aspirin 81 MG EC tablet  Commonly known as:  ECOTRIN  Take 81 mg by mouth once daily.     clopidogrel 75 mg tablet  Commonly known as:  PLAVIX  Take 75 mg by mouth once daily.     ferrous sulfate 325 mg (65 mg iron) Tab tablet  Commonly known as:  FEOSOL  Take 325 mg by mouth daily with breakfast.     isosorbide mononitrate 60 MG 24 hr tablet  Commonly known as:  IMDUR     losartan 50 MG tablet  Commonly known as:  COZAAR  Take 50 mg by mouth 2 (two) times daily.     magnesium 250 mg Tab  Take 1 tablet by mouth once daily.     pantoprazole 40 MG tablet  Commonly known as:  PROTONIX  Take 40 mg by mouth once daily.     potassium chloride SA 20 MEQ tablet  Commonly known as:  K-DUR,KLOR-CON  Take 1 tablet (20 mEq total) by mouth once daily.     simvastatin 20 MG tablet  Commonly known as:  ZOCOR  Take 20 mg by mouth every evening.     sotalol 80 MG tablet  Commonly known as:  BETAPACE  Take 80 mg by mouth 2 (two) times daily.     tamsulosin 0.4 mg Cap  Commonly known as:  FLOMAX  Take 1 capsule (0.4 mg total) by mouth once daily.        STOP taking these medications    betaxolol 10 mg Tab  Commonly known as:  KERLONE     isosorbide dinitrate 20 MG tablet  Commonly known as:  ISORDIL            Time spent on the discharge of patient: 20 minutes        Grey Gonzalez MD  Department of Physical Medicine & Rehab  [facility]

## 2018-09-20 NOTE — DISCHARGE INSTRUCTIONS
Referral made to [facility] for PT 3 times per week.  Your initial appt will be next Tuesday, 9/25/18 @ 11:00.  Their phone # is [phone].    Wheelchair obtained for home use from [facility] - # [phone].  Bedside commode obtained from [employer] - # [phone].   These are the the providers contracted with Medicare.   Patient will obtain a transfer tub bench on his own.          Resume previous home medication routine   Monitor left hip incision, if you notice any redness, swelling,or drainage or onset of fever. Call physician,  Dr Kuhn

## 2018-09-20 NOTE — PT/OT/SLP PROGRESS
Physical Therapy         Treatment        Marck Ramos   MRN: [id number]     PT Received On: 09/19/18  Total Time (min): 90        Billable Minutes:  Gait [id number], Therapeutic Activity 30, Therapeutic Exercise 15 and Train/Wheelchair Management 15  Total Minutes: 90    Treatment Type: Treatment  PT/PTA: PT     PTA Visit Number: 5       General Precautions: Standard, fall  Orthopedic Precautions: Orthopedic Precautions : LLE partial weight bearing      Subjective:  Communicated with RN prior to session.     Objective:  Patient found sitting in a W/C with alarm    Functional Mobility:    Balance:   Static Sit: GOOD: Takes MODERATE challenges from all directions  Dynamic Sit:  GOOD-: Incosistently Maintains balance through MODERATE excursions of active trunk movement,     Static Stand: GOOD: Takes MODERATE challenges from all directions  Dynamic stand: FAIR+: Needs CLOSE SUPERVISION during gait and is able to right self with minor LOB    Transfer Training:  Sit to stand:Stand-by Assistance with Rolling Walker / W/C/ bedrail  Bed <> Chair:  Stand Pivot with Stand-by Assistance with No Assistive Device bed rail/ W/C armrests    Wheelchair Training:  Pt propelled Standard wheelchair x 130 x2 feet on Level tile with  Bilateral upper extremity and Bilateral lower extremity with Stand-by Assistance.     Gait Training:  Patient gait trained PWB: left lower extremity 260  feet on level tile with Rolling Walker with Contact Guard Assistance.  Pt with demonstarting a  3-point gait and partial weightbearing with decreased alondra, increased time in double stance, decreased step length, decreased toe-to-floor clearance and decreased weight-shifting ability.Impairments contributing to gait deviations include impaired balance, impaired motor control, impaired postural control and decreased strength    Additional Treatment:  LE strengthening ex; Functional mob training as above, safety, final HEP and mobility techniques instructed  to pt; verbalized understanding    Activity Tolerance:  Patient tolerated treatment well    Patient left up in chair with all lines intact, call button in reach, chair alarm on and RN notified   Assessment:  Marck Ramos is a 89 y.o. male with a medical diagnosis of Hx Fx S/P ORIF; He presents with PWB to (L)LE due to Fx, decreased strength, decreased balance, decreased coordination, decreased tolerance to functional activity, decreased level of functional mobility.    Rehab potential is good.    Activity tolerance: Good    Discharge recommendations: Discharge Facility/Level Of Care Needs: outpatient PT, home     Equipment recommendations: Equipment Needed After Discharge: walker, rolling, wheelchair     GOALS:   Multidisciplinary Problems     Physical Therapy Goals     Not on file          Multidisciplinary Problems (Resolved)        Problem: Physical Therapy Goal    Goal Priority Disciplines Outcome Goal Variances Interventions   Physical Therapy Goal   (Resolved)     PT, PT/OT Outcome(s) achieved     Description:  Goals to be met by: 2018     Patient will increase functional independence with mobility by performin. Supine to sit with Stand-by Assistance  2. Sit to supine with Stand-by Assistance  3. Sit to stand transfer with Stand-by Assistance  4. Bed to chair transfer with Stand-by Assistance using Rolling Walker  5. Gait  x 150 feet with Stand-by Assistance using Rolling Walker.   6. Wheelchair propulsion x 200 feet with Modified [place] using bilateral uppper extremities  7. Ascend/Descend 6 inch curb step with Contact Guard Assistance using Rolling Walker.                      PLAN:    Patient to be seen daily  to address the above listed problems via gait training, therapeutic activities, therapeutic exercises, wheelchair management/training, neuromuscular re-education  Plan of Care expires: 18  Plan of Care reviewed with: patient         Rosa Yoder, PT 2018

## 2018-09-25 ENCOUNTER — CLINICAL SUPPORT (OUTPATIENT)
Dept: REHABILITATION | Facility: HOSPITAL | Age: 83
End: 2018-09-25
Attending: PHYSICAL MEDICINE & REHABILITATION
Payer: MEDICARE

## 2018-09-25 DIAGNOSIS — S72.145D CLOSED NONDISPLACED INTERTROCHANTERIC FRACTURE OF LEFT FEMUR WITH ROUTINE HEALING, SUBSEQUENT ENCOUNTER: Primary | ICD-10-CM

## 2018-09-25 PROBLEM — S72.412A: Status: ACTIVE | Noted: 2018-09-25

## 2018-09-25 PROCEDURE — G8978 MOBILITY CURRENT STATUS: HCPCS | Mod: CL,PN

## 2018-09-25 PROCEDURE — 97530 THERAPEUTIC ACTIVITIES: CPT | Mod: PN

## 2018-09-25 PROCEDURE — G8979 MOBILITY GOAL STATUS: HCPCS | Mod: CK,PN

## 2018-09-25 PROCEDURE — 97110 THERAPEUTIC EXERCISES: CPT | Mod: PN

## 2018-09-25 PROCEDURE — 97161 PT EVAL LOW COMPLEX 20 MIN: CPT | Mod: PN

## 2018-09-25 NOTE — PLAN OF CARE
TIME RECORD    Date: 09/25/2018    Start Time:  1100  Stop Time:  1200    PROCEDURES:    TIMED  Procedure Time Min.   thera Ex Start:1130  Stop:1200 30         UNTIMED  Procedure Time Min.   PT Eval Start:1100  Stop:1130 30     Total Timed Minutes:  30  Total Timed Units:  2  Total Untimed Units:  1  Charges Billed/# of units:  3    OUTPATIENT PHYSICAL THERAPY   PATIENT EVALUATION  Onset Date: 9/7/18  Primary Diagnosis:   1. 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  Nutrition:  Normal  History of Present Illness: Patient had intertrochanteric fx to left femur with presence of IM minerva.   Prior Level of Function: Independent  Social History: lives with daughter  Place of Residence (Steps/Adaptations): one step threshold entry  Functional Deficits Leading to Referral/Nature of Injury: Patient is dependent on RW for safety from  decreased weight shift ability.  Patient Therapy Goals: to be walking better and may be progress to no AD when cleared for FWB..    Subjective     Marck Ramos states no report of pain except 90 deg of left hip flexion  causes discomfort.     Pain:  Location: left hipDescription: discomfort  Activities Which Increase Pain: Flexing  Activities Which Decrease Pain: rest  Pain Scale: 2/10 at best 0/10 now  7/10 at worst    Objective     Posture: dorsokyphosis with anterior head.  Palpation: no tenderness   Sensation: intact  Range of Motion/Strength: WFL except hip flexion pain at 90 deg flexion; tight heelcords at neutral DF.        Flexibility: rigid trunk mobility  Gait: With AD.  Device Used -  Rolling walker  Analysis: Assistance supervision Decreased right foot floor clearance  Bed Mobility:Assistance - supervision  Transfers: Assistance - supervision  Other: Lower Extremity functional scale 19/80  Treatment: Thera Ex and Act    Assessment       Initial Assessment (Pertinent finding, problem list and factors affecting outcome): Noted right foot drag with increased double stance during gait, Appears to be FWB against advice with no significant signs of pain. Generalized weakness with apparent inability to do SLR LLE.   Min-moderate safety risk from fall secondary to decreased reactive and proactive balance from bradykinesia related to age  Rehab Potiential: good    Short Term Goals (2 Weeks):   1. Patient will demonstrate active left hip flexion 3/5  2. Patient will improve gait with 100% floor clearance.  3. Patient will report pain-free function 100% of the time.    Long Term Goals (6 Weeks):   1. Patient will be FWB  gait with the least restrictive AD.  2. Patient will be able to do stair maneuvers x 15 steps.    Plan     Certification Period: 9/25/18 to 10/9/18  Recommended Treatment Plan: 2 times per week for 6 weeks: Gait Training, Manual Therapy, Therapeutic Activites and Therapeutic Exercise  Other Recommendations: pain modalities as needed.      Therapist: Sanjeev Perdomo, PT    I CERTIFY THE NEED FOR THESE SERVICES FURNISHED UNDER THIS PLAN OF TREATMENT AND WHILE UNDER MY CARE    Physician's comments:  ________________________________________________________________________________________________________________________________________________      Physician's Name: ___________________________________

## 2018-09-27 RX ORDER — SOTALOL HYDROCHLORIDE 120 MG/1
80 TABLET ORAL DAILY
COMMUNITY
End: 2019-08-28

## 2018-09-28 ENCOUNTER — OFFICE VISIT (OUTPATIENT)
Dept: ORTHOPEDICS | Facility: CLINIC | Age: 83
End: 2018-09-28
Payer: MEDICARE

## 2018-09-28 VITALS — BODY MASS INDEX: 21.86 KG/M2 | WEIGHT: 136 LBS | HEIGHT: 66 IN

## 2018-09-28 DIAGNOSIS — Z98.890 POSTOPERATIVE STATE: ICD-10-CM

## 2018-09-28 DIAGNOSIS — S72.142D CLOSED DISPLACED INTERTROCHANTERIC FRACTURE OF LEFT FEMUR WITH ROUTINE HEALING, SUBSEQUENT ENCOUNTER: Primary | ICD-10-CM

## 2018-09-28 PROCEDURE — 73502 X-RAY EXAM HIP UNI 2-3 VIEWS: CPT | Mod: FY,LT,, | Performed by: ORTHOPAEDIC SURGERY

## 2018-09-28 PROCEDURE — 99024 POSTOP FOLLOW-UP VISIT: CPT | Mod: POP,,, | Performed by: ORTHOPAEDIC SURGERY

## 2018-09-28 NOTE — PROGRESS NOTES
Subjective:       Chief Complaint    Chief Complaint   Patient presents with    Post-op Evaluation     4 weeks 2 days DOS:Closed reduction and Internal fixiation of Closed, comminuted, displaced intertrochanteric fracture of Left hip. Hip is not bothering him. He has some swelling in LLE.       HPI  Marck Ramos is a 89 y.o.  male who presents follow-up intertrochanteric fracture fixation left hip. Overall, appears to be doing very well.      Past History  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    Iron deficiency anemia, unspecified 7/18/2017     Past Surgical History:   Procedure Laterality Date    Closed reduction & internal fixation of closed, comminuted, displaced intertrochanteric fx left hip w/ TFN Left 08/29/2018     Social History     Socioeconomic History    Marital status:      Spouse name: Not on file    Number of children: Not on file    Years of education: Not on file    Highest education level: Not on file   Social Needs    Financial resource strain: Not on file    Food insecurity - worry: Not on file    Food insecurity - inability: Not on file    Transportation needs - medical: Not on file    Transportation needs - non-medical: Not on file   Occupational History    Not on file   Tobacco Use    Smoking status: Former Smoker     Types: Cigarettes    Smokeless tobacco: Never Used   Substance and Sexual Activity    Alcohol use: No    Drug use: No    Sexual activity: No   Other Topics Concern    Not on file   Social History Narrative    Not on file         Medications  Current Outpatient Medications   Medication Sig    amlodipine (NORVASC) 5 MG tablet Take 5  mg by mouth once daily.    aspirin (ECOTRIN) 81 MG EC tablet Take 81 mg by mouth once daily.      clopidogrel (PLAVIX) 75 mg tablet Take 75 mg by mouth once daily.    ferrous sulfate 325 mg (65 mg iron) Tab tablet Take 325 mg by mouth daily with breakfast.    furosemide (LASIX) 40 MG tablet Take 1 tablet (40 mg total) by mouth once daily.    isosorbide mononitrate (IMDUR) 60 MG 24 hr tablet     losartan (COZAAR) 50 MG tablet Take 50 mg by mouth 2 (two) times daily.    magnesium 250 mg Tab Take 1 tablet by mouth once daily.    pantoprazole (PROTONIX) 40 MG tablet Take 40 mg by mouth once daily.    potassium chloride SA (K-DUR,KLOR-CON) 20 MEQ tablet Take 1 tablet (20 mEq total) by mouth once daily.    simvastatin (ZOCOR) 20 MG tablet Take 20 mg by mouth every evening.     sotalol (BETAPACE) 120 MG Tab Take 80 mg by mouth 2 (two) times daily.    tamsulosin (FLOMAX) 0.4 mg Cap Take 1 capsule (0.4 mg total) by mouth once daily.    docusate sodium (COLACE) 100 MG capsule Take 1 capsule (100 mg total) by mouth 2 (two) times daily.     No current facility-administered medications for this visit.        Allergies  Review of patient's allergies indicates:   Allergen Reactions    Sulfa (sulfonamide antibiotics)      Patient can not recall reaction          Review of Systems     Constitutional: Negative    HENT: Negative  Eyes: Negative  Respiratory: Negative  Cardiovascular: Negative  Musculoskeletal: HPI  Skin: Negative  Neurological: Negative  Hematological: Negative  Endocrine: Negative      Physical Exam    There were no vitals filed for this visit.  Physical Examination: Nontender, left hip. In the seated position. He can flex more than 90°. External rotation 20°, internal rotation 20°, abduction 30.     Skin-clear  General appearance -  well appearing, and in no distress  Mental status - awake  Neck - supple  Chest -  symmetric air entry  Heart - normal rate   Abdomen - soft      Assessment/Plan   Closed  displaced intertrochanteric fracture of left femur with routine healing, subsequent encounter  -     X-Ray Hip 2 or 3 views Left    Postoperative state  -     X-Ray Hip 2 or 3 views Left        Continue with walker. Must avoid walking without a walker.    This note was dictated using voice recognition software and may contain grammatical errors.

## 2018-10-02 ENCOUNTER — CLINICAL SUPPORT (OUTPATIENT)
Dept: REHABILITATION | Facility: HOSPITAL | Age: 83
End: 2018-10-02
Attending: PHYSICAL MEDICINE & REHABILITATION
Payer: MEDICARE

## 2018-10-02 DIAGNOSIS — S72.102D CLOSED FRACTURE OF TROCHANTER OF LEFT FEMUR WITH ROUTINE HEALING, SUBSEQUENT ENCOUNTER: Primary | ICD-10-CM

## 2018-10-02 DIAGNOSIS — S72.142D CLOSED DISPLACED INTERTROCHANTERIC FRACTURE OF LEFT FEMUR WITH ROUTINE HEALING: ICD-10-CM

## 2018-10-02 PROBLEM — S72.102A: Status: ACTIVE | Noted: 2018-09-25

## 2018-10-02 PROCEDURE — 97110 THERAPEUTIC EXERCISES: CPT | Mod: PN

## 2018-10-02 NOTE — PROGRESS NOTES
Name: Marck Ramos  Clinic Number: [id number]  Date of Treatment: 10/02/2018   Diagnosis:   Encounter Diagnoses   Name Primary?    Closed fracture of trochanter of left femur with routine healing, subsequent encounter Yes    Closed displaced intertrochanteric fracture of left femur with routine healing        Time in: 1030  Time Out: 1100  Total Treatment Time: 30  Group Time: 0      Subjective:    Marck reports feeling better from yesterday.  Patient reports their pain to be 1/10 on a 0-10 scale with 0 being no pain and 10 being the worst pain imaginable.    Objective    Patient received individual therapy to increase strength, endurance and ROM with  activities as follows:   Therapeutic Exercise 30 minutes with  Quad sets,  Hamstring sets  Seated marches  LAQ's seated  Hamstring curls  Ball squeezes seated  Hip abduction seated.    Assessment:     Pt has significant pitting edema to LLE. Noted mild swelling to RLE also. No report of pain. MD has not mentioned WB progression. Patient had appointment with ortho MD yesterday  Tolerating treatment well.  Patient is making good progress towards established goals.    Plan:  Continue with established Plan of Care towards PT goals.

## 2018-10-02 NOTE — PROGRESS NOTES
Name: Marck Ramos  Clinic Number: [id number]  Date of Treatment: 10/02/2018   Diagnosis: No diagnosis found.    Time in: 1100  Time Out: 1130  Total Treatment Time: 30      Subjective:    Marck reports pain in L knee 4/10, pain L hip 2/10.  Patient reports their pain to be 2/10 on a 0-10 scale with 0 being no pain and 10 being the worst pain imaginable.  Pt thought his appt was at 10:30, arrived 30 minutes late    Objective    Patient received individual therapy to increase strength, flexibility and balance with activities as follows:     Marck received therapeutic exercises to develop strength, flexibility and balance for 30 minutes including:     Care of pt taken from BOSSMAN Perdomo, PT due to scheduling conflict     Nustep level 2 x 10 minutes, UE/LE  Supine hip abd 2 x 10 L  Supine SLR 2 x 10 L  Bridges x 20    Written Home Exercises Provided: cont HEP  Pt demo good understanding of the education provided. Marck demonstrated good return demonstration of activities.     Assessment:     VC to  R foot with swing through phase.  Good tolerance for activity.  Pt will continue to benefit from skilled PT intervention. Medical Necessity is demonstrated by:  Fall Risk, Pain limits function of effected part for some activities, Unable to participate fully in daily activities, Requires skilled supervision to complete and progress HEP and Weakness.    Patient is making good progress towards established goals.      Plan:  Continue with established Plan of Care towards PT goals.

## 2018-10-05 ENCOUNTER — CLINICAL SUPPORT (OUTPATIENT)
Dept: REHABILITATION | Facility: HOSPITAL | Age: 83
End: 2018-10-05
Attending: PHYSICAL MEDICINE & REHABILITATION
Payer: MEDICARE

## 2018-10-05 DIAGNOSIS — S72.142D CLOSED DISPLACED INTERTROCHANTERIC FRACTURE OF LEFT FEMUR WITH ROUTINE HEALING: Primary | ICD-10-CM

## 2018-10-05 PROCEDURE — 97110 THERAPEUTIC EXERCISES: CPT | Mod: PN

## 2018-10-05 NOTE — PROGRESS NOTES
Name: Marck Ramos  Clinic Number: [id number]  Date of Treatment: 10/05/2018   Diagnosis:   Encounter Diagnosis   Name Primary?    Closed displaced intertrochanteric fracture of left femur with routine healing Yes       Time in: 1355  Time Out: 1450  Total Treatment Time: 55  Group Time: 0      Subjective:    Marck reports continue L knee pain and LE swelling.  Patient reports their pain to be 5/10 on a 0-10 scale with 0 being no pain and 10 being the worst pain imaginable.    Objective    Marck received therapeutic exercises to develop strength and endurance for 55 minutes including:     NuStep Lv2 10' with UE's  Supine SAQ 3x10 R/L  AP 2x30 LE elevated R/L  Quad sets x30 with LE elevation R/L  SLR 3x10 R/L  (min A L)    Written Home Exercises Provided: yes    Pt demo good understanding of the education provided. Marck demonstrated good return demonstration of activities with cues for direction, proper form and rest breaks.     Assessment:     Pt will continue to benefit from skilled PT intervention. Medical Necessity is demonstrated by:  Pain limits function of effected part for some activities, Unable to participate fully in daily activities, Requires skilled supervision to complete and progress HEP and Weakness.    Plan:    Continue with established Plan of Care towards PT goals.

## 2018-10-29 ENCOUNTER — OFFICE VISIT (OUTPATIENT)
Dept: UROLOGY | Facility: CLINIC | Age: 83
End: 2018-10-29
Payer: MEDICARE

## 2018-10-29 VITALS
TEMPERATURE: 99 F | HEART RATE: 73 BPM | HEIGHT: 66 IN | WEIGHT: 133.38 LBS | RESPIRATION RATE: 18 BRPM | BODY MASS INDEX: 21.44 KG/M2 | SYSTOLIC BLOOD PRESSURE: 151 MMHG | DIASTOLIC BLOOD PRESSURE: 74 MMHG

## 2018-10-29 DIAGNOSIS — R33.9 URINARY RETENTION: Primary | ICD-10-CM

## 2018-10-29 LAB
BILIRUB SERPL-MCNC: NORMAL MG/DL
BLOOD URINE, POC: NORMAL
COLOR, POC UA: YELLOW
GLUCOSE UR QL STRIP: NORMAL
KETONES UR QL STRIP: NORMAL
LEUKOCYTE ESTERASE URINE, POC: NORMAL
NITRITE, POC UA: NORMAL
PH, POC UA: 6
PROTEIN, POC: NORMAL
SPECIFIC GRAVITY, POC UA: 1.01
UROBILINOGEN, POC UA: NORMAL

## 2018-10-29 PROCEDURE — 81002 URINALYSIS NONAUTO W/O SCOPE: CPT | Mod: PBBFAC,PN | Performed by: UROLOGY

## 2018-10-29 PROCEDURE — 99213 OFFICE O/P EST LOW 20 MIN: CPT | Mod: S$PBB,,, | Performed by: UROLOGY

## 2018-10-29 PROCEDURE — 99999 PR PBB SHADOW E&M-EST. PATIENT-LVL III: CPT | Mod: PBBFAC,,, | Performed by: UROLOGY

## 2018-10-29 PROCEDURE — 99213 OFFICE O/P EST LOW 20 MIN: CPT | Mod: PBBFAC,PN | Performed by: UROLOGY

## 2018-10-29 RX ORDER — TAMSULOSIN HYDROCHLORIDE 0.4 MG/1
0.4 CAPSULE ORAL DAILY
Qty: 30 CAPSULE | Refills: 11 | Status: SHIPPED | OUTPATIENT
Start: 2018-10-29 | End: 2019-08-24 | Stop reason: SDUPTHER

## 2018-10-29 RX ORDER — APIXABAN 2.5 MG/1
2.5 TABLET, FILM COATED ORAL 2 TIMES DAILY
COMMUNITY
Start: 2018-10-12 | End: 2020-10-08

## 2018-10-29 NOTE — PROGRESS NOTES
"[facility] Urology Progress Note    Marck Ramos is a 89 y.o. male who presents for for follow up of urinary retention    Pt had postop retention after L hip fracture repair 8/29 at [facility]. Reportedly on 8/27 presentation to er from fall, juarez placement caused significant gross hematuria. When removed 8/30 also noted to be bloody. Pt chronically anticoagulated. Nursing attempted juarez replacement multiple times but could not pass juarez. This required bedside cystoscopic juarez placement 2/2 prostatic urethral trauma/false passage to place juarze on 8/30/18.  He was discharged to [facility] inpatient rehab, and juarez was removed 9/11/18. Post void residual was checked and found to be 0cc.  He was advised to continue flomax and f/u 4-6 weeks later    Was on flomax at baseline though had never seen urologist.  PMHx: CAD, PAF, RCA stent, HTN, HLD, GERD, BPH    Returns today noting no interim voiding issues  He states that he was never having any voiding difficulty prior to his hip fracture as above.  Since his Juarez catheter has been removed he has been urinating well.  Denies hesitancy, intermittency, weak stream, hematuria, dysuria  AUA symptom score:  2/1, pleased (nocturia x2).  PVR 110cc  He did unfortunately have shortness of breath 2 weeks and his outpatient rehab and was found to have 4 small pulmonary embolisms and is now on blood thinners      ROS: A comprehensive 10 system review was performed and is negative except as noted above in HPI    PHYSICAL EXAM:    Vitals:    10/29/18 1057   BP: (!) 151/74   Pulse: 73   Resp: 18   Temp: 98.6 °F (37 °C)     Body mass index is 21.53 kg/m². Weight: 60.5 kg (133 lb 6.1 oz) Height: 5' 6" (167.6 cm)       General: Alert, cooperative, no distress, appears stated age   Head: Normocephalic, without obvious abnormality, atraumatic   Eyes: PERRL, conjunctiva/corneas clear   Lungs: Respirations unlabored   Heart: Warm and well perfused   Abdomen:  Soft, nontender, " nondistended  Extremities: Extremities normal, atraumatic, no cyanosis or edema   Skin: Skin color, texture, turgor normal, no rashes or lesions   Psych: Appropriate   Neurologic: Non-focal       Recent Results (from the past 336 hour(s))   POCT URINE DIPSTICK WITHOUT MICROSCOPE    Collection Time: 10/29/18 11:02 AM   Result Value Ref Range    Color, UA yellow     Spec Grav UA 1.010     pH, UA 6     WBC, UA neg     Nitrite, UA neg     Protein neg     Glucose, UA neg     Ketones, UA neg     Urobilinogen, UA neg     Bilirubin neg     Blood, UA neg        ASSESSMENT   1. Urinary retention  POCT URINE DIPSTICK WITHOUT MICROSCOPE       Plan    His retention has resolved and he is voiding well.  He empties reasonably for a man his age.  He is not bothered by any lower urinary tract symptoms.  I did renew his Flomax prescription and advised that he could follow up annually

## 2018-11-06 ENCOUNTER — OUTSIDE PLACE OF SERVICE (OUTPATIENT)
Dept: UROLOGY | Facility: CLINIC | Age: 83
End: 2018-11-06

## 2018-11-09 ENCOUNTER — OFFICE VISIT (OUTPATIENT)
Dept: ORTHOPEDICS | Facility: CLINIC | Age: 83
End: 2018-11-09
Payer: MEDICARE

## 2018-11-09 VITALS — WEIGHT: 133 LBS | HEIGHT: 66 IN | BODY MASS INDEX: 21.38 KG/M2

## 2018-11-09 DIAGNOSIS — Z98.890 POSTOPERATIVE STATE: ICD-10-CM

## 2018-11-09 DIAGNOSIS — S72.142D CLOSED DISPLACED INTERTROCHANTERIC FRACTURE OF LEFT FEMUR WITH ROUTINE HEALING, SUBSEQUENT ENCOUNTER: Primary | ICD-10-CM

## 2018-11-09 PROCEDURE — 99024 POSTOP FOLLOW-UP VISIT: CPT | Mod: POP,,, | Performed by: ORTHOPAEDIC SURGERY

## 2018-11-09 NOTE — PROGRESS NOTES
Subjective:       Chief Complaint    Chief Complaint   Patient presents with    Post-op Evaluation     DOS:8/29/18,10 weeks 2 days s/p :Closed reduction and Internal fixiation of Closed, comminuted, displaced intertrochanteric fracture of Left hip. Pt states that he is doing well. Pt using walker for assistance with ambulation. Pt states that he has a slight pain in his Left hip when he gets up and walks but otherwise he has no pain. Pt's Son states that he has been complaining of Intermittent Left knee pain. Pt states denies knee pain at this time but states that it was painful earlier.        HPI  Marck Ramos is a 89 y.o.  male who presents follow-up on intertrochanteric fracture left hip. Appears to be doing very well. Would like to go to outpatient physical therapy@[facility].      Past History  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    Iron deficiency anemia, unspecified 7/18/2017    Pulmonary emboli      Past Surgical History:   Procedure Laterality Date    Closed reduction & internal fixation of closed, comminuted, displaced intertrochanteric fx left hip w/ TFN Left 08/29/2018     Social History     Socioeconomic History    Marital status:      Spouse name: Not on file    Number of children: Not on file    Years of education: Not on file    Highest education level: Not on file   Social Needs    Financial resource strain: Not on file    Food insecurity - worry: Not on file    Food insecurity - inability: Not on file    Transportation needs - medical: Not on file    Transportation needs - non-medical: Not on file   Occupational History    Not on file   Tobacco Use     Smoking status: Former Smoker     Types: Cigarettes    Smokeless tobacco: Never Used   Substance and Sexual Activity    Alcohol use: No    Drug use: No    Sexual activity: No   Other Topics Concern    Not on file   Social History Narrative    Not on file         Medications  Current Outpatient Medications   Medication Sig    amlodipine (NORVASC) 5 MG tablet Take 5 mg by mouth once daily.    aspirin (ECOTRIN) 81 MG EC tablet Take 81 mg by mouth once daily.      ELIQUIS 5 mg Tab     ferrous sulfate 325 mg (65 mg iron) Tab tablet Take 325 mg by mouth daily with breakfast.    furosemide (LASIX) 40 MG tablet Take 1 tablet (40 mg total) by mouth once daily. (Patient taking differently: Take 20 mg by mouth once daily. )    isosorbide mononitrate (IMDUR) 60 MG 24 hr tablet     losartan (COZAAR) 50 MG tablet Take 50 mg by mouth 2 (two) times daily.    magnesium 200 mg Tab Take 1 tablet by mouth once daily.    pantoprazole (PROTONIX) 40 MG tablet Take 40 mg by mouth once daily.    potassium chloride SA (K-DUR,KLOR-CON) 20 MEQ tablet Take 1 tablet (20 mEq total) by mouth once daily.    simvastatin (ZOCOR) 20 MG tablet Take 20 mg by mouth every evening.     sotalol (BETAPACE) 120 MG Tab Take 80 mg by mouth once daily.     tamsulosin (FLOMAX) 0.4 mg Cap Take 1 capsule (0.4 mg total) by mouth once daily.    vit C/E/Zn/coppr/lutein/zeaxan (PRESERVISION AREDS-2 ORAL) Take 2 tablets by mouth 2 (two) times daily.    docusate sodium (COLACE) 100 MG capsule Take 1 capsule (100 mg total) by mouth 2 (two) times daily.     No current facility-administered medications for this visit.        Allergies  Review of patient's allergies indicates:   Allergen Reactions    Sulfa (sulfonamide antibiotics)      Patient can not recall reaction          Review of Systems     Constitutional: Negative    HENT: Negative  Eyes: Negative  Respiratory: Negative  Cardiovascular: Negative  Musculoskeletal: HPI  Skin:  Negative  Neurological: Negative  Hematological: Negative  Endocrine: Negative      Physical Exam    There were no vitals filed for this visit.  Physical Examination: Left hip is nontender. External rotation 35° internal rotation 25°. Ambulating with a walker. Be doing very well. Pain level at worst is 2/10.     Skin-  General appearance -  well appearing, and in no distress  Mental status - awake  Neck - supple  Chest -  symmetric air entry  Heart - normal rate   Abdomen - soft      Assessment/Plan   Closed displaced intertrochanteric fracture of left femur with routine healing, subsequent encounter    Postoperative state            This note was dictated using voice recognition software and may contain grammatical errors.

## 2018-11-15 ENCOUNTER — DOCUMENTATION ONLY (OUTPATIENT)
Dept: REHABILITATION | Facility: HOSPITAL | Age: 83
End: 2018-11-15

## 2018-11-15 NOTE — PROGRESS NOTES
REHAB SERVICES OUTPATIENT DISCHARGE SUMMARY  Physical Therapy      Name:  Marck Ramos  Date:  11/15/18  Date of Evaluation:  9/25/18  Physician:  Grey Gonzalez MD  Total # Of Visits:  3  Cancelled:  2    Diagnosis:  Closed non-displaced fx of condyle left femur with routine healing      Physical/Functional Status:  At time of discharge, patient was able to do weight bearing with no pain aggravation. Ambulates with RW with mild left foot drag. Difficulty with active SLR left leg. Pain to left hip with flexion 5/10.    The patient is to be discharged from our Therapy service for the following reason(s):  Patient has not attended therapy since 10/5/18    Degree of Goal Achievement:  Patient has partially met goals    Unplanned DC.

## 2018-12-04 ENCOUNTER — CLINICAL SUPPORT (OUTPATIENT)
Dept: REHABILITATION | Facility: HOSPITAL | Age: 83
End: 2018-12-04
Attending: ORTHOPAEDIC SURGERY
Payer: MEDICARE

## 2018-12-04 DIAGNOSIS — Z87.81 S/P ORIF (OPEN REDUCTION INTERNAL FIXATION) FRACTURE: Primary | ICD-10-CM

## 2018-12-04 DIAGNOSIS — R29.898 LEFT LEG WEAKNESS: ICD-10-CM

## 2018-12-04 DIAGNOSIS — S72.142D CLOSED DISPLACED INTERTROCHANTERIC FRACTURE OF LEFT FEMUR WITH ROUTINE HEALING: ICD-10-CM

## 2018-12-04 DIAGNOSIS — M25.552 LEFT HIP PAIN: ICD-10-CM

## 2018-12-04 DIAGNOSIS — Z98.890 S/P ORIF (OPEN REDUCTION INTERNAL FIXATION) FRACTURE: Primary | ICD-10-CM

## 2018-12-04 PROCEDURE — 97110 THERAPEUTIC EXERCISES: CPT | Mod: PN

## 2018-12-04 PROCEDURE — G8978 MOBILITY CURRENT STATUS: HCPCS | Mod: CL,PN

## 2018-12-04 PROCEDURE — G8979 MOBILITY GOAL STATUS: HCPCS | Mod: CK,PN

## 2018-12-04 PROCEDURE — 97161 PT EVAL LOW COMPLEX 20 MIN: CPT | Mod: PN

## 2018-12-04 NOTE — PLAN OF CARE
TIME RECORD    Date: 12/04/2018    Start Time:  1100  Stop Time:  1155    PROCEDURES:    TIMED  Procedure Time Min.   There ex Start:1130  Stop:1155     Start:  Stop:     Start:  Stop:     Start:  Stop:          UNTIMED  Procedure Time Min.   eval Start:  Stop:     Start:  Stop:      Total Timed Minutes:  25  Total Timed Units:  2  Total Untimed Units:  1  Charges Billed/# of units:  3    OUTPATIENT PHYSICAL THERAPY   PATIENT EVALUATION  Onset Date: 08/29/18  Primary Diagnosis:   1. S/P ORIF (open reduction internal fixation) fracture     2. Closed displaced intertrochanteric fracture of left femur with routine healing     3. Left hip pain     4. Left leg weakness       Treatment Diagnosis: debility due to recent lt hip fx w/ ORIF  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    Iron deficiency anemia, unspecified 7/18/2017    Pulmonary emboli      Precautions: fall; WBAT lt LE  Prior Therapy: inpatient rehab x 2 wks, then several wks of outpt - had to stop due to PE  Medications: Mrack Ramos has a current medication list which includes the following prescription(s): amlodipine, aspirin, docusate sodium, eliquis, ferrous sulfate, furosemide, isosorbide mononitrate, losartan, magnesium, pantoprazole, potassium chloride sa, simvastatin, sotalol, tamsulosin, and vit c/e/zn/coppr/lutein/zeaxan.  Nutrition:  Normal  History of Present Illness: reports was trying to stop his dtr's large dog from escaping through an open gate - grabbed the dog's collar and was twisted onto the ground by the force of the pull; sustained a closed, comminuted, displaced intertrochanteric fracture of his left hip; underwent  ORIF lt hip on 08/29/18  Prior Level of Function: Assistance - supervision  Social History: retired  Place of Residence (Steps/Adaptations): lives w/ dtr in 1-story slab home (one threshold step)  Functional Deficits Leading to Referral/Nature of Injury: difficulty performing everyday activities  Patient Therapy Goals: improve overall strength/mobility    Subjective     Marck Ramos states that his recent lt hip ORIF limits his ability to perform his everyday activities.    Pain:  Location: lt hip  Description: Aching and Dull  Activities Which Increase Pain: Standing and Walking  Activities Which Decrease Pain: rest  Pain Scale: 0/10 at best 3/10 now  3/10 at worst    Objective     Posture: guarded due to pain  Palpation: pain w/ palpation to affected area  Sensation: intact  DTRs:  Range of Motion/Strength: MMT = 3+/5 lt hip, grossly 4-/5 throughout rest of marina. LE's; AROM = WFL throughout    Flexibility: mild limitation lt hip  Gait: 2 x 75 ft w/ RW and SBA  Analysis: decreased wt bearing lt LE = decreased stride length rt LE  Bed Mobility:Assistance - SBA  Transfers: Assistance - SBA  Special Tests: LEFS = 31/80  Other:   Treatment: x 20 seated marina. LE there ex = LAQ, marching, ball sq, hip ABD (RTB); x 10 sit to and from stand; x 20 static standing holds; provided fly w/ seated marina. LE there ex HEP - instructed to assist pt. BID    Assessment       Initial Assessment (Pertinent finding, problem list and factors affecting outcome): presents w/ strength/mobility deficits due to recent lt hip fx w/ ORIF; pt. would benefit from PT to address these areas and to reinforce the HEP  Rehab Potiential: fair    Short Term Goals (3 Weeks):   1.  Marry. X 20 reps marina. LE there ex w/ 1# wt  2.  Pt. to report no new falls  3.  Supervision w/ transfers    Long Term Goals (6 Weeks):   1.  Improve lt hip MMT 1/2 grade  2.  Marry. X 20 reps marina. LE there ex w/ 2# wt  3.  Family demo comp w/ HEP  4.  Improve LEFS score to  35/80    Plan     Certification Period: 12/04/18 to 01/12/19  Recommended Treatment Plan: 2 times per week for 1 week, then 3 times per wk for 5 weeks: Gait Training, Manual Therapy, Moist Heat/ Ice, Patient Education, Therapeutic Activites, Therapeutic Exercise and HEP  Other Recommendations: NA      Therapist: Temo Herrera, PT    I CERTIFY THE NEED FOR THESE SERVICES FURNISHED UNDER THIS PLAN OF TREATMENT AND WHILE UNDER MY CARE    Physician's comments: ________________________________________________________________________________________________________________________________________________      Physician's Name: ___________________________________

## 2018-12-06 ENCOUNTER — CLINICAL SUPPORT (OUTPATIENT)
Dept: REHABILITATION | Facility: HOSPITAL | Age: 83
End: 2018-12-06
Attending: ORTHOPAEDIC SURGERY
Payer: MEDICARE

## 2018-12-06 DIAGNOSIS — M25.552 LEFT HIP PAIN: ICD-10-CM

## 2018-12-06 DIAGNOSIS — R29.898 LEFT LEG WEAKNESS: ICD-10-CM

## 2018-12-06 PROCEDURE — 97110 THERAPEUTIC EXERCISES: CPT | Mod: PN

## 2018-12-06 NOTE — PROGRESS NOTES
Name: Marck Ramos  Clinic Number: [id number]  Date of Treatment: 12/06/2018   Diagnosis:   Encounter Diagnoses   Name Primary?    Left leg weakness     Left hip pain        Time in: 1003  Time Out: 1100  Total Treatment Time: 57      Subjective:    Marck reports no complaints of increased pain.  Patient reports their pain to be 1/10 on a 0-10 scale with 0 being no pain and 10 being the worst pain imaginable.    Objective  Marck received therapeutic exercises to develop strength, endurance, ROM and flexibility for 57 minutes including:   NuStep L3 10'  Quad sets 3/10  Glute sets 3/10  Heel slides 3/10  Supine hip abd/add 3/10  SLR 3/10 with AA on L LE  Ball squeeze in hooklying 3/10  Supine CLAM with RTB 3/10     T/f sup<>sit with S    Sitting HR/TR 3/10  LAQ 3/10  Marching 3/10  Hip abd/add 3/10    WC pushups x 15    Written Home Exercises Provided: Cont with HEP  Pt demo good understanding of the education provided. Marck demonstrated good return demonstration of activities.     Assessment:   Frequent rests 2* to fatigue, good tolerance otherwise.  Required assist for L LE SLR.    Pt will continue to benefit from skilled PT intervention. Medical Necessity is demonstrated by:  Fall Risk, Unable to participate fully in daily activities, Requires skilled supervision to complete and progress HEP and Weakness.    Patient is making good progress towards established goals.    Plan:  Continue with established Plan of Care towards PT goals.

## 2018-12-10 ENCOUNTER — CLINICAL SUPPORT (OUTPATIENT)
Dept: REHABILITATION | Facility: HOSPITAL | Age: 83
End: 2018-12-10
Attending: ORTHOPAEDIC SURGERY
Payer: MEDICARE

## 2018-12-10 DIAGNOSIS — M25.552 LEFT HIP PAIN: ICD-10-CM

## 2018-12-10 DIAGNOSIS — R29.898 LEFT LEG WEAKNESS: ICD-10-CM

## 2018-12-10 PROCEDURE — 97110 THERAPEUTIC EXERCISES: CPT | Mod: PN

## 2018-12-10 PROCEDURE — 97530 THERAPEUTIC ACTIVITIES: CPT | Mod: PN

## 2018-12-10 NOTE — PROGRESS NOTES
"Name: Marck Ramos  Clinic Number: [id number]  Date of Treatment: 12/10/2018   Diagnosis:   Encounter Diagnoses   Name Primary?    Left leg weakness     Left hip pain        Time in: 1000  Time Out: 1100  Total Treatment Time: 60  Group Time: 0      Subjective:    Marck reports decreased pain.  Patient reports their pain to be 0/10 on a 0-10 scale with 0 being no pain and 10 being the worst pain imaginable.    Objective    Patient received individual therapy to increase strength, endurance, ROM, posture, core stabilization and function with 0 patients with activities as follows:     Marck received therapeutic exercises to develop strength, endurance, ROM, posture, core stabilization and function for 45 minutes including:   Mat exercises  heelcord stretches  Quad sets  SLR  Bridging  SAQ's with 43s ankle weights  Ball squeezes and abduction with knee bent YTB  Standing   heel raises  Mini squates      Patient participated in dynamic functional therapeutic activities to improve functional performance for 15 minutes. Including:   Bed mobility/supine to sit-vv  Sit to stand  Side stepping  Stepping up 4"      Assessment:     Patient has no pain at rest but mild discomforts with full weight bearing unilateral stance to left leg.  Admits to driving his car once again.    Pt tolerated Rx.  Patient is making good progress towards established goals.    Plan:  Continue with established Plan of Care towards PT goals.   "

## 2018-12-13 ENCOUNTER — CLINICAL SUPPORT (OUTPATIENT)
Dept: REHABILITATION | Facility: HOSPITAL | Age: 83
End: 2018-12-13
Attending: ORTHOPAEDIC SURGERY
Payer: MEDICARE

## 2018-12-13 DIAGNOSIS — M25.552 LEFT HIP PAIN: ICD-10-CM

## 2018-12-13 DIAGNOSIS — R29.898 LEFT LEG WEAKNESS: ICD-10-CM

## 2018-12-13 PROCEDURE — 97110 THERAPEUTIC EXERCISES: CPT | Mod: PN

## 2018-12-13 NOTE — PROGRESS NOTES
Name: Marck aRmos  Clinic Number: [id number]  Date of Treatment: 12/13/2018   Diagnosis:   Encounter Diagnoses   Name Primary?    Left leg weakness     Left hip pain        Time in: 1300  Time Out: 1310  Total Treatment Time: 10  Group Time: 0      Subjective:    Marck reports improvement of symptoms.  Patient reports their pain to be 0/10 on a 0-10 scale with 0 being no pain and 10 being the worst pain imaginable.    Objective    Marck received therapeutic exercises to develop strength and endurance for 10 minutes including:     Waldemar Davis 1 10'        Pt demo good understanding of the education provided. Marck demonstrated good return demonstration of activities.     Assessment:     Pt will continue to benefit from skilled PT intervention. Medical Necessity is demonstrated by:  Unable to participate fully in daily activities, Requires skilled supervision to complete and progress HEP and Weakness.    Plan:    Continue with established Plan of Care towards PT goals.

## 2018-12-13 NOTE — PROGRESS NOTES
"Name: Marck Ramos  Clinic Number: [id number]  Date of Treatment: 12/13/2018   Diagnosis:   Encounter Diagnoses   Name Primary?    Left leg weakness     Left hip pain        Time in: 1310  Time Out: 1400  Total Treatment Time: 50    Subjective:    Marck reports has been performing HEP daily without problems. Walking with rw. Brought to clinic per son. Pt states his son is driving him to clinic now. L hip discomfort 1-2/10 at present and later states it improves with ex's.     Objective:    Patient received individual therapy to increase strength, endurance, ROM, flexibility, posture and core stabilization with activities as follows:     Marck received therapeutic exercises to develop strength, endurance, ROM, flexibility, posture and core stabilization for 50 minutes including:     Sidestepping in // bars with SBA L/R  Step ups L 10 to 6" step with // bars  Supine ex's 10/3:     SAQ L/R     AP B     Supine hooklying hip aBd with RTB 10/3 B     Supine hooklying hip aDD ballsqueezes 10/3     HS    Seated LAQ 10/3 L/R    Sit-stands x 5 from armchair without AD with CG/SBA and tc for fwd weight translation    Standing static balance with peturbations-easily imbalanced, vicente A-P    Gait training with rw in gym and to waiting room with SBA and vc for increased LE clearance.    Continue HEP daily.    Pt demo good understanding of the education provided. Patient demonstrated good return demonstration of activities.     Assessment:     Slow moving with all ex's. Slight imbalances with sit-stand without AD but able to self-correct with time. Sliding feet with gait but improves with vc's.     Pt will continue to benefit from skilled PT intervention. Medical Necessity is demonstrated by:  Requires skilled supervision to complete and progress HEP and Weakness.    Patient is making good progress towards established goals.    Plan:    Continue with established Plan of Care towards PT goals.     "

## 2018-12-17 ENCOUNTER — TELEPHONE (OUTPATIENT)
Dept: REHABILITATION | Facility: HOSPITAL | Age: 83
End: 2018-12-17

## 2019-01-04 ENCOUNTER — CLINICAL SUPPORT (OUTPATIENT)
Dept: REHABILITATION | Facility: HOSPITAL | Age: 84
End: 2019-01-04
Attending: ORTHOPAEDIC SURGERY
Payer: MEDICARE

## 2019-01-04 DIAGNOSIS — M25.552 LEFT HIP PAIN: ICD-10-CM

## 2019-01-04 DIAGNOSIS — R29.898 LEFT LEG WEAKNESS: ICD-10-CM

## 2019-01-04 PROCEDURE — 97110 THERAPEUTIC EXERCISES: CPT | Mod: PN

## 2019-01-04 NOTE — PROGRESS NOTES
Name: Marck Ramos  Clinic Number: [id number]  Date of Treatment: 01/04/2019   Diagnosis:   Encounter Diagnoses   Name Primary?    Left leg weakness     Left hip pain        Time in: 1400  Time Out: 1500  Total Treatment Time: 60    Subjective:    Marck reports missed last few appts. Daughter Kati states pt has groin infection which is now resolved after MD appt and imaging, antibiotics. States MD told pt he could resume PT when he feels better.Pt is walking with rw.     Objective:    Patient received individual therapy to increase strength, endurance, ROM, flexibility, posture and core stabilization with activities as follows:     Marck received therapeutic exercises to develop strength, endurance, ROM, flexibility, posture and core stabilization for 60 minutes including:     NuSTep L1 10' LE's only  Seated TE 10/3:     LAQ     March     DF     Ballsqueezes RTB     Clams RTB    Standing hip aBd L/R 10/3 in // bars       Supine ex's L LE 10/3:     HS     SAQ     SLR (AA)    Continue HEP daily.    Pt demo good understanding of the education provided. Patient demonstrated good return demonstration of activities.     Assessment:     Moving well through routine with improved hip with decreased tightness per pt report after session.     Pt will continue to benefit from skilled PT intervention. Medical Necessity is demonstrated by:  Requires skilled supervision to complete and progress HEP and Weakness.    Patient is making good progress towards established goals.    Plan:    Continue with established Plan of Care towards PT goals.

## 2019-01-07 ENCOUNTER — CLINICAL SUPPORT (OUTPATIENT)
Dept: REHABILITATION | Facility: HOSPITAL | Age: 84
End: 2019-01-07
Attending: ORTHOPAEDIC SURGERY
Payer: MEDICARE

## 2019-01-07 DIAGNOSIS — R29.898 LEFT LEG WEAKNESS: ICD-10-CM

## 2019-01-07 DIAGNOSIS — M25.552 LEFT HIP PAIN: ICD-10-CM

## 2019-01-07 PROCEDURE — 97110 THERAPEUTIC EXERCISES: CPT | Mod: PN

## 2019-01-07 NOTE — PROGRESS NOTES
Name: Marck Ramos  Clinic Number: [id number]  Date of Treatment: 01/07/2019   Diagnosis: debility due to recent left hip fx/ORIF  Encounter Diagnoses   Name Primary?    Left leg weakness     Left hip pain        Time in: 11:00  Time Out: 1200  Total Treatment Time: 60  Group Time: 0      Subjective:    Marck reports variable symptoms.  Patient reports their pain to be 2/10 on a 0-10 scale with 0 being no pain and 10 being the worst pain imaginable.    Objective    Patient received individual therapy to increase strength, endurance, ROM, posture and core stabilization  with activities as follows:     Marck received therapeutic exercises to develop strength, endurance, ROM, posture and core stabilization for 60 minutes including:   Nu-step 10' L1  Mat Exercises:  Quad sets  Heel cord stretches/ankle pumps  SLR  SAQ's 4#s  Bridging  Ball squeezes  Seated Hip abduction with manual resist  Standing heel raises  Standing quarter knee bends  Hip Abduction-unilateral stance using RW  Sit to stand     Patient instructed on proper car transfers technique.     Assessment:     Pt states that he forgets instructions given. Claims having poor memory. Noted increased pain on left leg weight bearing during hip abduction exercises in standing.    Patient is making  progress towards established goals.    Plan:  Continue with established Plan of Care towards PT goals.

## 2019-01-09 ENCOUNTER — CLINICAL SUPPORT (OUTPATIENT)
Dept: REHABILITATION | Facility: HOSPITAL | Age: 84
End: 2019-01-09
Attending: ORTHOPAEDIC SURGERY
Payer: MEDICARE

## 2019-01-09 DIAGNOSIS — M25.552 LEFT HIP PAIN: ICD-10-CM

## 2019-01-09 DIAGNOSIS — R29.898 LEFT LEG WEAKNESS: ICD-10-CM

## 2019-01-09 PROCEDURE — 97110 THERAPEUTIC EXERCISES: CPT | Mod: PN

## 2019-01-09 NOTE — PROGRESS NOTES
"Name: Marck Ramos  Clinic Number: [id number]  Date of Treatment: 01/09/2019   Diagnosis:   Encounter Diagnoses   Name Primary?    Left leg weakness     Left hip pain        Time in: 1400  Time Out: 1450  Total Treatment Time: 50  Group Time: 0      Subjective:    Marck reports improvement of symptoms with a little soreness L ant hip/groin area.  Patient reports their pain to be 1/10 on a 0-10 scale with 0 being no pain and 10 being the worst pain imaginable.    Objective    Marck received therapeutic exercises to L LE to develop strength and endurance for 50 minutes including:     NuStep Lv2 10'  Sit ex 2x10 2# R/L: LAQ, March, ball squeeze, hip abd Green Tband  Stand ex // bars x20 reciprocal: HR/TR, march, hamstring curls, hip abd, hip ext  // bar step up/down R/L 6" box x10   // bar side step 4" box L<>R x10    Pt demo good understanding of the education provided. Marck demonstrated good return demonstration of activities with cues for direction and proper form.    Assessment:     Pt will continue to benefit from skilled PT intervention. Medical Necessity is demonstrated by:  Pain limits function of effected part for some activities, Unable to participate fully in daily activities, Requires skilled supervision to complete and progress HEP and Weakness.    Patient is making good progress towards established goals.    Plan:    Continue with established Plan of Care towards PT goals.   "

## 2019-01-11 ENCOUNTER — CLINICAL SUPPORT (OUTPATIENT)
Dept: REHABILITATION | Facility: HOSPITAL | Age: 84
End: 2019-01-11
Attending: ORTHOPAEDIC SURGERY
Payer: MEDICARE

## 2019-01-11 ENCOUNTER — OFFICE VISIT (OUTPATIENT)
Dept: ORTHOPEDICS | Facility: CLINIC | Age: 84
End: 2019-01-11
Payer: MEDICARE

## 2019-01-11 VITALS — HEIGHT: 66 IN | BODY MASS INDEX: 21.38 KG/M2 | WEIGHT: 133 LBS

## 2019-01-11 DIAGNOSIS — S72.142D CLOSED DISPLACED INTERTROCHANTERIC FRACTURE OF LEFT FEMUR WITH ROUTINE HEALING, SUBSEQUENT ENCOUNTER: Primary | ICD-10-CM

## 2019-01-11 DIAGNOSIS — M25.552 LEFT HIP PAIN: ICD-10-CM

## 2019-01-11 DIAGNOSIS — Z98.890 S/P ORIF (OPEN REDUCTION INTERNAL FIXATION) FRACTURE: Primary | ICD-10-CM

## 2019-01-11 DIAGNOSIS — R29.898 LEFT LEG WEAKNESS: ICD-10-CM

## 2019-01-11 DIAGNOSIS — Z87.81 S/P ORIF (OPEN REDUCTION INTERNAL FIXATION) FRACTURE: Primary | ICD-10-CM

## 2019-01-11 PROCEDURE — 99213 PR OFFICE/OUTPT VISIT, EST, LEVL III, 20-29 MIN: ICD-10-PCS | Mod: ,,, | Performed by: ORTHOPAEDIC SURGERY

## 2019-01-11 PROCEDURE — 73502 PR X-RAY EXAM HIP UNI 2-3 VIEWS: ICD-10-PCS | Mod: FY,LT,, | Performed by: ORTHOPAEDIC SURGERY

## 2019-01-11 PROCEDURE — 73502 X-RAY EXAM HIP UNI 2-3 VIEWS: CPT | Mod: FY,LT,, | Performed by: ORTHOPAEDIC SURGERY

## 2019-01-11 PROCEDURE — 99213 OFFICE O/P EST LOW 20 MIN: CPT | Mod: ,,, | Performed by: ORTHOPAEDIC SURGERY

## 2019-01-11 PROCEDURE — 97110 THERAPEUTIC EXERCISES: CPT | Mod: PN

## 2019-01-11 PROCEDURE — 97116 GAIT TRAINING THERAPY: CPT | Mod: PN

## 2019-01-11 RX ORDER — SOTALOL HYDROCHLORIDE 80 MG/1
80 TABLET ORAL DAILY
Refills: 3 | COMMUNITY
Start: 2018-11-19 | End: 2020-04-08 | Stop reason: SDUPTHER

## 2019-01-11 NOTE — PLAN OF CARE
TIME RECORD    Date: 01/11/2019    Start Time:  1405  Stop Time:  1500    PROCEDURES:    TIMED  Procedure Time Min.   There ex Start:1405  Stop:1435    gait Start:1435  Stop:1445     Start:  Stop:     Start:  Stop:          UNTIMED  Procedure Time Min.    Start:  Stop:     Start:  Stop:      Total Timed Minutes:  40  Total Timed Units:  3  Total Untimed Units:  0  Charges Billed/# of units:  3    PHYSICAL THERAPY UPDATED PLAN OF TREATMENT    Patient name: Marck Ramos  Onset Date:  08/29/18  SOC Date:  12/04/18  Primary Diagnosis:    1. S/P ORIF (open reduction internal fixation) fracture     2. Left leg weakness     3. Left hip pain       Treatment Diagnosis:  debility due to recent lt hip fx w/ ORIF  Certification Period:  12/04/18 to 01/12/19  Precautions:  Fall; WBAT lt LE  Visits from SOC:  8  Functional Level Prior to SOC:  supervision needed during general mobility    Treatment:    X 20 seated marina. LE there ex = marching (2#), LAQ (2#), ball sq, hip ABD (RTB)  X 20 supine marina. LE there ex (2#) = HS, hip ABD/ADD, SAQ   SBA w/ transfers   Amb. 2 x 75 ft w/ RW and SBA    Updated Assessment:  LEFS = 34/80; MMT = 3+/5 lt hip, 4-/5 rt hip, 4/5 throughout rest of marina. LE's; reports no new falls    Previous Short Term Goals Status:     1.  Marry. X 20 reps marina. LE there ex w/ 1# wt (MET)  2.  Pt. to report no new falls (MET)  3.  Supervision w/ transfers (NOT MET)    New Short Term Goals:     1.  Marry. X 20 reps marina. LE there ex w/ 3# wt  2.  Pt. to report no new falls  3.  amb 150 ft w/ RW and SBA    Previous Long Term Goals Status:     1.  Improve lt hip MMT 1/2 grade (NOT MET)  2.  Marry. X 20 reps marina. LE there ex w/ 2# wt (MET)  3.  Family demo comp w/ HEP (NOT MET)  4.  Improve LEFS score to 35/80 (NOT MET)    New Long Terms Goals:  1.  Improve lt hip MMT 1/2 grade  2.  Family demo comp w/ HEP  3.  Improve LEFS score to 38/80    Reasons for Recertification of Therapy:   Pt. Would benefit from further PT visits to  cont. addressing strength/mobility deficits.    Certification Period: 01/11/19 to 02/09/19  Recommended Treatment Plan: 2 times per week for 4 weeks (starting wk of 01/13/19): Gait Training, Manual Therapy, Moist Heat/ Ice, Patient Education, Therapeutic Activites, Therapeutic Exercise and HEP  Other Recommendations: NA        Therapist's Name: Temo Herrera, PT   Date: 01/11/2019    I CERTIFY THE NEED FOR THESE SERVICES FURNISHED UNDER THIS PLAN OF TREATMENT AND WHILE UNDER MY CARE    Physician's comments: ________________________________________________________________________________________________________________________________________________      Physician's Name: ___________________________________

## 2019-01-11 NOTE — PROGRESS NOTES
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    aspirin (ECOTRIN) 81 MG EC tablet Take 81 mg by mouth once daily.      docusate sodium (COLACE) 100 MG capsule Take 1 capsule (100 mg total) by mouth 2 (two) times daily.    ELIQUIS 5 mg Tab     ferrous sulfate 325 mg (65 mg iron) Tab tablet Take 325 mg by mouth daily with breakfast.    furosemide (LASIX) 40 MG tablet Take 1 tablet (40 mg total) by mouth once daily. (Patient taking differently: Take 20 mg by mouth once daily. )    isosorbide mononitrate (IMDUR) 60 MG 24 hr tablet     losartan (COZAAR) 50 MG tablet Take 50 mg by mouth 2 (two) times daily.    magnesium 200 mg Tab Take 1 tablet by mouth once daily.    pantoprazole (PROTONIX) 40 MG tablet Take 40 mg by mouth once daily.    potassium chloride SA (K-DUR,KLOR-CON) 20 MEQ tablet Take 1 tablet (20 mEq total) by mouth once daily.    simvastatin (ZOCOR) 20 MG tablet Take 20 mg by mouth every evening.     sotalol (BETAPACE) 120 MG Tab Take 80 mg by mouth once daily.     sotalol (BETAPACE) 80 MG tablet     tamsulosin (FLOMAX) 0.4 mg Cap Take 1 capsule (0.4 mg total) by mouth once daily.    vit C/E/Zn/coppr/lutein/zeaxan (PRESERVISION AREDS-2 ORAL) Take 2 tablets by mouth 2 (two) times daily.     No current  "facility-administered medications for this visit.        Review of patient's allergies indicates:   Allergen Reactions    Sulfa (sulfonamide antibiotics)      Patient can not recall reaction        History reviewed. No pertinent family history.    Social History     Socioeconomic History    Marital status:      Spouse name: Not on file    Number of children: Not on file    Years of education: Not on file    Highest education level: Not on file   Social Needs    Financial resource strain: Not on file    Food insecurity - worry: Not on file    Food insecurity - inability: Not on file    Transportation needs - medical: Not on file    Transportation needs - non-medical: Not on file   Occupational History    Not on file   Tobacco Use    Smoking status: Former Smoker     Types: Cigarettes    Smokeless tobacco: Never Used   Substance and Sexual Activity    Alcohol use: No    Drug use: No    Sexual activity: No   Other Topics Concern    Not on file   Social History Narrative    Not on file       Chief Complaint:   Chief Complaint   Patient presents with    Left Hip - Follow-up, Pain     DOS: 08/29/2018, 4 1/2 months s/p :Closed reduction and Internal fixiation of Closed, comminuted, displaced intertrochanteric fracture of Left hip. Patient having minor pain and ambulating with walker.        Consulting Physician: No ref. provider found    History of Present Illness:    This is a 89 y.o. year old male who complains of patient is 4 months status post IM rodding for a displaced intertrochanteric hip fracture patient has some minor complaints of pain in which it worsens at times especially on a walker      ROS    Examination:    Vital Signs:    Vitals:    01/11/19 1003   Weight: 60.3 kg (133 lb)   Height: 5' 6" (1.676 m)   PainSc:   1   PainLoc: Hip       This a well-developed, well nourished patient in no acute distress.    Alert and oriented and cooperative to examination.       Physical Exam: left " hip-ncisions are healed patient has fairly good range of motion of the hip is no gross deformity he is presently walking with a walker    Imaging:  AP and lateral x-ray of the left hip shows the I am minerva be in place the nail was in the head the greater trochanters involves medially but this is no change from his previous x-ray 3 months ago     Assessment: status post IM nailing of a left inotropic hip fracture patient overall is doing well    Plan:  Patient can continue full weightbearing as tolerated we'll see him as needed      DISCLAIMER: This note may have been dictated using voice recognition software and may contain grammatical errors.     NOTE: Consult report sent to referring provider via [employer] EMR.

## 2019-01-11 NOTE — PROGRESS NOTES
Pt. would benefit from further PT visits to cont. addressing strength/mobility deficits.  Pls see updated POC.

## 2019-01-14 NOTE — PROGRESS NOTES
PT/PTA met face to face to discuss patient's treatment plan and progress towards established goals.  Treatment will be continued as described in initial report/eval and progress notes.  Patient will be seen by physical therapist every sixth visit and minimally once per month.    Additional information: n/a

## 2019-01-15 ENCOUNTER — CLINICAL SUPPORT (OUTPATIENT)
Dept: REHABILITATION | Facility: HOSPITAL | Age: 84
End: 2019-01-15
Attending: ORTHOPAEDIC SURGERY
Payer: MEDICARE

## 2019-01-15 DIAGNOSIS — R29.898 LEFT LEG WEAKNESS: ICD-10-CM

## 2019-01-15 DIAGNOSIS — M25.552 LEFT HIP PAIN: ICD-10-CM

## 2019-01-15 PROCEDURE — 97110 THERAPEUTIC EXERCISES: CPT | Mod: PN

## 2019-01-15 NOTE — PROGRESS NOTES
"Name: Marck Ramos  Clinic Number: [id number]  Date of Treatment: 01/15/2019   Diagnosis:   Encounter Diagnoses   Name Primary?    Left leg weakness     Left hip pain        Time in: 1305  Time Out: 1400  Total Treatment Time: 55  Group Time: 0      Subjective:    Marck reports doesn't feel like he is getting better.  Patient reports their pain to be 0/10 on a 0-10 scale with 0 being no pain and 10 being the worst pain imaginable.    Objective    Marck received therapeutic exercises to develop strength and endurance for 55 minutes including:     NuStep Lv4 10' (patient request to increase resistance)  Stand with RW 2x10: HR, hip flexion  Sit<>Stand 2x5 with L UE only  // bar step up 2x10 6" step R/L  // bar tandem gait 12' x 6    Pt demo good understanding of the education provided. Marck demonstrated good return demonstration of activities.     Assessment:     Pt will continue to benefit from skilled PT intervention. Medical Necessity is demonstrated by:  Fall Risk, Unable to participate fully in daily activities, Requires skilled supervision to complete and progress HEP and Weakness.    Plan:    Continue with established Plan of Care towards PT goals.   "

## 2019-01-18 ENCOUNTER — CLINICAL SUPPORT (OUTPATIENT)
Dept: REHABILITATION | Facility: HOSPITAL | Age: 84
End: 2019-01-18
Attending: ORTHOPAEDIC SURGERY
Payer: MEDICARE

## 2019-01-18 DIAGNOSIS — R29.898 LEFT LEG WEAKNESS: ICD-10-CM

## 2019-01-18 DIAGNOSIS — M25.552 LEFT HIP PAIN: ICD-10-CM

## 2019-01-18 PROCEDURE — 97110 THERAPEUTIC EXERCISES: CPT | Mod: PN

## 2019-01-18 PROCEDURE — 97140 MANUAL THERAPY 1/> REGIONS: CPT | Mod: PN

## 2019-01-18 NOTE — PROGRESS NOTES
Name: Marck Ramos  Clinic Number: [id number]  Date of Treatment: 01/18/2019   Diagnosis:   Encounter Diagnoses   Name Primary?    Left leg weakness     Left hip pain        Time in: 1300  Time Out: 1345  Total Treatment Time: 45  Group Time: 0      Subjective:    Marck reports increased pain L lateral LE since last night and unable to pin point a specific reason why.  Patient reports their pain to be 4-5/10 on a 0-10 scale with 0 being no pain and 10 being the worst pain imaginable.    Objective    Marck received therapeutic exercises to develop strength and endurance for 45 minutes including:     NuStep Lv2 10' (0/10 pain after)  Sit ex x20 2# R/L: LAQ, march, ball squeeze, hip abd with Green Tband    Manual Therapy: Soft tissue mobilization to L IT band 15' with Thera Roller, as well as instruction in self massage with tennis ball.    Pt demo good understanding of the education provided. Marck demonstrated good return demonstration of activities.     Assessment:     Pt will continue to benefit from skilled PT intervention. Medical Necessity is demonstrated by:  Pain limits function of effected part for some activities, Unable to participate fully in daily activities, Requires skilled supervision to complete and progress HEP and Weakness.    Patient is making good progress towards established goals. Reported without pain after session.    Plan:    Continue with established Plan of Care towards PT goals.

## 2019-01-22 ENCOUNTER — CLINICAL SUPPORT (OUTPATIENT)
Dept: REHABILITATION | Facility: HOSPITAL | Age: 84
End: 2019-01-22
Attending: ORTHOPAEDIC SURGERY
Payer: MEDICARE

## 2019-01-22 DIAGNOSIS — R29.898 LEFT LEG WEAKNESS: ICD-10-CM

## 2019-01-22 DIAGNOSIS — M25.552 LEFT HIP PAIN: ICD-10-CM

## 2019-01-22 PROCEDURE — 97110 THERAPEUTIC EXERCISES: CPT | Mod: PN

## 2019-01-22 NOTE — PROGRESS NOTES
"Name: Marck Ramos  Clinic Number: [id number]  Date of Treatment: 01/22/2019   Diagnosis:   Encounter Diagnoses   Name Primary?    Left leg weakness     Left hip pain        Time in: 1307  Time Out: 1400  Total Treatment Time: 53    Subjective:    Marck reports discomfort lat L thigh.  Patient reports their pain to be "low" on a 0-10 scale with 0 being no pain and 10 being the worst pain imaginable and does not rate. Walking with rw.     Objective:    Patient received individual therapy to increase strength, endurance, ROM, flexibility, posture and core stabilization with activities as follows:     Marck received therapeutic exercises to develop strength, endurance, ROM, flexibility, posture and core stabilization for 53 minutes including:     NuSTep L3 10' LE's only    Seated TE 10/3:     LAQ 2#     March 2# (limited range L)     DF     Ballsqueezes B     Clams GTB     Standing knee flexion L 10/3  Standing balance static on airex 3' with // bars  Attempted standing hip aBd and step ups to 2" L but unsuccessful 2* discomfort.        Supine ex's L LE 10/3:     HS     SAQ     SLR AROM 10/2    Gait training with rw 100' x 2 with SBA and cues for increased B LE clearance, increased stance time L, maintaining proper distance from rw. Pt appears approx 75% WB L LE.     Continue HEP daily.    Pt demo good understanding of the education provided. Patient demonstrated good return demonstration of activities.     Assessment:     Discomfort today with standing hip aBd and step ups, which pt has performed successfully in the past; however, pt states mobility makes hip LE feel better. Improved AROM for SLR today. Notable limit of L LE flexion for advancing into passenger side of vehicle but able to perform transfer with SBA.    Pt will continue to benefit from skilled PT intervention. Medical Necessity is demonstrated by:  Requires skilled supervision to complete and progress HEP and Weakness.    Patient is making good " progress towards established goals.    Plan:    Continue with established Plan of Care towards PT goals.

## 2019-01-25 ENCOUNTER — TELEPHONE (OUTPATIENT)
Dept: HEMATOLOGY/ONCOLOGY | Facility: CLINIC | Age: 84
End: 2019-01-25

## 2019-01-25 NOTE — TELEPHONE ENCOUNTER
Called to see if the pt had any labs done prior to f/u appt.    Called the ER contact to remind him to have his dad do the labs. He was on the [employer] course and would check on it.

## 2019-01-28 ENCOUNTER — CLINICAL SUPPORT (OUTPATIENT)
Dept: REHABILITATION | Facility: HOSPITAL | Age: 84
End: 2019-01-28
Attending: ORTHOPAEDIC SURGERY
Payer: MEDICARE

## 2019-01-28 DIAGNOSIS — R29.898 LEFT LEG WEAKNESS: ICD-10-CM

## 2019-01-28 DIAGNOSIS — M25.552 LEFT HIP PAIN: ICD-10-CM

## 2019-01-28 PROCEDURE — 97110 THERAPEUTIC EXERCISES: CPT | Mod: PN

## 2019-01-28 NOTE — PROGRESS NOTES
"Name: Marck Ramos  Clinic Number: [id number]  Date of Treatment: 01/28/2019   Diagnosis:   Encounter Diagnoses   Name Primary?    Left leg weakness     Left hip pain        Time in: 1200  Time Out: 1257  Total Treatment Time: 57  Group Time: 0      Subjective:    Marck reports improvement of symptoms.  Patient reports their pain to be 2/10 on a 0-10 scale with 0 being no pain and 10 being the worst pain imaginable.    Objective    Marck received therapeutic exercises to develop strength, endurance and balance for 57 minutes including:     NuStep Lv3 10'  Sit ex 3x10 2# R/L: LAQ, march, ball squeeze, hip abd Green Tband  // bar stand on Airex x20: HR/TR, march, hip abd R/L  // bar DLS on Airex 3x30"  // bar Tandem gait Fwd/Back 10' x3  SLR 3x10 supine R/L     Gait with RW 2x120'  SBA with VC to decrease UE support    Pt demo good understanding of the education provided. Marck demonstrated good return demonstration of activities.     Assessment:     Pt will continue to benefit from skilled PT intervention. Medical Necessity is demonstrated by:  Pain limits function of effected part for some activities, Unable to participate fully in daily activities, Requires skilled supervision to complete and progress HEP and Weakness.    Patient is making good progress towards established goals.    Plan:    Continue with established Plan of Care towards PT goals.   "

## 2019-02-01 ENCOUNTER — CLINICAL SUPPORT (OUTPATIENT)
Dept: REHABILITATION | Facility: HOSPITAL | Age: 84
End: 2019-02-01
Attending: ORTHOPAEDIC SURGERY
Payer: MEDICARE

## 2019-02-01 DIAGNOSIS — M25.552 LEFT HIP PAIN: ICD-10-CM

## 2019-02-01 DIAGNOSIS — R29.898 LEFT LEG WEAKNESS: ICD-10-CM

## 2019-02-01 PROCEDURE — 97110 THERAPEUTIC EXERCISES: CPT | Mod: PN

## 2019-02-01 NOTE — PROGRESS NOTES
Name: Marck Ramos  Clinic Number: [id number]  Date of Treatment: 02/01/2019   Diagnosis:   Encounter Diagnoses   Name Primary?    Left leg weakness     Left hip pain        Time in: 1303  Time Out: 1400  Total Treatment Time: 57  Group Time: 0      Subjective:    Marck reports continued L hip pain.  Patient reports their pain to be 3/10 on a 0-10 scale with 0 being no pain and 10 being the worst pain imaginable.    Objective    Marck received therapeutic exercises to develop strength, endurance and weight shift for 57 minutes including:     NuStep Lv4 10'  // bar HRx20,March x20 L/R  // bar Airex with L UE support only: HR x20, March x5  DLS Airex 2' without UE support  // Bar Gait with L UE only 10' x1  // Bar Tandem Gait  with B UE support 10' x1  // Bar Side Step R<>L 10' x4: B UE support >R, L UE support >L    Pt demo good understanding of the education provided. Marck demonstrated good return demonstration of activities.     Assessment:     Pt will continue to benefit from skilled PT intervention. Medical Necessity is demonstrated by:  Pain limits function of effected part for some activities, Unable to participate fully in daily activities, Requires skilled supervision to complete and progress HEP and Weakness.    Patient is making fair progress towards established goals.    Plan:    Continue with established Plan of Care towards PT goals.

## 2019-02-04 ENCOUNTER — CLINICAL SUPPORT (OUTPATIENT)
Dept: REHABILITATION | Facility: HOSPITAL | Age: 84
End: 2019-02-04
Attending: ORTHOPAEDIC SURGERY
Payer: MEDICARE

## 2019-02-04 DIAGNOSIS — M25.552 LEFT HIP PAIN: ICD-10-CM

## 2019-02-04 DIAGNOSIS — R29.898 LEFT LEG WEAKNESS: ICD-10-CM

## 2019-02-04 PROCEDURE — 97110 THERAPEUTIC EXERCISES: CPT | Mod: PN

## 2019-02-04 NOTE — PROGRESS NOTES
Name: Marck Ramos  Clinic Number: [id number]  Date of Treatment: 02/04/2019   Diagnosis:   Encounter Diagnoses   Name Primary?    Left leg weakness     Left hip pain        Time in: 1204  Time Out: 1300  Total Treatment Time: 56    Subjective:    Marck reports L hip discomfort .  Patient reports their pain to be 2-3/10 on a 0-10 scale with 0 being no pain and 10 being the worst pain imaginable.    Objective:    Patient received individual therapy to increase strength, endurance, ROM, flexibility, posture and core stabilization with activities as follows:     Marck received therapeutic exercises to develop strength, endurance, ROM, flexibility, posture and core stabilization for 56 minutes including:     NuSTep L3 10' LE's only     Seated TE:     LAQ 2# 10/3     March 2# 10/2     DF 10/2 B     Ballsqueezes B 10/3     Clams GTB 10/3     Hamstring curls L GTB 10/2     Standing hip aBd L/R 10/10 in // bars  Standing HR 10/2 in // bars  Standing march 10/2 in // bars  Standing balance static on airex 3' with // bars     Supine ex's L LE 10/3:     HS     SAQ     SLR AROM 10/2     Gait training with rw 100' x 2 with SBA and cues for increased B LE clearance, increased stance time L, maintaining proper distance from rw.     Continue HEP daily.    Pt demo good understanding of the education provided. Patient demonstrated good return demonstration of activities.     Assessment:     Cont with sliding feet with gait with minimal improvement with vc. Improved tolerance for standing ex's.     Pt will continue to benefit from skilled PT intervention. Medical Necessity is demonstrated by:  Requires skilled supervision to complete and progress HEP and Weakness.    Patient is making good progress towards established goals.    Plan:    Continue with established Plan of Care towards PT goals.

## 2019-02-08 ENCOUNTER — CLINICAL SUPPORT (OUTPATIENT)
Dept: REHABILITATION | Facility: HOSPITAL | Age: 84
End: 2019-02-08
Attending: ORTHOPAEDIC SURGERY
Payer: MEDICARE

## 2019-02-08 DIAGNOSIS — R29.898 LEFT LEG WEAKNESS: ICD-10-CM

## 2019-02-08 DIAGNOSIS — Z87.81 S/P ORIF (OPEN REDUCTION INTERNAL FIXATION) FRACTURE: Primary | ICD-10-CM

## 2019-02-08 DIAGNOSIS — M25.552 LEFT HIP PAIN: ICD-10-CM

## 2019-02-08 DIAGNOSIS — Z98.890 S/P ORIF (OPEN REDUCTION INTERNAL FIXATION) FRACTURE: Primary | ICD-10-CM

## 2019-02-08 PROCEDURE — 97110 THERAPEUTIC EXERCISES: CPT | Mod: PN

## 2019-02-08 PROCEDURE — 97116 GAIT TRAINING THERAPY: CPT | Mod: PN

## 2019-02-08 NOTE — PLAN OF CARE
TIME RECORD    Date: 02/08/2019    Start Time:  1205  Stop Time:  1300    PROCEDURES:    TIMED  Procedure Time Min.   There ex Start:1205  Stop:1230    Gait training Start:1230  Stop:1245     Start:  Stop:     Start:  Stop:          UNTIMED  Procedure Time Min.    Start:  Stop:     Start:  Stop:      Total Timed Minutes:  40  Total Timed Units:  3  Total Untimed Units:  0  Charges Billed/# of units:  3    PHYSICAL THERAPY UPDATED PLAN OF TREATMENT    Patient name: Marck Ramos  Onset Date:  08/29/18  SOC Date:  12/04/18  Primary Diagnosis:    1. S/P ORIF (open reduction internal fixation) fracture     2. Left leg weakness     3. Left hip pain       Treatment Diagnosis:  debility due to recent lt hip fx w/ ORIF  Certification Period:  01/11/19 to 02/09/19  Precautions:  Fall; WBAT lt LE  Visits from SOC:  15  Functional Level Prior to SOC:  supervision needed during general mobility    Treatment:    X 20 seated marina. LE there ex = marching (3#), LAQ (3#), ball sq, hip ABD (GTB)  X 20 supine marina. LE there ex (3#) = HS, hip ABD/ADD, SAQ   SBA w/ transfers   Amb. 200 ft w/ RW and SBA    Updated Assessment:  LEFS = 26/80; MMT = 3+/5 lt hip, 4-/5 rt hip, 4/5 throughout rest of marina. LE's; reports no new falls    Previous Short Term Goals Status:     1.  Marry. X 20 reps marina. LE there ex w/ 3# wt (MET)  2.  Pt. to report no new falls (MET)  3.  amb 150 ft w/ RW and SBA (MET)    New Short Term Goals:     1.  Marry. X 20 reps marina. LE there ex w/ 4# wt  2.  amb 300 ft w/ RW and SBA    Previous Long Term Goals Status:     1.  Improve lt hip MMT 1/2 grade (NOT MET)  2.  Family demo comp w/ HEP (NOT MET)  3.  Improve LEFS score to 38/80 (NOT MET)    New Long Term Goals:  1.  Improve lt hip MMT 1/2 grade  2.  Family demo comp w/ HEP  3.  Improve LEFS score to 28/80    Reasons for Recertification of Therapy:   Pt. would benefit from further PT visits to cont. addressing strength/mobility deficits.    Certification Period: 02/08/19 to  03/16/19  Recommended Treatment Plan: 2 times per week for 5 weeks (starting wk of 02/10/19): Gait Training, Manual Therapy, Moist Heat/ Ice, Patient Education, Therapeutic Activites, Therapeutic Exercise and HEP  Other Recommendations: NA        Therapist's Name: Temo Herrera, PT   Date: 02/08/2019    I CERTIFY THE NEED FOR THESE SERVICES FURNISHED UNDER THIS PLAN OF TREATMENT AND WHILE UNDER MY CARE    Physician's comments: ________________________________________________________________________________________________________________________________________________      Physician's Name: ___________________________________

## 2019-02-08 NOTE — PROGRESS NOTES
Patient would benefit from further PT visits to cont. addressing strength/mobility deficits.  Pls see updated POC.

## 2019-02-11 ENCOUNTER — CLINICAL SUPPORT (OUTPATIENT)
Dept: REHABILITATION | Facility: HOSPITAL | Age: 84
End: 2019-02-11
Attending: ORTHOPAEDIC SURGERY
Payer: MEDICARE

## 2019-02-11 DIAGNOSIS — M25.552 LEFT HIP PAIN: ICD-10-CM

## 2019-02-11 DIAGNOSIS — R29.898 LEFT LEG WEAKNESS: ICD-10-CM

## 2019-02-11 PROCEDURE — 97110 THERAPEUTIC EXERCISES: CPT | Mod: PN

## 2019-02-11 NOTE — PROGRESS NOTES
"Name: Marck Ramos  Clinic Number: [id number]  Date of Treatment: 02/11/2019   Diagnosis:   Encounter Diagnoses   Name Primary?    Left leg weakness     Left hip pain        Time in: 1400  Time Out: 1450  Total Treatment Time: 50  Group Time: 0      Subjective:    Marck reports improvement of symptoms.  Patient reports their pain to be 0/10 on a 0-10 scale with 0 being no pain and 10 being the worst pain imaginable.    Objective    Marck received therapeutic exercises to develop strength, endurance and balance for 50 minutes including:     NuStep Lv4 10'  Sit ex 3x10 3#: LAQ, march, ball squeeze, hip abd Blue Tband R/L  // bar tandem gait fwd/back 6'x10  Step Up/Dwn 3x10 6" box L WB  Side Step L<>R 4" box x10    Gait training with ' with assist for forward progress (increased L WB)    Pt demo good understanding of the education provided. Marck demonstrated good return demonstration of activities.     Assessment:     Pt will continue to benefit from skilled PT intervention. Medical Necessity is demonstrated by:  Unable to participate fully in daily activities, Requires skilled supervision to complete and progress HEP and Weakness.    Patient is making good progress towards established goals.    Plan:    Continue with established Plan of Care towards PT goals.   "

## 2019-02-15 ENCOUNTER — CLINICAL SUPPORT (OUTPATIENT)
Dept: REHABILITATION | Facility: HOSPITAL | Age: 84
End: 2019-02-15
Attending: ORTHOPAEDIC SURGERY
Payer: MEDICARE

## 2019-02-15 DIAGNOSIS — Z98.890 S/P ORIF (OPEN REDUCTION INTERNAL FIXATION) FRACTURE: Primary | ICD-10-CM

## 2019-02-15 DIAGNOSIS — R29.898 LEFT LEG WEAKNESS: ICD-10-CM

## 2019-02-15 DIAGNOSIS — Z87.81 S/P ORIF (OPEN REDUCTION INTERNAL FIXATION) FRACTURE: Primary | ICD-10-CM

## 2019-02-15 DIAGNOSIS — M25.552 LEFT HIP PAIN: ICD-10-CM

## 2019-02-15 PROCEDURE — 97110 THERAPEUTIC EXERCISES: CPT | Mod: PN

## 2019-02-15 PROCEDURE — 97116 GAIT TRAINING THERAPY: CPT | Mod: PN

## 2019-02-15 NOTE — PROGRESS NOTES
Name: Marck Ramos  Clinic Number: [id number]  Date of Treatment: 02/15/2019   Diagnosis:   Encounter Diagnoses   Name Primary?    S/P ORIF (open reduction internal fixation) fracture Yes    Left leg weakness     Left hip pain        Time in: 1505  Time Out: 1600  Total Treatment Time: 55  Group Time: NA      Subjective:    Marck reports no c/o pain at rest.  Patient reports their pain to be n/a/10 on a 0-10 scale with 0 being no pain and 10 being the worst pain imaginable.    Objective    Marck received therapeutic exercises to develop strength, endurance and balance for 55 minutes including:     X 20 seated marina. LE there ex = marching (3#), LAQ (3#), ball sq, hip ABD (GTB)  X 20 supine marina. LE there ex (3#) = HS, hip ABD/ADD, SAQ   X 10 min NuStep (L4)  Amb. 200 ft w/ SC and CGA  X 15 ft ea balance training in // bars = side stepping, backwards gt, heel-toe  X 20 ea AirEx mini sq and HR/TR    Assessment:     Mr. Ramos was able to esthela. tx session w/out increase in symptoms.    Pt will continue to benefit from skilled PT intervention. Medical Necessity is demonstrated by:  Unable to participate fully in daily activities and Weakness.    Patient is making good progress towards established goals.    New/Revised goals: NA      Plan:  Continue with established Plan of Care towards PT goals.

## 2019-02-20 ENCOUNTER — CLINICAL SUPPORT (OUTPATIENT)
Dept: REHABILITATION | Facility: HOSPITAL | Age: 84
End: 2019-02-20
Attending: ORTHOPAEDIC SURGERY
Payer: MEDICARE

## 2019-02-20 DIAGNOSIS — R29.898 LEFT LEG WEAKNESS: ICD-10-CM

## 2019-02-20 DIAGNOSIS — M25.552 LEFT HIP PAIN: ICD-10-CM

## 2019-02-20 PROCEDURE — 97010 HOT OR COLD PACKS THERAPY: CPT | Mod: PN

## 2019-02-20 PROCEDURE — 97110 THERAPEUTIC EXERCISES: CPT | Mod: PN

## 2019-02-20 NOTE — PROGRESS NOTES
"Name: Marck Ramos  Clinic Number: [id number]  Date of Treatment: 02/20/2019   Diagnosis:   Encounter Diagnoses   Name Primary?    Left leg weakness     Left hip pain        Time in: 1302  Time Out: 1400  Total Treatment Time: 58  Group Time: 0      Subjective:    Marck reports increased L hip soreness after last session.  Patient reports their pain to be 2-3/10 on a 0-10 scale with 0 being no pain and 10 being the worst pain imaginable.    Objective    Marck received therapeutic exercises to develop strength and endurance for 48 minutes including:     NuStep Lv4 10'  Ball squeeze in sit x30  Clams in sit x30 Bllue Tband  LAQ and march in sit 3# 3x10 R/L  Step Up 6"  Box step x20 L  Side step 6" box x10 L<>R  Stand ex // bars 2x10: march, HS curl, hip ext (VC/TC to increase Weight shift to L during stance)    Cold pack to L hip 10' in sit    Gait training with RW with assist for forward progression 100'      Pt demo good understanding of the education provided. Marck demonstrated good return demonstration of activities.     Assessment:     Pt will continue to benefit from skilled PT intervention. Medical Necessity is demonstrated by:  Pain limits function of effected part for some activities, Unable to participate fully in daily activities, Requires skilled supervision to complete and progress HEP and Weakness.    Patient is making fair progress towards established goals.    Plan:    Continue with established Plan of Care towards PT goals.   "

## 2019-02-25 ENCOUNTER — CLINICAL SUPPORT (OUTPATIENT)
Dept: REHABILITATION | Facility: HOSPITAL | Age: 84
End: 2019-02-25
Attending: ORTHOPAEDIC SURGERY
Payer: MEDICARE

## 2019-02-25 DIAGNOSIS — R29.898 LEFT LEG WEAKNESS: ICD-10-CM

## 2019-02-25 DIAGNOSIS — M25.552 LEFT HIP PAIN: ICD-10-CM

## 2019-02-25 PROCEDURE — 97110 THERAPEUTIC EXERCISES: CPT | Mod: PN

## 2019-02-25 PROCEDURE — 97010 HOT OR COLD PACKS THERAPY: CPT | Mod: PN

## 2019-02-25 NOTE — PROGRESS NOTES
"Name: Marck Ramos  Clinic Number: [id number]  Date of Treatment: 02/25/2019   Diagnosis:   Encounter Diagnoses   Name Primary?    Left leg weakness     Left hip pain        Time in: 1405  Time Out: 1455  Total Treatment Time: 50      Subjective:    Marck reports discomfort from greater trochanter down to knee.  Patient reports their pain to be 3/10 on a 0-10 scale with 0 being no pain and 10 being the worst pain imaginable.    Objective  Marck received therapeutic exercises to develop strength, endurance, ROM and flexibility for 50 minutes including:    NuStep Lv4 10'   Ball squeeze in sit x30   Clams in sit x30 Bllue Tband   LAQ and march in sit 3# 3x10 R/L   Step Up 6"  Box step x20 L   Side step 6" box x10 L<>R   Stand ex // bars 2x10: march, HS curl, hip ext (VC/TC to increase Weight shift to L during stance)     Written Home Exercises Provided: Cont with HEP  Pt demo good understanding of the education provided. Marck demonstrated good return demonstration of activities.     Assessment:   Patient fatigues quickly with standing therex.     Pt will continue to benefit from skilled PT intervention. Medical Necessity is demonstrated by:  Fall Risk, Continued inability to participate in vocational pursuits, Pain limits function of effected part for some activities, Requires skilled supervision to complete and progress HEP and Weakness.    Patient is making fair progress towards established goals.    Plan:  Continue with established Plan of Care towards PT goals.   "

## 2019-02-28 LAB
% SATURATION: 44 %
ALBUMIN SERPL-MCNC: 3.1 G/DL (ref 3.1–4.7)
ALP SERPL-CCNC: 89 IU/L (ref 40–104)
ALT (SGPT): 9 IU/L (ref 3–33)
AST SERPL-CCNC: 28 IU/L (ref 10–40)
BASOPHILS NFR BLD: 0.1 K/UL (ref 0–0.2)
BASOPHILS NFR BLD: 1 %
BILIRUB SERPL-MCNC: 1 MG/DL (ref 0.3–1)
BUN SERPL-MCNC: 23 MG/DL (ref 8–20)
CALCIUM SERPL-MCNC: 8.8 MG/DL (ref 7.7–10.4)
CHLORIDE: 106 MMOL/L (ref 98–110)
CO2 SERPL-SCNC: 26.9 MMOL/L (ref 22.8–31.6)
CREATININE: 0.9 MG/DL (ref 0.6–1.4)
EOSINOPHIL NFR BLD: 0.3 K/UL (ref 0–0.7)
EOSINOPHIL NFR BLD: 4.4 %
ERYTHROCYTE [DISTWIDTH] IN BLOOD BY AUTOMATED COUNT: 14.4 % (ref 11.7–14.9)
FERRITIN SERPL-MCNC: 117 NG/ML (ref 37–201)
GLUCOSE: 109 MG/DL (ref 70–99)
GRAN #: 3 K/UL (ref 1.4–6.5)
GRAN%: 42.4 %
HCT VFR BLD AUTO: 40.4 % (ref 39–55)
HGB BLD-MCNC: 13.1 G/DL (ref 14–16)
IMMATURE GRANS (ABS): 0 K/UL (ref 0–1)
IMMATURE GRANULOCYTES: 0.1 %
IRON: 90 MCG/DL (ref 32–176)
LYMPH #: 3.1 K/UL (ref 1.2–3.4)
LYMPH%: 43.2 %
MCH RBC QN AUTO: 32.1 PG (ref 25–35)
MCHC RBC AUTO-ENTMCNC: 32.4 G/DL (ref 31–36)
MCV RBC AUTO: 99 FL (ref 80–100)
MONO #: 0.6 K/UL (ref 0.1–0.6)
MONO%: 8.9 %
NUCLEATED RBCS: 0 %
PLATELET # BLD AUTO: 171 K/UL (ref 140–440)
PMV BLD AUTO: 10.7 FL (ref 8.8–12.7)
POTASSIUM SERPL-SCNC: 4.7 MMOL/L (ref 3.5–5)
PROT SERPL-MCNC: 6.8 G/DL (ref 6–8.2)
RBC # BLD AUTO: 4.08 M/UL (ref 4.3–5.9)
SODIUM: 140 MMOL/L (ref 134–144)
TOTAL IRON BINDING CAPACITY: 206 MCG/DL (ref 177–435)
WBC # BLD AUTO: 7.1 K/UL (ref 5–10)

## 2019-03-01 ENCOUNTER — CLINICAL SUPPORT (OUTPATIENT)
Dept: REHABILITATION | Facility: HOSPITAL | Age: 84
End: 2019-03-01
Attending: PHYSICAL MEDICINE & REHABILITATION
Payer: MEDICARE

## 2019-03-01 DIAGNOSIS — M25.552 LEFT HIP PAIN: ICD-10-CM

## 2019-03-01 DIAGNOSIS — R29.898 LEFT LEG WEAKNESS: ICD-10-CM

## 2019-03-01 PROCEDURE — 97110 THERAPEUTIC EXERCISES: CPT | Mod: PN

## 2019-03-01 PROCEDURE — 97010 HOT OR COLD PACKS THERAPY: CPT | Mod: PN

## 2019-03-01 NOTE — PROGRESS NOTES
Name: Marck Ramos  Clinic Number: [id number]  Date of Treatment: 03/01/2019   Diagnosis:   Encounter Diagnoses   Name Primary?    Left leg weakness     Left hip pain        Time in: 1300  Time Out: 1350  Total Treatment Time: 50  Group Time: 0      Subjective:    Marck reports continued hip soreness upon arrival to clinic, reports decreased after NuStep exercise.  Patient reports their pain to be 3/10 on a 0-10 scale with 0 being no pain and 10 being the worst pain imaginable.    Objective    Marck received therapeutic exercises to develop strength, endurance and balance for 40 minutes including:     NuStep Lv5 10'  LAQ and march in sit 2x10 R/L    Amb with SC CGA 3x100' with sit rest in between    Amb with ' with assist to increase step through on R and decrease UE WB during L stance    Pt demo good understanding of the education provided. Marck demonstrated good return demonstration of activities.     Assessment:     Pt will continue to benefit from skilled PT intervention. Medical Necessity is demonstrated by:  Pain limits function of effected part for some activities, Unable to participate fully in daily activities, Requires skilled supervision to complete and progress HEP and Weakness. Reporting L knee soreness with increased ambulation with SC.    Plan:    Continue with established Plan of Care towards PT goals.

## 2019-03-05 NOTE — PROGRESS NOTES
"   [facility] Hematology/Oncology  PROGRESS NOTE      Subjective:       Patient ID:   NAME: Marck Ramos : 1921     89 y.o. male    Referring Doc: Jagdeep  Other Physicians: Ramesh Mares Albright    Chief Complaint:  Anemia f/u    History of Present Illness:     Patient returns today for a regularly scheduled follow-up visit.  The patient is doing ok with no new issues. He is feeling "good ok"; no CP, SOB, HA's or N/V. He is here with his daughter.  He had fall with hip fracture last Aug 2018. He has some chronic pain issues. He uses a walker. He also had pulmonary emboli and is now on eliquis.             ROS:   GEN: normal without any fever, night sweats or weight loss  HEENT: normal with no HA's, sore throat, stiff neck, changes in vision  CV: normal with no CP, SOB, PND, CARRERO or orthopnea  PULM: normal with no SOB, cough, hemoptysis, sputum or pleuritic pain  GI: normal with no abdominal pain, nausea, vomiting, constipation, diarrhea, melanotic stools, BRBPR, or hematemesis  : normal with no hematuria, dysuria  BREAST: normal with no mass, discharge, pain  SKIN: normal with no rash, erythema, bruising, or swelling    Allergies:  Review of patient's allergies indicates:   Allergen Reactions    Sulfa (sulfonamide antibiotics)      Patient can not recall reaction        Medications:    Current Outpatient Medications:     amlodipine (NORVASC) 5 MG tablet, Take 5 mg by mouth once daily., Disp: , Rfl:     aspirin (ECOTRIN) 81 MG EC tablet, Take 81 mg by mouth once daily.  , Disp: , Rfl:     docusate sodium (COLACE) 100 MG capsule, Take 1 capsule (100 mg total) by mouth 2 (two) times daily., Disp: , Rfl: 0    ELIQUIS 5 mg Tab, , Disp: , Rfl:     ferrous sulfate 325 mg (65 mg iron) Tab tablet, Take 325 mg by mouth daily with breakfast., Disp: , Rfl:     furosemide (LASIX) 40 MG tablet, Take 1 tablet (40 mg total) by mouth once daily. (Patient taking differently: Take 20 mg by mouth once daily. ), Disp: 30 " tablet, Rfl: 11    isosorbide mononitrate (IMDUR) 60 MG 24 hr tablet, , Disp: , Rfl:     losartan (COZAAR) 50 MG tablet, Take 50 mg by mouth 2 (two) times daily., Disp: , Rfl:     magnesium 200 mg Tab, Take 1 tablet by mouth once daily., Disp: , Rfl:     pantoprazole (PROTONIX) 40 MG tablet, Take 40 mg by mouth once daily., Disp: , Rfl:     potassium chloride SA (K-DUR,KLOR-CON) 20 MEQ tablet, Take 1 tablet (20 mEq total) by mouth once daily., Disp: 30 tablet, Rfl: 0    simvastatin (ZOCOR) 20 MG tablet, Take 20 mg by mouth every evening. , Disp: , Rfl:     sotalol (BETAPACE) 120 MG Tab, Take 80 mg by mouth once daily. , Disp: , Rfl:     sotalol (BETAPACE) 80 MG tablet, , Disp: , Rfl: 3    tamsulosin (FLOMAX) 0.4 mg Cap, Take 1 capsule (0.4 mg total) by mouth once daily., Disp: 30 capsule, Rfl: 11    vit C/E/Zn/coppr/lutein/zeaxan (PRESERVISION AREDS-2 ORAL), Take 2 tablets by mouth 2 (two) times daily., Disp: , Rfl:     PMHx/PSHx Updates:  See patient's last visit with me on 7/30/2018  See H&P on  1/20/16        Pathology:  Cancer Staging  No matching staging information was found for the patient.          Objective:     Vitals:  Blood pressure (!) 144/70, pulse 75, temperature 97.5 °F (36.4 °C), resp. rate 20, weight 61.6 kg (135 lb 12.8 oz).    Physical Examination:   GEN: no apparent distress, comfortable; AAOx3  HEAD: atraumatic and normocephalic  EYES: no pallor, no icterus, PERRLA  ENT: OMM, no pharyngeal erythema, external ears WNL; no nasal discharge; no thrush  NECK: no masses, thyroid normal, trachea midline, no LAD/LN's, supple  CV: RRR with no murmur; normal pulse; normal S1 and S2; no pedal edema  CHEST: Normal respiratory effort; CTAB; normal breath sounds; no wheeze or crackles  ABDOM: nontender and nondistended; soft; normal bowel sounds; no rebound/guarding  MUSC/Skeletal: ROM normal; no crepitus; joints normal; no deformities or arthropathy; s/p left hip fracture and uses walker  EXTREM:  no clubbing, cyanosis, inflammation or swelling  SKIN: no rashes, lesions, ulcers, petechiae or subcutaneous nodules  : no juarez  NEURO: grossly intact; motor/sensory WNL; AAOx3; no tremors  PSYCH: normal mood, affect and behavior  LYMPH: normal cervical, supraclavicular, axillary and groin LN's            Labs:       2/28/2019      Sodium   Date Value Ref Range Status   02/28/2019 140 134 - 144 mmol/L      Potassium   Date Value Ref Range Status   02/28/2019 4.7 3.5 - 5.0 mmol/L      Chloride   Date Value Ref Range Status   02/28/2019 106 98 - 110 mmol/L      CO2   Date Value Ref Range Status   02/28/2019 26.9 22.8 - 31.6 mmol/L      Glucose   Date Value Ref Range Status   02/28/2019 109 (H) 70 - 99 mg/dL      BUN, Bld   Date Value Ref Range Status   02/28/2019 23 (H) 8 - 20 mg/dL      Creatinine   Date Value Ref Range Status   02/28/2019 0.90 0.60 - 1.40 mg/dL      Calcium   Date Value Ref Range Status   02/28/2019 8.8 7.7 - 10.4 mg/dL      Total Protein   Date Value Ref Range Status   02/28/2019 6.8 6.0 - 8.2 g/dL      Albumin   Date Value Ref Range Status   02/28/2019 3.1 3.1 - 4.7 g/dL      Total Bilirubin   Date Value Ref Range Status   02/28/2019 1.0 0.3 - 1.0 mg/dL      Alkaline Phosphatase   Date Value Ref Range Status   02/28/2019 89 40 - 104 IU/L      AST   Date Value Ref Range Status   02/28/2019 28 10 - 40 IU/L      ALT   Date Value Ref Range Status   09/08/2018 10 10 - 44 U/L Final     Anion Gap   Date Value Ref Range Status   09/20/2018 8 8 - 16 mmol/L Final     eGFR if    Date Value Ref Range Status   09/20/2018 >60 >60 mL/min/1.73 m^2 Final     eGFR if non    Date Value Ref Range Status   09/20/2018 >60 >60 mL/min/1.73 m^2 Final     Comment:     Calculation used to obtain the estimated glomerular filtration  rate (eGFR) is the CKD-EPI equation.        Lab Results   Component Value Date    IRON 90 02/28/2019    TIBC 206 02/28/2019    FERRITIN 117 02/28/2019     Lab  Results   Component Value Date    WBC 7.1 02/28/2019    HGB 13.1 (L) 02/28/2019    HCT 40.4 02/28/2019    MCV 99.0 02/28/2019     02/28/2019           Radiology/Diagnostic Studies:        I have reviewed all available lab results and radiology reports.    Assessment/Plan:   (1) 89 y.o. male with diagnosis of chronic anemia  - multifactorial in etiology  - underlying iron deficiency issues, anemia of chronic disorders and anemia of chronic renal  - prior chronic GI blood loss issues as well  - s/p IV iron in past  - followed by Dr Batista with GI  - last available labs are from May 2018 and hgb was at 13.1 and fairly adequate   - last iron was 90    (2) CAD and CHF - followed by Dr Mares    (3) Hip fracture in Aug 2018    (4) Pulmonary emboli in Oct 2018 - since of Eliquis - managed by Dr Mares    1. Iron deficiency anemia, unspecified iron deficiency anemia type     2. 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 Gastrointestinal hemorrhage, unspecified gastrointestinal hemorrhage type     6. Other pulmonary embolism with acute cor pulmonale, unspecified chronicity           PLAN:  1. Check labs every 3 months   2. F/u with PCP, card and GI  3. RTC 6 months    Fax note to Dr Batista, Suzan and Jagdeep    I spent over 15 mins with the patient, of which over half was face to face with the patient. Reviewing materials, labs, reports and studies. Making treatment and analytical decisions. Ordering necessary labs, tests and studies.      Discussion:     I have explained all of the above in detail and the patient understands all of the current recommendation(s). I have answered all of their questions to the best of my ability and to their complete satisfaction.   The patient is to continue with the current management plan.    RTC in  6 months          Electronically signed by Teodoro Mueller MD

## 2019-03-06 ENCOUNTER — OFFICE VISIT (OUTPATIENT)
Dept: HEMATOLOGY/ONCOLOGY | Facility: CLINIC | Age: 84
End: 2019-03-06
Payer: MEDICARE

## 2019-03-06 VITALS
TEMPERATURE: 98 F | HEART RATE: 75 BPM | DIASTOLIC BLOOD PRESSURE: 70 MMHG | WEIGHT: 135.81 LBS | SYSTOLIC BLOOD PRESSURE: 144 MMHG | RESPIRATION RATE: 20 BRPM | BODY MASS INDEX: 21.92 KG/M2

## 2019-03-06 DIAGNOSIS — D63.8 ANEMIA, CHRONIC DISEASE: ICD-10-CM

## 2019-03-06 DIAGNOSIS — N18.2 ANEMIA IN STAGE 2 CHRONIC KIDNEY DISEASE: ICD-10-CM

## 2019-03-06 DIAGNOSIS — D50.9 IRON DEFICIENCY ANEMIA, UNSPECIFIED IRON DEFICIENCY ANEMIA TYPE: Primary | ICD-10-CM

## 2019-03-06 DIAGNOSIS — D63.1 ANEMIA IN STAGE 2 CHRONIC KIDNEY DISEASE: ICD-10-CM

## 2019-03-06 DIAGNOSIS — I26.09 OTHER PULMONARY EMBOLISM WITH ACUTE COR PULMONALE, UNSPECIFIED CHRONICITY: ICD-10-CM

## 2019-03-06 DIAGNOSIS — D50.0 IRON DEFICIENCY ANEMIA SECONDARY TO BLOOD LOSS (CHRONIC): ICD-10-CM

## 2019-03-06 DIAGNOSIS — K92.2 GASTROINTESTINAL HEMORRHAGE, UNSPECIFIED GASTROINTESTINAL HEMORRHAGE TYPE: ICD-10-CM

## 2019-03-06 PROCEDURE — 99213 PR OFFICE/OUTPT VISIT, EST, LEVL III, 20-29 MIN: ICD-10-PCS | Mod: ,,, | Performed by: INTERNAL MEDICINE

## 2019-03-06 PROCEDURE — 99213 OFFICE O/P EST LOW 20 MIN: CPT | Mod: ,,, | Performed by: INTERNAL MEDICINE

## 2019-03-06 NOTE — LETTER
March 6, 2019      Hood Gannon MD  [address]  [address]  [place] [address]           [facility] - Hematology Oncology  [address]  [address]  [place] [address]  Phone: [phone]  Fax: [phone]          Patient: Marck Ramos   MR Number: [id number]   YOB: 1921   Date of Visit: 3/6/2019       Dear Dr. Hood Gannon:    Thank you for referring Marck Ramos to me for evaluation. Attached you will find relevant portions of my assessment and plan of care.    If you have questions, please do not hesitate to call me. I look forward to following Marck Ramos along with you.    Sincerely,    Teodoro Mueller MD    Enclosure  CC:  No Recipients    If you would like to receive this communication electronically, please contact [email or web address] or [phone] to request more information on [employer] access.    For providers and/or their staff who would like to refer a patient to [facility], please contact us through our one-stop-shop provider referral line, [facility], at [phone].    If you feel you have received this communication in error or would no longer like to receive these types of communications, please e-mail [email or web address]

## 2019-03-08 ENCOUNTER — CLINICAL SUPPORT (OUTPATIENT)
Dept: REHABILITATION | Facility: HOSPITAL | Age: 84
End: 2019-03-08
Attending: PHYSICAL MEDICINE & REHABILITATION
Payer: MEDICARE

## 2019-03-08 DIAGNOSIS — R29.898 LEFT LEG WEAKNESS: ICD-10-CM

## 2019-03-08 DIAGNOSIS — M25.552 LEFT HIP PAIN: ICD-10-CM

## 2019-03-08 PROCEDURE — 97010 HOT OR COLD PACKS THERAPY: CPT | Mod: PN

## 2019-03-08 PROCEDURE — 97140 MANUAL THERAPY 1/> REGIONS: CPT | Mod: PN

## 2019-03-08 PROCEDURE — 97110 THERAPEUTIC EXERCISES: CPT | Mod: PN

## 2019-03-11 ENCOUNTER — CLINICAL SUPPORT (OUTPATIENT)
Dept: REHABILITATION | Facility: HOSPITAL | Age: 84
End: 2019-03-11
Attending: PHYSICAL MEDICINE & REHABILITATION
Payer: MEDICARE

## 2019-03-11 DIAGNOSIS — M25.552 LEFT HIP PAIN: ICD-10-CM

## 2019-03-11 DIAGNOSIS — R29.898 LEFT LEG WEAKNESS: ICD-10-CM

## 2019-03-11 PROCEDURE — 97110 THERAPEUTIC EXERCISES: CPT | Mod: PN

## 2019-03-11 NOTE — PROGRESS NOTES
Name: Marck Ramos  Clinic Number: [id number]  Date of Treatment: 03/11/2019   Diagnosis:   Encounter Diagnoses   Name Primary?    Left leg weakness     Left hip pain        Time in: 1100  Time Out: 1200  Total Treatment Time: 60   Group Time: 0       Subjective:    Marck reports improvement of symptoms.  Patient reports their pain to be 1/10 on a 0-10 scale with 0 being no pain and 10 being the worst pain imaginable.    Objective    Marck received therapeutic exercises to develop strength and endurance for 50 minutes including:     NuStep Lv5 10'  Sit ex 3# 3x10: LAQ, march, ball squeeze, clams blue Tband R/L  Sciatic nerve glide x30 sit R/L  Gait training with ' CGA, ' with assist for forward progression (cues to decrease CELI)    Cold pack to L hip and L knee 10'    Pt demo fair understanding of the education provided. Marck demonstrated good return demonstration of activities with cues and occasional assist.     Assessment:     Pt will continue to benefit from skilled PT intervention. Medical Necessity is demonstrated by:  Pain limits function of effected part for some activities, Unable to participate fully in daily activities, Requires skilled supervision to complete and progress HEP and Weakness.    Patient is making fair progress towards established goals.    Plan:    Continue with established Plan of Care towards PT goals.

## 2019-03-13 ENCOUNTER — CLINICAL SUPPORT (OUTPATIENT)
Dept: REHABILITATION | Facility: HOSPITAL | Age: 84
End: 2019-03-13
Attending: PHYSICAL MEDICINE & REHABILITATION
Payer: MEDICARE

## 2019-03-13 DIAGNOSIS — Z98.890 S/P ORIF (OPEN REDUCTION INTERNAL FIXATION) FRACTURE: Primary | ICD-10-CM

## 2019-03-13 DIAGNOSIS — M25.552 LEFT HIP PAIN: ICD-10-CM

## 2019-03-13 DIAGNOSIS — R29.898 LEFT LEG WEAKNESS: ICD-10-CM

## 2019-03-13 DIAGNOSIS — Z87.81 S/P ORIF (OPEN REDUCTION INTERNAL FIXATION) FRACTURE: Primary | ICD-10-CM

## 2019-03-13 PROCEDURE — 97116 GAIT TRAINING THERAPY: CPT | Mod: PN

## 2019-03-13 PROCEDURE — 97110 THERAPEUTIC EXERCISES: CPT | Mod: PN

## 2019-03-13 NOTE — PROGRESS NOTES
Name: Marck Ramos   Clinic Number: [id number]   Age: 89 y.o.   Diagnosis:   Encounter Diagnoses   Name Primary?    S/P ORIF (open reduction internal fixation) fracture Yes    Left leg weakness     Left hip pain       Physician: Grey Gonzalez MD   Original Orders : PT eval and treat  Initial visit: 12/04/18  Date of Last visit: 03/13/19  Date of Discharge Note:  03/13/19  Total Visits Received: 23    Subjective: agreeable to tx; reports 1/10 pain at rest    Objective:  Treatment :    Included:Therapeutic exercise, AROM, Proprioception exercises , Balance and Coordination ex and HEP        Treatment today:      X 20 seated marina. LE there ex = marching (4#), LAQ (4#), ball sq, hip ABD (BTB)  X 10 min NuStep (L4)  Amb. 300 ft w/ SC and CGA  reviewed HEP - understood importance of cont. to perform on his own to maintain therapeutic gains  Instructed to cont. use of RW instead of SC during amb. due to safety concerns    Time In: 1410  Time Out: 1455     Assessment:  MMT = 3-/5 lt hip; LEFS = 38/80; independent w/ HEP    Goals Achieved: 1 of 2 stg's met; 2 of 3 ltg's met    Discharge reason : Patient has maximized benefit from PT at this time    Discharge plan :Continue HEP and Pt to follow-up with MD as planned    Plan:  This patient is discharged from [facility] Therapy Services.

## 2019-04-05 NOTE — PROGRESS NOTES
"Name: Marck Ramos  Clinic Number: [id number]  Date of Treatment: 03/08/2019   Diagnosis:   Encounter Diagnoses   Name Primary?    Left leg weakness     Left hip pain        Time in: 1300  Time Out: 1350  Total Treatment Time: 50  Group Time: 0      Subjective:    Marck reports continued L hip pain from "hip to knee".  Patient reports their pain to be 3-4/10 on a 0-10 scale with 0 being no pain and 10 being the worst pain imaginable. Patient reports 0/10 pain after NuStep. Daughter suggesting L hip pain may due to "sciatica".    Objective    Marck received therapeutic exercises to develop strength, endurance and balance for 30 minutes including:     NuStep Lv5 10'  Sciatic nerve glide in sit x30 R/L  Gait training with SC 2x200' Maia > CGA      Marck received the following manual therapy techniques: Soft tissue Mobilization were applied to the: L lateral thigh from glut to knee with Thera roller for 10 minutes.     Cold pack x10' to L lateral thigh from hip to knee to decrease soreness    Pt demo good understanding of the education provided. Marck demonstrated good return demonstration of activities.     Assessment:     Pt will continue to benefit from skilled PT intervention. Medical Necessity is demonstrated by:  Pain limits function of effected part for some activities, Unable to participate fully in daily activities, Requires skilled supervision to complete and progress HEP and Weakness.    Plan:    Continue with established Plan of Care towards PT goals.   " Dr Andrea Alexander had some cancellations for 4/8. I called the pt and moved up her appt time to 0930am.     I went over the instructions with the pt as well.

## 2019-06-06 LAB
BASOPHILS NFR BLD: 0.1 K/UL (ref 0–0.2)
BASOPHILS NFR BLD: 0.7 %
EOSINOPHIL NFR BLD: 0.5 K/UL (ref 0–0.7)
EOSINOPHIL NFR BLD: 6.7 %
ERYTHROCYTE [DISTWIDTH] IN BLOOD BY AUTOMATED COUNT: 16.4 % (ref 11.7–14.9)
GRAN #: 2.8 K/UL (ref 1.4–6.5)
GRAN%: 41.7 %
HCT VFR BLD AUTO: 34 % (ref 39–55)
HGB BLD-MCNC: 11 G/DL (ref 14–16)
IMMATURE GRANS (ABS): 0 K/UL (ref 0–1)
IMMATURE GRANULOCYTES: 0.3 %
LYMPH #: 2.6 K/UL (ref 1.2–3.4)
LYMPH%: 39.1 %
MCH RBC QN AUTO: 32.4 PG (ref 25–35)
MCHC RBC AUTO-ENTMCNC: 32.4 G/DL (ref 31–36)
MCV RBC AUTO: 100.3 FL (ref 80–100)
MONO #: 0.8 K/UL (ref 0.1–0.6)
MONO%: 11.5 %
NUCLEATED RBCS: 0 %
PLATELET # BLD AUTO: 219 K/UL (ref 140–440)
PMV BLD AUTO: 9.1 FL (ref 8.8–12.7)
RBC # BLD AUTO: 3.39 M/UL (ref 4.3–5.9)
WBC # BLD AUTO: 6.8 K/UL (ref 5–10)

## 2019-08-19 ENCOUNTER — OFFICE VISIT (OUTPATIENT)
Dept: UROLOGY | Facility: CLINIC | Age: 84
End: 2019-08-19
Payer: MEDICARE

## 2019-08-19 VITALS
WEIGHT: 125.44 LBS | RESPIRATION RATE: 18 BRPM | HEART RATE: 80 BPM | BODY MASS INDEX: 20.16 KG/M2 | HEIGHT: 66 IN | DIASTOLIC BLOOD PRESSURE: 83 MMHG | SYSTOLIC BLOOD PRESSURE: 183 MMHG

## 2019-08-19 DIAGNOSIS — N40.0 BPH WITHOUT URINARY OBSTRUCTION: Primary | ICD-10-CM

## 2019-08-19 PROCEDURE — 99213 PR OFFICE/OUTPT VISIT, EST, LEVL III, 20-29 MIN: ICD-10-PCS | Mod: S$PBB,,, | Performed by: UROLOGY

## 2019-08-19 PROCEDURE — 99213 OFFICE O/P EST LOW 20 MIN: CPT | Mod: PBBFAC,PN | Performed by: UROLOGY

## 2019-08-19 PROCEDURE — 99213 OFFICE O/P EST LOW 20 MIN: CPT | Mod: S$PBB,,, | Performed by: UROLOGY

## 2019-08-19 PROCEDURE — 99999 PR PBB SHADOW E&M-EST. PATIENT-LVL III: ICD-10-PCS | Mod: PBBFAC,,, | Performed by: UROLOGY

## 2019-08-19 PROCEDURE — 99999 PR PBB SHADOW E&M-EST. PATIENT-LVL III: CPT | Mod: PBBFAC,,, | Performed by: UROLOGY

## 2019-08-24 RX ORDER — TAMSULOSIN HYDROCHLORIDE 0.4 MG/1
0.4 CAPSULE ORAL DAILY
Qty: 30 CAPSULE | Refills: 11 | Status: SHIPPED | OUTPATIENT
Start: 2019-08-24 | End: 2020-07-30 | Stop reason: SDUPTHER

## 2019-08-24 NOTE — PROGRESS NOTES
"[facility] Urology Progress Note    Marck Ramos is a 89 y.o. male who presents for bph fu with hx urinary retention    Pt had postop retention after L hip fracture repair 8/29 at [facility]. Reportedly on 8/27 presentation to er from fall, juarez placement caused significant gross hematuria. When removed 8/30 also noted to be bloody. Pt chronically anticoagulated. Nursing attempted juarez replacement multiple times but could not pass juarez. This required bedside cystoscopic juarez placement 2/2 prostatic urethral trauma/false passage to place juarez on 8/30/18.  He was discharged to [facility] inpatient rehab, and juarez was removed 9/11/18. Post void residual was checked and found to be 0cc. He was advised to continue flomax and f/u 4-6 weeks later  Was on flomax at baseline though had never seen urologist.  PMHx: CAD, PAF, RCA stent, HTN, HLD, GERD, BPH     Last seen 11/2018 with no interim voiding issues. He states that he was never having any voiding difficulty prior to his hip fracture as above.  Since his Juarez catheter has been removed he has been urinating well. Denies hesitancy, intermittency, weak stream, hematuria, dysuria. AUA symptom score:  2/1, pleased (nocturia x2).  PVR 110cc  He did unfortunately have shortness of breath 2 weeks and his outpatient rehab and was found to have 4 small pulmonary embolisms and is now on blood thinners     He returns today noting that he is still voiding well.    He denies all obstructive lower urinary tract symptoms except for mild weak stream  He has continued Flomax  He denies hematuria, dysuria  Rarely gets up at night to urinate.  Urinated just prior to arrival in postvoid residual by bladder scan is 41 cc      ROS: A comprehensive 10 system review was performed and is negative except as noted above in HPI    PHYSICAL EXAM:    Vitals:    08/19/19 1516   BP: (!) 183/83   Pulse: 80   Resp: 18     Body mass index is 20.25 kg/m². Weight: 56.9 kg (125 lb 7.1 oz) Height: 5' 6" (167.6 " cm)       General: Alert, cooperative, no distress, appears stated age   Head: Normocephalic, without obvious abnormality, atraumatic   Eyes: PERRL, conjunctiva/corneas clear   Lungs: Respirations unlabored   Heart: Warm and well perfused   Abdomen: soft NT ND  Extremities: Extremities normal, atraumatic, no cyanosis or edema   Skin: Skin color, texture, turgor normal, no rashes or lesions   Psych: Appropriate   Neurologic: Non-focal       ASSESSMENT   1. BPH without urinary obstruction         Plan    No voiding complaints at this time.  Emptying well.  Maintain on Flomax, doing well.  Flomax renewed.  Can follow up on as-needed basis only and his annual Flomax can be renewed by his primary care physician.  All questions patient had were answered in detail and he is agreeable to treatment plan.

## 2019-08-28 ENCOUNTER — OFFICE VISIT (OUTPATIENT)
Dept: PODIATRY | Facility: CLINIC | Age: 84
End: 2019-08-28
Payer: MEDICARE

## 2019-08-28 VITALS
DIASTOLIC BLOOD PRESSURE: 72 MMHG | SYSTOLIC BLOOD PRESSURE: 118 MMHG | HEIGHT: 66 IN | WEIGHT: 125 LBS | HEART RATE: 68 BPM | BODY MASS INDEX: 20.09 KG/M2

## 2019-08-28 DIAGNOSIS — B35.1 ONYCHOMYCOSIS DUE TO DERMATOPHYTE: ICD-10-CM

## 2019-08-28 DIAGNOSIS — M79.672 LEFT FOOT PAIN: Primary | ICD-10-CM

## 2019-08-28 PROCEDURE — 99999 PR PBB SHADOW E&M-EST. PATIENT-LVL IV: CPT | Mod: PBBFAC,,, | Performed by: PODIATRIST

## 2019-08-28 PROCEDURE — 99203 OFFICE O/P NEW LOW 30 MIN: CPT | Mod: S$PBB,,, | Performed by: PODIATRIST

## 2019-08-28 PROCEDURE — 99214 OFFICE O/P EST MOD 30 MIN: CPT | Mod: PBBFAC | Performed by: PODIATRIST

## 2019-08-28 PROCEDURE — 99999 PR PBB SHADOW E&M-EST. PATIENT-LVL IV: ICD-10-PCS | Mod: PBBFAC,,, | Performed by: PODIATRIST

## 2019-08-28 PROCEDURE — 99203 PR OFFICE/OUTPT VISIT, NEW, LEVL III, 30-44 MIN: ICD-10-PCS | Mod: S$PBB,,, | Performed by: PODIATRIST

## 2019-08-28 RX ORDER — ISOSORBIDE MONONITRATE 60 MG/1
60 TABLET, EXTENDED RELEASE ORAL DAILY
COMMUNITY
End: 2020-04-08 | Stop reason: SDUPTHER

## 2019-08-28 RX ORDER — PANTOPRAZOLE SODIUM 40 MG/1
40 TABLET, DELAYED RELEASE ORAL DAILY
COMMUNITY
End: 2020-10-08 | Stop reason: SDUPTHER

## 2019-08-28 NOTE — PROGRESS NOTES
[address] [facility] [address]  [place] [address]  Phone: [phone]   [phone]    Patient's PCP:Hood Gannon MD  Referring Provider: No ref. provider found    Subjective:      Chief Complaint:: Heel Pain (left) and Nail Care    HPI  Marck Ramos is a 89 y.o. male who presents with a complaint of pain in his left heel.  Patient states having intermittent issues with pain in the heel.  Patient states this most recently happened while he was in a rehab facility following a hip fracture.  Patient states that he was given heel protector boots but he does not like to wear them.  Patient's daughter states that he is seated or in bed for the majority of the day.    Patient also complains of thickened elongated toenails.  He states he has difficulty in trimming the nails.  He denies any pain associated with the nails.  He has not tried any treatment for the nails.      Systemic Doctor: Hood Gannon MD  Date Last Seen:7-19    Vitals:    08/28/19 1410   BP: 118/72   Pulse: 68     Shoe Size:     Past Surgical History:   Procedure Laterality Date    Closed reduction & internal fixation of closed, comminuted, displaced intertrochanteric fx left hip w/ TFN Left 08/29/2018     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    Iron deficiency anemia, unspecified 7/18/2017    Other pulmonary embolism with acute cor pulmonale 3/6/2019    Pulmonary emboli      History reviewed. No pertinent family history.     Social History:   Marital Status:   Alcohol History:  reports that he does not drink alcohol.  Tobacco History:  reports that he has quit smoking. His smoking use included cigarettes. He  has never used smokeless tobacco.  Drug History:  reports that he does not use drugs.    Review of patient's allergies indicates:   Allergen Reactions    Sulfa (sulfonamide antibiotics)      Patient can not recall reaction        Current Outpatient Medications   Medication Sig Dispense Refill    amlodipine (NORVASC) 5 MG tablet Take 5 mg by mouth once daily.      aspirin (ECOTRIN) 81 MG EC tablet Take 81 mg by mouth once daily.        ELIQUIS 5 mg Tab       furosemide (LASIX) 40 MG tablet Take 1 tablet (40 mg total) by mouth once daily. (Patient taking differently: Take 20 mg by mouth once daily. ) 30 tablet 11    isosorbide mononitrate (IMDUR) 60 MG 24 hr tablet Take 60 mg by mouth once daily.      losartan (COZAAR) 50 MG tablet Take 50 mg by mouth 2 (two) times daily.      pantoprazole (PROTONIX) 40 MG tablet Take 40 mg by mouth once daily.      potassium chloride SA (K-DUR,KLOR-CON) 20 MEQ tablet Take 1 tablet (20 mEq total) by mouth once daily. 30 tablet 0    simvastatin (ZOCOR) 20 MG tablet Take 20 mg by mouth every evening.       sotalol (BETAPACE) 80 MG tablet   3    tamsulosin (FLOMAX) 0.4 mg Cap Take 1 capsule (0.4 mg total) by mouth once daily. 30 capsule 11    vit C/E/Zn/coppr/lutein/zeaxan (PRESERVISION AREDS-2 ORAL) Take 2 tablets by mouth 2 (two) times daily.      magnesium 200 mg Tab Take 1 tablet by mouth once daily.       No current facility-administered medications for this visit.        Review of Systems      Objective:        Physical Exam:   Foot Exam    General  Orientation: alert and oriented to person, place, and time   Affect: appropriate   Gait: antalgic   Assistance: walker use       Right Foot/Ankle     Inspection and Palpation  Ecchymosis: none  Tenderness: none   Swelling: (Mild edema is lower extremity)  Hammertoes: second toe, third toe, fourth toe and fifth toe  Hallux valgus: no  Hallux limitus: no  Skin Exam: skin intact;     Neurovascular  Dorsalis pedis:  2+  Posterior tibial: 2+  Saphenous nerve sensation: normal  Tibial nerve sensation: normal  Superficial peroneal nerve sensation: normal  Deep peroneal nerve sensation: normal  Sural nerve sensation: normal    Muscle Strength  Ankle dorsiflexion: 4  Ankle plantar flexion: 4  Ankle inversion: 4  Ankle eversion: 4  Great toe extension: 4  Great toe flexion: 4    Comments  Nails 1 through 5 are thickened, discolored, dystrophic, and elongated with subungual debris      Left Foot/Ankle      Inspection and Palpation  Ecchymosis: none  Tenderness: none   Swelling: (Mild edema to the lower extremity)  Hammertoes: second toe, third toe, fourth toe and fifth toe  Hallux valgus: no  Hallux limitus: no  Skin Exam: skin intact;     Neurovascular  Dorsalis pedis: 2+  Posterior tibial: 2+  Saphenous nerve sensation: normal  Tibial nerve sensation: normal  Superficial peroneal nerve sensation: normal  Deep peroneal nerve sensation: normal  Sural nerve sensation: normal    Muscle Strength  Ankle dorsiflexion: 4  Ankle plantar flexion: 4  Ankle inversion: 4  Ankle eversion: 4  Great toe extension: 4  Great toe flexion: 4    Comments  Nails 1 through 5 are thickened, discolored, dystrophic, and elongated with subungual debris      Physical Exam   Cardiovascular:   Pulses:       Dorsalis pedis pulses are 2+ on the right side, and 2+ on the left side.        Posterior tibial pulses are 2+ on the right side, and 2+ on the left side.       Imaging: none            Assessment:       1. Left foot pain    2. Onychomycosis due to dermatophyte      Plan:   Left foot pain    Onychomycosis due to dermatophyte      Follow up if symptoms worsen or fail to improve, for future ACODE .    Procedures - None    I explained to the patient that the pain that he experiences in his left heel is due to continued pressure to the area.  I explained the importance of offloading his heel while seated or lying in bed.  I do recommend that he use either a  pillow behind his legs or the heel protector boots he was given in the rehab facility.    Counseling:     I provided patient education verbally regarding:   Patient diagnosis, treatment options, as well as alternatives, risks, and benefits.     Fungal infection of toenails explained. Treatment options including no treatment, periodic debridement, topical medications, oral medications, and removal of the nail were discussed, as well as success rates and risks of recurrence. We agreed on no treatment at this time      This note was created using Dragon voice recognition software that occasionally misinterpreted phrases or words.

## 2019-08-28 NOTE — PATIENT INSTRUCTIONS
Nail Fungal Infection  A nail fungal infection changes the way fingernails and toenails look. They may thicken, discolor, change shape, or split. This condition is hard to treat because nails grow slowly and have limited blood supply. The infection often comes back after treatment.  There are 2 types of medicines used to treat this condition:  · Topical anti-fungal medicines. These are applied to the surface of the skin and nail area. These medicines are not very effective because they cant get deep into the nail.  · Oral antifungal medicines. These medicines work better because they go into the nail from the inside out. But the infection may still come back. It may take 9 to 12 months for your nail to look normal again. This means you are cured. You can repeat treatment if needed. Most people take these medicines without any problems. It is rare to stop therapy because of side effects. But your healthcare provider may give you some monitoring tests. Talk about possible side effects with your provider before starting treatment.  If medicines fail, the nail can be removed surgically or chemically. These methods physically remove the fungus from the body. This helps medical treatment be more effective.  Home care  · Use medicines exactly as directed for as long as directed. Treating a fungal infection can take longer than other kinds of infections.  · Smoking is a risk factor for fungal infection. This is one more reason to quit.  · Wear absorbent socks, and shoes that let your feet breathe. Sweaty feet increase your risk of fungal infection. They also make an existing infection harder to treat.  · Use footwear when in damp public places like swimming pools, gyms, and shower rooms. This will help you avoid the fungus that grows there.  · Don't share nail clippers or scissors with others.  Follow-up care  Follow up with your healthcare provider, or as advised.  When to seek medical advice  Call your healthcare  provider right away if any of these occur:  · Skin by the nail becomes red, swollen, painful, or drains pus (a creamy yellow or white liquid)  · Side effects from oral anti-fungal medicines  Date Last Reviewed: 8/1/2016  © 9584-8709 [employer]. [address] [place] [address]. All rights reserved. This information is not intended as a substitute for professional medical care. Always follow your healthcare professional's instructions.

## 2019-08-30 ENCOUNTER — LAB VISIT (OUTPATIENT)
Dept: LAB | Facility: HOSPITAL | Age: 84
End: 2019-08-30
Attending: INTERNAL MEDICINE
Payer: MEDICARE

## 2019-08-30 DIAGNOSIS — D50.0 IRON DEFICIENCY ANEMIA SECONDARY TO BLOOD LOSS (CHRONIC): ICD-10-CM

## 2019-08-30 DIAGNOSIS — N18.2 ANEMIA IN STAGE 2 CHRONIC KIDNEY DISEASE: ICD-10-CM

## 2019-08-30 DIAGNOSIS — D63.8 ANEMIA, CHRONIC DISEASE: ICD-10-CM

## 2019-08-30 DIAGNOSIS — D63.1 ANEMIA IN STAGE 2 CHRONIC KIDNEY DISEASE: ICD-10-CM

## 2019-08-30 DIAGNOSIS — D50.9 IRON DEFICIENCY ANEMIA, UNSPECIFIED IRON DEFICIENCY ANEMIA TYPE: ICD-10-CM

## 2019-08-30 LAB
FERRITIN SERPL-MCNC: 111 NG/ML (ref 20–300)
IRON SERPL-MCNC: 42 UG/DL (ref 45–160)
SATURATED IRON: 22 % (ref 20–50)
TOTAL IRON BINDING CAPACITY: 190 UG/DL (ref 250–450)
TRANSFERRIN SERPL-MCNC: 136 MG/DL (ref 200–375)

## 2019-08-30 PROCEDURE — 82728 ASSAY OF FERRITIN: CPT

## 2019-08-30 PROCEDURE — 83540 ASSAY OF IRON: CPT

## 2019-08-30 PROCEDURE — 36415 COLL VENOUS BLD VENIPUNCTURE: CPT

## 2019-09-04 NOTE — PROGRESS NOTES
"   [facility] Hematology/Oncology  PROGRESS NOTE      Subjective:       Patient ID:   NAME: Marck Ramos : 1921     89 y.o. male    Referring Doc: Jagdeep  Other Physicians: Ramesh Mares Albright    Chief Complaint:  Anemia/pul emboli  f/u    History of Present Illness:     Patient returns today for a regularly scheduled follow-up visit.  The patient is doing ok with no new issues. He is feeling "good ok"; no CP, SOB, HA's or N/V. He is here with his daughter.  He has some chronic pain issues. He uses a walker. He also has had pulmonary emboli and has been on eliquis but at the lower dose of 2.5 po bid. He has not been taking his oral iron as instructed.             ROS:   GEN: normal without any fever, night sweats or weight loss  HEENT: normal with no HA's, sore throat, stiff neck, changes in vision  CV: normal with no CP, SOB, PND, CARRERO or orthopnea  PULM: normal with no SOB, cough, hemoptysis, sputum or pleuritic pain  GI: normal with no abdominal pain, nausea, vomiting, constipation, diarrhea, melanotic stools, BRBPR, or hematemesis  : normal with no hematuria, dysuria  BREAST: normal with no mass, discharge, pain  SKIN: normal with no rash, erythema, bruising, or swelling    Allergies:  Review of patient's allergies indicates:   Allergen Reactions    Sulfa (sulfonamide antibiotics)      Patient can not recall reaction        Medications:    Current Outpatient Medications:     amlodipine (NORVASC) 5 MG tablet, Take 5 mg by mouth once daily., Disp: , Rfl:     aspirin (ECOTRIN) 81 MG EC tablet, Take 81 mg by mouth once daily.  , Disp: , Rfl:     ELIQUIS 5 mg Tab, , Disp: , Rfl:     furosemide (LASIX) 40 MG tablet, Take 1 tablet (40 mg total) by mouth once daily. (Patient taking differently: Take 20 mg by mouth once daily. ), Disp: 30 tablet, Rfl: 11    isosorbide mononitrate (IMDUR) 60 MG 24 hr tablet, Take 60 mg by mouth once daily., Disp: , Rfl:     losartan (COZAAR) 50 MG tablet, Take 50 mg by " mouth 2 (two) times daily., Disp: , Rfl:     magnesium 200 mg Tab, Take 1 tablet by mouth once daily., Disp: , Rfl:     pantoprazole (PROTONIX) 40 MG tablet, Take 40 mg by mouth once daily., Disp: , Rfl:     potassium chloride SA (K-DUR,KLOR-CON) 20 MEQ tablet, Take 1 tablet (20 mEq total) by mouth once daily., Disp: 30 tablet, Rfl: 0    simvastatin (ZOCOR) 20 MG tablet, Take 20 mg by mouth every evening. , Disp: , Rfl:     sotalol (BETAPACE) 80 MG tablet, , Disp: , Rfl: 3    tamsulosin (FLOMAX) 0.4 mg Cap, Take 1 capsule (0.4 mg total) by mouth once daily., Disp: 30 capsule, Rfl: 11    vit C/E/Zn/coppr/lutein/zeaxan (PRESERVISION AREDS-2 ORAL), Take 2 tablets by mouth 2 (two) times daily., Disp: , Rfl:     PMHx/PSHx Updates:  See patient's last visit with me on 3/6/2019  See H&P on  1/20/16        Pathology:  Cancer Staging  No matching staging information was found for the patient.          Objective:     Vitals:  Blood pressure (!) 195/77, pulse 64, temperature 97.8 °F (36.6 °C), temperature source Oral, resp. rate 20, weight 57.7 kg (127 lb 3.2 oz).    Physical Examination:   GEN: no apparent distress, comfortable; AAOx3  HEAD: atraumatic and normocephalic  EYES: no pallor, no icterus, PERRLA  ENT: OMM, no pharyngeal erythema, external ears WNL; no nasal discharge; no thrush  NECK: no masses, thyroid normal, trachea midline, no LAD/LN's, supple  CV: RRR with no murmur; normal pulse; normal S1 and S2; no pedal edema  CHEST: Normal respiratory effort; CTAB; normal breath sounds; no wheeze or crackles  ABDOM: nontender and nondistended; soft; normal bowel sounds; no rebound/guarding  MUSC/Skeletal: ROM normal; no crepitus; joints normal; no deformities or arthropathy; s/p left hip fracture and uses walker  EXTREM: no clubbing, cyanosis, inflammation or swelling  SKIN: no rashes, lesions, ulcers, petechiae or subcutaneous nodules  : no juarez  NEURO: grossly intact; motor/sensory WNL; AAOx3; no  tremors  PSYCH: normal mood, affect and behavior  LYMPH: normal cervical, supraclavicular, axillary and groin LN's            Labs:       8/30/2019   Lab Results   Component Value Date    IRON 42 (L) 08/30/2019    TIBC 190 (L) 08/30/2019    FERRITIN 111 08/30/2019 6/6/2019  Lab Results   Component Value Date    WBC 6.8 06/06/2019    HGB 11.0 (L) 06/06/2019    HCT 34.0 (L) 06/06/2019    .3 (H) 06/06/2019     06/06/2019           Radiology/Diagnostic Studies:        I have reviewed all available lab results and radiology reports.    Assessment/Plan:   (1) 89 y.o. male with diagnosis of chronic anemia  - multifactorial in etiology  - underlying iron deficiency issues, anemia of chronic disorders and anemia of chronic renal  - prior chronic GI blood loss issues as well  - s/p IV iron in past  - followed by Dr Batista with GI  - last available labs are from June 2019 and hgb was at 11.0 and a little lower  - lastest iron was down to 42  - he has not been taking his oral iron      (2) CAD and CHF - followed by Dr Mares    (3) Hip fracture in Aug 2018    (4) Pulmonary emboli in Oct 2018 - since of Eliqu - managed by Dr Mares    1. Anemia, chronic disease     2. 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 Iron deficiency anemia secondary to blood loss (chronic)     4. Iron deficiency anemia, unspecified iron deficiency anemia type     5. Other pulmonary embolism with acute cor pulmonale, unspecified chronicity     6. Gastrointestinal hemorrhage, unspecified gastrointestinal hemorrhage type           PLAN:  1. Check labs every 3 months   2. Encouraged him to at least take the oral iron every other day; otherwise he will eventually need IV iron  3. F/u with PCP, card and GI  4. RTC 6 months    Fax note to Dr Batista, Suzan and Jagdeep    I spent over 15 mins with the patient, of which over half was face to face with the patient. Reviewing materials, labs, reports and studies. Making treatment and  analytical decisions. Ordering necessary labs, tests and studies.      Discussion:     I have explained all of the above in detail and the patient understands all of the current recommendation(s). I have answered all of their questions to the best of my ability and to their complete satisfaction.   The patient is to continue with the current management plan.    RTC in  6 months          Electronically signed by Teodoro Mueller MD

## 2019-09-05 ENCOUNTER — OFFICE VISIT (OUTPATIENT)
Dept: HEMATOLOGY/ONCOLOGY | Facility: CLINIC | Age: 84
End: 2019-09-05
Payer: MEDICARE

## 2019-09-05 VITALS
WEIGHT: 127.19 LBS | TEMPERATURE: 98 F | SYSTOLIC BLOOD PRESSURE: 195 MMHG | DIASTOLIC BLOOD PRESSURE: 77 MMHG | BODY MASS INDEX: 20.53 KG/M2 | RESPIRATION RATE: 20 BRPM | HEART RATE: 64 BPM

## 2019-09-05 DIAGNOSIS — N18.2 ANEMIA IN STAGE 2 CHRONIC KIDNEY DISEASE: ICD-10-CM

## 2019-09-05 DIAGNOSIS — D50.0 IRON DEFICIENCY ANEMIA SECONDARY TO BLOOD LOSS (CHRONIC): ICD-10-CM

## 2019-09-05 DIAGNOSIS — D50.9 IRON DEFICIENCY ANEMIA, UNSPECIFIED IRON DEFICIENCY ANEMIA TYPE: ICD-10-CM

## 2019-09-05 DIAGNOSIS — I26.09 OTHER PULMONARY EMBOLISM WITH ACUTE COR PULMONALE, UNSPECIFIED CHRONICITY: ICD-10-CM

## 2019-09-05 DIAGNOSIS — D63.1 ANEMIA IN STAGE 2 CHRONIC KIDNEY DISEASE: ICD-10-CM

## 2019-09-05 DIAGNOSIS — K92.2 GASTROINTESTINAL HEMORRHAGE, UNSPECIFIED GASTROINTESTINAL HEMORRHAGE TYPE: ICD-10-CM

## 2019-09-05 DIAGNOSIS — D63.8 ANEMIA, CHRONIC DISEASE: Primary | ICD-10-CM

## 2019-09-05 PROCEDURE — 99213 PR OFFICE/OUTPT VISIT, EST, LEVL III, 20-29 MIN: ICD-10-PCS | Mod: S$GLB,,, | Performed by: INTERNAL MEDICINE

## 2019-09-05 PROCEDURE — 99213 OFFICE O/P EST LOW 20 MIN: CPT | Mod: S$GLB,,, | Performed by: INTERNAL MEDICINE

## 2019-10-03 ENCOUNTER — OFFICE VISIT (OUTPATIENT)
Dept: FAMILY MEDICINE | Facility: CLINIC | Age: 84
End: 2019-10-03
Payer: MEDICARE

## 2019-10-03 VITALS
SYSTOLIC BLOOD PRESSURE: 124 MMHG | HEIGHT: 66 IN | HEART RATE: 64 BPM | BODY MASS INDEX: 20.15 KG/M2 | WEIGHT: 125.38 LBS | OXYGEN SATURATION: 96 % | DIASTOLIC BLOOD PRESSURE: 62 MMHG

## 2019-10-03 DIAGNOSIS — Z95.0 PACEMAKER: ICD-10-CM

## 2019-10-03 DIAGNOSIS — D50.9 IRON DEFICIENCY ANEMIA, UNSPECIFIED IRON DEFICIENCY ANEMIA TYPE: Primary | ICD-10-CM

## 2019-10-03 DIAGNOSIS — N13.8 BPH WITH OBSTRUCTION/LOWER URINARY TRACT SYMPTOMS: ICD-10-CM

## 2019-10-03 DIAGNOSIS — N40.1 BPH WITH OBSTRUCTION/LOWER URINARY TRACT SYMPTOMS: ICD-10-CM

## 2019-10-03 DIAGNOSIS — E43 SEVERE MALNUTRITION: ICD-10-CM

## 2019-10-03 DIAGNOSIS — R54 ADVANCED AGE: ICD-10-CM

## 2019-10-03 DIAGNOSIS — I26.09 OTHER PULMONARY EMBOLISM WITH ACUTE COR PULMONALE, UNSPECIFIED CHRONICITY: ICD-10-CM

## 2019-10-03 DIAGNOSIS — E78.2 MIXED HYPERLIPIDEMIA: ICD-10-CM

## 2019-10-03 DIAGNOSIS — N18.2 ANEMIA IN STAGE 2 CHRONIC KIDNEY DISEASE: ICD-10-CM

## 2019-10-03 DIAGNOSIS — D63.1 ANEMIA IN STAGE 2 CHRONIC KIDNEY DISEASE: ICD-10-CM

## 2019-10-03 DIAGNOSIS — K92.2 GASTROINTESTINAL HEMORRHAGE, UNSPECIFIED GASTROINTESTINAL HEMORRHAGE TYPE: ICD-10-CM

## 2019-10-03 DIAGNOSIS — I10 ESSENTIAL HYPERTENSION: ICD-10-CM

## 2019-10-03 PROCEDURE — 99214 OFFICE O/P EST MOD 30 MIN: CPT | Mod: S$GLB,,, | Performed by: FAMILY MEDICINE

## 2019-10-03 PROCEDURE — 99214 PR OFFICE/OUTPT VISIT, EST, LEVL IV, 30-39 MIN: ICD-10-PCS | Mod: S$GLB,,, | Performed by: FAMILY MEDICINE

## 2019-10-03 RX ORDER — FERROUS SULFATE 325(65) MG
TABLET ORAL
COMMUNITY
End: 2020-06-03 | Stop reason: ALTCHOICE

## 2019-10-03 NOTE — PROGRESS NOTES
SUBJECTIVE:    Patient ID: Marck Ramos is a 89 y.o. male.    Chief Complaint: No chief complaint on file.    This 89-year-old male is cared for by family members.  He eats very well 3 small meals per day, drinks a boost once a day likes to drink iced tea and cocaine between.  He can leave home and go with family members to go to the store to the [employer] shop.    Last year after an ORIF of the left hip, he suffered multiple PEs since then he has been seen by Dr. Mares and placed on Eliquis 2.5 mg b.i.d..  He has a history of admission to [facility] for congestive heart failure this spring.  After this he had to stay in skilled nursing facility for several weeks to restore his strength.  He is followed as an outpatient with lab work from  Q 12 weeks for chronic anemia iron deficiency.  Patient does not like to take iron is a gets some diarrhea.  He 7 his had some hypotension and his losartan has been reduced to half a tablet a day and amlodipine reduce to half a tablet a day.  He does have a pacemaker present he walks with a rolling walker at all times and has had no falls since last year      Lab Visit on 08/30/2019   Component Date Value Ref Range Status    Iron 08/30/2019 42* 45 - 160 ug/dL Final    Transferrin 08/30/2019 136* 200 - 375 mg/dL Final    TIBC 08/30/2019 190* 250 - 450 ug/dL Final    Saturated Iron 08/30/2019 22  20 - 50 % Final    Ferritin 08/30/2019 111  20.0 - 300.0 ng/mL Final   Orders Only on 06/06/2019   Component Date Value Ref Range Status    WBC 06/06/2019 6.8  5.0 - 10.0 K/uL Final    RBC 06/06/2019 3.39* 4.30 - 5.90 M/uL Final    Hemoglobin 06/06/2019 11.0* 14.0 - 16.0 g/dL Final    Hematocrit 06/06/2019 34.0* 39.0 - 55.0 % Final    Mean Corpuscular Volume 06/06/2019 100.3* 80.0 - 100.0 fL Final    Mean Corpuscular Hemoglobin 06/06/2019 32.4  25.0 - 35.0 pg Final    Mean Corpuscular Hemoglobin Conc 06/06/2019 32.4  31.0 - 36.0 g/dL Final    RDW 06/06/2019 16.4* 11.7  - 14.9 % Final    Platelets 06/06/2019 219  140 - 440 K/uL Final    MPV 06/06/2019 9.1  8.8 - 12.7 fL Final    Gran% 06/06/2019 41.7  % Final    Lymph% 06/06/2019 39.1  % Final    Mono% 06/06/2019 11.5  % Final    Eosinophil% 06/06/2019 6.7  % Final    Basophil% 06/06/2019 0.7  % Final    Gran # (ANC) 06/06/2019 2.8  1.4 - 6.5 K/uL Final    Lymph # 06/06/2019 2.6  1.2 - 3.4 K/uL Final    Mono # 06/06/2019 0.8* 0.1 - 0.6 K/uL Final    Eos # 06/06/2019 0.5  0.0 - 0.7 K/uL Final    Baso # 06/06/2019 0.1  0.0 - 0.2 K/uL Final    Immature Grans (Abs) 06/06/2019 0.0  0.0 - 1.0 K/uL Final    Immature Granulocytes 06/06/2019 0.3  % Final    nRBC% 06/06/2019 0  % Final       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 No pertinent family history.    Marital Status:   Alcohol History:  reports that he does not drink alcohol.  Tobacco History:  reports that he has quit smoking. His smoking use included cigarettes. He quit after 1.00 year of use. He has never used smokeless tobacco.  Drug History:  reports that he does not use drugs.    Review of patient's allergies indicates:   Allergen Reactions    Sulfa (sulfonamide antibiotics)      Patient can not recall reaction        Current Outpatient Medications:     amlodipine (NORVASC) 5 MG tablet, Take 5 mg by  mouth. Take 1/2 tab by mouth daily, Disp: , Rfl:     aspirin (ECOTRIN) 81 MG EC tablet, Take 81 mg by mouth once daily.  , Disp: , Rfl:     ELIQUIS 2.5 mg Tab, 2.5 mg 2 (two) times daily. , Disp: , Rfl:     furosemide (LASIX) 40 MG tablet, Take 1 tablet (40 mg total) by mouth once daily. (Patient taking differently: Take 20 mg by mouth once daily. ), Disp: 30 tablet, Rfl: 11    isosorbide mononitrate (IMDUR) 60 MG 24 hr tablet, Take 60 mg by mouth once daily., Disp: , Rfl:     losartan (COZAAR) 50 MG tablet, Take 50 mg by mouth 2 (two) times daily., Disp: , Rfl:     magnesium 200 mg Tab, Take 1 tablet by mouth once daily., Disp: , Rfl:     pantoprazole (PROTONIX) 40 MG tablet, Take 40 mg by mouth once daily., Disp: , Rfl:     potassium chloride SA (K-DUR,KLOR-CON) 20 MEQ tablet, Take 1 tablet (20 mEq total) by mouth once daily., Disp: 30 tablet, Rfl: 0    simvastatin (ZOCOR) 20 MG tablet, Take 20 mg by mouth every evening. , Disp: , Rfl:     sotalol (BETAPACE) 80 MG tablet, , Disp: , Rfl: 3    tamsulosin (FLOMAX) 0.4 mg Cap, Take 1 capsule (0.4 mg total) by mouth once daily., Disp: 30 capsule, Rfl: 11    vit C/E/Zn/coppr/lutein/zeaxan (PRESERVISION AREDS-2 ORAL), Take 2 tablets by mouth 2 (two) times daily., Disp: , Rfl:     ferrous sulfate (FEOSOL) 325 mg (65 mg iron) Tab tablet, 1 tablet, Disp: , Rfl:     Review of Systems   Constitutional: Negative for appetite change, chills, fatigue, fever and unexpected weight change.   HENT: Negative for congestion, ear pain and trouble swallowing.    Eyes: Negative for pain, discharge and visual disturbance.   Respiratory: Negative for apnea, cough, shortness of breath and wheezing.    Cardiovascular: Negative for chest pain and leg swelling (rt ankle edema).   Gastrointestinal: Negative for abdominal pain, blood in stool, constipation, diarrhea, nausea and vomiting.   Endocrine: Negative for cold intolerance, heat intolerance and polydipsia.   Genitourinary:  "Negative for dysuria, hematuria, testicular pain and urgency.   Musculoskeletal: Negative for gait problem (walks w/ walker), joint swelling and myalgias.   Neurological: Negative for dizziness, seizures and numbness.   Hematological: Bruises/bleeds easily.   Psychiatric/Behavioral: Negative for agitation, behavioral problems and hallucinations. The patient is not nervous/anxious.           Objective:      Vitals:    10/03/19 1223   BP: 124/62   Pulse: 64   SpO2: 96%   Weight: 56.9 kg (125 lb 6.4 oz)   Height: 5' 6" (1.676 m)     Body mass index is 20.24 kg/m².  Physical Exam   Constitutional: He is oriented to person, place, and time. He appears well-developed and well-nourished.   Frail elderly male who walks with a walker.   HENT:   Head: Normocephalic and atraumatic.   Right Ear: External ear normal.   Left Ear: External ear normal.   Nose: Nose normal.   Mouth/Throat: Oropharynx is clear and moist.   Eyes: Pupils are equal, round, and reactive to light. EOM are normal.   Neck: Normal range of motion. Neck supple. Carotid bruit is not present. No thyromegaly present.   Cardiovascular: Normal rate, regular rhythm, normal heart sounds and intact distal pulses.   No murmur heard.  Pacemaker in place in the left chest   Pulmonary/Chest: Effort normal and breath sounds normal. He has no wheezes. He has no rales.   Abdominal: Soft. Bowel sounds are normal. He exhibits no distension. There is no hepatosplenomegaly. There is no tenderness.   Musculoskeletal: Normal range of motion. He exhibits edema (S trace ankle edema bilaterally). He exhibits no tenderness or deformity.        Lumbar back: Normal. He exhibits no pain and no spasm.   Bends 90 degrees at  waist   Lymphadenopathy:     He has no cervical adenopathy.   Neurological: He is alert and oriented to person, place, and time. No cranial nerve deficit. Coordination normal.   He has a slow but steady gait with a rolling walker   Skin: Skin is warm and dry. No rash " noted.   Psychiatric: He has a normal mood and affect. His behavior is normal. Judgment and thought content normal.   Nursing note and vitals reviewed.        Assessment:       1. Iron deficiency anemia, unspecified iron deficiency anemia type    2. Other pulmonary embolism with acute cor pulmonale, unspecified chronicity    3. Mixed hyperlipidemia    4. Essential hypertension    5. Anemia in stage 2 chronic kidney disease    6. Severe malnutrition    7. Gastrointestinal hemorrhage, unspecified gastrointestinal hemorrhage type    8. Advanced age    9. Pacemaker    10. BPH with obstruction/lower urinary tract symptoms         Plan:       Iron deficiency anemia, unspecified iron deficiency anemia type  Continue lab orders from hematologist's q.12 weeks.  CBC and ferritin  Other pulmonary embolism with acute cor pulmonale, unspecified chronicity  Continue Eliquis 2.5 mg b.i.d.  Mixed hyperlipidemia  Simvastatin should be continued  Essential hypertension  Stable on current half tablet pills  Anemia in stage 2 chronic kidney disease    Severe malnutrition  Eating small meals but seems stable at this point  Gastrointestinal hemorrhage, unspecified gastrointestinal hemorrhage type  Continue pantoprazole  Advanced age  Has adequate supervision at home by family members  Pacemaker  DR Mares following  BPH with obstruction/lower urinary tract symptoms  Continue tamsulosin    No follow-ups on file.    lab work will be done with cardiologist in a few months.  And Q 12 weeks with the oncologist  Offered patient flu vaccine but he declines

## 2019-10-14 ENCOUNTER — OUTSIDE PLACE OF SERVICE (OUTPATIENT)
Dept: FAMILY MEDICINE | Facility: CLINIC | Age: 84
End: 2019-10-14
Payer: MEDICARE

## 2019-11-14 ENCOUNTER — LAB VISIT (OUTPATIENT)
Dept: LAB | Facility: HOSPITAL | Age: 84
End: 2019-11-14
Attending: INTERNAL MEDICINE
Payer: MEDICARE

## 2019-11-14 DIAGNOSIS — D50.0 IRON DEFICIENCY ANEMIA SECONDARY TO BLOOD LOSS (CHRONIC): ICD-10-CM

## 2019-11-14 DIAGNOSIS — D50.9 IRON DEFICIENCY ANEMIA, UNSPECIFIED IRON DEFICIENCY ANEMIA TYPE: ICD-10-CM

## 2019-11-14 DIAGNOSIS — D63.8 ANEMIA, CHRONIC DISEASE: ICD-10-CM

## 2019-11-14 DIAGNOSIS — D63.1 ANEMIA IN STAGE 2 CHRONIC KIDNEY DISEASE: ICD-10-CM

## 2019-11-14 DIAGNOSIS — N18.2 ANEMIA IN STAGE 2 CHRONIC KIDNEY DISEASE: ICD-10-CM

## 2019-11-14 LAB
ALBUMIN SERPL BCP-MCNC: 3.2 G/DL (ref 3.5–5.2)
ALP SERPL-CCNC: 77 U/L (ref 55–135)
ALT SERPL W/O P-5'-P-CCNC: 18 U/L (ref 10–44)
ANION GAP SERPL CALC-SCNC: 7 MMOL/L (ref 8–16)
AST SERPL-CCNC: 22 U/L (ref 10–40)
BASOPHILS # BLD AUTO: 0.07 K/UL (ref 0–0.2)
BASOPHILS NFR BLD: 1 % (ref 0–1.9)
BILIRUB SERPL-MCNC: 0.8 MG/DL (ref 0.1–1)
BUN SERPL-MCNC: 29 MG/DL (ref 10–30)
CALCIUM SERPL-MCNC: 8.5 MG/DL (ref 8.7–10.5)
CHLORIDE SERPL-SCNC: 101 MMOL/L (ref 95–110)
CO2 SERPL-SCNC: 29 MMOL/L (ref 23–29)
CREAT SERPL-MCNC: 1 MG/DL (ref 0.5–1.4)
DIFFERENTIAL METHOD: ABNORMAL
EOSINOPHIL # BLD AUTO: 0.4 K/UL (ref 0–0.5)
EOSINOPHIL NFR BLD: 6.1 % (ref 0–8)
ERYTHROCYTE [DISTWIDTH] IN BLOOD BY AUTOMATED COUNT: 14.8 % (ref 11.5–14.5)
EST. GFR  (AFRICAN AMERICAN): >60 ML/MIN/1.73 M^2
EST. GFR  (NON AFRICAN AMERICAN): >60 ML/MIN/1.73 M^2
FERRITIN SERPL-MCNC: 106 NG/ML (ref 20–300)
GLUCOSE SERPL-MCNC: 139 MG/DL (ref 70–110)
HCT VFR BLD AUTO: 36.8 % (ref 40–54)
HGB BLD-MCNC: 11.9 G/DL (ref 14–18)
IMM GRANULOCYTES # BLD AUTO: 0.02 K/UL (ref 0–0.04)
IMM GRANULOCYTES NFR BLD AUTO: 0.3 % (ref 0–0.5)
IRON SERPL-MCNC: 71 UG/DL (ref 45–160)
LYMPHOCYTES # BLD AUTO: 2.2 K/UL (ref 1–4.8)
LYMPHOCYTES NFR BLD: 30.7 % (ref 18–48)
MCH RBC QN AUTO: 33.5 PG (ref 27–31)
MCHC RBC AUTO-ENTMCNC: 32.3 G/DL (ref 32–36)
MCV RBC AUTO: 104 FL (ref 82–98)
MONOCYTES # BLD AUTO: 0.7 K/UL (ref 0.3–1)
MONOCYTES NFR BLD: 10.6 % (ref 4–15)
NEUTROPHILS # BLD AUTO: 3.6 K/UL (ref 1.8–7.7)
NEUTROPHILS NFR BLD: 51.3 % (ref 38–73)
NRBC BLD-RTO: 0 /100 WBC
PLATELET # BLD AUTO: 204 K/UL (ref 150–350)
PMV BLD AUTO: 9.6 FL (ref 9.2–12.9)
POTASSIUM SERPL-SCNC: 4 MMOL/L (ref 3.5–5.1)
PROT SERPL-MCNC: 6.4 G/DL (ref 6–8.4)
RBC # BLD AUTO: 3.55 M/UL (ref 4.6–6.2)
SATURATED IRON: 30 % (ref 20–50)
SODIUM SERPL-SCNC: 137 MMOL/L (ref 136–145)
TOTAL IRON BINDING CAPACITY: 235 UG/DL (ref 250–450)
TRANSFERRIN SERPL-MCNC: 168 MG/DL (ref 200–375)
WBC # BLD AUTO: 7.01 K/UL (ref 3.9–12.7)

## 2019-11-14 PROCEDURE — 83540 ASSAY OF IRON: CPT

## 2019-11-14 PROCEDURE — 36415 COLL VENOUS BLD VENIPUNCTURE: CPT

## 2019-11-14 PROCEDURE — 85025 COMPLETE CBC W/AUTO DIFF WBC: CPT

## 2019-11-14 PROCEDURE — 80053 COMPREHEN METABOLIC PANEL: CPT

## 2019-11-14 PROCEDURE — 82728 ASSAY OF FERRITIN: CPT

## 2019-12-13 ENCOUNTER — OFFICE VISIT (OUTPATIENT)
Dept: FAMILY MEDICINE | Facility: CLINIC | Age: 84
End: 2019-12-13
Payer: MEDICARE

## 2019-12-13 ENCOUNTER — TELEPHONE (OUTPATIENT)
Dept: FAMILY MEDICINE | Facility: CLINIC | Age: 84
End: 2019-12-13

## 2019-12-13 VITALS
HEART RATE: 68 BPM | BODY MASS INDEX: 21.05 KG/M2 | DIASTOLIC BLOOD PRESSURE: 68 MMHG | WEIGHT: 131 LBS | SYSTOLIC BLOOD PRESSURE: 164 MMHG | HEIGHT: 66 IN | TEMPERATURE: 98 F

## 2019-12-13 DIAGNOSIS — I10 ESSENTIAL HYPERTENSION: ICD-10-CM

## 2019-12-13 DIAGNOSIS — J06.9 UPPER RESPIRATORY TRACT INFECTION, UNSPECIFIED TYPE: Primary | ICD-10-CM

## 2019-12-13 PROCEDURE — 96372 THER/PROPH/DIAG INJ SC/IM: CPT | Mod: S$GLB,,, | Performed by: NURSE PRACTITIONER

## 2019-12-13 PROCEDURE — 1159F MED LIST DOCD IN RCRD: CPT | Mod: S$GLB,,, | Performed by: NURSE PRACTITIONER

## 2019-12-13 PROCEDURE — 96372 PR INJECTION,THERAP/PROPH/DIAG2ST, IM OR SUBCUT: ICD-10-PCS | Mod: S$GLB,,, | Performed by: NURSE PRACTITIONER

## 2019-12-13 PROCEDURE — 99213 PR OFFICE/OUTPT VISIT, EST, LEVL III, 20-29 MIN: ICD-10-PCS | Mod: 25,S$GLB,, | Performed by: NURSE PRACTITIONER

## 2019-12-13 PROCEDURE — 1159F PR MEDICATION LIST DOCUMENTED IN MEDICAL RECORD: ICD-10-PCS | Mod: S$GLB,,, | Performed by: NURSE PRACTITIONER

## 2019-12-13 PROCEDURE — 99213 OFFICE O/P EST LOW 20 MIN: CPT | Mod: 25,S$GLB,, | Performed by: NURSE PRACTITIONER

## 2019-12-13 RX ORDER — DEXAMETHASONE SODIUM PHOSPHATE 4 MG/ML
4 INJECTION, SOLUTION INTRA-ARTICULAR; INTRALESIONAL; INTRAMUSCULAR; INTRAVENOUS; SOFT TISSUE ONCE
Status: COMPLETED | OUTPATIENT
Start: 2019-12-13 | End: 2019-12-13

## 2019-12-13 RX ORDER — AMOXICILLIN 875 MG/1
875 TABLET, FILM COATED ORAL 2 TIMES DAILY
Qty: 14 TABLET | Refills: 0 | Status: SHIPPED | OUTPATIENT
Start: 2019-12-13 | End: 2020-04-08

## 2019-12-13 RX ADMIN — DEXAMETHASONE SODIUM PHOSPHATE 4 MG: 4 INJECTION, SOLUTION INTRA-ARTICULAR; INTRALESIONAL; INTRAMUSCULAR; INTRAVENOUS; SOFT TISSUE at 11:12

## 2019-12-13 NOTE — TELEPHONE ENCOUNTER
----- Message from Elaina Llanos sent at 12/13/2019 10:24 AM CST -----  Pt wants to be seen today. He has laryngitis and is getting sick. Pt #[phone]

## 2019-12-13 NOTE — PROGRESS NOTES
Patient ID: Marck Ramos is a 89 y.o. male.    Chief Complaint: Sore Throat (no pain only hoarse since yesterday, no bottles// SW)    Pt here for sick visit- here with his daughter. Pt reports started yesterday with scratchy throat and this AM has lost his voice almost completely. Mild sore throat. Daughter reports mild cough, no fever/chills, nasal congestion. No trouble swallowing.  Daughter worried given his advanced age he will become much sicker over the weekend          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    Iron deficiency anemia, unspecified 7/18/2017    Other pulmonary embolism with acute cor pulmonale 3/6/2019    Pulmonary emboli      Past Surgical History:   Procedure Laterality Date    Closed reduction & internal fixation of closed, comminuted, displaced intertrochanteric fx left hip w/ TFN Left 08/29/2018         Tobacco History:  reports that he has quit smoking. His smoking use included cigarettes. He quit after 1.00 year of use. He has never used smokeless tobacco.      Review of patient's allergies indicates:   Allergen Reactions    Sulfa (sulfonamide antibiotics)      Patient can not recall reaction     Iron      Other reaction(s): Unknown       Current Outpatient Medications:     amlodipine (NORVASC) 5 MG tablet, Take 5 mg by mouth. Take 1/2 tab by mouth daily, Disp: , Rfl:     aspirin (ECOTRIN) 81 MG EC tablet, Take 81 mg by mouth once daily.  , Disp: , Rfl:     ELIQUIS 2.5 mg Tab, 2.5 mg 2 (two) times daily. , Disp: , Rfl:     ferrous sulfate (FEOSOL) 325 mg (65 mg iron) Tab tablet, 1 tablet, Disp: , Rfl:     isosorbide mononitrate (IMDUR) 60 MG 24 hr tablet,  "Take 60 mg by mouth once daily., Disp: , Rfl:     losartan (COZAAR) 50 MG tablet, Take 50 mg by mouth 2 (two) times daily., Disp: , Rfl:     magnesium 200 mg Tab, Take 1 tablet by mouth once daily., Disp: , Rfl:     pantoprazole (PROTONIX) 40 MG tablet, Take 40 mg by mouth once daily., Disp: , Rfl:     potassium chloride SA (K-DUR,KLOR-CON) 20 MEQ tablet, Take 1 tablet (20 mEq total) by mouth once daily., Disp: 30 tablet, Rfl: 0    simvastatin (ZOCOR) 20 MG tablet, Take 20 mg by mouth every evening. , Disp: , Rfl:     sotalol (BETAPACE) 80 MG tablet, , Disp: , Rfl: 3    tamsulosin (FLOMAX) 0.4 mg Cap, Take 1 capsule (0.4 mg total) by mouth once daily., Disp: 30 capsule, Rfl: 11    vit C/E/Zn/coppr/lutein/zeaxan (PRESERVISION AREDS-2 ORAL), Take 2 tablets by mouth 2 (two) times daily., Disp: , Rfl:     amoxicillin (AMOXIL) 875 MG tablet, Take 1 tablet (875 mg total) by mouth 2 (two) times daily., Disp: 14 tablet, Rfl: 0    furosemide (LASIX) 40 MG tablet, Take 1 tablet (40 mg total) by mouth once daily. (Patient taking differently: Take 20 mg by mouth once daily. ), Disp: 30 tablet, Rfl: 11  No current facility-administered medications for this visit.     Review of Systems   Constitutional: Negative for chills and fever.   HENT: Positive for sore throat and voice change. Negative for congestion, ear pain, postnasal drip, rhinorrhea, sinus pain and trouble swallowing.    Respiratory: Negative for cough, shortness of breath and wheezing.    Cardiovascular: Negative for chest pain.   Gastrointestinal: Negative for nausea and vomiting.   Skin: Negative for rash.   Neurological: Negative for dizziness and headaches.          Objective:      Vitals:    12/13/19 1105 12/13/19 1112 12/13/19 1145   BP: (!) 174/80 (!) 174/80 (!) 164/68   Pulse: 68     Temp: 98.1 °F (36.7 °C)     Weight: 59.4 kg (131 lb)     Height: 5' 6" (1.676 m)       Physical Exam   Constitutional: He is oriented to person, place, and time. He " appears well-developed and well-nourished.   HENT:   Head: Normocephalic and atraumatic.   Right Ear: Tympanic membrane and ear canal normal.   Left Ear: Tympanic membrane and ear canal normal.   Nose: No mucosal edema or rhinorrhea. Right sinus exhibits no maxillary sinus tenderness and no frontal sinus tenderness. Left sinus exhibits no maxillary sinus tenderness and no frontal sinus tenderness.   Mouth/Throat: Mucous membranes are normal. Posterior oropharyngeal erythema (mild with white mucus in posterior pharynx) present. No oropharyngeal exudate. No tonsillar exudate.   Eyes: Conjunctivae are normal.   Neck: Neck supple.   Cardiovascular: Normal rate and regular rhythm.   Pulmonary/Chest: Effort normal and breath sounds normal. He has no wheezes. He has no rales.   Neurological: He is alert and oriented to person, place, and time.   Skin: No rash noted.   Nursing note and vitals reviewed.        Assessment:       1. Upper respiratory tract infection, unspecified type    2. Essential hypertension           Plan:       Upper respiratory tract infection, unspecified type  -     dexamethasone injection 4 mg  -     amoxicillin (AMOXIL) 875 MG tablet; Take 1 tablet (875 mg total) by mouth 2 (two) times daily.  Dispense: 14 tablet; Refill: 0  -patient/daughter advised given the high viral activity we are current seen in the community Achilles symptoms a like or viral.  Has no fever adenopathy or tonsillar exudate to suggest a bacterial infection however given the upcoming weekend daughter given a prescription for amoxicillin to begin only if his symptoms worsen which would include fever over 100.4, purulent sputum or severe sore throat    Hypertension  -BP elevated today, however daughter reports blood pressure medicines have been reduced due to hypotension he was experiencing.  Advised for now to monitor blood pressure at home and if blood pressure is consistently over 150/90 to call us and may need increase in  dose    Follow up if symptoms worsen or fail to improve, for as scheduled.        12/13/2019 Elaina Dinh NP

## 2019-12-17 ENCOUNTER — LAB VISIT (OUTPATIENT)
Dept: LAB | Facility: HOSPITAL | Age: 84
End: 2019-12-17
Attending: INTERNAL MEDICINE
Payer: MEDICARE

## 2019-12-17 DIAGNOSIS — D64.9 ANEMIA, UNSPECIFIED: Primary | ICD-10-CM

## 2019-12-17 DIAGNOSIS — E78.5 HYPERLIPEMIA: ICD-10-CM

## 2019-12-17 DIAGNOSIS — I25.10 CORONARY ATHEROSCLEROSIS OF NATIVE CORONARY ARTERY: ICD-10-CM

## 2019-12-17 DIAGNOSIS — I50.9 HEART FAILURE, UNSPECIFIED: ICD-10-CM

## 2019-12-17 LAB
ALBUMIN SERPL BCP-MCNC: 3 G/DL (ref 3.5–5.2)
ALP SERPL-CCNC: 65 U/L (ref 55–135)
ALT SERPL W/O P-5'-P-CCNC: 15 U/L (ref 10–44)
ANION GAP SERPL CALC-SCNC: 3 MMOL/L (ref 8–16)
AST SERPL-CCNC: 20 U/L (ref 10–40)
BASOPHILS # BLD AUTO: 0.05 K/UL (ref 0–0.2)
BASOPHILS NFR BLD: 0.6 % (ref 0–1.9)
BILIRUB SERPL-MCNC: 0.7 MG/DL (ref 0.1–1)
BUN SERPL-MCNC: 33 MG/DL (ref 10–30)
CALCIUM SERPL-MCNC: 8.7 MG/DL (ref 8.7–10.5)
CHLORIDE SERPL-SCNC: 108 MMOL/L (ref 95–110)
CHOLEST SERPL-MCNC: 96 MG/DL (ref 120–199)
CHOLEST/HDLC SERPL: 2.7 {RATIO} (ref 2–5)
CO2 SERPL-SCNC: 32 MMOL/L (ref 23–29)
CREAT SERPL-MCNC: 1 MG/DL (ref 0.5–1.4)
DIFFERENTIAL METHOD: ABNORMAL
EOSINOPHIL # BLD AUTO: 0.5 K/UL (ref 0–0.5)
EOSINOPHIL NFR BLD: 5.6 % (ref 0–8)
ERYTHROCYTE [DISTWIDTH] IN BLOOD BY AUTOMATED COUNT: 14.6 % (ref 11.5–14.5)
EST. GFR  (AFRICAN AMERICAN): >60 ML/MIN/1.73 M^2
EST. GFR  (NON AFRICAN AMERICAN): >60 ML/MIN/1.73 M^2
GLUCOSE SERPL-MCNC: 98 MG/DL (ref 70–110)
HCT VFR BLD AUTO: 36.8 % (ref 40–54)
HDLC SERPL-MCNC: 35 MG/DL (ref 40–75)
HDLC SERPL: 36.5 % (ref 20–50)
HGB BLD-MCNC: 12 G/DL (ref 14–18)
IMM GRANULOCYTES # BLD AUTO: 0.02 K/UL (ref 0–0.04)
IMM GRANULOCYTES NFR BLD AUTO: 0.2 % (ref 0–0.5)
LDLC SERPL CALC-MCNC: 56.8 MG/DL (ref 63–159)
LYMPHOCYTES # BLD AUTO: 3.2 K/UL (ref 1–4.8)
LYMPHOCYTES NFR BLD: 39.3 % (ref 18–48)
MCH RBC QN AUTO: 33.8 PG (ref 27–31)
MCHC RBC AUTO-ENTMCNC: 32.6 G/DL (ref 32–36)
MCV RBC AUTO: 104 FL (ref 82–98)
MONOCYTES # BLD AUTO: 0.8 K/UL (ref 0.3–1)
MONOCYTES NFR BLD: 9.8 % (ref 4–15)
NEUTROPHILS # BLD AUTO: 3.6 K/UL (ref 1.8–7.7)
NEUTROPHILS NFR BLD: 44.5 % (ref 38–73)
NONHDLC SERPL-MCNC: 61 MG/DL
NRBC BLD-RTO: 0 /100 WBC
PLATELET # BLD AUTO: 191 K/UL (ref 150–350)
PMV BLD AUTO: 10.2 FL (ref 9.2–12.9)
POTASSIUM SERPL-SCNC: 4.5 MMOL/L (ref 3.5–5.1)
PROT SERPL-MCNC: 6.5 G/DL (ref 6–8.4)
RBC # BLD AUTO: 3.55 M/UL (ref 4.6–6.2)
SODIUM SERPL-SCNC: 143 MMOL/L (ref 136–145)
TRIGL SERPL-MCNC: 21 MG/DL (ref 30–150)
WBC # BLD AUTO: 8.09 K/UL (ref 3.9–12.7)

## 2019-12-17 PROCEDURE — 80061 LIPID PANEL: CPT

## 2019-12-17 PROCEDURE — 36415 COLL VENOUS BLD VENIPUNCTURE: CPT

## 2019-12-17 PROCEDURE — 85025 COMPLETE CBC W/AUTO DIFF WBC: CPT

## 2019-12-17 PROCEDURE — 80053 COMPREHEN METABOLIC PANEL: CPT

## 2020-01-02 ENCOUNTER — OFFICE VISIT (OUTPATIENT)
Dept: FAMILY MEDICINE | Facility: CLINIC | Age: 85
End: 2020-01-02
Payer: MEDICARE

## 2020-01-02 ENCOUNTER — TELEPHONE (OUTPATIENT)
Dept: FAMILY MEDICINE | Facility: CLINIC | Age: 85
End: 2020-01-02

## 2020-01-02 VITALS
HEIGHT: 66 IN | DIASTOLIC BLOOD PRESSURE: 70 MMHG | HEART RATE: 68 BPM | BODY MASS INDEX: 21.05 KG/M2 | SYSTOLIC BLOOD PRESSURE: 140 MMHG | WEIGHT: 131 LBS

## 2020-01-02 DIAGNOSIS — Z79.01 LONG TERM CURRENT USE OF ANTICOAGULANT: ICD-10-CM

## 2020-01-02 DIAGNOSIS — S50.12XA TRAUMATIC HEMATOMA OF LEFT FOREARM, INITIAL ENCOUNTER: Primary | ICD-10-CM
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
## 2020-01-02 PROCEDURE — 1159F MED LIST DOCD IN RCRD: CPT | Mod: S$GLB,,, | Performed by: NURSE PRACTITIONER

## 2020-01-02 PROCEDURE — 99213 OFFICE O/P EST LOW 20 MIN: CPT | Mod: S$GLB,,, | Performed by: NURSE PRACTITIONER

## 2020-01-02 PROCEDURE — 99213 PR OFFICE/OUTPT VISIT, EST, LEVL III, 20-29 MIN: ICD-10-PCS | Mod: S$GLB,,, | Performed by: NURSE PRACTITIONER

## 2020-01-02 PROCEDURE — 1159F PR MEDICATION LIST DOCUMENTED IN MEDICAL RECORD: ICD-10-PCS | Mod: S$GLB,,, | Performed by: NURSE PRACTITIONER

## 2020-01-02 NOTE — TELEPHONE ENCOUNTER
----- Message from Karey Betancourt sent at 1/2/2020 11:50 AM CST -----  Daughter called and left a voicemail. New years charlene something was scratching at the house and he was trying to find a place to hide. He forgot to grab his walker and fell. He has a bruise on his arm that does not look good. He did not have a skin tear. She is worried about how it looks. He is on eliquis.   [phone] crystal

## 2020-01-02 NOTE — PROGRESS NOTES
Patient ID: Marck Ramos is a 89 y.o. male.    Chief Complaint: Fall (huge bruise on left arm x1 day, no bottles// SW)    Pt here for sick visit- here with daughter who reports on TAHMINA pt heard something outside- got up out of bed and forgot his walker.  Fell walking into the bathroom and struck his left forearm on the floor.  Daughter reports developed large raised hematoma the following morning however reports by today it is now improved,  has began to disperse.  No skin tears or bleeding.  Patient denies any pain to left forearm.  No head trauma or loss of consciousness.  Daughter is concerned because he is on Eliquis          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    Iron deficiency anemia, unspecified 7/18/2017    Other pulmonary embolism with acute cor pulmonale 3/6/2019    Pulmonary emboli      Past Surgical History:   Procedure Laterality Date    Closed reduction & internal fixation of closed, comminuted, displaced intertrochanteric fx left hip w/ TFN Left 08/29/2018         Tobacco History:  reports that he has quit smoking. His smoking use included cigarettes. He quit after 1.00 year of use. He has never used smokeless tobacco.      Review of patient's allergies indicates:   Allergen Reactions    Sulfa (sulfonamide antibiotics)      Patient can not recall reaction     Iron      Other reaction(s): Unknown       Current Outpatient Medications:     amlodipine (NORVASC) 5 MG tablet, Take 5 mg by mouth. Take 1/2 tab by mouth daily, Disp: , Rfl:     amoxicillin (AMOXIL) 875 MG tablet, Take 1 tablet (875 mg total) by mouth 2 (two) times daily., Disp: 14 tablet, Rfl: 0    aspirin  "(ECOTRIN) 81 MG EC tablet, Take 81 mg by mouth once daily.  , Disp: , Rfl:     ELIQUIS 2.5 mg Tab, 2.5 mg 2 (two) times daily. , Disp: , Rfl:     ferrous sulfate (FEOSOL) 325 mg (65 mg iron) Tab tablet, 1 tablet, Disp: , Rfl:     furosemide (LASIX) 40 MG tablet, Take 1 tablet (40 mg total) by mouth once daily. (Patient taking differently: Take 20 mg by mouth once daily. ), Disp: 30 tablet, Rfl: 11    isosorbide mononitrate (IMDUR) 60 MG 24 hr tablet, Take 60 mg by mouth once daily., Disp: , Rfl:     losartan (COZAAR) 50 MG tablet, Take 50 mg by mouth 2 (two) times daily., Disp: , Rfl:     magnesium 200 mg Tab, Take 1 tablet by mouth once daily., Disp: , Rfl:     pantoprazole (PROTONIX) 40 MG tablet, Take 40 mg by mouth once daily., Disp: , Rfl:     potassium chloride SA (K-DUR,KLOR-CON) 20 MEQ tablet, Take 1 tablet (20 mEq total) by mouth once daily., Disp: 30 tablet, Rfl: 0    simvastatin (ZOCOR) 20 MG tablet, Take 20 mg by mouth every evening. , Disp: , Rfl:     sotalol (BETAPACE) 80 MG tablet, , Disp: , Rfl: 3    tamsulosin (FLOMAX) 0.4 mg Cap, Take 1 capsule (0.4 mg total) by mouth once daily., Disp: 30 capsule, Rfl: 11    vit C/E/Zn/coppr/lutein/zeaxan (PRESERVISION AREDS-2 ORAL), Take 2 tablets by mouth 2 (two) times daily., Disp: , Rfl:     Review of Systems   Respiratory: Negative for cough and shortness of breath.    Cardiovascular: Negative for chest pain.   Musculoskeletal: Negative for joint swelling.   Skin:        Hematoma/bruising left forearm   Neurological: Negative for dizziness, syncope and headaches.          Objective:      Vitals:    01/02/20 1414 01/02/20 1427   BP: 138/82 (!) 140/70   Pulse: 68    Weight: 59.4 kg (131 lb)    Height: 5' 6" (1.676 m)      Physical Exam   Constitutional: He appears well-developed and well-nourished.   Elderly white male    Cardiovascular: Normal rate and regular rhythm.   No murmur heard.  Pulmonary/Chest: Effort normal and breath sounds normal. "   Abdominal: Soft.   Musculoskeletal: He exhibits no tenderness.        Right shoulder: He exhibits normal range of motion and no tenderness.        Left elbow: Normal.   Neurological: He is alert.   Skin:              Assessment:       1. Traumatic hematoma of left forearm, initial encounter    2. Fall, initial encounter    3. Long term current use of anticoagulant           Plan:       Traumatic hematoma of left forearm, initial encounter  -recommended warm compress to left forearm site a couple times a day.  Daughter advised she could hold Eliquis dose tonight however hematoma seems to already be improving.  Cautioned to report any warmth, pain or redness to arm    Fall, initial encounter  -stable, no bony tenderness to suggest need for x-ray    Long term current use of anticoagulant      Follow up if symptoms worsen or fail to improve, for as scheduled.        1/2/2020 Elaina Dinh NP

## 2020-02-03 ENCOUNTER — TELEPHONE (OUTPATIENT)
Dept: FAMILY MEDICINE | Facility: CLINIC | Age: 85
End: 2020-02-03

## 2020-02-03 NOTE — TELEPHONE ENCOUNTER
----- Message from Gris Strauss sent at 2/3/2020 10:16 AM CST -----  Contact: More darling's daughter   Daughter says she thinks her dad has Bowel Blockage and his stool is very messy and not a lot is coming out. She says she has to help him out and it feels like stones and she also has been giving him stool softeners but it isn't helping much. Daughter would like to know what should she do. Please give her a call back # [phone]

## 2020-02-12 NOTE — PROGRESS NOTES
Patient:   ABNER YAO            MRN: [id number]            FIN: [id number]              [facility]  Physical Medicine & Rehab  Progress Note    Patient Name: Marck Ramos  MRN: [id number]  Patient Class: IP- Rehab   Admission Date: 9/5/2018  Length of Stay: 3 days  Attending Physician: Grey Gonzalez MD    Subjective:     Principal Problem: s/p ORIF left femur    Interval History: 9/8/2018:  Patient is seen for follow-up rehab evaluation and recommendations: pt is 89 y.o male s/p ORIF left femur, participating with therapy    HPI, Past Medical, Family, and Social History remains the same as documented in the initial encounter.    Scheduled Medications:    amLODIPine  5 mg Oral Daily    aspirin  81 mg Oral Daily    clopidogrel  75 mg Oral Daily    docusate sodium  100 mg Oral BID    ferrous sulfate  325 mg Oral BID    furosemide  40 mg Oral Daily    isosorbide mononitrate  60 mg Oral Daily    losartan  50 mg Oral BID    magnesium oxide  400 mg Oral Daily    pantoprazole  40 mg Oral Daily    polyethylene glycol  17 g Oral Daily    potassium chloride  20 mEq Oral Daily    simvastatin  20 mg Oral QHS    sotalol  80 mg Oral BID    tamsulosin  0.4 mg Oral Daily       PRN Medications: albuterol sulfate, lactulose, ondansetron, traMADol    Review of Systems   Constitutional: Negative.    HENT: Negative.    Eyes: Negative.    Respiratory: Negative.    Cardiovascular: Negative.    Gastrointestinal: Negative.    Endocrine: Negative.    Genitourinary: Negative.    Musculoskeletal: Negative.    Skin: Negative.    Allergic/Immunologic: Negative.    Neurological: Negative.    Hematological: Negative.    Psychiatric/Behavioral: Negative.      Objective:     Vital Signs (Most Recent):  Temp: 98.5 °F (36.9 °C) (09/08/18 0600)  Pulse: 64 (09/08/18 0600)  Resp: 18 (09/08/18 0600)  BP: (!) 156/72 (09/08/18 0827)  SpO2: 97 % (09/08/18 0600)    Vital Signs (24h Range):  Temp:  [98.4 °F (36.9 °C)-98.5 °F (36.9 °C)] 98.5 °F (36.9 °C)  Pulse:  [64-66] 64  Resp:  [18] 18  SpO2:  [97 %] 97  %  BP: (156-185)/(69-89) 156/72     Physical Exam   Constitutional: He is oriented to person, place, and time. He appears well-developed and well-nourished. No distress.   HENT:   Head: Normocephalic and atraumatic.   Right Ear: External ear normal.   Left Ear: External ear normal.   Nose: Nose normal.   Mouth/Throat: Oropharynx is clear and moist. No oropharyngeal exudate.   Eyes: Conjunctivae and EOM are normal. Pupils are equal, round, and reactive to light. Right eye exhibits no discharge. Left eye exhibits no discharge. No scleral icterus.   Neck: Normal range of motion. Neck supple. No JVD present. No tracheal deviation present. No thyromegaly present.   Cardiovascular: Normal rate, regular rhythm, normal heart sounds and intact distal pulses. Exam reveals no gallop and no friction rub.   No murmur heard.  Pulmonary/Chest: Effort normal and breath sounds normal. No stridor. No respiratory distress. He has no wheezes. He has no rales. He exhibits no tenderness.   Abdominal: Soft. Bowel sounds are normal. He exhibits no distension. There is no tenderness. There is no rebound and no guarding.   Genitourinary:   Genitourinary Comments: deferred   Musculoskeletal: Normal range of motion. He exhibits no edema, tenderness or deformity.   Lymphadenopathy:     He has no cervical adenopathy.   Neurological: He is alert and oriented to person, place, and time. No cranial nerve deficit or sensory deficit. He exhibits normal muscle tone. Coordination normal.   Skin: No rash noted. He is not diaphoretic. No erythema. No pallor.   Incision stable   Psychiatric: He has a normal mood and affect. His behavior is normal.   Vitals reviewed.    NEUROLOGICAL EXAMINATION:     MENTAL STATUS   Oriented to person, place, and time.     CRANIAL NERVES     CN III, IV, VI   Pupils are equal, round, and reactive to light.  Extraocular motions are normal.     Lab Results   Component Value Date    WBC 7.40 09/08/2018    HGB 10.6 (L)  09/08/2018    HCT 31.7 (L) 09/08/2018    MCV 98 09/08/2018     (H) 09/08/2018     CMP  Sodium   Date Value Ref Range Status   09/08/2018 127 (L) 136 - 145 mmol/L Final   08/30/2018 132 (L) 134 - 144 mmol/L      Potassium   Date Value Ref Range Status   09/08/2018 3.7 3.5 - 5.1 mmol/L Final     Chloride   Date Value Ref Range Status   09/08/2018 93 (L) 95 - 110 mmol/L Final   08/30/2018 98 98 - 110 mmol/L      CO2   Date Value Ref Range Status   09/08/2018 25 23 - 29 mmol/L Final     Glucose   Date Value Ref Range Status   09/08/2018 128 (H) 70 - 110 mg/dL Final   08/30/2018 118 (H) 70 - 99 mg/dL      BUN, Bld   Date Value Ref Range Status   09/08/2018 13 10 - 30 mg/dL Final     Creatinine   Date Value Ref Range Status   09/08/2018 0.7 0.5 - 1.4 mg/dL Final   08/30/2018 1.00 0.60 - 1.40 mg/dL      Calcium   Date Value Ref Range Status   09/08/2018 8.0 (L) 8.7 - 10.5 mg/dL Final     Total Protein   Date Value Ref Range Status   09/08/2018 5.4 (L) 6.0 - 8.4 g/dL Final     Albumin   Date Value Ref Range Status   09/08/2018 2.3 (L) 3.5 - 5.2 g/dL Final   07/30/2018 3.3 3.1 - 4.7 g/dL      Total Bilirubin   Date Value Ref Range Status   09/08/2018 1.4 (H) 0.1 - 1.0 mg/dL Final     Comment:     For infants and newborns, interpretation of results should be based  on gestational age, weight and in agreement with clinical  observations.  Premature Infant recommended reference ranges:  Up to 24 hours.............<8.0 mg/dL  Up to 48 hours............<12.0 mg/dL  3-5 days..................<15.0 mg/dL  6-29 days.................<15.0 mg/dL       Alkaline Phosphatase   Date Value Ref Range Status   09/08/2018 77 55 - 135 U/L Final     AST   Date Value Ref Range Status   09/08/2018 25 10 - 40 U/L Final     ALT   Date Value Ref Range Status   09/08/2018 10 10 - 44 U/L Final     Anion Gap   Date Value Ref Range Status   09/08/2018 9 8 - 16 mmol/L Final     eGFR if    Date Value Ref Range Status   09/08/2018  >60 >60 mL/min/1.73 m^2 Final     eGFR if non    Date Value Ref Range Status   09/08/2018 >60 >60 mL/min/1.73 m^2 Final     Comment:     Calculation used to obtain the estimated glomerular filtration  rate (eGFR) is the CKD-EPI equation.        Assessment/Plan:      Marck Ramos is a 89 y.o. male admitted to inpatient rehabilitation on 9/5/2018 for <principal problem not specified> with impaired mobility and ADLs. Patient remains appropriate for PT, OT, and as required Speech therapy. Patient continues to require 24 hour nursing care as well as daily Physician assessment.    Active Diagnoses:    Diagnosis Date Noted POA    Fracture of left femur [S72.92XA] 09/05/2018 Yes      Problems Resolved During this Admission:     S/p ORIF left femur, cont therapy  Hyponatremia, ? MARAH, consult nephro  HTN, vitals noted, cont current med  Anticoagulation, cont asa & Plavix    DISCHARGE PLANNING:  Tentative Discharge Date:     Future Appointments   Date Time Provider Department Center   9/10/2018  8:00 AM UROLOGY, NURSE [facility] UROLOGY [place]   10/8/2018  9:00 AM Hood Collins MD [facility] UROLOGY [place]   1/28/2019 10:15 AM Teodoro Mueller MD [facility] HEM ONC [facility] Rob Chan MD  Department of Physical Medicine & Rehab  [facility]

## 2020-02-19 DIAGNOSIS — M54.50 LOW BACK PAIN: Primary | ICD-10-CM

## 2020-02-20 ENCOUNTER — TELEPHONE (OUTPATIENT)
Dept: FAMILY MEDICINE | Facility: CLINIC | Age: 85
End: 2020-02-20

## 2020-02-20 NOTE — TELEPHONE ENCOUNTER
Spoke with pt daughter she stated she had a missed call from our office, but I let her know I'm not sure what happened and that it might have just been in error, she stated okay thank you

## 2020-02-20 NOTE — TELEPHONE ENCOUNTER
----- Message from Milagro Perez sent at 2/20/2020  2:21 PM CST -----  Contact: FLOYD AT 1:47 PM NO NAME WAS SAID.   Returning someone's call. No Phone  # was said. GH

## 2020-02-26 ENCOUNTER — HOSPITAL ENCOUNTER (OUTPATIENT)
Dept: RADIOLOGY | Facility: HOSPITAL | Age: 85
Discharge: HOME | End: 2020-02-26
Attending: ORTHOPAEDIC SURGERY
Payer: MEDICARE

## 2020-02-26 DIAGNOSIS — M54.50 LOW BACK PAIN: ICD-10-CM

## 2020-02-26 PROCEDURE — 72100 X-RAY EXAM L-S SPINE 2/3 VWS: CPT | Mod: TC,PO

## 2020-04-01 ENCOUNTER — TELEPHONE (OUTPATIENT)
Dept: FAMILY MEDICINE | Facility: CLINIC | Age: 85
End: 2020-04-01

## 2020-04-01 NOTE — TELEPHONE ENCOUNTER
----- Message from Milagro Perez sent at 4/1/2020  3:29 PM CDT -----  Contact: Kati pts sister   The patients daughter returned Karey's call  Back. His daughter can not do a virtual visit. Her inter net is down. He has an apt next Wednesday with Dr. Gannon. She said he is doing good right now.  Please call about his apt. Kati # [phone] GH

## 2020-04-07 ENCOUNTER — TELEPHONE (OUTPATIENT)
Dept: FAMILY MEDICINE | Facility: CLINIC | Age: 85
End: 2020-04-07

## 2020-04-07 NOTE — TELEPHONE ENCOUNTER
Tried calling patients phone multiples times today, just rings, never goes to . Called son and LMOR for him to call back.

## 2020-04-07 NOTE — TELEPHONE ENCOUNTER
----- Message from Karey Betancourt sent at 4/7/2020  8:31 AM CDT -----  Pt has appt. With Dr. Gannon tomorrow. Can you please do med rec   [phone]

## 2020-04-08 ENCOUNTER — TELEPHONE (OUTPATIENT)
Dept: FAMILY MEDICINE | Facility: CLINIC | Age: 85
End: 2020-04-08

## 2020-04-08 ENCOUNTER — OFFICE VISIT (OUTPATIENT)
Dept: FAMILY MEDICINE | Facility: CLINIC | Age: 85
End: 2020-04-08
Payer: MEDICARE

## 2020-04-08 VITALS — DIASTOLIC BLOOD PRESSURE: 79 MMHG | SYSTOLIC BLOOD PRESSURE: 132 MMHG

## 2020-04-08 DIAGNOSIS — D50.0 IRON DEFICIENCY ANEMIA SECONDARY TO BLOOD LOSS (CHRONIC): ICD-10-CM

## 2020-04-08 DIAGNOSIS — I26.09 OTHER PULMONARY EMBOLISM WITH ACUTE COR PULMONALE, UNSPECIFIED CHRONICITY: Primary | ICD-10-CM

## 2020-04-08 DIAGNOSIS — N13.8 BPH WITH OBSTRUCTION/LOWER URINARY TRACT SYMPTOMS: ICD-10-CM

## 2020-04-08 DIAGNOSIS — R54 ADVANCED AGE: ICD-10-CM

## 2020-04-08 DIAGNOSIS — I10 ESSENTIAL HYPERTENSION: ICD-10-CM

## 2020-04-08 DIAGNOSIS — E43 SEVERE MALNUTRITION: ICD-10-CM

## 2020-04-08 DIAGNOSIS — D63.1 ANEMIA IN STAGE 2 CHRONIC KIDNEY DISEASE: ICD-10-CM

## 2020-04-08 DIAGNOSIS — Z95.0 PACEMAKER: ICD-10-CM

## 2020-04-08 DIAGNOSIS — I48.0 PAROXYSMAL ATRIAL FIBRILLATION: ICD-10-CM

## 2020-04-08 DIAGNOSIS — N18.2 ANEMIA IN STAGE 2 CHRONIC KIDNEY DISEASE: ICD-10-CM

## 2020-04-08 DIAGNOSIS — N40.1 BPH WITH OBSTRUCTION/LOWER URINARY TRACT SYMPTOMS: ICD-10-CM

## 2020-04-08 DIAGNOSIS — E78.2 MIXED HYPERLIPIDEMIA: ICD-10-CM

## 2020-04-08 PROBLEM — I13.2 HYPERTENSIVE HEART AND CHRONIC KIDNEY DISEASE WITH HEART FAILURE AND WITH STAGE 5 CHRONIC KIDNEY DISEASE, OR END STAGE RENAL DISEASE: Status: ACTIVE | Noted: 2020-04-08

## 2020-04-08 PROCEDURE — 99442 PR PHYSICIAN TELEPHONE EVALUATION 11-20 MIN: ICD-10-PCS | Mod: 95,,, | Performed by: FAMILY MEDICINE

## 2020-04-08 PROCEDURE — 99442 PR PHYSICIAN TELEPHONE EVALUATION 11-20 MIN: CPT | Mod: 95,,, | Performed by: FAMILY MEDICINE

## 2020-04-08 RX ORDER — LOSARTAN POTASSIUM 50 MG/1
25 TABLET ORAL DAILY
Qty: 45 TABLET | Refills: 1 | Status: SHIPPED | OUTPATIENT
Start: 2020-04-08 | End: 2020-10-08 | Stop reason: SDUPTHER

## 2020-04-08 RX ORDER — SOTALOL HYDROCHLORIDE 80 MG/1
80 TABLET ORAL 2 TIMES DAILY
Qty: 180 TABLET | Refills: 1 | Status: SHIPPED | OUTPATIENT
Start: 2020-04-08 | End: 2020-10-08 | Stop reason: SDUPTHER

## 2020-04-08 RX ORDER — ISOSORBIDE MONONITRATE 60 MG/1
60 TABLET, EXTENDED RELEASE ORAL DAILY
Qty: 90 TABLET | Refills: 1 | Status: SHIPPED | OUTPATIENT
Start: 2020-04-08 | End: 2020-10-08 | Stop reason: SDUPTHER

## 2020-04-08 NOTE — TELEPHONE ENCOUNTER
----- Message from Hood Gannon MD sent at 4/8/2020 12:24 PM CDT -----  Labs due in May, call patient set up office visit in 6 months

## 2020-04-08 NOTE — TELEPHONE ENCOUNTER
Tried calling pts number, phone was very static, was not able to hear the patient. Called the other number in his chart, Kati answered and went over all med list/allergies/hx. States she does not think he needs anything at this time but will check and let us know at visit.

## 2020-04-08 NOTE — PROGRESS NOTES
Subjective:        The chief complaint leading to consultation is:  Pedal edema  The patient location is:  Home  Visit type: Virtual visit with synchronous audio/video or audio only  This was a phone conversation in lieu of in-person visit due to the coronavirus emergency. Patient acknowledged and agreed to the telephone encounter.     This 89-year-old male is here for audio telemedicine visit.  His daughter is on the phone relating information to him.  She states he has a fair appetite, his color is good.  He has been losing weight with the diuretics.  He takes Lasix 20 mg every other day and is controlling his pedal edema rather well.  His right ankle occasionally gets puffy he wears compression socks occasionally to control this.  He saw Dr. Mares of cardiologist back in December or January in his cardiac status was stable.  He walks with a Rollator type walker and has had no recent falls.  Dr. Kuhn saw him for orthopedic gait issues and recommended physical therapy.  He attended 2 outpatient physical therapy sessions before the COVID-19 virus restrictions set in.  He takes Eliquis 2.5 mg b.i.d. for history of pulmonary emboli and has had no recent hemorrhages.      Past Surgical History:   Procedure Laterality Date    Closed reduction & internal fixation of closed, comminuted, displaced intertrochanteric fx left hip w/ TFN Left 08/29/2018     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    Iron deficiency anemia, unspecified 7/18/2017    Other pulmonary embolism with acute cor pulmonale 3/6/2019    Pulmonary emboli      No family history on file.     Social History:   Marital Status:    Alcohol History:  reports that he does not drink alcohol.  Tobacco History:  reports that he has quit smoking. His smoking use included cigarettes. He quit after 1.00 year of use. He has never used smokeless tobacco.  Drug History:  reports that he does not use drugs.    Review of patient's allergies indicates:   Allergen Reactions    Sulfa (sulfonamide antibiotics)      Patient can not recall reaction     Iron      Other reaction(s): Unknown       Current Outpatient Medications   Medication Sig Dispense Refill    amlodipine (NORVASC) 5 MG tablet Take 2.5 mg by mouth once daily. Take 1/2 tab by mouth daily      aspirin (ECOTRIN) 81 MG EC tablet Take 81 mg by mouth once daily.        ELIQUIS 2.5 mg Tab 2.5 mg 2 (two) times daily.       ferrous sulfate (FEOSOL) 325 mg (65 mg iron) Tab tablet 1 tablet      furosemide (LASIX) 40 MG tablet Take 1 tablet (40 mg total) by mouth once daily. (Patient taking differently: Take 20 mg by mouth every other day. ) 30 tablet 11    isosorbide mononitrate (IMDUR) 60 MG 24 hr tablet Take 1 tablet (60 mg total) by mouth once daily. 90 tablet 1    losartan (COZAAR) 50 MG tablet Take 0.5 tablets (25 mg total) by mouth once daily. 45 tablet 1    magnesium 200 mg Tab Take 1 tablet by mouth once daily.      pantoprazole (PROTONIX) 40 MG tablet Take 40 mg by mouth once daily.      potassium chloride SA (K-DUR,KLOR-CON) 20 MEQ tablet Take 1 tablet (20 mEq total) by mouth once daily. 30 tablet 0    simvastatin (ZOCOR) 20 MG tablet Take 20 mg by mouth every evening.       sotaloL (BETAPACE) 80 MG tablet Take 1 tablet (80 mg total) by mouth 2 (two) times daily. 180 tablet 1    tamsulosin (FLOMAX) 0.4 mg Cap Take 1 capsule (0.4 mg total) by mouth once daily. 30 capsule 11    vit C/E/Zn/coppr/lutein/zeaxan (PRESERVISION AREDS-2 ORAL) Take 2 tablets by mouth 2 (two) times daily.       No current facility-administered medications for this visit.        Review of Systems    Constitutional: Negative for appetite change (appetite fluctuates, boost or ensure daily), chills, fatigue, fever and unexpected weight change.   HENT: Negative for congestion, ear pain and trouble swallowing.    Eyes: Negative for pain, discharge and visual disturbance.   Respiratory: Negative for apnea, cough, shortness of breath and wheezing.    Cardiovascular: Negative for chest pain and leg swelling.   Gastrointestinal: Negative for abdominal pain, blood in stool, constipation, diarrhea, nausea and vomiting.   Endocrine: Negative for cold intolerance, heat intolerance and polydipsia.   Genitourinary: Negative for dysuria, hematuria, testicular pain and urgency.        Slower flow , on tamsulosin daily   Musculoskeletal: Positive for back pain (lower back pains , occas sciatica,rare Tylenol,). Negative for gait problem, joint swelling and myalgias.   Neurological: Negative for dizziness, seizures and numbness (sciatica occas,.).   Hematological: Bruises/bleeds easily.   Psychiatric/Behavioral: Negative for agitation, behavioral problems and hallucinations. The patient is not nervous/anxious.          Objective:        Physical Exam:   Physical Exam         Assessment:       1. Other pulmonary embolism with acute cor pulmonale, unspecified chronicity    2. BPH with obstruction/lower urinary tract symptoms    3. Mixed hyperlipidemia    4. Essential hypertension    5. Pacemaker    6. Iron deficiency anemia secondary to blood loss (chronic)    7. Anemia in stage 2 chronic kidney disease    8. Severe malnutrition    9. Advanced age    10. Paroxysmal atrial fibrillation      Plan:   Other pulmonary embolism with acute cor pulmonale, unspecified chronicity  Continue Eliquis 2.5 mg b.i.d. per Dr. Mares  BPH with obstruction/lower urinary tract symptoms  Voiding well at this time  Mixed hyperlipidemia  -     Comprehensive metabolic panel; Future; Expected date: 05/08/2020  -     Lipid panel; Future; Expected date:  05/08/2020  Lipids ordered for May  Essential hypertension  -     sotaloL (BETAPACE) 80 MG tablet; Take 1 tablet (80 mg total) by mouth 2 (two) times daily.  Dispense: 180 tablet; Refill: 1  -     losartan (COZAAR) 50 MG tablet; Take 0.5 tablets (25 mg total) by mouth once daily.  Dispense: 45 tablet; Refill: 1  -     isosorbide mononitrate (IMDUR) 60 MG 24 hr tablet; Take 1 tablet (60 mg total) by mouth once daily.  Dispense: 90 tablet; Refill: 1  -     CBC auto differential; Future; Expected date: 05/08/2020  Blood pressure well controlled continue current medications  Pacemaker    Iron deficiency anemia secondary to blood loss (chronic)  CBC due in May  Anemia in stage 2 chronic kidney disease    Severe malnutrition  Continue supplements such as boost or ensure  Advanced age  Daughter lives with him and cares for him daily.  Paroxysmal atrial fibrillation  Currently stable on medications    No follow-ups on file.    Total time spent with patient:  20 min    Each patient to whom he or she provides medical services by telemedicine is:  (1) informed of the relationship between the physician and patient and the respective role of any other health care provider with respect to management of the patient; and (2) notified that he or she may decline to receive medical services by telemedicine and may withdraw from such care at any time.    This note was created using [employer] voice recognition software that occasionally misinterprets phrases or words.

## 2020-06-03 ENCOUNTER — HOSPITAL ENCOUNTER (EMERGENCY)
Facility: HOSPITAL | Age: 85
Discharge: HOME | End: 2020-06-03
Attending: EMERGENCY MEDICINE
Payer: MEDICARE

## 2020-06-03 ENCOUNTER — TELEPHONE (OUTPATIENT)
Dept: FAMILY MEDICINE | Facility: CLINIC | Age: 85
End: 2020-06-03

## 2020-06-03 VITALS
TEMPERATURE: 98 F | SYSTOLIC BLOOD PRESSURE: 179 MMHG | HEIGHT: 66 IN | HEART RATE: 69 BPM | RESPIRATION RATE: 16 BRPM | BODY MASS INDEX: 18.48 KG/M2 | OXYGEN SATURATION: 98 % | WEIGHT: 115 LBS | DIASTOLIC BLOOD PRESSURE: 82 MMHG

## 2020-06-03 DIAGNOSIS — S42.292A HUMERAL HEAD FRACTURE, LEFT, CLOSED, INITIAL ENCOUNTER: Primary | ICD-10-CM

## 2020-06-03 DIAGNOSIS — T14.8XXA MULTIPLE SKIN TEARS: ICD-10-CM

## 2020-06-03 DIAGNOSIS — M25.522 LEFT ELBOW PAIN: ICD-10-CM

## 2020-06-03 DIAGNOSIS — M25.512 LEFT SHOULDER PAIN: ICD-10-CM
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
## 2020-06-03 DIAGNOSIS — I10 HYPERTENSION, UNSPECIFIED TYPE: ICD-10-CM

## 2020-06-03 PROCEDURE — 99284 EMERGENCY DEPT VISIT MOD MDM: CPT | Mod: 25

## 2020-06-03 PROCEDURE — 90471 IMMUNIZATION ADMIN: CPT | Performed by: EMERGENCY MEDICINE

## 2020-06-03 PROCEDURE — 25000003 PHARM REV CODE 250: Performed by: EMERGENCY MEDICINE

## 2020-06-03 PROCEDURE — 63600175 PHARM REV CODE 636 W HCPCS: Performed by: EMERGENCY MEDICINE

## 2020-06-03 PROCEDURE — 90715 TDAP VACCINE 7 YRS/> IM: CPT | Performed by: EMERGENCY MEDICINE

## 2020-06-03 RX ADMIN — BACITRACIN ZINC, NEOMYCIN, POLYMYXIN B: 400; 3.5; 5 OINTMENT TOPICAL at 01:06

## 2020-06-03 RX ADMIN — CLOSTRIDIUM TETANI TOXOID ANTIGEN (FORMALDEHYDE INACTIVATED), CORYNEBACTERIUM DIPHTHERIAE TOXOID ANTIGEN (FORMALDEHYDE INACTIVATED), BORDETELLA PERTUSSIS TOXOID ANTIGEN (GLUTARALDEHYDE INACTIVATED), BORDETELLA PERTUSSIS FILAMENTOUS HEMAGGLUTININ ANTIGEN (FORMALDEHYDE INACTIVATED), BORDETELLA PERTUSSIS PERTACTIN ANTIGEN, AND BORDETELLA PERTUSSIS FIMBRIAE 2/3 ANTIGEN 0.5 ML: 5; 2; 2.5; 5; 3; 5 INJECTION, SUSPENSION INTRAMUSCULAR at 01:06

## 2020-06-03 NOTE — TELEPHONE ENCOUNTER
Spoke with pts daughter, agrees to see Vanessa, wants to discuss getting HH ordered. She has to bring him in because she has to push him in a wheel chair. Aware to wear masks and bring bottles. Will come check her dad in and have him wait in the car until we have an available room.

## 2020-06-03 NOTE — ED NOTES
Wound care performed to left elbow skin tears, cleaned with normal saline, antibiotic ointment applied, non adherent dressing placed, gauze wrap placed, pt tolerated well

## 2020-06-03 NOTE — TELEPHONE ENCOUNTER
----- Message from Shwetha Cross MA sent at 6/3/2020  2:46 PM CDT -----  Pt's daughter called in to advise that her father fell last night and hurt his shoulder and had a skin tear.  He has a humoral head fracture, and skin tears.  Hospital has advised daughter to see his PCP in 1 to 2 days.  Please contact to advise.    Kati - [phone]

## 2020-06-03 NOTE — ED PROVIDER NOTES
"Encounter Date: 6/3/2020       History     Chief Complaint   Patient presents with    Shoulder Pain     "fell and hurt left shoulder and left elbow"      89-year-old male with history of iron deficiency anemia, CKD, CHF, hypertension, prior PE on Eliquis presents to the ER by EMS for evaluation of left shoulder and elbow pain status post a mechanical fall.  Patient reports that he got up to use the bathroom and his daughter's dog that sleeps in his room was on the ground he tripped over 8 falling onto his left side.  He fell onto the left shoulder.  And and sustained multiple skin tears to the left elbow and forearm area.  He has bony pain around the left shoulder especially with range of motion was placed in a sling by EMS.  He denies any elbow pain.  Unknown tetanus vaccine.  No medications were given by EMS.  Patient reports pain is mild in LEs the arm is moved.     He, and family reports, he did not hit his head or lose consciousness.  He denies headache head pain face pain neck pain or back pain.  He denies chest pain or shortness of breath he denies any pain to the pelvis lower extremities or right extremity.        Review of patient's allergies indicates:   Allergen Reactions    Sulfa (sulfonamide antibiotics)      Patient can not recall reaction     Iron      Other reaction(s): Unknown     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    Iron deficiency anemia, unspecified 7/18/2017    Other pulmonary embolism with acute cor pulmonale 3/6/2019    Pulmonary emboli      Past Surgical History:   Procedure Laterality Date    Closed reduction & internal fixation of closed, comminuted, displaced " intertrochanteric fx left hip w/ TFN Left 08/29/2018     No family history on file.  Social History     Tobacco Use    Smoking status: Former Smoker     Years: 1.00     Types: Cigarettes    Smokeless tobacco: Never Used   Substance Use Topics    Alcohol use: No    Drug use: No     Review of Systems   Constitutional: Negative for activity change, appetite change and fatigue.   HENT: Negative for dental problem, ear discharge, nosebleeds, rhinorrhea and trouble swallowing.    Respiratory: Negative for shortness of breath.    Cardiovascular: Negative for chest pain.   Gastrointestinal: Negative for abdominal pain, nausea and vomiting.   Genitourinary: Negative for flank pain.   Musculoskeletal: Positive for arthralgias and myalgias. Negative for back pain, gait problem, joint swelling, neck pain and neck stiffness.   Skin: Positive for color change and wound. Negative for pallor and rash.   Neurological: Negative for dizziness, tremors, seizures, syncope, facial asymmetry, weakness, light-headedness, numbness and headaches.   Hematological: Bruises/bleeds easily.   Psychiatric/Behavioral: Negative for agitation, behavioral problems and confusion.   All other systems reviewed and are negative.      Physical Exam     Initial Vitals [06/03/20 0117]   BP Pulse Resp Temp SpO2   (!) 196/86 66 16 98.1 °F (36.7 °C) 97 %      MAP       --         Physical Exam    Nursing note and vitals reviewed.  Constitutional: He appears well-developed and well-nourished. He is not diaphoretic. No distress.   HENT:   Head: Normocephalic and atraumatic.   Right Ear: External ear normal.   Left Ear: External ear normal.   Nose: Nose normal.   Mouth/Throat: Oropharynx is clear and moist.   No signs of trauma to the head or face.  No hemotympanum no nasal septal hematoma no signs of epistaxis, no dental injury clear oropharynx   Eyes: Conjunctivae and EOM are normal. Pupils are equal, round, and reactive to light.   Neck: Normal range of  motion. Neck supple. No tracheal deviation present.   No tenderness to palpation of cervical spine no step-off or deformity   Cardiovascular: Normal rate, regular rhythm, normal heart sounds and intact distal pulses. Exam reveals no gallop and no friction rub.    No murmur heard.  Blood pressure 196/86  Pulse 66   Pulmonary/Chest: Breath sounds normal. No stridor. No respiratory distress. He has no wheezes. He has no rhonchi. He has no rales. He exhibits no tenderness.   No chest wall tenderness no signs of trauma to the chest wall  Sats of 97% on room air respirations 16 no distress   Abdominal: Soft. Bowel sounds are normal. He exhibits no distension and no mass. There is no tenderness. There is no rebound and no guarding.   No signs of trauma to the abdomen   Musculoskeletal: Normal range of motion. He exhibits tenderness. He exhibits no edema.   Tenderness on palpation of left distal clavicle, and deltoid area.  With developing bruising and abrasion over the deltoid area.  Decreased range of motion of the shoulder due to pain.  Patient also has pain in the left proximal humerus.  Multiple skin tears with ecchymosis to the elbow and proximal forearm but no bony pain to the elbow forearm wrist or hand.  2+ radial pulse left upper extremity  No pain to right upper extremity or bilateral lower extremities with full range of motion of bilateral hips without pain no pain on palpation of the pelvis  No thoracic lumbar sacral spine pain no signs of trauma to the back   Neurological: He is alert and oriented to person, place, and time. He has normal strength. No cranial nerve deficit or sensory deficit.   Skin: Skin is warm and dry. No rash noted. No erythema. No pallor.   Psychiatric: He has a normal mood and affect. His behavior is normal. Judgment and thought content normal.         ED Course   Procedures  Labs Reviewed - No data to display       Imaging Results          X-Ray Shoulder Trauma Left (Preliminary result)   Result time 06/03/20 02:56:37    ED Interpretation by Xiomara Schultz MD (06/03/20 02:56:37, [facility], Emergency Medicine)    Acute nondisplaced humeral head fracture                             X-Ray Elbow Complete Left (Preliminary result)  Result time 06/03/20 02:56:51    ED Interpretation by Xiomara Schultz MD (06/03/20 02:56:51, [facility], Emergency Medicine)    No fracture dislocation                               Medical Decision Making:   Independently Interpreted Test(s):   I have ordered and independently interpreted X-rays - see prior notes.  Clinical Tests:   Radiological Study: Ordered and Reviewed  ED Management:  89-year-old male on Eliquis would a mechanical fall onto his left side complaining of left shoulder pain with decreased range of motion and multiple skin tears to the elbow area and proximal forearm.  Mild active bleeding.  Tetanus unknown.  We will update the patient's tetanus.  All wounds will be dressed with Neosporin and nonadhesive dressings.  X-ray will be done of the left shoulder and left elbow.  Patient was offered pain medication but did not want anything for pain as he reports very minimal pain as long as the left arm is not moved.  He has no other signs of trauma family confirmed he did not have head injury or loss of consciousness.  Blood pressure is elevated 196/86 other vitals are all normal.  Patient has history of hypertension.  Xiomara Schultz M.D.  2:11 AM 6/3/2020      X-ray of left shoulder reveals acute nondisplaced humeral head fracture.  X-ray of the elbow reveals no fracture dislocation.  Patient placed in a sling and swath for immobilization will be discharged home he reports he will take Tylenol at home for pain I have contacted his daughter Kati who is coming to pick him up.  Xiomara Schultz M.D.  3:03 AM 6/3/2020                                   Clinical Impression:       ICD-10-CM ICD-9-CM   1. Humeral head  fracture, left, closed, initial encounter S42.292A 812.09   2. Left shoulder pain M25.512 719.41   3. Left elbow pain M25.522 719.42   4. Multiple skin tears T14.8XXA 879.8   5. Fall, initial encounter W19.XXXA E888.9   6. Hypertension, unspecified type I10 401.9             ED Disposition Condition    Discharge Stable        ED Prescriptions     None        Follow-up Information     Follow up With Specialties Details Why Contact Info Additional Information    Hood Gannon MD Family Medicine Schedule an appointment as soon as possible for a visit in 2 days For wound re-check [address]  [address]  [facility] [place] [facility] [address]  [phone]       [facility] Emergency Medicine Go to  If symptoms worsen [address] [place] [id number] [phone] 1st floor    Payam Kuhn Jr., MD Orthopedic Surgery, Surgery Call today to schedule appt in 1-2 weeks [address]  [address]  [facility] [id number] [phone]                                          Xiomara Schultz MD  06/03/20 2273

## 2020-06-04 ENCOUNTER — OFFICE VISIT (OUTPATIENT)
Dept: FAMILY MEDICINE | Facility: CLINIC | Age: 85
End: 2020-06-04
Payer: MEDICARE

## 2020-06-04 VITALS
BODY MASS INDEX: 18.56 KG/M2 | SYSTOLIC BLOOD PRESSURE: 170 MMHG | HEIGHT: 66 IN | DIASTOLIC BLOOD PRESSURE: 72 MMHG | HEART RATE: 68 BPM

## 2020-06-04 DIAGNOSIS — S51.012D SKIN TEAR OF ELBOW WITHOUT COMPLICATION, LEFT, SUBSEQUENT ENCOUNTER: ICD-10-CM

## 2020-06-04 DIAGNOSIS — Z79.01 LONG TERM CURRENT USE OF ANTICOAGULANT: ICD-10-CM

## 2020-06-04 DIAGNOSIS — I10 ESSENTIAL HYPERTENSION: ICD-10-CM

## 2020-06-04 DIAGNOSIS — R54 ADVANCED AGE: ICD-10-CM

## 2020-06-04 DIAGNOSIS — S42.292D CLOSED FRACTURE OF HEAD OF LEFT HUMERUS WITH ROUTINE HEALING, SUBSEQUENT ENCOUNTER: ICD-10-CM
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
## 2020-06-04 PROCEDURE — 99213 PR OFFICE/OUTPT VISIT, EST, LEVL III, 20-29 MIN: ICD-10-PCS | Mod: S$GLB,,, | Performed by: NURSE PRACTITIONER

## 2020-06-04 PROCEDURE — 99213 OFFICE O/P EST LOW 20 MIN: CPT | Mod: S$GLB,,, | Performed by: NURSE PRACTITIONER

## 2020-06-05 NOTE — PROGRESS NOTES
SUBJECTIVE:    Patient ID: Marck Ramos is a 89 y.o. male.    Chief Complaint: Follow-up (ER, check skin tares, brought list// SW)    Pt presents for ER follow-up. Pt presented to ER on Wednesday after fall sustained Tuesday night. Pt's daughter with him as historian. She reports pt got up to use the bathroom and tried to place his walker around and step over large dog that was sleeping on the ground. The dog stood up at the same moment he was trying to step over him, causing the patient to lose his balance and fall to the ground. Landed on left shoulder. Denies hitting head or losing consciousness. Pt is on Eliquis at 2.5mg. X-ray showed  Left minimally displaced humeral head fracture. Was placed in a sling. Also sustained multiple skin tears to left arm. Denies any profuse bleeding from the skin tears. Pt reports he is doing ok post fall. Wheelchair needed to get around d/t being unable to use walker while in sling. Denies SOB, dizziness, or lightheadedness. Follow-up appointment scheduled with Dr. Kuhn next week.  BP quite elevated in office today and was also elevated in ER the day prior. Pt's daughter reports amlodipine had been cut down from 10mg to 5mg several months ago.       Lab Visit on 12/17/2019   Component Date Value Ref Range Status    Sodium 12/17/2019 143  136 - 145 mmol/L Final    Potassium 12/17/2019 4.5  3.5 - 5.1 mmol/L Final    Chloride 12/17/2019 108  95 - 110 mmol/L Final    CO2 12/17/2019 32* 23 - 29 mmol/L Final    Glucose 12/17/2019 98  70 - 110 mg/dL Final    BUN, Bld 12/17/2019 33* 10 - 30 mg/dL Final    Creatinine 12/17/2019 1.0  0.5 - 1.4 mg/dL Final    Calcium 12/17/2019 8.7  8.7 - 10.5 mg/dL Final    Total Protein 12/17/2019 6.5  6.0 - 8.4 g/dL Final    Albumin 12/17/2019 3.0* 3.5 - 5.2 g/dL Final    Total Bilirubin 12/17/2019 0.7  0.1 - 1.0 mg/dL Final    Alkaline Phosphatase 12/17/2019 65  55 - 135 U/L Final    AST 12/17/2019 20  10 - 40 U/L Final    ALT  12/17/2019 15  10 - 44 U/L Final    Anion Gap 12/17/2019 3* 8 - 16 mmol/L Final    eGFR if African American 12/17/2019 >60.0  >60 mL/min/1.73 m^2 Final    eGFR if non African American 12/17/2019 >60.0  >60 mL/min/1.73 m^2 Final    Cholesterol 12/17/2019 96* 120 - 199 mg/dL Final    Triglycerides 12/17/2019 21* 30 - 150 mg/dL Final    HDL 12/17/2019 35* 40 - 75 mg/dL Final    LDL Cholesterol 12/17/2019 56.8* 63.0 - 159.0 mg/dL Final    Hdl/Cholesterol Ratio 12/17/2019 36.5  20.0 - 50.0 % Final    Total Cholesterol/HDL Ratio 12/17/2019 2.7  2.0 - 5.0 Final    Non-HDL Cholesterol 12/17/2019 61  mg/dL Final    WBC 12/17/2019 8.09  3.90 - 12.70 K/uL Final    RBC 12/17/2019 3.55* 4.60 - 6.20 M/uL Final    Hemoglobin 12/17/2019 12.0* 14.0 - 18.0 g/dL Final    Hematocrit 12/17/2019 36.8* 40.0 - 54.0 % Final    Mean Corpuscular Volume 12/17/2019 104* 82 - 98 fL Final    Mean Corpuscular Hemoglobin 12/17/2019 33.8* 27.0 - 31.0 pg Final    Mean Corpuscular Hemoglobin Conc 12/17/2019 32.6  32.0 - 36.0 g/dL Final    RDW 12/17/2019 14.6* 11.5 - 14.5 % Final    Platelets 12/17/2019 191  150 - 350 K/uL Final    MPV 12/17/2019 10.2  9.2 - 12.9 fL Final    Immature Granulocytes 12/17/2019 0.2  0.0 - 0.5 % Final    Gran # (ANC) 12/17/2019 3.6  1.8 - 7.7 K/uL Final    Immature Grans (Abs) 12/17/2019 0.02  0.00 - 0.04 K/uL Final    Lymph # 12/17/2019 3.2  1.0 - 4.8 K/uL Final    Mono # 12/17/2019 0.8  0.3 - 1.0 K/uL Final    Eos # 12/17/2019 0.5  0.0 - 0.5 K/uL Final    Baso # 12/17/2019 0.05  0.00 - 0.20 K/uL Final    nRBC 12/17/2019 0  0 /100 WBC Final    Gran% 12/17/2019 44.5  38.0 - 73.0 % Final    Lymph% 12/17/2019 39.3  18.0 - 48.0 % Final    Mono% 12/17/2019 9.8  4.0 - 15.0 % Final    Eosinophil% 12/17/2019 5.6  0.0 - 8.0 % Final    Basophil% 12/17/2019 0.6  0.0 - 1.9 % Final    Differential Method 12/17/2019 Automated   Final       Past Medical History:   Diagnosis Date    Anemia in stage 2  chronic kidney disease 7/18/2017    Anemia of other chronic disease 7/18/2017    Anticoagulant long-term use     CHF (congestive heart failure)     Hemorrhage of gastrointestinal tract, unspecified 7/18/2017    Hypertension     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 No pertinent family history.    Marital Status:   Alcohol History:  reports that he does not drink alcohol.  Tobacco History:  reports that he has quit smoking. His smoking use included cigarettes. He quit after 1.00 year of use. He has never used smokeless tobacco.  Drug History:  reports that he does not use drugs.    Review of patient's allergies indicates:   Allergen Reactions    Sulfa (sulfonamide antibiotics)      Patient can not recall reaction     Iron      Other reaction(s): Unknown       Current Outpatient Medications:     amlodipine (NORVASC) 5 MG tablet, Take 2.5 mg by mouth once daily. Take 1/2 tab by mouth daily, Disp: , Rfl:     aspirin (ECOTRIN) 81 MG EC tablet, Take 81 mg by mouth once daily.  , Disp: , Rfl:     ELIQUIS 2.5 mg Tab, 2.5 mg 2 (two) times daily. , Disp: , Rfl:     isosorbide mononitrate (IMDUR) 60 MG 24 hr tablet, Take 1 tablet (60 mg total) by mouth once daily., Disp: 90 tablet, Rfl: 1    losartan (COZAAR) 50 MG tablet, Take 0.5 tablets (25 mg total) by mouth once daily., Disp: 45 tablet, Rfl: 1    magnesium 200 mg Tab, Take 1 tablet by mouth once daily., Disp: , Rfl:     pantoprazole (PROTONIX) 40 MG tablet, Take 40 mg by mouth once daily., Disp: , Rfl:     potassium chloride SA (K-DUR,KLOR-CON) 20 MEQ tablet, Take 1 tablet (20 mEq total)  "by mouth once daily., Disp: 30 tablet, Rfl: 0    simvastatin (ZOCOR) 20 MG tablet, Take 20 mg by mouth every evening. , Disp: , Rfl:     sotaloL (BETAPACE) 80 MG tablet, Take 1 tablet (80 mg total) by mouth 2 (two) times daily., Disp: 180 tablet, Rfl: 1    tamsulosin (FLOMAX) 0.4 mg Cap, Take 1 capsule (0.4 mg total) by mouth once daily., Disp: 30 capsule, Rfl: 11    furosemide (LASIX) 40 MG tablet, Take 1 tablet (40 mg total) by mouth once daily. (Patient taking differently: Take 20 mg by mouth every other day. ), Disp: 30 tablet, Rfl: 11    Review of Systems   Constitutional: Negative.    HENT: Negative.    Genitourinary: Negative.    Musculoskeletal: Positive for arthralgias and neck pain (d/t sling pulling on neck).   Skin: Positive for wound.   Neurological: Negative for dizziness, light-headedness and headaches.   Hematological: Bruises/bleeds easily.          Objective:      Vitals:    06/04/20 1417   BP: (!) 170/72   Pulse: 68   Height: 5' 6" (1.676 m)     Body mass index is 18.56 kg/m².  Physical Exam   Constitutional: He is oriented to person, place, and time. He appears well-developed. No distress.   Frail appearance   HENT:   Head: Normocephalic and atraumatic.   Neck: Normal range of motion.   Cardiovascular: Normal rate and regular rhythm.   Pulmonary/Chest: Effort normal and breath sounds normal. No respiratory distress.   Neurological: He is alert and oriented to person, place, and time.   Skin: Skin is warm and dry. Capillary refill takes less than 2 seconds.   Two large shearing skin tears to left elbow. Dressing removed. Lots of bruising around tears.    Psychiatric: He has a normal mood and affect.         Assessment:       1. Fall on same level from slipping, tripping or stumbling, subsequent encounter    2. Advanced age    3. Long term current use of anticoagulant    4. Skin tear of elbow without complication, left, subsequent encounter    5. Closed fracture of head of left humerus with " routine healing, subsequent encounter    6. Essential hypertension         Plan:       Fall on same level from slipping, tripping or stumbling, subsequent encounter  - Future safety precautions discussed with pt and daughter.     Advanced age    Long term current use of anticoagulant    Skin tear of elbow without complication, left, subsequent encounter  - Site redressed with nonadherent pads and kerlix. Advised daughter to change dressing daily and inspect area to be sure skin is lying appropriately. Avoid use of tape or adhesive on skin given how fragile it is. Continue to use a gauze wrapping instead of adhesive.    Closed fracture of head of left humerus with routine healing, subsequent encounter  - Follow-up with Dr. Kuhn.    Essential hypertension  - Only able to check BP in R arm d/t injury. Advised daughter that elevated BP may be r/t pain although pt denies any pain. Check BP at home for a week or 2 and increase amlodipine back to 10mg if BP remain elevated >160 systolic.      Follow up if symptoms worsen or fail to improve.

## 2020-06-08 ENCOUNTER — TELEPHONE (OUTPATIENT)
Dept: FAMILY MEDICINE | Facility: CLINIC | Age: 85
End: 2020-06-08

## 2020-06-08 DIAGNOSIS — S42.292D CLOSED FRACTURE OF HEAD OF LEFT HUMERUS WITH ROUTINE HEALING, SUBSEQUENT ENCOUNTER: Primary | ICD-10-CM

## 2020-06-08 NOTE — TELEPHONE ENCOUNTER
----- Message from Shwetha Cross MA sent at 6/8/2020  3:45 PM CDT -----  Pt came in to see Elaina for a HFU after falling.  Daughter states HH was discussed, but not ordered at that time.  Daughter is calling in today to requesting a HH physical therapist and a HH nurse.  She would like to utilize [facility].  Please contact daughter to discuss further.    Kati - [phone]

## 2020-06-08 NOTE — TELEPHONE ENCOUNTER
LMOR to return call.    Just faxed HH order & office note. They should contact her within 1-2 days.

## 2020-06-09 NOTE — TELEPHONE ENCOUNTER
Spoke to pt's daughter Kati. Advised the order & note was sent yesterday evening. They should contact her & the patient within the next 2 days.

## 2020-06-10 RX ORDER — ATORVASTATIN CALCIUM 10 MG/1
10 TABLET, FILM COATED ORAL DAILY
Qty: 90 TABLET | Refills: 1 | Status: SHIPPED | OUTPATIENT
Start: 2020-06-10 | End: 2020-10-08 | Stop reason: SDUPTHER

## 2020-06-16 ENCOUNTER — EXTERNAL HOME HEALTH (OUTPATIENT)
Dept: HOME HEALTH SERVICES | Facility: HOSPITAL | Age: 85
End: 2020-06-16

## 2020-06-22 NOTE — PLAN OF CARE
Problem: Occupational Therapy Goal  Goal: Occupational Therapy Goal  Goals to be met by: 9/25/2018     Patient will increase functional independence with ADLs by performing:    LE Dressing with Modified [place] and Assistive Devices as needed.  Grooming while standing at sink with Modified [place].  Toileting from toilet with [place] for hygiene and clothing management.   Bathing from  shower chair/bench with Modified [place].  Supine to sit with Modified [place].  Stand pivot transfers with Modified [place] and maintaining weight-bearing precaution(s).  Toilet transfer to toilet with Modified [place] and maintaining weight-bearing precaution(s).  Upper extremity exercise program x15 reps per handout, with independence.    Outcome: Ongoing (interventions implemented as appropriate)  OT evaluation completed today. Goals & care plan established.    KENNEDI Peacock  9/6/2018       22-Jun-2020 10:23

## 2020-07-10 ENCOUNTER — HOSPITAL ENCOUNTER (INPATIENT)
Facility: HOSPITAL | Age: 85
LOS: 1 days | Discharge: HOME HEALTH | DRG: 178 | End: 2020-07-12
Attending: EMERGENCY MEDICINE | Admitting: INTERNAL MEDICINE
Payer: MEDICARE

## 2020-07-10 DIAGNOSIS — R07.9 CHEST PAIN: ICD-10-CM

## 2020-07-10 DIAGNOSIS — J18.9 PNEUMONIA OF RIGHT LOWER LOBE DUE TO INFECTIOUS ORGANISM: ICD-10-CM

## 2020-07-10 DIAGNOSIS — R09.02 HYPOXIA: ICD-10-CM

## 2020-07-10 DIAGNOSIS — J69.0 ASPIRATION PNEUMONIA OF RIGHT LOWER LOBE, UNSPECIFIED ASPIRATION PNEUMONIA TYPE: Primary | ICD-10-CM

## 2020-07-10 DIAGNOSIS — R11.10 VOMITING, INTRACTABILITY OF VOMITING NOT SPECIFIED, PRESENCE OF NAUSEA NOT SPECIFIED, UNSPECIFIED VOMITING TYPE: ICD-10-CM

## 2020-07-10 DIAGNOSIS — J90 PLEURAL EFFUSION ON RIGHT: ICD-10-CM

## 2020-07-10 DIAGNOSIS — J69.0 ASPIRATION PNEUMONIA: ICD-10-CM

## 2020-07-10 LAB
BASOPHILS # BLD AUTO: 0.03 K/UL (ref 0–0.2)
BASOPHILS NFR BLD: 0.3 % (ref 0–1.9)
DIFFERENTIAL METHOD: ABNORMAL
EOSINOPHIL # BLD AUTO: 0.3 K/UL (ref 0–0.5)
EOSINOPHIL NFR BLD: 2.8 % (ref 0–8)
ERYTHROCYTE [DISTWIDTH] IN BLOOD BY AUTOMATED COUNT: 14.1 % (ref 11.5–14.5)
HCT VFR BLD AUTO: 37.4 % (ref 40–54)
HGB BLD-MCNC: 12.4 G/DL (ref 14–18)
IMM GRANULOCYTES # BLD AUTO: 0.03 K/UL (ref 0–0.04)
IMM GRANULOCYTES NFR BLD AUTO: 0.3 % (ref 0–0.5)
LYMPHOCYTES # BLD AUTO: 2 K/UL (ref 1–4.8)
LYMPHOCYTES NFR BLD: 20.9 % (ref 18–48)
MCH RBC QN AUTO: 33.5 PG (ref 27–31)
MCHC RBC AUTO-ENTMCNC: 33.2 G/DL (ref 32–36)
MCV RBC AUTO: 101 FL (ref 82–98)
MONOCYTES # BLD AUTO: 0.6 K/UL (ref 0.3–1)
MONOCYTES NFR BLD: 6 % (ref 4–15)
NEUTROPHILS # BLD AUTO: 6.8 K/UL (ref 1.8–7.7)
NEUTROPHILS NFR BLD: 69.7 % (ref 38–73)
NRBC BLD-RTO: 0 /100 WBC
PLATELET # BLD AUTO: 206 K/UL (ref 150–350)
PMV BLD AUTO: 10 FL (ref 9.2–12.9)
RBC # BLD AUTO: 3.7 M/UL (ref 4.6–6.2)
WBC # BLD AUTO: 9.68 K/UL (ref 3.9–12.7)

## 2020-07-10 PROCEDURE — 99291 CRITICAL CARE FIRST HOUR: CPT

## 2020-07-10 PROCEDURE — 83690 ASSAY OF LIPASE: CPT

## 2020-07-10 PROCEDURE — 84484 ASSAY OF TROPONIN QUANT: CPT

## 2020-07-10 PROCEDURE — 87040 BLOOD CULTURE FOR BACTERIA: CPT

## 2020-07-10 PROCEDURE — 83880 ASSAY OF NATRIURETIC PEPTIDE: CPT

## 2020-07-10 PROCEDURE — 96365 THER/PROPH/DIAG IV INF INIT: CPT

## 2020-07-10 PROCEDURE — 80053 COMPREHEN METABOLIC PANEL: CPT

## 2020-07-10 PROCEDURE — 85025 COMPLETE CBC W/AUTO DIFF WBC: CPT

## 2020-07-10 PROCEDURE — 85610 PROTHROMBIN TIME: CPT

## 2020-07-10 PROCEDURE — 83605 ASSAY OF LACTIC ACID: CPT

## 2020-07-10 PROCEDURE — 93005 ELECTROCARDIOGRAM TRACING: CPT | Performed by: INTERNAL MEDICINE

## 2020-07-10 PROCEDURE — U0002 COVID-19 LAB TEST NON-CDC: HCPCS

## 2020-07-10 PROCEDURE — 83735 ASSAY OF MAGNESIUM: CPT

## 2020-07-11 PROBLEM — J69.0 ASPIRATION PNEUMONIA: Status: ACTIVE | Noted: 2020-07-11

## 2020-07-11 LAB
ALBUMIN SERPL BCP-MCNC: 3.1 G/DL (ref 3.5–5.2)
ALP SERPL-CCNC: 88 U/L (ref 55–135)
ALT SERPL W/O P-5'-P-CCNC: 22 U/L (ref 10–44)
ANION GAP SERPL CALC-SCNC: 9 MMOL/L (ref 8–16)
ANION GAP SERPL CALC-SCNC: 9 MMOL/L (ref 8–16)
AST SERPL-CCNC: 25 U/L (ref 10–40)
BACTERIA #/AREA URNS HPF: NEGATIVE /HPF
BACTERIA #/AREA URNS HPF: NEGATIVE /HPF
BASOPHILS # BLD AUTO: 0.03 K/UL (ref 0–0.2)
BASOPHILS NFR BLD: 0.2 % (ref 0–1.9)
BILIRUB SERPL-MCNC: 0.7 MG/DL (ref 0.1–1)
BILIRUB UR QL STRIP: NEGATIVE
BILIRUB UR QL STRIP: NEGATIVE
BNP SERPL-MCNC: 376 PG/ML (ref 0–99)
BUN SERPL-MCNC: 29 MG/DL (ref 10–30)
BUN SERPL-MCNC: 30 MG/DL (ref 10–30)
CALCIUM SERPL-MCNC: 8.1 MG/DL (ref 8.7–10.5)
CALCIUM SERPL-MCNC: 8.4 MG/DL (ref 8.7–10.5)
CHLORIDE SERPL-SCNC: 103 MMOL/L (ref 95–110)
CHLORIDE SERPL-SCNC: 103 MMOL/L (ref 95–110)
CLARITY UR: ABNORMAL
CLARITY UR: CLEAR
CO2 SERPL-SCNC: 26 MMOL/L (ref 23–29)
CO2 SERPL-SCNC: 26 MMOL/L (ref 23–29)
COLOR UR: YELLOW
COLOR UR: YELLOW
CREAT SERPL-MCNC: 0.9 MG/DL (ref 0.5–1.4)
CREAT SERPL-MCNC: 1 MG/DL (ref 0.5–1.4)
DIFFERENTIAL METHOD: ABNORMAL
EOSINOPHIL # BLD AUTO: 0 K/UL (ref 0–0.5)
EOSINOPHIL NFR BLD: 0.1 % (ref 0–8)
ERYTHROCYTE [DISTWIDTH] IN BLOOD BY AUTOMATED COUNT: 14.3 % (ref 11.5–14.5)
EST. GFR  (AFRICAN AMERICAN): >60 ML/MIN/1.73 M^2
EST. GFR  (AFRICAN AMERICAN): >60 ML/MIN/1.73 M^2
EST. GFR  (NON AFRICAN AMERICAN): >60 ML/MIN/1.73 M^2
EST. GFR  (NON AFRICAN AMERICAN): >60 ML/MIN/1.73 M^2
GLUCOSE SERPL-MCNC: 118 MG/DL (ref 70–110)
GLUCOSE SERPL-MCNC: 132 MG/DL (ref 70–110)
GLUCOSE UR QL STRIP: NEGATIVE
GLUCOSE UR QL STRIP: NEGATIVE
HCT VFR BLD AUTO: 36.3 % (ref 40–54)
HGB BLD-MCNC: 11.8 G/DL (ref 14–18)
HGB UR QL STRIP: ABNORMAL
HGB UR QL STRIP: ABNORMAL
HYALINE CASTS #/AREA URNS LPF: 3 /LPF
HYALINE CASTS #/AREA URNS LPF: 41 /LPF
IMM GRANULOCYTES # BLD AUTO: 0.05 K/UL (ref 0–0.04)
IMM GRANULOCYTES NFR BLD AUTO: 0.4 % (ref 0–0.5)
INR PPP: 1.4
KETONES UR QL STRIP: NEGATIVE
KETONES UR QL STRIP: NEGATIVE
LACTATE SERPL-SCNC: 1.4 MMOL/L (ref 0.5–1.9)
LEUKOCYTE ESTERASE UR QL STRIP: NEGATIVE
LEUKOCYTE ESTERASE UR QL STRIP: NEGATIVE
LIPASE SERPL-CCNC: 32 U/L (ref 4–60)
LYMPHOCYTES # BLD AUTO: 2.7 K/UL (ref 1–4.8)
LYMPHOCYTES NFR BLD: 19.8 % (ref 18–48)
MAGNESIUM SERPL-MCNC: 1.7 MG/DL (ref 1.6–2.6)
MAGNESIUM SERPL-MCNC: 1.8 MG/DL (ref 1.6–2.6)
MCH RBC QN AUTO: 33.7 PG (ref 27–31)
MCHC RBC AUTO-ENTMCNC: 32.5 G/DL (ref 32–36)
MCV RBC AUTO: 104 FL (ref 82–98)
MICROSCOPIC COMMENT: ABNORMAL
MICROSCOPIC COMMENT: ABNORMAL
MONOCYTES # BLD AUTO: 0.9 K/UL (ref 0.3–1)
MONOCYTES NFR BLD: 6.7 % (ref 4–15)
NEUTROPHILS # BLD AUTO: 10 K/UL (ref 1.8–7.7)
NEUTROPHILS NFR BLD: 72.8 % (ref 38–73)
NITRITE UR QL STRIP: NEGATIVE
NITRITE UR QL STRIP: NEGATIVE
NRBC BLD-RTO: 0 /100 WBC
PH UR STRIP: 6 [PH] (ref 5–8)
PH UR STRIP: 6 [PH] (ref 5–8)
PHOSPHATE SERPL-MCNC: 3.4 MG/DL (ref 2.7–4.5)
PLATELET # BLD AUTO: 180 K/UL (ref 150–350)
PMV BLD AUTO: 10.3 FL (ref 9.2–12.9)
POTASSIUM SERPL-SCNC: 4.4 MMOL/L (ref 3.5–5.1)
POTASSIUM SERPL-SCNC: 4.4 MMOL/L (ref 3.5–5.1)
PROCALCITONIN SERPL IA-MCNC: 0.27 NG/ML (ref 0–0.5)
PROT SERPL-MCNC: 6.6 G/DL (ref 6–8.4)
PROT UR QL STRIP: ABNORMAL
PROT UR QL STRIP: ABNORMAL
PROTHROMBIN TIME: 16.3 SEC (ref 10.6–14.8)
RBC # BLD AUTO: 3.5 M/UL (ref 4.6–6.2)
RBC #/AREA URNS HPF: 54 /HPF (ref 0–4)
RBC #/AREA URNS HPF: >100 /HPF (ref 0–4)
SARS-COV-2 RDRP RESP QL NAA+PROBE: NEGATIVE
SODIUM SERPL-SCNC: 138 MMOL/L (ref 136–145)
SODIUM SERPL-SCNC: 138 MMOL/L (ref 136–145)
SP GR UR STRIP: 1.02 (ref 1–1.03)
SP GR UR STRIP: >1.03 (ref 1–1.03)
SQUAMOUS #/AREA URNS HPF: 1 /HPF
SQUAMOUS #/AREA URNS HPF: 15 /HPF
TROPONIN I SERPL DL<=0.01 NG/ML-MCNC: <0.03 NG/ML
URN SPEC COLLECT METH UR: ABNORMAL
URN SPEC COLLECT METH UR: ABNORMAL
UROBILINOGEN UR STRIP-ACNC: NEGATIVE EU/DL
UROBILINOGEN UR STRIP-ACNC: NEGATIVE EU/DL
WBC # BLD AUTO: 13.71 K/UL (ref 3.9–12.7)
WBC #/AREA URNS HPF: 3 /HPF (ref 0–5)
WBC #/AREA URNS HPF: 3 /HPF (ref 0–5)

## 2020-07-11 PROCEDURE — 99900035 HC TECH TIME PER 15 MIN (STAT)

## 2020-07-11 PROCEDURE — 36415 COLL VENOUS BLD VENIPUNCTURE: CPT

## 2020-07-11 PROCEDURE — 63600175 PHARM REV CODE 636 W HCPCS: Performed by: EMERGENCY MEDICINE

## 2020-07-11 PROCEDURE — 94664 DEMO&/EVAL PT USE INHALER: CPT

## 2020-07-11 PROCEDURE — 84100 ASSAY OF PHOSPHORUS: CPT

## 2020-07-11 PROCEDURE — 25000003 PHARM REV CODE 250: Performed by: EMERGENCY MEDICINE

## 2020-07-11 PROCEDURE — 25000242 PHARM REV CODE 250 ALT 637 W/ HCPCS: Performed by: NURSE PRACTITIONER

## 2020-07-11 PROCEDURE — 83735 ASSAY OF MAGNESIUM: CPT

## 2020-07-11 PROCEDURE — 27000221 HC OXYGEN, UP TO 24 HOURS

## 2020-07-11 PROCEDURE — 94640 AIRWAY INHALATION TREATMENT: CPT

## 2020-07-11 PROCEDURE — 97161 PT EVAL LOW COMPLEX 20 MIN: CPT

## 2020-07-11 PROCEDURE — 21400001 HC TELEMETRY ROOM

## 2020-07-11 PROCEDURE — 94761 N-INVAS EAR/PLS OXIMETRY MLT: CPT

## 2020-07-11 PROCEDURE — 81001 URINALYSIS AUTO W/SCOPE: CPT

## 2020-07-11 PROCEDURE — 80048 BASIC METABOLIC PNL TOTAL CA: CPT

## 2020-07-11 PROCEDURE — 63600175 PHARM REV CODE 636 W HCPCS: Performed by: NURSE PRACTITIONER

## 2020-07-11 PROCEDURE — 81001 URINALYSIS AUTO W/SCOPE: CPT | Mod: 91

## 2020-07-11 PROCEDURE — 85025 COMPLETE CBC W/AUTO DIFF WBC: CPT

## 2020-07-11 PROCEDURE — 84145 PROCALCITONIN (PCT): CPT

## 2020-07-11 PROCEDURE — 25000003 PHARM REV CODE 250: Performed by: NURSE PRACTITIONER

## 2020-07-11 PROCEDURE — 97116 GAIT TRAINING THERAPY: CPT

## 2020-07-11 PROCEDURE — 25500020 PHARM REV CODE 255: Performed by: EMERGENCY MEDICINE

## 2020-07-11 RX ORDER — ASPIRIN 81 MG/1
81 TABLET ORAL DAILY
Status: DISCONTINUED | OUTPATIENT
Start: 2020-07-11 | End: 2020-07-12 | Stop reason: HOSPADM

## 2020-07-11 RX ORDER — IPRATROPIUM BROMIDE AND ALBUTEROL SULFATE 2.5; .5 MG/3ML; MG/3ML
3 SOLUTION RESPIRATORY (INHALATION) EVERY 6 HOURS
Status: DISCONTINUED | OUTPATIENT
Start: 2020-07-11 | End: 2020-07-12 | Stop reason: HOSPADM

## 2020-07-11 RX ORDER — TAMSULOSIN HYDROCHLORIDE 0.4 MG/1
0.4 CAPSULE ORAL DAILY
Status: DISCONTINUED | OUTPATIENT
Start: 2020-07-11 | End: 2020-07-12 | Stop reason: HOSPADM

## 2020-07-11 RX ORDER — SODIUM CHLORIDE 9 MG/ML
INJECTION, SOLUTION INTRAVENOUS CONTINUOUS
Status: ACTIVE | OUTPATIENT
Start: 2020-07-11 | End: 2020-07-12

## 2020-07-11 RX ORDER — ISOSORBIDE MONONITRATE 30 MG/1
60 TABLET, EXTENDED RELEASE ORAL DAILY
Status: DISCONTINUED | OUTPATIENT
Start: 2020-07-11 | End: 2020-07-12 | Stop reason: HOSPADM

## 2020-07-11 RX ORDER — TALC
6 POWDER (GRAM) TOPICAL NIGHTLY PRN
Status: DISCONTINUED | OUTPATIENT
Start: 2020-07-11 | End: 2020-07-12 | Stop reason: HOSPADM

## 2020-07-11 RX ORDER — AMLODIPINE BESYLATE 2.5 MG/1
2.5 TABLET ORAL DAILY
Status: DISCONTINUED | OUTPATIENT
Start: 2020-07-11 | End: 2020-07-12 | Stop reason: HOSPADM

## 2020-07-11 RX ORDER — FUROSEMIDE 20 MG/1
20 TABLET ORAL EVERY OTHER DAY
Status: DISCONTINUED | OUTPATIENT
Start: 2020-07-11 | End: 2020-07-12 | Stop reason: HOSPADM

## 2020-07-11 RX ORDER — SODIUM CHLORIDE 0.9 % (FLUSH) 0.9 %
10 SYRINGE (ML) INJECTION
Status: DISCONTINUED | OUTPATIENT
Start: 2020-07-11 | End: 2020-07-12 | Stop reason: HOSPADM

## 2020-07-11 RX ORDER — ONDANSETRON 2 MG/ML
4 INJECTION INTRAMUSCULAR; INTRAVENOUS EVERY 8 HOURS PRN
Status: DISCONTINUED | OUTPATIENT
Start: 2020-07-11 | End: 2020-07-12 | Stop reason: HOSPADM

## 2020-07-11 RX ORDER — ACETAMINOPHEN 325 MG/1
650 TABLET ORAL EVERY 8 HOURS PRN
Status: DISCONTINUED | OUTPATIENT
Start: 2020-07-11 | End: 2020-07-12 | Stop reason: HOSPADM

## 2020-07-11 RX ORDER — LOSARTAN POTASSIUM 25 MG/1
25 TABLET ORAL DAILY
Status: DISCONTINUED | OUTPATIENT
Start: 2020-07-11 | End: 2020-07-12 | Stop reason: HOSPADM

## 2020-07-11 RX ORDER — PANTOPRAZOLE SODIUM 40 MG/1
40 TABLET, DELAYED RELEASE ORAL DAILY
Status: DISCONTINUED | OUTPATIENT
Start: 2020-07-11 | End: 2020-07-12 | Stop reason: HOSPADM

## 2020-07-11 RX ORDER — SOTALOL HYDROCHLORIDE 80 MG/1
80 TABLET ORAL 2 TIMES DAILY
Status: DISCONTINUED | OUTPATIENT
Start: 2020-07-11 | End: 2020-07-12 | Stop reason: HOSPADM

## 2020-07-11 RX ORDER — ATORVASTATIN CALCIUM 10 MG/1
10 TABLET, FILM COATED ORAL DAILY
Status: DISCONTINUED | OUTPATIENT
Start: 2020-07-11 | End: 2020-07-12 | Stop reason: HOSPADM

## 2020-07-11 RX ADMIN — LOSARTAN POTASSIUM 25 MG: 25 TABLET, FILM COATED ORAL at 09:07

## 2020-07-11 RX ADMIN — IPRATROPIUM BROMIDE AND ALBUTEROL SULFATE 3 ML: .5; 3 SOLUTION RESPIRATORY (INHALATION) at 02:07

## 2020-07-11 RX ADMIN — ASPIRIN 81 MG: 81 TABLET, DELAYED RELEASE ORAL at 09:07

## 2020-07-11 RX ADMIN — PIPERACILLIN AND TAZOBACTAM 4.5 G: 4; .5 INJECTION, POWDER, LYOPHILIZED, FOR SOLUTION INTRAVENOUS; PARENTERAL at 12:07

## 2020-07-11 RX ADMIN — ISOSORBIDE MONONITRATE 60 MG: 30 TABLET, EXTENDED RELEASE ORAL at 09:07

## 2020-07-11 RX ADMIN — APIXABAN 2.5 MG: 2.5 TABLET, FILM COATED ORAL at 09:07

## 2020-07-11 RX ADMIN — IPRATROPIUM BROMIDE AND ALBUTEROL SULFATE 3 ML: .5; 3 SOLUTION RESPIRATORY (INHALATION) at 07:07

## 2020-07-11 RX ADMIN — IOHEXOL 70 ML: 350 INJECTION, SOLUTION INTRAVENOUS at 01:07

## 2020-07-11 RX ADMIN — FUROSEMIDE 20 MG: 20 TABLET ORAL at 09:07

## 2020-07-11 RX ADMIN — IPRATROPIUM BROMIDE AND ALBUTEROL SULFATE 3 ML: .5; 3 SOLUTION RESPIRATORY (INHALATION) at 08:07

## 2020-07-11 RX ADMIN — ATORVASTATIN CALCIUM 10 MG: 10 TABLET, FILM COATED ORAL at 09:07

## 2020-07-11 RX ADMIN — SOTALOL HYDROCHLORIDE 80 MG: 80 TABLET ORAL at 09:07

## 2020-07-11 RX ADMIN — TAMSULOSIN HYDROCHLORIDE 0.4 MG: 0.4 CAPSULE ORAL at 09:07

## 2020-07-11 RX ADMIN — PIPERACILLIN AND TAZOBACTAM 4.5 G: 4; .5 INJECTION, POWDER, LYOPHILIZED, FOR SOLUTION INTRAVENOUS; PARENTERAL at 09:07

## 2020-07-11 RX ADMIN — APIXABAN 2.5 MG: 2.5 TABLET, FILM COATED ORAL at 08:07

## 2020-07-11 RX ADMIN — PANTOPRAZOLE SODIUM 40 MG: 40 TABLET, DELAYED RELEASE ORAL at 09:07

## 2020-07-11 RX ADMIN — SODIUM CHLORIDE 1000 ML: 0.9 INJECTION, SOLUTION INTRAVENOUS at 12:07

## 2020-07-11 RX ADMIN — Medication 200 MG: at 09:07

## 2020-07-11 RX ADMIN — PIPERACILLIN AND TAZOBACTAM 4.5 G: 4; .5 INJECTION, POWDER, LYOPHILIZED, FOR SOLUTION INTRAVENOUS; PARENTERAL at 03:07

## 2020-07-11 NOTE — PT/OT/SLP EVAL
Physical Therapy Evaluation    Patient Name:  Marck Ramos   MRN:  [id number]    Recommendations:     Discharge Recommendations:  home, home health PT   Discharge Equipment Recommendations: none   Barriers to discharge: None    Assessment:     Marck Ramos is a 89 y.o. male admitted with a medical diagnosis of Aspiration pneumonia.  He presents with the following impairments/functional limitations:  weakness, impaired endurance, impaired functional mobilty, gait instability, impaired balance, decreased lower extremity function, decreased ROM .  Patient readily agreeable to PT evaluation to include gait training. Patient presented supine in bed and required min assist to transfer supine to sit and CGA to stand.  Patient then ambulated x 150 feet with RW with CGA using right UE only due to sling on left UE.     Rehab Prognosis: Good; patient would benefit from acute skilled PT services to address these deficits and reach maximum level of function.    Recent Surgery: * No surgery found *      Plan:     During this hospitalization, patient to be seen 6 x/week to address the identified rehab impairments via gait training, therapeutic activities, therapeutic exercises and progress toward the following goals:    · Plan of Care Expires:  08/07/20    Subjective     Chief Complaint: none given  Patient/Family Comments/goals:  Go home  Pain/Comfort:       Patients cultural, spiritual, Restoration conflicts given the current situation:      Living Environment:  Currently lives with daughter in 1 story home.  Prior to admission, patients level of function was modified independent.  Equipment used at home: walker, rolling.  DME owned (not currently used): none.  Upon discharge, patient will have assistance from family.    Objective:     Communicated with nurse prior to session.  Patient found supine with bed alarm  upon PT entry to room.    General Precautions: Standard, fall   Orthopedic Precautions:N/A   Braces: Sling       Exams:  · RLE ROM: WFL  · RLE Strength: Deficits: 4/5 overall  · LLE ROM: WFL  · LLE Strength: Deficits: 4/5 overall    Functional Mobility:  · Bed Mobility:     · Supine to Sit: minimum assistance  · Sit to Supine: minimum assistance  · Transfers:     · Sit to Stand:  contact guard assistance with rolling walker  · Gait: x 150 feet with RW with min assist     Therapeutic Activities and Exercises:   gait training x 150 feet with RW with min assist using right UE only due to sling on left UE    AM-PAC 6 CLICK MOBILITY  Total Score:18     Patient left supine with call button in reach, bed alarm on and nurse notified.    GOALS:   Multidisciplinary Problems     Physical Therapy Goals        Problem: Physical Therapy Goal    Goal Priority Disciplines Outcome Goal Variances Interventions   Physical Therapy Goal     PT, PT/OT Ongoing, Progressing     Description: Goals to be met by: 2020    Patient will increase functional independence with mobility by performin. Supine to sit with Stand-by Assistance  2. Sit to supine with Stand-by Assistance  3. Sit to stand transfer with Supervision  4. Bed to chair transfer with Supervision using Rolling Walker  5. Gait  x 150 feet with Supervision using Rolling Walker.                      History:     Past Medical History:   Diagnosis Date    Anemia in stage 2 chronic kidney disease 2017    Anemia of other chronic disease 2017    Anticoagulant long-term use     CHF (congestive heart failure)     Hemorrhage of gastrointestinal tract, unspecified 2017    Hypertension     Iron deficiency anemia secondary to blood loss (chronic) 2017    Iron deficiency anemia, unspecified 2017    Other pulmonary embolism with acute cor pulmonale 3/6/2019    Pulmonary emboli        Past Surgical History:   Procedure Laterality Date    Closed reduction & internal fixation of closed, comminuted, displaced intertrochanteric fx left hip w/ TFN Left  08/29/2018       Time Tracking:     PT Received On: 07/11/20  PT Start Time: 1255     PT Stop Time: 1323  PT Total Time (min): 28 min     Billable Minutes: Evaluation 15 and Gait Training 13      Chris MeGilligan, PT  07/11/2020

## 2020-07-11 NOTE — H&P
[facility] Medicine History & Physical Examination   Patient Name: Marck Ramos  MRN: [id number]  Patient Class: IP- Inpatient   Admission Date: 7/10/2020 10:57 PM  Length of Stay: 0  Attending Physician:   Primary Care Provider: Hood Gannon MD  Face-to-Face encounter date: 07/11/2020  Code Status Pending will need to contact daughter in am  MPOA:Daughter  Chief Complaint: Vomiting (Started vomiting when EMS sat him up in bed to move to stretcher.) and Weakness (Daughter states became weak in legs while getting into bed and almost fell.)        Patient information was obtained from patient, past medical records and ER records.   HISTORY OF PRESENT ILLNESS:   Marck Ramos is a 89 y.o. old male who  has a past medical history of Anemia in stage 2 chronic kidney disease (7/18/2017), Anemia of other chronic disease (7/18/2017), Anticoagulant long-term use, CHF (congestive heart failure), Hemorrhage of gastrointestinal tract, unspecified (7/18/2017), Hypertension, Iron deficiency anemia secondary to blood loss (chronic) (7/18/2017), Iron deficiency anemia, unspecified (7/18/2017), Other pulmonary embolism with acute cor pulmonale (3/6/2019), and Pulmonary emboli.. The patient presented to [facility] on 7/10/2020 with a primary complaint of Vomiting (Started vomiting when EMS sat him up in bed to move to stretcher.) and Weakness (Daughter states became weak in legs while getting into bed and almost fell.)  .   89-year-old  male presents emergency room with diffuse abdominal discomfort and vomiting.      According to the patient's daughter the patient had complaints of nausea with vomiting and diffuse abdominal tenderness for the past 2 days.  The daughter states that the patient had been weak in his legs and was not getting up out the bed.  When he did attempt to get out of bed he almost fell.      She also noted him to be short of breath with minimal activity.       The patient has a past medical history significant for chronic kidney disease, CHF, pulmonary emboli of which he is on Eliquis, hypertension hyperlipidemia GERD and chronic anemia.      Upon arrival in emergency room the patient was found to have a oral temperature of a 102° he was hypoxic in the high 80s and exhibiting moderate tachypnea.  He was given acetaminophen started on IV antibiotics with Zosyn and supplemental O2.    REVIEW OF SYSTEMS:   10 Point Review of System was performed and was found to be negative except for that mentioned already in the HPI and   Review of Systems (Negative unless checked off)  Review of Systems   Constitutional: Positive for malaise/fatigue.   HENT: Negative.    Eyes: Negative.    Respiratory: Positive for cough, shortness of breath and wheezing.    Cardiovascular: Positive for orthopnea.   Gastrointestinal: Positive for abdominal pain, nausea and vomiting.   Genitourinary: Negative.    Musculoskeletal: Positive for joint pain and myalgias.   Skin: Negative.    Neurological: Positive for weakness.   Endo/Heme/Allergies: Negative.    Psychiatric/Behavioral: Negative.            PAST MEDICAL HISTORY:     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    Iron deficiency anemia, unspecified 7/18/2017    Other pulmonary embolism with acute cor pulmonale 3/6/2019    Pulmonary emboli        PAST SURGICAL HISTORY:     Past Surgical History:   Procedure Laterality Date    Closed reduction & internal fixation of closed, comminuted, displaced intertrochanteric fx left hip w/ TFN Left 08/29/2018       ALLERGIES:   Sulfa (sulfonamide antibiotics) and Iron    FAMILY HISTORY:    History reviewed. No pertinent family history.    SOCIAL HISTORY:     Social History     Tobacco Use    Smoking status: Former Smoker     Years: 1.00     Types: Cigarettes    Smokeless tobacco: Never Used   Substance Use Topics    Alcohol use: No        Social History     Substance and Sexual Activity   Sexual Activity Never        HOME MEDICATIONS:     Prior to Admission medications    Medication Sig Start Date End Date Taking? Authorizing Provider   amlodipine (NORVASC) 5 MG tablet Take 2.5 mg by mouth once daily. Take 1/2 tab by mouth daily    Historical Provider, MD   aspirin (ECOTRIN) 81 MG EC tablet Take 81 mg by mouth once daily.      Historical Provider, MD   atorvastatin (LIPITOR) 10 MG tablet Take 1 tablet (10 mg total) by mouth once daily. 6/10/20 6/10/21  Vanessa Mancini, NP   ELIQUIS 2.5 mg Tab 2.5 mg 2 (two) times daily.  10/12/18   Historical Provider, MD   furosemide (LASIX) 40 MG tablet Take 1 tablet (40 mg total) by mouth once daily.  Patient taking differently: Take 20 mg by mouth every other day.  9/19/18 6/3/20  Grey Gonzalez MD   isosorbide mononitrate (IMDUR) 60 MG 24 hr tablet Take 1 tablet (60 mg total) by mouth once daily. 4/8/20   Hood Gannon MD   losartan (COZAAR) 50 MG tablet Take 0.5 tablets (25 mg total) by mouth once daily. 4/8/20   Hood Gannon MD   magnesium 200 mg Tab Take 1 tablet by mouth once daily.    Historical Provider, MD   pantoprazole (PROTONIX) 40 MG tablet Take 40 mg by mouth once daily.    Historical Provider, MD   potassium chloride SA (K-DUR,KLOR-CON) 20 MEQ tablet Take 1 tablet (20 mEq total) by mouth once daily. 9/19/18   Grey Gonzalez MD   sotaloL (BETAPACE) 80 MG tablet Take 1 tablet (80 mg total) by mouth 2 (two) times daily. 4/8/20   Hood Gannon MD   tamsulosin (FLOMAX) 0.4 mg Cap Take 1 capsule (0.4 mg total) by mouth once daily. 8/24/19 8/23/20  Hood Collins MD         PHYSICAL EXAM:   BP (!) 165/74 (BP Location:  "Right arm, Patient Position: Lying)   Pulse 87   Temp 98.6 °F (37 °C) (Oral)   Resp (!) 22   Ht 5' 6" (1.676 m)   Wt 60.2 kg (132 lb 11.5 oz)   SpO2 (!) 92%   BMI 21.42 kg/m²   Vitals Reviewed  General appearance: Well-developed, well-nourished male in no apparent distress.  Skin: No Rash.   Neuro: Motor and sensory exams grossly intact. Good tone. Power in all 4 extremities 5/5.   HENT: Atraumatic head. Moist mucous membranes of oral cavity.  Eyes: Normal extraocular movements.   Neck: Supple. No evidence of lymphadenopathy. No thyroidomegaly.  Lungs: Clear to auscultation bilaterally. No wheezing present.   Heart: Regular rate and rhythm. S1 and S2 present with no murmurs/gallop/rub. No pedal edema. No JVD present.   Abdomen: Soft, non-distended, non-tender. No rebound tenderness/guarding. No masses or organomegaly. Bowel sounds are normal. Bladder is not palpable.   Extremities: No cyanosis, clubbing, or edema.  Psych/mental status: Alert and oriented. Cooperative. Responds appropriately to questions.   EMERGENCY DEPARTMENT LABS AND IMAGING:   Following labs were Reviewed   Recent Labs   Lab 07/10/20  2310   WBC 9.68   HGB 12.4*   HCT 37.4*      CALCIUM 8.4*   ALBUMIN 3.1*   PROT 6.6      K 4.4   CO2 26      BUN 30   CREATININE 1.0   ALKPHOS 88   ALT 22   AST 25   BILITOT 0.7         BMP:   Recent Labs   Lab 07/10/20  2310   *      K 4.4      CO2 26   BUN 30   CREATININE 1.0   CALCIUM 8.4*   MG 1.7   , CMP   Recent Labs   Lab 07/10/20  2310      K 4.4      CO2 26   *   BUN 30   CREATININE 1.0   CALCIUM 8.4*   PROT 6.6   ALBUMIN 3.1*   BILITOT 0.7   ALKPHOS 88   AST 25   ALT 22   ANIONGAP 9   ESTGFRAFRICA >60.0   EGFRNONAA >60.0   , CBC   Recent Labs   Lab 07/10/20  2310   WBC 9.68   HGB 12.4*   HCT 37.4*      , INR   Lab Results   Component Value Date    INR 1.4 07/10/2020   , Lipid Panel   Lab Results   Component Value Date    CHOL 96 (L) " 12/17/2019    HDL 35 (L) 12/17/2019    LDLCALC 56.8 (L) 12/17/2019    TRIG 21 (L) 12/17/2019    CHOLHDL 36.5 12/17/2019   , Troponin   Recent Labs   Lab 07/10/20  2310   TROPONINI <0.030   , A1C: No results for input(s): HGBA1C in the last 4320 hours. and All labs within the past 24 hours have been reviewed  Microbiology Results (last 7 days)     Procedure Component Value Units Date/Time    Blood culture #2 **CANNOT BE ORDERED STAT** [[id number]] Collected: 07/10/20 3394    Order Status: Sent Specimen: Blood from Peripheral, Upper Arm, Right Updated: 07/11/20 0008    Blood culture #1 **CANNOT BE ORDERED STAT** [[id number]] Collected: 07/10/20 2335    Order Status: Sent Specimen: Blood from Peripheral, Upper Arm, Right Updated: 07/11/20 0008        CT Abdomen Pelvis With Contrast   Final Result      X-Ray Chest AP Portable   ED Interpretation   Abnormal   Right lower lobe consolidation and right-sided pleural effusion worse than prior x-ray      Final Result      Ct Abdomen Pelvis With Contrast    Result Date: 7/10/2020  EXAM DESCRIPTION: CT ABDOMEN PELVIS WITH CONTRAST 7/11/2020 1:32 AM CDT CLINICAL HISTORY: 89 years, Male, Abdominal pain, fever COMPARISON: 04/27/2019 PROCEDURE: Contrast-enhanced images of the abdomen and pelvis were performed utilizing 2 mm slice thickness at 2 mm interval reconstruction interval reconstruction from the lung bases to the ischial tuberosities after the administration of 70 mL of Omnipaque 350 at the rate of 3 cc/sec. 10 minutes delay images of the kidney and bladder were also generated. An individualized dose optimization technique, Automated Exposure Control, was utilized for the performed procedure. FINDINGS: The lung bases demonstrate the presence of interstitial lung changes with atelectasis. Large hiatal hernia. Pacemaker. The liver, gallbladder, pancreas, spleen and adrenal glands demonstrate to be unremarkable, no focal lesions are noted. The kidneys demonstrate normal uptake  of contrast media.  No hydronephrosis and/or stones were identified. Tiny hypodensities within the right kidney suggests the presence of renal cysts, largest one measuring 1.2 cm midpole on image 56. Grossly the unopacified stomach, small bowel and large bowel demonstrate to be within normal limits. Fecal residue within the large bowel limits evaluation. Fecal residue within the left site colon could suggest mild fecal stasis. The appendix was not visualized. The urinary bladder demonstrate to be well distended. The prostate gland is prominent perhaps related to BPH. The presence of metallic hardware within the left hip joint limits evaluation of the pelvis. The aorta demonstrate atherosclerotic disease.  There is no retroperitoneal lymphadenopathy. There is no evidence for ascites. The bone windows demonstrate again mild diffuse bony osteopenia with dextra scoliosis and degenerative changes at the point of curvature. There is ORIF left hip joint. Small umbilical hernia containing omentum. IMPRESSION: MODERATE TO LARGE SIZE HIATAL HERNIA. CHRONIC/INTERSTITIAL LUNG CHANGES. LEFT RENAL CYSTS. ATHEROSCLEROTIC DISEASE OF THE AORTA. ENLARGED PROSTATE GLAND PERHAPS RELATED TO BPH. MILD CONSTIPATION. NO EVIDENCE FOR ACUTE INTRA-ABDOMINAL PROCESS. Electronically signed by:  Fei Do MD  7/11/2020 1:42 AM CDT Workstation: [id number]    X-ray Chest Ap Portable    Result Date: 7/10/2020  EXAM DESCRIPTION: XR CHEST AP PORTABLE 7/11/2020 1:43 AM CDT CLINICAL HISTORY: 89 years, Male, shortness of breath; Vomiting; Weakness; , cough COMPARISON: 04/27/2019 FINDINGS: Single view of the chest was obtained portable. Prior films were compared. End there is a dual-lead pacemaker via a left clavian. Decreased lung volume. The cardiomediastinal silhouette demonstrate to be unremarkable.  The heart is not enlarged. Double shadow density within the retrocardiac region corresponding to a moderate to large size hiatal hernia. The thoracic  aorta is tortuous with atherosclerotic disease. Reticular-nodular densities throughout the lungs corresponding to a most likely chronic interstitial changes. Slight increase densities within the lung bases, greater right lung than left, the possibility of infiltrate within the right lung base cannot be excluded. External EKG leads within the field-of-view limits diagnosis. Scoliosis of the thoracolumbar spine. IMPRESSION: DECREASED LUNG VOLUME. HIATAL HERNIA. ATHEROSCLEROTIC DISEASE OF THE AORTA. PACEMAKER IN PLACE. INTERSTITIAL LUNG DISEASE UIP/IPF PATTERN. ATELECTASIS LUNG BASES, RIGHT GREATER THAN LEFT, SUPERIMPOSED INFILTRATE CANNOT BE EXCLUDED. Electronically signed by:  Fei Do MD  7/11/2020 1:46 AM CDT Workstation: [id number]      Twelve lead EKG reveals atrial sensed V paced rhythm rate 86  milliseconds  ASSESSMENT & PLAN:   Marck Ramos is a 89 y.o. male admitted for    1. Aspiration Pneumonia  -IV antibiotics with Zosyn  -aerosol nebulizer treatments  -CPT b.i.d.  -gentle IV hydration  -continuous pulse oximetry  -supplemental O2 as needed to keep sats above 90    2. Large Hiatal Hernia    3. Advance age  -awake alert cooperative answers questions appropriately no signs or symptoms of dementia    4.  History of pulmonary emboli  -continue Eliquis    5.  Paroxysmal atrial fibrillation-chronic  -rate controlled  -anticoagulated with Eliquis    6. 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 Mares    7.old Humeral head fracture, left, closed (occurred 06/03/2020)   -currently with the sling and swath    8. CKD stage III  - essential stable  - monitor   - avoidance of nephrotoxic meds for now    DVT Prophylaxis: will be placed on Eliquis for DVT prophylaxis and will be advised to be as mobile as possible and sit in a chair as tolerated.   ________________________________________________________________________________        Face-to-Face encounter date:  07/11/2020  Encounter included review of the medical records, interviewing and examining the patient face-to-face, discussion with family and other health care providers including emergency medicine physician, admission orders, interpreting lab/test results and formulating a plan of care.   Medical Decision Making during this encounter was  [_] Low Complexity  [_] Moderate Complexity  [x] High Complexity  _________________________________________________________________________________    INPATIENT LIST OF MEDICATIONS     Current Facility-Administered Medications:     0.9%  NaCl infusion, , Intravenous, Continuous, Mirta Fitzpatrick NP    acetaminophen tablet 650 mg, 650 mg, Oral, Q8H PRN, Mirta Fitzpatrick NP    albuterol-ipratropium 2.5 mg-0.5 mg/3 mL nebulizer solution 3 mL, 3 mL, Nebulization, Q6H, Mirta Fitzpatrick NP    amLODIPine tablet 2.5 mg, 2.5 mg, Oral, Daily, Mirta Fitzpatrick NP    apixaban tablet 2.5 mg, 2.5 mg, Oral, BID, Mirta Fitzpatrick NP    aspirin EC tablet 81 mg, 81 mg, Oral, Daily, Mirta Fitzpatrick NP    atorvastatin tablet 10 mg, 10 mg, Oral, Daily, Mirta Fitzpatrick NP    furosemide tablet 20 mg, 20 mg, Oral, Every other day, Mirta Fitzpatrick NP    ibuprofen tablet 600 mg, 600 mg, Oral, Q6H PRN, Mirta Fitzpatrick NP    isosorbide mononitrate 24 hr tablet 60 mg, 60 mg, Oral, Daily, Mirta Fitzpatrick NP    losartan tablet 25 mg, 25 mg, Oral, Daily, Mirta Fitzpatrick NP    magnesium oxide split tablet 200 mg, 200 mg, Oral, Daily, Mirta Fitzpatrick NP    melatonin tablet 6 mg, 6 mg, Oral, Nightly PRN, Mirta Fitzpatrick NP    ondansetron injection 4 mg, 4 mg, Intravenous, Q8H PRN, Mirta Fitzptarick NP    pantoprazole EC tablet 40 mg, 40 mg, Oral, Daily, Mirta Fitzpatrick, MONALISA    piperacillin-tazobactam 4.5 g in dextrose 5 % 100 mL IVPB (ready to mix system), 4.5 g, Intravenous, Q8H, Mirta Fitzpatrick, NP    sodium chloride 0.9% flush 10 mL, 10 mL, Intravenous, PRN, Mirta Fitzpatrick, NP    sotaloL tablet 80 mg, 80 mg, Oral, BID,  Mirta Fitzpatrick NP    tamsulosin 24 hr capsule 0.4 mg, 0.4 mg, Oral, Daily, Mirta Fitzpatrick NP      Scheduled Meds:  Continuous Infusions:  PRN Meds:.      Mirta Fitzpatrick  [facility] Hospitalist NP  07/11/2020

## 2020-07-11 NOTE — RESPIRATORY THERAPY
This note also relates to the following rows which could not be included:  SpO2 - Cannot attach notes to unvalidated device data  Pulse - Cannot attach notes to unvalidated device data  BP - Cannot attach notes to unvalidated device data       07/11/20 0302   Patient Assessment/Suction   Level of Consciousness (AVPU) alert   Respiratory Effort Unlabored   Expansion/Accessory Muscles/Retractions expansion symmetric;no retractions;no use of accessory muscles   All Lung Fields Breath Sounds coarse;crackles, fine;diminished   Rhythm/Pattern, Respiratory shallow;no shortness of breath reported;pattern regular;unlabored   Cough Type congested;fair;nonproductive   PRE-TX-O2   O2 Device (Oxygen Therapy) nasal cannula   $ Is the patient on Low Flow Oxygen? Yes   Pulse Oximetry Type Continuous   $ Pulse Oximetry - Multiple Charge Pulse Oximetry - Multiple   Resp 16   Respiratory Interventions   Cough And Deep Breathing done with encouragement   Breathing Techniques/Airway Clearance deep/controlled cough encouraged;diaphragmatic breathing promoted   Respiratory Evaluation   $ Care Plan Tech Time 15 min   Evaluation For New Orders   Admitting Diagnosis Asp Pneumonia   Home Oxygen   Has Home Oxygen? No   Home Aerosol, MDI, DPI, and Other Treatments/Therapies   Home Respiratory Therapy Per Patient/Review of Chart No   Oxygen Care Plan   Oxygen Care Plan Per Protocol   SPO2 Goal (%) 92% non-cardiac   Rationale SpO2 is <92% (Non-cardiac Pt.)   Bronchodilator Care Plan   Bronchodilator Care Plan Aerosol   Aerosol Meds w/ frequency Albuterol - Ipratropium (DUO-NEB) 0.5mg-3mg(2.5ml) 3ml Nebulizer Solution2.5mg Q 6Hr        07/11/20 0302   Patient Assessment/Suction   Level of Consciousness (AVPU) alert   Respiratory Effort Unlabored   Expansion/Accessory Muscles/Retractions expansion symmetric;no retractions;no use of accessory muscles   All Lung Fields Breath Sounds coarse;crackles, fine;diminished   Rhythm/Pattern, Respiratory  shallow;no shortness of breath reported;pattern regular;unlabored   Cough Type congested;fair;nonproductive   PRE-TX-O2   O2 Device (Oxygen Therapy) nasal cannula   $ Is the patient on Low Flow Oxygen? Yes   Pulse Oximetry Type Continuous   $ Pulse Oximetry - Multiple Charge Pulse Oximetry - Multiple   Resp 16   Respiratory Interventions   Cough And Deep Breathing done with encouragement   Breathing Techniques/Airway Clearance deep/controlled cough encouraged;diaphragmatic breathing promoted   Respiratory Evaluation   $ Care Plan Tech Time 15 min   Evaluation For New Orders   Admitting Diagnosis Asp Pneumonia   Home Oxygen   Has Home Oxygen? No   Home Aerosol, MDI, DPI, and Other Treatments/Therapies   Home Respiratory Therapy Per Patient/Review of Chart No   Oxygen Care Plan   Oxygen Care Plan Per Protocol   SPO2 Goal (%) 92% non-cardiac   Rationale SpO2 is <92% (Non-cardiac Pt.)   Bronchodilator Care Plan   Bronchodilator Care Plan Aerosol   Aerosol Meds w/ frequency Albuterol - Ipratropium (DUO-NEB) 0.5mg-3mg(2.5ml) 3ml Nebulizer Solution2.5mg Q 6Hr        07/11/20 0302   Patient Assessment/Suction   Level of Consciousness (AVPU) alert   Respiratory Effort Unlabored   Expansion/Accessory Muscles/Retractions expansion symmetric;no retractions;no use of accessory muscles   All Lung Fields Breath Sounds coarse;crackles, fine;diminished   Rhythm/Pattern, Respiratory shallow;no shortness of breath reported;pattern regular;unlabored   Cough Type congested;fair;nonproductive   PRE-TX-O2   O2 Device (Oxygen Therapy) nasal cannula   $ Is the patient on Low Flow Oxygen? Yes   Pulse Oximetry Type Continuous   $ Pulse Oximetry - Multiple Charge Pulse Oximetry - Multiple   Resp 16   Respiratory Interventions   Cough And Deep Breathing done with encouragement   Breathing Techniques/Airway Clearance deep/controlled cough encouraged;diaphragmatic breathing promoted   Respiratory Evaluation   $ Care Plan Tech Time 15 min    Evaluation For New Orders   Admitting Diagnosis Asp Pneumonia   Home Oxygen   Has Home Oxygen? No   Home Aerosol, MDI, DPI, and Other Treatments/Therapies   Home Respiratory Therapy Per Patient/Review of Chart No   Oxygen Care Plan   Oxygen Care Plan Per Protocol   SPO2 Goal (%) 92% non-cardiac   Rationale SpO2 is <92% (Non-cardiac Pt.)   Bronchodilator Care Plan   Bronchodilator Care Plan Aerosol   Aerosol Meds w/ frequency Albuterol - Ipratropium (DUO-NEB) 0.5mg-3mg(2.5ml) 3ml Nebulizer Solution2.5mg Q 6Hr

## 2020-07-11 NOTE — ED NOTES
Patient identifiers for Marck Ramos checked and correct.  LOC:  Marck Ramos is awake, alert, and aware of environment. He is oriented x 3   APPEARANCE:  He is resting comfortably and in no acute distress. He is clean and well groomed, patient's clothing is properly fastened.  MUSCULOSKELETAL: Left arm is in a sling, prior fracture reported.  RESPIRATORY:  Airway is open and patent. Respirations are spontaneous, somewhat labored. O2 sat 92% on 2.5L nasal cannula  CARDIAC: Patient has a pacemaker, Pacing at 77 BPM. Some peripheral edema noted.   ABDOMEN:  No distention noted.  Patient has vomited multiple times  NEUROLOGICAL:  PERRL. Facial expression is symmetrical.   Allergies reported:    Review of patient's allergies indicates:   Allergen Reactions    Sulfa (sulfonamide antibiotics)      Patient can not recall reaction     Iron      Other reaction(s): Unknown     OTHER NOTES:

## 2020-07-11 NOTE — PLAN OF CARE
07/11/20 0828   Patient Assessment/Suction   Level of Consciousness (AVPU) alert   Respiratory Effort Normal;Unlabored   All Lung Fields Breath Sounds coarse   PRE-TX-O2   O2 Device (Oxygen Therapy) nasal cannula   $ Is the patient on Low Flow Oxygen? Yes   Flow (L/min) 2   SpO2 97 %   Pulse Oximetry Type Continuous   $ Pulse Oximetry - Multiple Charge Pulse Oximetry - Multiple   Pulse 73   Resp 18   Aerosol Therapy   $ Aerosol Therapy Charges Aerosol Treatment   Daily Review of Necessity (SVN) completed   Respiratory Treatment Status (SVN) given   Treatment Route (SVN) mask;oxygen   Patient Position (SVN) HOB elevated;semi-Gomes's   Post Treatment Assessment (SVN) breath sounds improved   Signs of Intolerance (SVN) none   Vibratory PEP Therapy   $ Vibratory PEP Charges Acapella Therapy   $ Vibratory PEP Equipment Aerobika Equipment   $ Vibratory PEP Tech Time Charges 15 min   Daily Review of Necessity (PEP Therapy) completed   Type (PEP Therapy) vibratory/oscillatory   Device (PEP Therapy) flutter   Route (PEP Therapy) mouthpiece   Breaths per Cycle (PEP Therapy) 10   Cycles (PEP Therapy) 1   Patient Position (PEP Therapy) HOB elevated;semi-Gmoes's   Post Treatment Assessment (PEP) breath sounds improved   Signs of Intolerance (PEP Therapy) none   Respiratory Evaluation   $ Care Plan Tech Time 15 min

## 2020-07-11 NOTE — PROGRESS NOTES
[facility] Medicine  Progress Note    Patient name: Marck Ramos  MRN: [id number]  Admit Date: 7/10/2020   LOS: 0 days     SUBJECTIVE:     Principal problem: Aspiration pneumonia    Interval History:    Pt appears comfortable,denies any abd pain, afebrile  Reports dysuria, UA obtained  No difficuty in swallowing, reports productive cough with white phlem  Oriented to place and answeing questions appropriately  D/c planning tomorrow  Has homehealth  PT/OT while IP    Scheduled Meds:   albuterol-ipratropium  3 mL Nebulization Q6H    amLODIPine  2.5 mg Oral Daily    apixaban  2.5 mg Oral BID    aspirin  81 mg Oral Daily    atorvastatin  10 mg Oral Daily    furosemide  20 mg Oral Every other day    isosorbide mononitrate  60 mg Oral Daily    losartan  25 mg Oral Daily    magnesium oxide  200 mg Oral Daily    pantoprazole  40 mg Oral Daily    piperacillin-tazobactam (ZOSYN) IVPB  4.5 g Intravenous Q8H    sotaloL  80 mg Oral BID    tamsulosin  0.4 mg Oral Daily     Continuous Infusions:   sodium chloride 0.9%       PRN Meds:acetaminophen, ibuprofen, melatonin, ondansetron, sodium chloride 0.9%    Review of patient's allergies indicates:   Allergen Reactions    Sulfa (sulfonamide antibiotics)      Patient can not recall reaction     Iron      Other reaction(s): Unknown       Review of Systems: As per interval history    OBJECTIVE:     Vital Signs (Most Recent)  Temp: 97.1 °F (36.2 °C) (07/11/20 1100)  Pulse: 69 (07/11/20 1425)  Resp: 18 (07/11/20 1425)  BP: (!) 123/58 (07/11/20 1100)  SpO2: 96 % (07/11/20 1425)    Vital Signs Range (Last 24H):  Temp:  [97.1 °F (36.2 °C)-102.3 °F (39.1 °C)]   Pulse:  [67-87]   Resp:  [16-22]   BP: (117-206)/(52-97)   SpO2:  [90 %-97 %]     I & O (Last 24H):    Intake/Output Summary (Last 24 hours) at 7/11/2020 1452  Last data filed at 7/11/2020 1200  Gross per 24 hour   Intake 200 ml   Output 400 ml   Net -200 ml       Physical Exam:  General:  Patient resting comfortably in no acute distress. Appears as stated age. Calm, on left arm sling s/p fracture  Eyes: No conjunctival injection. No scleral icterus.  ENT: Hearing grossly intact. No discharge from ears. No nasal discharge.   Neck: Supple, trachea midline. No JVD  CVS: RRR. No LE edema BL  Lungs: CTA BLexcept right lung rales on bases, no wheezing or crackles. Good breath sounds. No accessory muscle use. No acute respiratory distress  Abdomen:  Soft, nontender and nondistended.  No organomegaly  Neuro: AOx3. Moves all extremities. Follows commands. Responds appropriately   Skin:  No rash or erythema noted    Laboratory:  CBC:   Recent Labs   Lab 07/11/20  0455   WBC 13.71*   RBC 3.50*   HGB 11.8*   HCT 36.3*      *   MCH 33.7*   MCHC 32.5     CMP:   Recent Labs   Lab 07/10/20  2310 07/11/20  0455   * 132*   CALCIUM 8.4* 8.1*   ALBUMIN 3.1*  --    PROT 6.6  --     138   K 4.4 4.4   CO2 26 26    103   BUN 30 29   CREATININE 1.0 0.9   ALKPHOS 88  --    ALT 22  --    AST 25  --    BILITOT 0.7  --            ASSESSMENT/PLAN:     1. Aspiration Pneumonia  -IV antibiotics with Zosyn  -aerosol nebulizer treatments  -CPT b.i.d.  -gentle IV hydration  -continuous pulse oximetry  -supplemental O2 as needed to keep sats above 90     2. Large Hiatal Hernia     3. Advance age  -awake alert cooperative answers questions appropriately no signs or symptoms of dementia     4.  History of pulmonary emboli  -continue Eliquis     5.  Paroxysmal atrial fibrillation-chronic  -rate controlled  -anticoagulated with Eliquis     6. 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 Mares     7.old Humeral head fracture, left, closed (occurred 06/03/2020)   -currently with the sling and swath     8. CKD stage III  - essential stable  - monitor   - avoidance of nephrotoxic meds for now     DVT Prophylaxis: will be placed on Eliquis for DVT prophylaxis and will be advised to be  as mobile as possible and sit in a chair as tolerated.     VTE Risk Mitigation (From admission, onward)         Ordered     apixaban tablet 2.5 mg  2 times daily      07/11/20 0226     IP VTE LOW RISK PATIENT  Once      07/11/20 0226     Place sequential compression device  Until discontinued      07/11/20 0226                  Department Hospital Medicine  [facility]  Jane Franco MD  Date of service: 07/11/2020

## 2020-07-11 NOTE — ED PROVIDER NOTES
Encounter Date: 7/10/2020       History     Chief Complaint   Patient presents with    Vomiting     Started vomiting when EMS sat him up in bed to move to stretcher.    Weakness     Daughter states became weak in legs while getting into bed and almost fell.     This is a 89-year-old male past medical history of chronic kidney disease, congestive heart failure, hypertension, anemia, pulmonary embolus who presents emergency department with generalized weakness, cough, vomiting.  EMS was called form gentleman who is generally weak and fatigued.  When they remove and sat him up he started to vomit profusely.  He had 4 episodes of emesis in route for EMS.  They gave him 4 mg of Zofran.  Patient admits to having a dry nonproductive cough for today.  Denies any fever chills.  Denies any chest pain or shortness of breath currently.  Says he does not have any abdominal pain.  Patient is a poor historian.        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 No pertinent family history.  Social History     Tobacco Use    Smoking status: Former Smoker     Years: 1.00     Types: Cigarettes    Smokeless  tobacco: Never Used   Substance Use Topics    Alcohol use: No    Drug use: No     Review of Systems   Constitutional: Positive for fatigue. Negative for chills and fever.   HENT: Negative for sore throat.    Respiratory: Positive for cough. Negative for shortness of breath.    Cardiovascular: Negative for chest pain.   Gastrointestinal: Positive for nausea and vomiting. Negative for abdominal pain.   Genitourinary: Negative for dysuria.   Musculoskeletal: Negative for back pain.   Skin: Negative for rash.   Neurological: Negative for weakness.   Hematological: Does not bruise/bleed easily.   All other systems reviewed and are negative.      Physical Exam     Initial Vitals [07/10/20 2300]   BP Pulse Resp Temp SpO2   (!) 206/97 86 (!) 22 99.4 °F (37.4 °C) (!) 93 %      MAP       --         Physical Exam    Nursing note and vitals reviewed.  Constitutional: He appears well-developed and well-nourished. He is not diaphoretic. No distress.   Patient actively vomiting on my evaluation.   HENT:   Head: Normocephalic and atraumatic.   Mouth/Throat: Oropharynx is clear and moist. No oropharyngeal exudate.   Eyes: Conjunctivae and EOM are normal. Pupils are equal, round, and reactive to light.   Neck: Neck supple. No tracheal deviation present.   Cardiovascular: Normal rate, regular rhythm, normal heart sounds and intact distal pulses.   No murmur heard.  Pulmonary/Chest: No stridor. No respiratory distress. He has no wheezes. He has rales.   Decreased breath sounds in the lower lungs bilaterally.  Rales in the right lower lobe   Abdominal: Soft. Bowel sounds are normal. He exhibits no distension. There is no abdominal tenderness. There is no rebound and no guarding.   Musculoskeletal: Normal range of motion. No tenderness or edema.   Lymphadenopathy:     He has no cervical adenopathy.   Neurological: He is alert. He has normal strength. No cranial nerve deficit or sensory deficit.   Skin: Skin is warm and dry. Capillary  refill takes less than 2 seconds. No rash noted. No erythema. There is pallor.   Psychiatric: He has a normal mood and affect.         ED Course   Procedures  Labs Reviewed   CBC W/ AUTO DIFFERENTIAL - Abnormal; Notable for the following components:       Result Value    RBC 3.70 (*)     Hemoglobin 12.4 (*)     Hematocrit 37.4 (*)     Mean Corpuscular Volume 101 (*)     Mean Corpuscular Hemoglobin 33.5 (*)     All other components within normal limits   COMPREHENSIVE METABOLIC PANEL - Abnormal; Notable for the following components:    Glucose 118 (*)     Calcium 8.4 (*)     Albumin 3.1 (*)     All other components within normal limits   B-TYPE NATRIURETIC PEPTIDE - Abnormal; Notable for the following components:     (*)     All other components within normal limits   PROTIME-INR - Abnormal; Notable for the following components:    PT 16.3 (*)     All other components within normal limits   URINALYSIS, REFLEX TO URINE CULTURE - Abnormal; Notable for the following components:    Appearance, UA Hazy (*)     Protein, UA Trace (*)     Occult Blood UA 2+ (*)     All other components within normal limits    Narrative:     Specimen Source->Urine   URINALYSIS MICROSCOPIC - Abnormal; Notable for the following components:    RBC, UA >100 (*)     Hyaline Casts, UA 41 (*)     All other components within normal limits    Narrative:     Specimen Source->Urine   CULTURE, BLOOD   CULTURE, BLOOD   TROPONIN I   LIPASE   MAGNESIUM   SARS-COV-2 RNA AMPLIFICATION, QUAL   LACTIC ACID, PLASMA           Results for orders placed or performed during the hospital encounter of 07/10/20   CBC auto differential   Result Value Ref Range    WBC 9.68 3.90 - 12.70 K/uL    RBC 3.70 (L) 4.60 - 6.20 M/uL    Hemoglobin 12.4 (L) 14.0 - 18.0 g/dL    Hematocrit 37.4 (L) 40.0 - 54.0 %    Mean Corpuscular Volume 101 (H) 82 - 98 fL    Mean Corpuscular Hemoglobin 33.5 (H) 27.0 - 31.0 pg    Mean Corpuscular Hemoglobin Conc 33.2 32.0 - 36.0 g/dL    RDW  14.1 11.5 - 14.5 %    Platelets 206 150 - 350 K/uL    MPV 10.0 9.2 - 12.9 fL    Immature Granulocytes 0.3 0.0 - 0.5 %    Gran # (ANC) 6.8 1.8 - 7.7 K/uL    Immature Grans (Abs) 0.03 0.00 - 0.04 K/uL    Lymph # 2.0 1.0 - 4.8 K/uL    Mono # 0.6 0.3 - 1.0 K/uL    Eos # 0.3 0.0 - 0.5 K/uL    Baso # 0.03 0.00 - 0.20 K/uL    nRBC 0 0 /100 WBC    Gran% 69.7 38.0 - 73.0 %    Lymph% 20.9 18.0 - 48.0 %    Mono% 6.0 4.0 - 15.0 %    Eosinophil% 2.8 0.0 - 8.0 %    Basophil% 0.3 0.0 - 1.9 %    Differential Method Automated    Comprehensive metabolic panel   Result Value Ref Range    Sodium 138 136 - 145 mmol/L    Potassium 4.4 3.5 - 5.1 mmol/L    Chloride 103 95 - 110 mmol/L    CO2 26 23 - 29 mmol/L    Glucose 118 (H) 70 - 110 mg/dL    BUN, Bld 30 10 - 30 mg/dL    Creatinine 1.0 0.5 - 1.4 mg/dL    Calcium 8.4 (L) 8.7 - 10.5 mg/dL    Total Protein 6.6 6.0 - 8.4 g/dL    Albumin 3.1 (L) 3.5 - 5.2 g/dL    Total Bilirubin 0.7 0.1 - 1.0 mg/dL    Alkaline Phosphatase 88 55 - 135 U/L    AST 25 10 - 40 U/L    ALT 22 10 - 44 U/L    Anion Gap 9 8 - 16 mmol/L    eGFR if African American >60.0 >60 mL/min/1.73 m^2    eGFR if non African American >60.0 >60 mL/min/1.73 m^2   Brain natriuretic peptide   Result Value Ref Range     (H) 0 - 99 pg/mL   Troponin I   Result Value Ref Range    Troponin I <0.030 <=0.040 ng/mL   Protime-INR   Result Value Ref Range    PT 16.3 (H) 10.6 - 14.8 sec    INR 1.4    Lipase   Result Value Ref Range    Lipase 32 4 - 60 U/L   Magnesium   Result Value Ref Range    Magnesium 1.7 1.6 - 2.6 mg/dL   COVID-19 Rapid Screening   Result Value Ref Range    SARS-CoV-2 RNA, Amplification, Qual Negative Negative   Urinalysis, Reflex to Urine Culture Urine, Clean Catch    Specimen: Urine   Result Value Ref Range    Specimen UA Urine, Clean Catch     Color, UA Yellow Yellow, Straw, Chasity    Appearance, UA Hazy (A) Clear    pH, UA 6.0 5.0 - 8.0    Specific Gravity, UA 1.020 1.005 - 1.030    Protein, UA Trace (A) Negative     Glucose, UA Negative Negative    Ketones, UA Negative Negative    Bilirubin (UA) Negative Negative    Occult Blood UA 2+ (A) Negative    Nitrite, UA Negative Negative    Urobilinogen, UA Negative Negative EU/dL    Leukocytes, UA Negative Negative   Lactic acid, plasma   Result Value Ref Range    Lactate (Lactic Acid) 1.4 0.5 - 1.9 mmol/L   Urinalysis Microscopic   Result Value Ref Range    RBC, UA >100 (H) 0 - 4 /hpf    WBC, UA 3 0 - 5 /hpf    Bacteria Negative None-Occ /hpf    Squam Epithel, UA 15 /hpf    Hyaline Casts, UA 41 (A) 0-1/lpf /lpf    Microscopic Comment SEE COMMENT      CT Abdomen Pelvis With Contrast   Final Result      X-Ray Chest AP Portable   ED Interpretation   Abnormal   Right lower lobe consolidation and right-sided pleural effusion worse than prior x-ray      Final Result            Imaging Results          CT Abdomen Pelvis With Contrast (Final result)  Result time 07/10/20 23:39:00    Final result by Fei Do MD (07/10/20 23:39:00)                 Narrative:    EXAM DESCRIPTION:    CT ABDOMEN PELVIS WITH CONTRAST 7/11/2020 1:32 AM CDT    CLINICAL HISTORY:    89 years, Male, Abdominal pain, fever    COMPARISON:    04/27/2019    PROCEDURE:    Contrast-enhanced images of the abdomen and pelvis were performed utilizing 2 mm slice thickness at 2 mm interval reconstruction interval reconstruction from the lung bases to the ischial tuberosities after the administration of 70 mL of Omnipaque 350 at the rate of 3 cc/sec. 10 minutes delay images of the kidney and bladder were also generated.  An individualized dose optimization technique, Automated Exposure Control, was utilized for the performed procedure.    FINDINGS:    The lung bases demonstrate the presence of interstitial lung changes with atelectasis. Large hiatal hernia. Pacemaker.    The liver, gallbladder, pancreas, spleen and adrenal glands demonstrate to be unremarkable, no focal lesions are noted.  The kidneys demonstrate normal  uptake of contrast media.  No hydronephrosis and/or stones were identified. Tiny hypodensities within the right kidney suggests the presence of renal cysts, largest one measuring 1.2 cm midpole on image 56.  Grossly the unopacified stomach, small bowel and large bowel demonstrate to be within normal limits. Fecal residue within the large bowel limits evaluation. Fecal residue within the left site colon could suggest mild fecal stasis. The appendix was not visualized.  The urinary bladder demonstrate to be well distended. The prostate gland is prominent perhaps related to BPH. The presence of metallic hardware within the left hip joint limits evaluation of the pelvis. The aorta demonstrate atherosclerotic disease.  There is no retroperitoneal lymphadenopathy. There is no evidence for ascites. The bone windows demonstrate again mild diffuse bony osteopenia with dextra scoliosis and degenerative changes at the point of curvature. There is ORIF left hip joint. Small umbilical hernia containing omentum.    IMPRESSION:    MODERATE TO LARGE SIZE HIATAL HERNIA.  CHRONIC/INTERSTITIAL LUNG CHANGES.  LEFT RENAL CYSTS.  ATHEROSCLEROTIC DISEASE OF THE AORTA.  ENLARGED PROSTATE GLAND PERHAPS RELATED TO BPH.  MILD CONSTIPATION.  NO EVIDENCE FOR ACUTE INTRA-ABDOMINAL PROCESS.    Electronically signed by:  Fei Do MD  7/11/2020 1:42 AM CDT Workstation: [id number]                             X-Ray Chest AP Portable (Final result)  Result time 07/10/20 23:37:46    Final result by Fei Do MD (07/10/20 23:22:00)                 Narrative:    EXAM DESCRIPTION:    XR CHEST AP PORTABLE 7/11/2020 1:43 AM CDT    CLINICAL HISTORY:    89 years, Male, shortness of breath; Vomiting; Weakness; , cough    COMPARISON:    04/27/2019    FINDINGS:    Single view of the chest was obtained portable. Prior films were compared. End there is a dual-lead pacemaker via a left clavian. Decreased lung volume. The cardiomediastinal silhouette  demonstrate to be unremarkable.  The heart is not enlarged. Double shadow density within the retrocardiac region corresponding to a moderate to large size hiatal hernia. The thoracic aorta is tortuous with atherosclerotic disease. Reticular-nodular densities throughout the lungs corresponding to a most likely chronic interstitial changes. Slight increase densities within the lung bases, greater right lung than left, the possibility of infiltrate within the right lung base cannot be excluded. External EKG leads within the field-of-view limits diagnosis. Scoliosis of the thoracolumbar spine.    IMPRESSION:    DECREASED LUNG VOLUME.  HIATAL HERNIA.  ATHEROSCLEROTIC DISEASE OF THE AORTA.  PACEMAKER IN PLACE.  INTERSTITIAL LUNG DISEASE UIP/IPF PATTERN.  ATELECTASIS LUNG BASES, RIGHT GREATER THAN LEFT, SUPERIMPOSED INFILTRATE CANNOT BE EXCLUDED.    Electronically signed by:  Fei Do MD  7/11/2020 1:46 AM CDT Workstation: [id number]                               Medical Decision Making:   ED Management:  Patient presents emergency department with fever, vomiting, coughing.  Overall impression is that the patient has aspiration pneumonia.  He is hypoxic, vomiting and likely aspirated during vomiting events.  He has been treated emergency department Zosyn.  His lactic acid is normal, his white blood cell count is negative.  He has improved on oxygen.  He will be admitted to the hospitalist for further care and evaluation of his symptoms.  I do not believe that he has sepsis or septic shock.              Attending Attestation:         Attending Critical Care:   Critical Care Times:   Direct Patient Care (initial evaluation, reassessments, and time considering the case)................................................................15 minutes.   Additional History from reviewing old medical records or taking additional history from the family, EMS, PCP, etc.......................5 minutes.   Ordering, Reviewing, and  Interpreting Diagnostic Studies...............................................................................................................5 minutes.   Documentation..................................................................................................................................................................................5 minutes.   Consultation with other Physicians. .................................................................................................................................................5 minutes.   Consultation with the patient's family directly relating to the patient's condition, care, and DNR status (when patient unable)......5 minutes.   ==============================================================  · Total Critical Care Time - exclusive of procedural time: 40 minutes.  ==============================================================  Critical care was necessary to treat or prevent imminent or life-threatening deterioration of the following conditions: respiratory failure.   The following critical care procedures were done by me (see procedure notes): blood draw for specimens and pulse oximetry.   Critical care was time spent personally by me on the following activities: obtaining history from patient or relative, examination of patient, review of old charts, development of treatment plan with patient or relative, ordering and performing treatments and interventions, evaluation of patient's response to treatment, interpretation of cardiac measurements, re-evaluation of patient's conition and ordering lab, x-rays, and/or EKG.   Critical Care Condition: potentially life-threatening               ED Course as of Jul 11 0216   Fri Jul 10, 2020   2330 EKG 11:12 p.m. atrial sensed ventricular paced rhythm rate of 86.  Typical pacemaker pattern.  Negative Sgarbossa criteria.  No STEMI EKG interpreted independently by me.    [JR]   Sat Jul 11, 2020   0202 Sheba rain  admit the patient to the hospital     [JR]      ED Course User Index  [JR] Harry Olmstead DO                Clinical Impression:       ICD-10-CM ICD-9-CM   1. Vomiting, intractability of vomiting not specified, presence of nausea not specified, unspecified vomiting type  R11.10 787.03   2. Chest pain  R07.9 786.50   3. Pneumonia of right lower lobe due to infectious organism  J18.1 486   4. Aspiration pneumonia of right lower lobe, unspecified aspiration pneumonia type  J69.0 507.0   5. Pleural effusion on right  J90 511.9   6. Hypoxia  R09.02 799.02             ED Disposition Condition    Admit                           Harry Olmstead DO  07/11/20 0216

## 2020-07-11 NOTE — PLAN OF CARE
Problem: Physical Therapy Goal  Goal: Physical Therapy Goal  Description: Goals to be met by: 2020    Patient will increase functional independence with mobility by performin. Supine to sit with Stand-by Assistance  2. Sit to supine with Stand-by Assistance  3. Sit to stand transfer with Supervision  4. Bed to chair transfer with Supervision using Rolling Walker  5. Gait  x 150 feet with Supervision using Rolling Walker.     Outcome: Ongoing, Progressing

## 2020-07-12 VITALS
SYSTOLIC BLOOD PRESSURE: 151 MMHG | RESPIRATION RATE: 18 BRPM | BODY MASS INDEX: 21.33 KG/M2 | HEART RATE: 65 BPM | WEIGHT: 132.69 LBS | TEMPERATURE: 98 F | HEIGHT: 66 IN | OXYGEN SATURATION: 96 % | DIASTOLIC BLOOD PRESSURE: 67 MMHG

## 2020-07-12 LAB
ANION GAP SERPL CALC-SCNC: 8 MMOL/L (ref 8–16)
BASOPHILS # BLD AUTO: 0.05 K/UL (ref 0–0.2)
BASOPHILS NFR BLD: 0.5 % (ref 0–1.9)
BUN SERPL-MCNC: 25 MG/DL (ref 10–30)
CALCIUM SERPL-MCNC: 7.8 MG/DL (ref 8.7–10.5)
CHLORIDE SERPL-SCNC: 102 MMOL/L (ref 95–110)
CO2 SERPL-SCNC: 27 MMOL/L (ref 23–29)
CREAT SERPL-MCNC: 1 MG/DL (ref 0.5–1.4)
DIFFERENTIAL METHOD: ABNORMAL
EOSINOPHIL # BLD AUTO: 0.6 K/UL (ref 0–0.5)
EOSINOPHIL NFR BLD: 5.6 % (ref 0–8)
ERYTHROCYTE [DISTWIDTH] IN BLOOD BY AUTOMATED COUNT: 14.4 % (ref 11.5–14.5)
EST. GFR  (AFRICAN AMERICAN): >60 ML/MIN/1.73 M^2
EST. GFR  (NON AFRICAN AMERICAN): >60 ML/MIN/1.73 M^2
GLUCOSE SERPL-MCNC: 98 MG/DL (ref 70–110)
HCT VFR BLD AUTO: 31.8 % (ref 40–54)
HGB BLD-MCNC: 10.6 G/DL (ref 14–18)
IMM GRANULOCYTES # BLD AUTO: 0.04 K/UL (ref 0–0.04)
IMM GRANULOCYTES NFR BLD AUTO: 0.4 % (ref 0–0.5)
LYMPHOCYTES # BLD AUTO: 2.3 K/UL (ref 1–4.8)
LYMPHOCYTES NFR BLD: 23.4 % (ref 18–48)
MAGNESIUM SERPL-MCNC: 1.9 MG/DL (ref 1.6–2.6)
MCH RBC QN AUTO: 34 PG (ref 27–31)
MCHC RBC AUTO-ENTMCNC: 33.3 G/DL (ref 32–36)
MCV RBC AUTO: 102 FL (ref 82–98)
MONOCYTES # BLD AUTO: 0.7 K/UL (ref 0.3–1)
MONOCYTES NFR BLD: 7.2 % (ref 4–15)
NEUTROPHILS # BLD AUTO: 6.3 K/UL (ref 1.8–7.7)
NEUTROPHILS NFR BLD: 62.9 % (ref 38–73)
NRBC BLD-RTO: 0 /100 WBC
PHOSPHATE SERPL-MCNC: 3 MG/DL (ref 2.7–4.5)
PLATELET # BLD AUTO: 154 K/UL (ref 150–350)
PMV BLD AUTO: 9.2 FL (ref 9.2–12.9)
POTASSIUM SERPL-SCNC: 3.4 MMOL/L (ref 3.5–5.1)
PROCALCITONIN SERPL IA-MCNC: 0.29 NG/ML (ref 0–0.5)
RBC # BLD AUTO: 3.12 M/UL (ref 4.6–6.2)
SODIUM SERPL-SCNC: 137 MMOL/L (ref 136–145)
WBC # BLD AUTO: 10.01 K/UL (ref 3.9–12.7)

## 2020-07-12 PROCEDURE — 94640 AIRWAY INHALATION TREATMENT: CPT

## 2020-07-12 PROCEDURE — 94761 N-INVAS EAR/PLS OXIMETRY MLT: CPT

## 2020-07-12 PROCEDURE — 25000242 PHARM REV CODE 250 ALT 637 W/ HCPCS: Performed by: NURSE PRACTITIONER

## 2020-07-12 PROCEDURE — 97535 SELF CARE MNGMENT TRAINING: CPT

## 2020-07-12 PROCEDURE — 84145 PROCALCITONIN (PCT): CPT

## 2020-07-12 PROCEDURE — 25000003 PHARM REV CODE 250: Performed by: NURSE PRACTITIONER

## 2020-07-12 PROCEDURE — 25000003 PHARM REV CODE 250: Performed by: INTERNAL MEDICINE

## 2020-07-12 PROCEDURE — 94664 DEMO&/EVAL PT USE INHALER: CPT

## 2020-07-12 PROCEDURE — 97165 OT EVAL LOW COMPLEX 30 MIN: CPT

## 2020-07-12 PROCEDURE — 80048 BASIC METABOLIC PNL TOTAL CA: CPT

## 2020-07-12 PROCEDURE — 63600175 PHARM REV CODE 636 W HCPCS: Performed by: NURSE PRACTITIONER

## 2020-07-12 PROCEDURE — 99900035 HC TECH TIME PER 15 MIN (STAT)

## 2020-07-12 PROCEDURE — 85025 COMPLETE CBC W/AUTO DIFF WBC: CPT

## 2020-07-12 PROCEDURE — 83735 ASSAY OF MAGNESIUM: CPT

## 2020-07-12 PROCEDURE — 84100 ASSAY OF PHOSPHORUS: CPT

## 2020-07-12 PROCEDURE — 36415 COLL VENOUS BLD VENIPUNCTURE: CPT

## 2020-07-12 PROCEDURE — 27000221 HC OXYGEN, UP TO 24 HOURS

## 2020-07-12 RX ORDER — POTASSIUM CHLORIDE 20 MEQ/1
40 TABLET, EXTENDED RELEASE ORAL ONCE
Qty: 2 TABLET | Refills: 0 | Status: SHIPPED | OUTPATIENT
Start: 2020-07-12 | End: 2020-07-12

## 2020-07-12 RX ORDER — POTASSIUM CHLORIDE 20 MEQ/1
40 TABLET, EXTENDED RELEASE ORAL ONCE
Status: COMPLETED | OUTPATIENT
Start: 2020-07-12 | End: 2020-07-12

## 2020-07-12 RX ORDER — GUAIFENESIN 100 MG/5ML
200 SOLUTION ORAL EVERY 4 HOURS PRN
Status: DISCONTINUED | OUTPATIENT
Start: 2020-07-12 | End: 2020-07-12 | Stop reason: HOSPADM

## 2020-07-12 RX ORDER — GUAIFENESIN 100 MG/5ML
200 SOLUTION ORAL 3 TIMES DAILY PRN
Qty: 100 ML | Refills: 0 | Status: ON HOLD | OUTPATIENT
Start: 2020-07-12 | End: 2020-07-27

## 2020-07-12 RX ADMIN — AMLODIPINE BESYLATE 2.5 MG: 2.5 TABLET ORAL at 10:07

## 2020-07-12 RX ADMIN — TAMSULOSIN HYDROCHLORIDE 0.4 MG: 0.4 CAPSULE ORAL at 10:07

## 2020-07-12 RX ADMIN — IPRATROPIUM BROMIDE AND ALBUTEROL SULFATE 3 ML: .5; 3 SOLUTION RESPIRATORY (INHALATION) at 08:07

## 2020-07-12 RX ADMIN — ISOSORBIDE MONONITRATE 60 MG: 30 TABLET, EXTENDED RELEASE ORAL at 10:07

## 2020-07-12 RX ADMIN — SOTALOL HYDROCHLORIDE 80 MG: 80 TABLET ORAL at 10:07

## 2020-07-12 RX ADMIN — LOSARTAN POTASSIUM 25 MG: 25 TABLET, FILM COATED ORAL at 10:07

## 2020-07-12 RX ADMIN — PANTOPRAZOLE SODIUM 40 MG: 40 TABLET, DELAYED RELEASE ORAL at 10:07

## 2020-07-12 RX ADMIN — ASPIRIN 81 MG: 81 TABLET, DELAYED RELEASE ORAL at 10:07

## 2020-07-12 RX ADMIN — Medication 200 MG: at 10:07

## 2020-07-12 RX ADMIN — ATORVASTATIN CALCIUM 10 MG: 10 TABLET, FILM COATED ORAL at 10:07

## 2020-07-12 RX ADMIN — IPRATROPIUM BROMIDE AND ALBUTEROL SULFATE 3 ML: .5; 3 SOLUTION RESPIRATORY (INHALATION) at 02:07

## 2020-07-12 RX ADMIN — APIXABAN 2.5 MG: 2.5 TABLET, FILM COATED ORAL at 10:07

## 2020-07-12 RX ADMIN — PIPERACILLIN AND TAZOBACTAM 4.5 G: 4; .5 INJECTION, POWDER, LYOPHILIZED, FOR SOLUTION INTRAVENOUS; PARENTERAL at 01:07

## 2020-07-12 RX ADMIN — IPRATROPIUM BROMIDE AND ALBUTEROL SULFATE 3 ML: .5; 3 SOLUTION RESPIRATORY (INHALATION) at 01:07

## 2020-07-12 RX ADMIN — PIPERACILLIN AND TAZOBACTAM 4.5 G: 4; .5 INJECTION, POWDER, LYOPHILIZED, FOR SOLUTION INTRAVENOUS; PARENTERAL at 10:07

## 2020-07-12 RX ADMIN — POTASSIUM CHLORIDE 40 MEQ: 20 TABLET, EXTENDED RELEASE ORAL at 03:07

## 2020-07-12 NOTE — PT/OT/SLP PROGRESS
Physical Therapy      Patient Name:  Marck Ramos   MRN:  [id number]    Patient not seen today secondary to (Pt unavailable during time of attempt (working with OT)). Will follow-up tomorrow .    Hillary Hammant, CHRISTIANA

## 2020-07-12 NOTE — HOSPITAL COURSE
Patient was admitted for possible aspiration pneumonia, with a high-grade fever and hypoxia with a pulse ox in mid 80s, hospital course was unremarkable, CT abdomen due to the complain of vomiting , unremarkable except large hiatus hernia, PT/OT eval appreciated, cleared for  discharge with home health which he had after the left humerus fracture.  Patient oxygen requirement improved saturating  >90% in room air, but for breathing effort, need to continue frequent physical therapy with breathing exercise to increase lung expansion, procalcitonin negative for bacteria pneumoniae, did not require antibiotic on discharge, patient remained afebrile throughout the hospital course, with persistent congestion, need cough medicines on discharge.  Encouraged p.o. hydration and aspiration precautions.  Patient did note have any problem with swallowing/coughing with meal   to eval for aspiration.  Microscopic hematuria with slowly down trending hemoglobin noted, patient urology eval and PMD follow-up to decide on continuation of Eliquis recommended in 1 week.    Instructions provided to follow up with primary care physician as outpatient. Patient verbalized understanding and is aware to contact primary care physician or return to ED if new or worsening symptoms.    Physical exam on the day of discharge:  General: Patient resting comfortably in no acute distress with left arm sling very pleasant, able to communicate clearly   Lungs: CTA.  Decreased breath sounds on the base with mild rales on the  right-side   Cor: Regular rate and rhythm. No murmurs. No pedal edema.  Abd: Soft. Nontender. Non-distended.  Neuro: A&O x3. Moving all 4 extremities equally  Ext: No clubbing. No cyanosis.

## 2020-07-12 NOTE — NURSING
Pt discharged with all belongings. Iv removed and telemetry discontinued. Pt son given discharegd instructions and follow up information.

## 2020-07-12 NOTE — DISCHARGE SUMMARY
[facility] Medicine  Discharge Summary      Patient Name: Marck Ramos  MRN: [id number]  Admission Date: 7/10/2020  Hospital Length of Stay: 1 days  Discharge Date and Time:  07/12/2020 2:39 PM  Attending Physician: Jane Franco MD   Discharging Provider: Jane Franco MD  Primary Care Provider: Hood Gannon MD      HPI:   No notes on file    * No surgery found *      Hospital Course:   Patient was admitted for possible aspiration pneumonia, with a high-grade fever and hypoxia with a pulse ox in mid 80s, hospital course was unremarkable, CT abdomen due to the complain of vomiting , unremarkable except large hiatus hernia, PT/OT eval appreciated, cleared for  discharge with home health which he had after the left humerus fracture.  Patient oxygen requirement improved saturating  >90% in room air, but for breathing effort, need to continue frequent physical therapy with breathing exercise to increase lung expansion, procalcitonin negative for bacteria pneumoniae, did not require antibiotic on discharge, patient remained afebrile throughout the hospital course, with persistent congestion, need cough medicines on discharge.  Encouraged p.o. hydration and aspiration precautions.  Patient did note have any problem with swallowing/coughing with meal   to eval for aspiration.  Microscopic hematuria with slowly down trending hemoglobin noted, patient urology eval and PMD follow-up to decide on continuation of Eliquis recommended in 1 week.    Instructions provided to follow up with primary care physician as outpatient. Patient verbalized understanding and is aware to contact primary care physician or return to ED if new or worsening symptoms.    Physical exam on the day of discharge:  General: Patient resting comfortably in no acute distress with left arm sling very pleasant, able to communicate clearly   Lungs: CTA.  Decreased breath sounds on the base with mild rales on the   right-side   Cor: Regular rate and rhythm. No murmurs. No pedal edema.  Abd: Soft. Nontender. Non-distended.  Neuro: A&O x3. Moving all 4 extremities equally  Ext: No clubbing. No cyanosis.      Consults:   Consults (From admission, onward)        Status Ordering Provider     Inpatient consult to Hospitalist  Once     Provider:  Mirta Fitzpatrick NP    Acknowledged APRYL SWEENEY     Inpatient consult to   Once     Provider:  (Not yet assigned)    MALACHI Guzman          No new Assessment & Plan notes have been filed under this hospital service since the last note was generated.  Service: Hospital Medicine    Final Active Diagnoses:    Diagnosis Date Noted POA    PRINCIPAL PROBLEM:  Aspiration pneumonia [J69.0] 07/11/2020 Yes    Paroxysmal atrial fibrillation [I48.0] 04/08/2020 Yes    Pacemaker [Z95.0] 10/03/2019 Yes    Closed displaced intertrochanteric fracture of left femur with routine healing [S72.142D] 09/28/2018 Not Applicable    Anemia in stage 2 chronic kidney disease [N18.2, D63.1] 07/18/2017 Yes    Anemia, chronic disease [D63.8] 07/18/2017 Yes      Problems Resolved During this Admission:       Discharged Condition: stable    Disposition: Home-Health Care [facility]    Follow Up:  Follow-up Information     Hood Ganonn MD In 1 week.    Specialty: Family Medicine  Contact information:  [address]  [address]  [facility] [place] [address]  [phone]                 Patient Instructions:      Ambulatory referral/consult to Urology   Standing Status: Future   Referral Priority: Routine Referral Type: Consultation   Referral Reason: Specialty Services Required   Requested Specialty: Urology   Number of Visits Requested: 1     Diet Cardiac     Notify your health care provider if you experience any of the following:  temperature >100.4     Notify your health care provider if you experience any of the following:  persistent nausea and vomiting or diarrhea      Notify your health care provider if you experience any of the following:  persistent dizziness, light-headedness, or visual disturbances     Activity as tolerated       Significant Diagnostic Studies: Labs:   BMP:   Recent Labs   Lab 07/10/20  2310 07/11/20  0455 07/12/20  0459   * 132* 98    138 137   K 4.4 4.4 3.4*    103 102   CO2 26 26 27   BUN 30 29 25   CREATININE 1.0 0.9 1.0   CALCIUM 8.4* 8.1* 7.8*   MG 1.7 1.8 1.9    and CBC   Recent Labs   Lab 07/10/20  2310 07/11/20  0455 07/12/20  0459   WBC 9.68 13.71* 10.01   HGB 12.4* 11.8* 10.6*   HCT 37.4* 36.3* 31.8*    180 154       Pending Diagnostic Studies:     None         Medications:  Reconciled Home Medications:      Medication List      START taking these medications    guaifenesin 100 mg/5 ml 100 mg/5 mL syrup  Commonly known as: ROBITUSSIN  Take 10 mLs (200 mg total) by mouth 3 (three) times daily as needed for Congestion.        CHANGE how you take these medications    furosemide 40 MG tablet  Commonly known as: LASIX  Take 1 tablet (40 mg total) by mouth once daily.  What changed:   · how much to take  · when to take this     * potassium chloride SA 20 MEQ tablet  Commonly known as: K-DUR,KLOR-CON  Take 1 tablet (20 mEq total) by mouth once daily.  What changed: Another medication with the same name was added. Make sure you understand how and when to take each.     * potassium chloride SA 20 MEQ tablet  Commonly known as: K-DUR,KLOR-CON  Take 2 tablets (40 mEq total) by mouth once. for 1 dose  What changed: You were already taking a medication with the same name, and this prescription was added. Make sure you understand how and when to take each.         * This list has 2 medication(s) that are the same as other medications prescribed for you. Read the directions carefully, and ask your doctor or other care provider to review them with you.            CONTINUE taking these medications    amLODIPine 5 MG tablet  Commonly  known as: NORVASC  Take 2.5 mg by mouth once daily. Take 1/2 tab by mouth daily     aspirin 81 MG EC tablet  Commonly known as: ECOTRIN  Take 81 mg by mouth once daily.     atorvastatin 10 MG tablet  Commonly known as: LIPITOR  Take 1 tablet (10 mg total) by mouth once daily.     ELIQUIS 2.5 mg Tab  Generic drug: apixaban  2.5 mg 2 (two) times daily.     isosorbide mononitrate 60 MG 24 hr tablet  Commonly known as: IMDUR  Take 1 tablet (60 mg total) by mouth once daily.     losartan 50 MG tablet  Commonly known as: COZAAR  Take 0.5 tablets (25 mg total) by mouth once daily.     magnesium 200 mg Tab  Take 1 tablet by mouth once daily.     pantoprazole 40 MG tablet  Commonly known as: PROTONIX  Take 40 mg by mouth once daily.     sotaloL 80 MG tablet  Commonly known as: BETAPACE  Take 1 tablet (80 mg total) by mouth 2 (two) times daily.     tamsulosin 0.4 mg Cap  Commonly known as: FLOMAX  Take 1 capsule (0.4 mg total) by mouth once daily.            Indwelling Lines/Drains at time of discharge:   Lines/Drains/Airways     None                 Time spent on the discharge of patient: 33 minutes  Patient was seen and examined on the date of discharge and determined to be suitable for discharge.         Jane Franco MD  Department of Hospital Medicine  [facility]

## 2020-07-12 NOTE — PLAN OF CARE
07/12/20 0841   Patient Assessment/Suction   Level of Consciousness (AVPU) alert   Respiratory Effort Normal;Unlabored   All Lung Fields Breath Sounds coarse   PRE-TX-O2   O2 Device (Oxygen Therapy) nasal cannula   $ Is the patient on Low Flow Oxygen? Yes   Flow (L/min) 1   SpO2 97 %   Pulse Oximetry Type Intermittent   $ Pulse Oximetry - Multiple Charge Pulse Oximetry - Multiple   Pulse (!) 58   Resp 18   Aerosol Therapy   $ Aerosol Therapy Charges Aerosol Treatment   Daily Review of Necessity (SVN) completed   Respiratory Treatment Status (SVN) given   Treatment Route (SVN) mask;oxygen   Patient Position (SVN) HOB elevated   Post Treatment Assessment (SVN) breath sounds improved   Signs of Intolerance (SVN) none   Respiratory Evaluation   $ Care Plan Tech Time 15 min

## 2020-07-12 NOTE — PLAN OF CARE
07/12/20 1455   Medicare Message   Important Message from Medicare regarding Discharge Appeal Rights Given to patient/caregiver;Explained to patient/caregiver;Signed/date by patient/caregiver   Date IMM was signed 07/12/20   Time IMM was signed [id number]

## 2020-07-12 NOTE — PLAN OF CARE
07/12/20 1345   Discharge Reassessment   Assessment Type Discharge Planning Reassessment   Anticipated Discharge Disposition Home-Health   Patient choice form signed by patient/caregiver List with quality metrics by geographic area provided;List from CMS Compare;List from System Post-Acute Care   Compa spoke to pt's son who states he is MPOA also and stated that the pt is suppose to be a DNR.     Hood stated he would bring POA papers to put in chart and cm will notify the doctor to let them know pt is to be a DNR.    Will let Dr Franco know that the pt is to be a DNR and when discharged to resume care with [facility].    Hood Ramos son/POA gave consent for Patient Choice Form to keep [facility].  Also gave phone consent to sign IMM. Leesa GRAVES witnessed both of these and signed form. Cm scanned into .    Pt to be discharged home today with [facility].    Compa sent FS to [facility] and called on call nurse and waiting for call back.

## 2020-07-12 NOTE — PT/OT/SLP EVAL
"Occupational Therapy   Evaluation    Name: Marck Ramos  MRN: [id number]  Admitting Diagnosis:  Aspiration pneumonia      Recommendations:     Discharge Recommendations: home, home health OT; Pt would benefit from 24/7 supervision/assist.  Discharge Equipment Recommendations:  none  Barriers to discharge:  None    Assessment:     Marck Ramos is a 89 y.o. male with a medical diagnosis of Aspiration pneumonia.  He presents with the following performance deficits affecting function: weakness, impaired endurance, impaired self care skills, impaired functional mobilty, gait instability, impaired balance, decreased upper extremity function. Pt's son at bedside to assist with history since pt is [place]. Pt with recent L humeral head fx and has been ambulating around his home with RW using RUE only as family has been unable to find lit-walker for pt use.    Rehab Prognosis: Good; patient would benefit from acute skilled OT services to address these deficits and reach maximum level of function.       Plan:     Patient to be seen 5 x/week to address the above listed problems via self-care/home management, therapeutic activities, therapeutic exercises  · Plan of Care Expires: 08/12/20  · Plan of Care Reviewed with: patient    Subjective     Chief Complaint: "I'm doing ok."  Patient/Family Comments/goals: to return home    Occupational Profile:  Living Environment: Pt lives with his daughter in  house.  Previous level of function: Mod I-min A for ADLs and mobility  Roles and Routines: father  Equipment Used at Home:  walker, rolling, bath bench, bedside commode  Assistance upon Discharge: from daughter and son    Pain/Comfort:  · Pain Rating 1: 0/10    Patients cultural, spiritual, Adventism conflicts given the current situation:      Objective:     Communicated with: nurse prior to session.  Patient found HOB elevated with bed alarm, oxygen, peripheral IV upon OT entry to room.    General Precautions: Standard, fall "   Orthopedic Precautions:N/A   Braces: Sling and swathe     Occupational Performance:    Bed Mobility:    · Patient completed Supine to Sit with minimum assistance    Functional Mobility/Transfers:  · Patient completed Sit <> Stand Transfer with contact guard assistance  with  rolling walker   · Functional Mobility: Pt ambulated ~15 feet within room with CGA using RW with RUE only 2/2 humeral head fx of LUE.    Activities of Daily Living:  · Grooming: contact guard assistance to brush teeth standing at sink    Cognitive/Visual Perceptual:  Cognitive/Psychosocial Skills:     -       Memory: Impaired LTM  -       Safety awareness/insight to disability: intact   -       Mood/Affect/Coping skills/emotional control: Appropriate to situation and Cooperative    Physical Exam:  Balance:    -       seated: good; standing: good(-)  Upper Extremity Range of Motion:     -       Right Upper Extremity: WFL  Upper Extremity Strength:    -       Right Upper Extremity: WFL   LUE not tested 2/2 sling and swathe    AMPA 6 Click ADL:  AMPAC Total Score: 17    Treatment & Education:  Pt and son educated on role of OT and POC  D/C planning  Education:    Patient left up in chair with all lines intact, call button in reach, nurse notified and son present    GOALS:   Multidisciplinary Problems     Occupational Therapy Goals     Not on file                History:     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

## 2020-07-12 NOTE — PLAN OF CARE
07/12/20 1448   Final Note   Assessment Type Final Discharge Note   Anticipated Discharge Disposition Home-Health   Post-Acute Status   Patient choice form signed by patient/caregiver List with quality metrics by geographic area provided;List from CMS Compare;List from [facility] Post-Acute Care   Pt discharged home with [facility]. Sent referral to [facility] and called On Call nurse.

## 2020-07-12 NOTE — RESPIRATORY THERAPY
07/11/20 1918   Patient Assessment/Suction   Respiratory Effort Unlabored   All Lung Fields Breath Sounds clear   Rhythm/Pattern, Respiratory pattern regular   PRE-TX-O2   O2 Device (Oxygen Therapy) nasal cannula   Flow (L/min) 2  (decreased o2 to 1)   SpO2 96 %   Pulse 61   Resp 16   Aerosol Therapy   $ Aerosol Therapy Charges Aerosol Treatment   Respiratory Treatment Status (SVN) given   Treatment Route (SVN) mask   Patient Position (SVN) HOB elevated   Post Treatment Assessment (SVN) increased aeration   Signs of Intolerance (SVN) none   Breath Sounds Post-Respiratory Treatment   Throughout All Fields Post-Treatment LLL   LLL Post-Treatment crackles   Post-treatment Heart Rate (beats/min) 60   Post-treatment Resp Rate (breaths/min) 16   Vibratory PEP Therapy   $ Vibratory PEP Tech Time Charges 15 min   Type (PEP Therapy) vibratory/oscillatory   Device (PEP Therapy) flutter   Route (PEP Therapy) mouthpiece   Patient Position (PEP Therapy) HOB elevated   Post Treatment Assessment (PEP) increased aeration   Signs of Intolerance (PEP Therapy) none   Respiratory Evaluation   $ Care Plan Tech Time 15 min   Admitting Diagnosis Asp. pneumonia

## 2020-07-13 ENCOUNTER — TELEPHONE (OUTPATIENT)
Dept: FAMILY MEDICINE | Facility: CLINIC | Age: 85
End: 2020-07-13

## 2020-07-13 NOTE — TELEPHONE ENCOUNTER
----- Message from Elaina Llanos sent at 7/13/2020  3:08 PM CDT -----  Contact: Kati  Pt was discharged yesterday form [facility] and needs a hospital follow up. Kati would like the pt to be seen asap. He is still has a cough and has swelling in his legs and upper arms. Please advise.  Kati @[phone] or  cell #[phone].

## 2020-07-14 ENCOUNTER — PATIENT OUTREACH (OUTPATIENT)
Dept: FAMILY MEDICINE | Facility: CLINIC | Age: 85
End: 2020-07-14

## 2020-07-14 NOTE — PROGRESS NOTES
"Discharge Information     Discharge Date:  07/12/2020         Primary Discharge Diagnosis:  Aspiration Pneumonia         How patient is feeling since discharge from the hospital?  Patient is doing okay per daughter.          Medication & Order Review     Discharge Medication Review:    Medication reconciliation performed Yes   If no, state reason why not performed: N/A       Did patient have any difficulty/problems filling prescriptions?  No           If yes, state reason and steps taken to assist in resolving issue: N/A     Does patient have any questions regarding medications?  No           If yes, state question and steps taken to answer question:  N/A    Was Home Health and/or any equipment ordered for patient upon discharge?  No          Home Health No   If yes, has home health contacted patient and/or initiated services? N/A   Name of Home Health Agency N/A   Durable Medical Equipment (DME)  No (Example: walker, wheelchair, nebulizer)   If yes, has the DME provider contacted patient and delivered equipment? N/A    DME Company N/A     Red Flag Review     Was patient educated on "red flags" or things to watch for?  Yes       If yes:  "Red flags" patient was told to watch for:     SOB                Fever                Chest Pain               Other:            Is patient experiencing any red flags today (or any of these symptoms) (List examples of red flags specifically)?  Yes       Notes:                   If no:    Educated the patient on 3 "red flags" to watch for:     SOB                Fever                Chest Pain               Other:                  Is patient experiencing any red flags today (or any of these symptoms) (List examples of red flags specifically)?  No      Notes:  Patient having a lingering cough still and some swelling. Taking meds per daughter.    Patient Education & Follow Up               Phone number patient will call if having any questions or problems:  Patient's daughter " able to state Dr Gannon's Office number accurately    Appointment scheduled?  Yes      Follow-up/transition of care appointment, including the date/time and location of your appointment:  Patient was able to state the follow-up appointment correctly.        Notes:            Provided patient with date, time and location of follow-up appointment if they do not have it:  Yes      Rescheduled transition of care appointment if the patient has a conflict:    Appointment re-scheduled?  No        Patient informed of this appointment?        Notes:          3 questions patient would like to ask physician during appointment:   Should patient be doing for the swelling?        How long will the cough last?     

## 2020-07-16 ENCOUNTER — OFFICE VISIT (OUTPATIENT)
Dept: FAMILY MEDICINE | Facility: CLINIC | Age: 85
End: 2020-07-16
Payer: MEDICARE

## 2020-07-16 VITALS
BODY MASS INDEX: 20.25 KG/M2 | WEIGHT: 126 LBS | HEIGHT: 66 IN | HEART RATE: 62 BPM | SYSTOLIC BLOOD PRESSURE: 136 MMHG | OXYGEN SATURATION: 94 % | DIASTOLIC BLOOD PRESSURE: 70 MMHG

## 2020-07-16 DIAGNOSIS — R31.29 MICROHEMATURIA: ICD-10-CM

## 2020-07-16 DIAGNOSIS — I48.0 PAROXYSMAL ATRIAL FIBRILLATION: ICD-10-CM

## 2020-07-16 DIAGNOSIS — S80.12XA LEG HEMATOMA, LEFT, INITIAL ENCOUNTER: ICD-10-CM

## 2020-07-16 DIAGNOSIS — D53.9 MACROCYTIC ANEMIA: ICD-10-CM

## 2020-07-16 DIAGNOSIS — J69.0 ASPIRATION PNEUMONIA OF BOTH LOWER LOBES, UNSPECIFIED ASPIRATION PNEUMONIA TYPE: Primary | ICD-10-CM

## 2020-07-16 DIAGNOSIS — S42.292D CLOSED FRACTURE OF HEAD OF LEFT HUMERUS WITH ROUTINE HEALING, SUBSEQUENT ENCOUNTER: ICD-10-CM

## 2020-07-16 DIAGNOSIS — R54 ADVANCED AGE: ICD-10-CM

## 2020-07-16 LAB
BACTERIA BLD CULT: NORMAL
BACTERIA BLD CULT: NORMAL

## 2020-07-16 PROCEDURE — 99496 TRANSJ CARE MGMT HIGH F2F 7D: CPT | Mod: S$GLB,,, | Performed by: NURSE PRACTITIONER

## 2020-07-16 PROCEDURE — 99496 TRANSITIONAL CARE MANAGE SERVICE 7 DAY DISCHARGE: ICD-10-PCS | Mod: S$GLB,,, | Performed by: NURSE PRACTITIONER

## 2020-07-16 NOTE — PROGRESS NOTES
SUBJECTIVE:    Patient ID: Marck Ramos is a 89 y.o. male.    Chief Complaint: Hospital Follow Up ((Brought bottles))      Admit Date: 7/10/20   Discharge Date: 7/12/20  Discharge Facility: Hospital     History of hospital stay: Presented to ER after daughter noted SOB and altered MS. Found to be hypoxic with temp of 102 in ER and admitted to [facility] for possible aspiration pneumonia. CXR showed chronic interstitial changes with slight increase in bibasilar densities, R>L. Treated with IV abx, overall hospital course was unremarkable, CT abdomen due to the complaint of vomiting , unremarkable except large hiatus hernia. Sating well prior to discharge so didn't require home O2 however daughter reports they already have home O2 to use if needed. Hospital note indicates pt did not have any problem with swallowing/coughing with meals so no swallow study was performed. Was noted to have hematuria with very slight drop in Hgb to 10.6 from admit 12.4 after IVFs.  Hospitalist recommended urology f/u and also review need for continued Eliquis.    How is patient feeling since discharge from the hospital? Daughter here with him today. Reports that he continues with productive cough, sputum clear, white. No fever/chills and no changes in MS. Does report some occas coughing/choking with eating. Daughter reports that he is only taking the lasix 20mg every other day. Daughter has noticed hematoma to left lower leg since he has been home, daughter/pt don't recall any trauma to cause hematoma. Site painful to touch, daughter reports the swelling does appear to be improving. Daughter reports pt saw Dr Mares last month and he recommended pt stay on eliquis d/t hx of PE and Pafib. Pt has had 2 falls in the last few months- last month resulted in left humerus fx which he's currently in a shoulder immobilizer and to f/u with DR. Vazquez with xray in the next couple weeks. Pain at fx site has improved over the past couple  weeks.  Daughter reports she needs to call and schedule appt with DR. Collins.      Medication Reconciliation:  Medications changed/added/deleted. Added guaifenisin, increased lasix to 40mg daily though daughter reports has continued with preadmit dose of 1/2 tab every other day  New Prescriptions filled after discharge: yes  Discharge summary reviewed:  yes  Pending test results at discharge reviewed:   n/a  Follow up appointments scheduled:  No    Sees Dr. Collins, daughter reports she will schedule appt  Follow up labs/tests ordered:   not applicable  Home Health ordered on discharge:   No  Home Health company name: Prior to admit pt had [facility] following left humerus fx. However, daughter reports that therapy needs to be resumed.   DME ordered at discharge:   No, daughter reports they have oxygen at home but he has not needed it.       Admission on 07/10/2020, Discharged on 07/12/2020   Component Date Value Ref Range Status    WBC 07/10/2020 9.68  3.90 - 12.70 K/uL Final    RBC 07/10/2020 3.70* 4.60 - 6.20 M/uL Final    Hemoglobin 07/10/2020 12.4* 14.0 - 18.0 g/dL Final    Hematocrit 07/10/2020 37.4* 40.0 - 54.0 % Final    Mean Corpuscular Volume 07/10/2020 101* 82 - 98 fL Final    Mean Corpuscular Hemoglobin 07/10/2020 33.5* 27.0 - 31.0 pg Final    Mean Corpuscular Hemoglobin Conc 07/10/2020 33.2  32.0 - 36.0 g/dL Final    RDW 07/10/2020 14.1  11.5 - 14.5 % Final    Platelets 07/10/2020 206  150 - 350 K/uL Final    MPV 07/10/2020 10.0  9.2 - 12.9 fL Final    Immature Granulocytes 07/10/2020 0.3  0.0 - 0.5 % Final    Gran # (ANC) 07/10/2020 6.8  1.8 - 7.7 K/uL Final    Immature Grans (Abs) 07/10/2020 0.03  0.00 - 0.04 K/uL Final    Lymph # 07/10/2020 2.0  1.0 - 4.8 K/uL Final    Mono # 07/10/2020 0.6  0.3 - 1.0 K/uL Final    Eos # 07/10/2020 0.3  0.0 - 0.5 K/uL Final    Baso # 07/10/2020 0.03  0.00 - 0.20 K/uL Final    nRBC 07/10/2020 0  0 /100 WBC Final    Gran% 07/10/2020 69.7   38.0 - 73.0 % Final    Lymph% 07/10/2020 20.9  18.0 - 48.0 % Final    Mono% 07/10/2020 6.0  4.0 - 15.0 % Final    Eosinophil% 07/10/2020 2.8  0.0 - 8.0 % Final    Basophil% 07/10/2020 0.3  0.0 - 1.9 % Final    Differential Method 07/10/2020 Automated   Final    Sodium 07/10/2020 138  136 - 145 mmol/L Final    Potassium 07/10/2020 4.4  3.5 - 5.1 mmol/L Final    Chloride 07/10/2020 103  95 - 110 mmol/L Final    CO2 07/10/2020 26  23 - 29 mmol/L Final    Glucose 07/10/2020 118* 70 - 110 mg/dL Final    BUN, Bld 07/10/2020 30  10 - 30 mg/dL Final    Creatinine 07/10/2020 1.0  0.5 - 1.4 mg/dL Final    Calcium 07/10/2020 8.4* 8.7 - 10.5 mg/dL Final    Total Protein 07/10/2020 6.6  6.0 - 8.4 g/dL Final    Albumin 07/10/2020 3.1* 3.5 - 5.2 g/dL Final    Total Bilirubin 07/10/2020 0.7  0.1 - 1.0 mg/dL Final    Alkaline Phosphatase 07/10/2020 88  55 - 135 U/L Final    AST 07/10/2020 25  10 - 40 U/L Final    ALT 07/10/2020 22  10 - 44 U/L Final    Anion Gap 07/10/2020 9  8 - 16 mmol/L Final    eGFR if African American 07/10/2020 >60.0  >60 mL/min/1.73 m^2 Final    eGFR if non African American 07/10/2020 >60.0  >60 mL/min/1.73 m^2 Final    BNP 07/10/2020 376* 0 - 99 pg/mL Final    Troponin I 07/10/2020 <0.030  <=0.040 ng/mL Final    PT 07/10/2020 16.3* 10.6 - 14.8 sec Final    INR 07/10/2020 1.4   Final    Lipase 07/10/2020 32  4 - 60 U/L Final    Magnesium 07/10/2020 1.7  1.6 - 2.6 mg/dL Final    SARS-CoV-2 RNA, Amplification, Qual 07/10/2020 Negative  Negative Final    Specimen UA 07/11/2020 Urine, Clean Catch   Final    Color, UA 07/11/2020 Yellow  Yellow, Straw, Chasity Final    Appearance, UA 07/11/2020 Hazy* Clear Final    pH, UA 07/11/2020 6.0  5.0 - 8.0 Final    Specific [place], UA 07/11/2020 1.020  1.005 - 1.030 Final    Protein, UA 07/11/2020 Trace* Negative Final    Glucose, UA 07/11/2020 Negative  Negative Final    Ketones, UA 07/11/2020 Negative  Negative Final    Bilirubin (UA)  07/11/2020 Negative  Negative Final    Occult Blood UA 07/11/2020 2+* Negative Final    Nitrite, UA 07/11/2020 Negative  Negative Final    Urobilinogen, UA 07/11/2020 Negative  Negative EU/dL Final    Leukocytes, UA 07/11/2020 Negative  Negative Final    Blood Culture, Routine 07/10/2020 No growth after 5 days.   Final    Blood Culture, Routine 07/10/2020 No growth after 5 days.   Final    Lactate (Lactic Acid) 07/10/2020 1.4  0.5 - 1.9 mmol/L Final    RBC, UA 07/11/2020 >100* 0 - 4 /hpf Final    WBC, UA 07/11/2020 3  0 - 5 /hpf Final    Bacteria 07/11/2020 Negative  None-Occ /hpf Final    Squam Epithel, UA 07/11/2020 15  /hpf Final    Hyaline Casts, UA 07/11/2020 41* 0-1/lpf /lpf Final    Microscopic Comment 07/11/2020 SEE COMMENT   Final    Sodium 07/11/2020 138  136 - 145 mmol/L Final    Potassium 07/11/2020 4.4  3.5 - 5.1 mmol/L Final    Chloride 07/11/2020 103  95 - 110 mmol/L Final    CO2 07/11/2020 26  23 - 29 mmol/L Final    Glucose 07/11/2020 132* 70 - 110 mg/dL Final    BUN, Bld 07/11/2020 29  10 - 30 mg/dL Final    Creatinine 07/11/2020 0.9  0.5 - 1.4 mg/dL Final    Calcium 07/11/2020 8.1* 8.7 - 10.5 mg/dL Final    Anion Gap 07/11/2020 9  8 - 16 mmol/L Final    eGFR if African American 07/11/2020 >60.0  >60 mL/min/1.73 m^2 Final    eGFR if non African American 07/11/2020 >60.0  >60 mL/min/1.73 m^2 Final    Magnesium 07/11/2020 1.8  1.6 - 2.6 mg/dL Final    Phosphorus 07/11/2020 3.4  2.7 - 4.5 mg/dL Final    WBC 07/11/2020 13.71* 3.90 - 12.70 K/uL Final    RBC 07/11/2020 3.50* 4.60 - 6.20 M/uL Final    Hemoglobin 07/11/2020 11.8* 14.0 - 18.0 g/dL Final    Hematocrit 07/11/2020 36.3* 40.0 - 54.0 % Final    Mean Corpuscular Volume 07/11/2020 104* 82 - 98 fL Final    Mean Corpuscular Hemoglobin 07/11/2020 33.7* 27.0 - 31.0 pg Final    Mean Corpuscular Hemoglobin Conc 07/11/2020 32.5  32.0 - 36.0 g/dL Final    RDW 07/11/2020 14.3  11.5 - 14.5 % Final    Platelets 07/11/2020  180  150 - 350 K/uL Final    MPV 07/11/2020 10.3  9.2 - 12.9 fL Final    Immature Granulocytes 07/11/2020 0.4  0.0 - 0.5 % Final    Gran # (ANC) 07/11/2020 10.0* 1.8 - 7.7 K/uL Final    Immature Grans (Abs) 07/11/2020 0.05* 0.00 - 0.04 K/uL Final    Lymph # 07/11/2020 2.7  1.0 - 4.8 K/uL Final    Mono # 07/11/2020 0.9  0.3 - 1.0 K/uL Final    Eos # 07/11/2020 0.0  0.0 - 0.5 K/uL Final    Baso # 07/11/2020 0.03  0.00 - 0.20 K/uL Final    nRBC 07/11/2020 0  0 /100 WBC Final    Gran% 07/11/2020 72.8  38.0 - 73.0 % Final    Lymph% 07/11/2020 19.8  18.0 - 48.0 % Final    Mono% 07/11/2020 6.7  4.0 - 15.0 % Final    Eosinophil% 07/11/2020 0.1  0.0 - 8.0 % Final    Basophil% 07/11/2020 0.2  0.0 - 1.9 % Final    Differential Method 07/11/2020 Automated   Final    Procalcitonin 07/11/2020 0.27  0.00 - 0.50 ng/mL Final    Specimen UA 07/11/2020 Urine, Clean Catch   Final    Color, UA 07/11/2020 Yellow  Yellow, Straw, Chasity Final    Appearance, UA 07/11/2020 Clear  Clear Final    pH, UA 07/11/2020 6.0  5.0 - 8.0 Final    Specific Gravity, UA 07/11/2020 >1.030* 1.005 - 1.030 Final    Protein, UA 07/11/2020 Trace* Negative Final    Glucose, UA 07/11/2020 Negative  Negative Final    Ketones, UA 07/11/2020 Negative  Negative Final    Bilirubin (UA) 07/11/2020 Negative  Negative Final    Occult Blood UA 07/11/2020 1+* Negative Final    Nitrite, UA 07/11/2020 Negative  Negative Final    Urobilinogen, UA 07/11/2020 Negative  Negative EU/dL Final    Leukocytes, UA 07/11/2020 Negative  Negative Final    RBC, UA 07/11/2020 54* 0 - 4 /hpf Final    WBC, UA 07/11/2020 3  0 - 5 /hpf Final    Bacteria 07/11/2020 Negative  None-Occ /hpf Final    Squam Epithel, UA 07/11/2020 1  /hpf Final    Hyaline Casts, UA 07/11/2020 3* 0-1/lpf /lpf Final    Microscopic Comment 07/11/2020 SEE COMMENT   Final    Sodium 07/12/2020 137  136 - 145 mmol/L Final    Potassium 07/12/2020 3.4* 3.5 - 5.1 mmol/L Final    Chloride  07/12/2020 102  95 - 110 mmol/L Final    CO2 07/12/2020 27  23 - 29 mmol/L Final    Glucose 07/12/2020 98  70 - 110 mg/dL Final    BUN, Bld 07/12/2020 25  10 - 30 mg/dL Final    Creatinine 07/12/2020 1.0  0.5 - 1.4 mg/dL Final    Calcium 07/12/2020 7.8* 8.7 - 10.5 mg/dL Final    Anion Gap 07/12/2020 8  8 - 16 mmol/L Final    eGFR if African American 07/12/2020 >60.0  >60 mL/min/1.73 m^2 Final    eGFR if non African American 07/12/2020 >60.0  >60 mL/min/1.73 m^2 Final    Magnesium 07/12/2020 1.9  1.6 - 2.6 mg/dL Final    Phosphorus 07/12/2020 3.0  2.7 - 4.5 mg/dL Final    Procalcitonin 07/12/2020 0.29  0.00 - 0.50 ng/mL Final    WBC 07/12/2020 10.01  3.90 - 12.70 K/uL Final    RBC 07/12/2020 3.12* 4.60 - 6.20 M/uL Final    Hemoglobin 07/12/2020 10.6* 14.0 - 18.0 g/dL Final    Hematocrit 07/12/2020 31.8* 40.0 - 54.0 % Final    Mean Corpuscular Volume 07/12/2020 102* 82 - 98 fL Final    Mean Corpuscular Hemoglobin 07/12/2020 34.0* 27.0 - 31.0 pg Final    Mean Corpuscular Hemoglobin Conc 07/12/2020 33.3  32.0 - 36.0 g/dL Final    RDW 07/12/2020 14.4  11.5 - 14.5 % Final    Platelets 07/12/2020 154  150 - 350 K/uL Final    MPV 07/12/2020 9.2  9.2 - 12.9 fL Final    Immature Granulocytes 07/12/2020 0.4  0.0 - 0.5 % Final    Gran # (ANC) 07/12/2020 6.3  1.8 - 7.7 K/uL Final    Immature Grans (Abs) 07/12/2020 0.04  0.00 - 0.04 K/uL Final    Lymph # 07/12/2020 2.3  1.0 - 4.8 K/uL Final    Mono # 07/12/2020 0.7  0.3 - 1.0 K/uL Final    Eos # 07/12/2020 0.6* 0.0 - 0.5 K/uL Final    Baso # 07/12/2020 0.05  0.00 - 0.20 K/uL Final    nRBC 07/12/2020 0  0 /100 WBC Final    Gran% 07/12/2020 62.9  38.0 - 73.0 % Final    Lymph% 07/12/2020 23.4  18.0 - 48.0 % Final    Mono% 07/12/2020 7.2  4.0 - 15.0 % Final    Eosinophil% 07/12/2020 5.6  0.0 - 8.0 % Final    Basophil% 07/12/2020 0.5  0.0 - 1.9 % Final    Differential Method 07/12/2020 Automated   Final       Past Medical History:   Diagnosis Date     Anemia in stage 2 chronic kidney disease 7/18/2017    Anemia of other chronic disease 7/18/2017    Anticoagulant long-term use     CHF (congestive heart failure)     Hemorrhage of gastrointestinal tract, unspecified 7/18/2017    Hypertension     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 No pertinent family history.    Marital Status:   Alcohol History:  reports no history of alcohol use.  Tobacco History:  reports that he has quit smoking. His smoking use included cigarettes. He quit after 1.00 year of use. He has never used smokeless tobacco.  Drug History:  reports no history of drug use.    Review of patient's allergies indicates:   Allergen Reactions    Sulfa (sulfonamide antibiotics)      Patient can not recall reaction     Iron      Other reaction(s): Unknown       Current Outpatient Medications:     amlodipine (NORVASC) 5 MG tablet, Take 2.5 mg by mouth. Take 1/2 tab by mouth daily as needed for systolic 150 or higher, Disp: , Rfl:     aspirin (ECOTRIN) 81 MG EC tablet, Take 81 mg by mouth once daily.  , Disp: , Rfl:     atorvastatin (LIPITOR) 10 MG tablet, Take 1 tablet (10 mg total) by mouth once daily., Disp: 90 tablet, Rfl: 1    ELIQUIS 2.5 mg Tab, 2.5 mg 2 (two) times daily. , Disp: , Rfl:     furosemide (LASIX) 40 MG tablet, Take 1 tablet (40 mg total) by mouth once daily. (Patient taking differently: Take 20 mg by mouth every other day. ), Disp: 30 tablet, Rfl: 11    guaifenesin 100 mg/5 ml (ROBITUSSIN) 100 mg/5 mL syrup, Take 10 mLs (200 mg total) by mouth 3 (three) times daily as needed for Congestion., Disp: 100 mL,  "Rfl: 0    isosorbide mononitrate (IMDUR) 60 MG 24 hr tablet, Take 1 tablet (60 mg total) by mouth once daily., Disp: 90 tablet, Rfl: 1    losartan (COZAAR) 50 MG tablet, Take 0.5 tablets (25 mg total) by mouth once daily., Disp: 45 tablet, Rfl: 1    MAGNESIUM ORAL, Take 400 mg by mouth once daily. , Disp: , Rfl:     pantoprazole (PROTONIX) 40 MG tablet, Take 40 mg by mouth once daily., Disp: , Rfl:     potassium chloride SA (K-DUR,KLOR-CON) 20 MEQ tablet, Take 1 tablet (20 mEq total) by mouth once daily., Disp: 30 tablet, Rfl: 0    sotaloL (BETAPACE) 80 MG tablet, Take 1 tablet (80 mg total) by mouth 2 (two) times daily., Disp: 180 tablet, Rfl: 1    tamsulosin (FLOMAX) 0.4 mg Cap, Take 1 capsule (0.4 mg total) by mouth once daily., Disp: 30 capsule, Rfl: 11    Review of Systems   Constitutional: Positive for unexpected weight change (wt down 5lbs since Jan visit). Negative for chills and fever.   Respiratory: Positive for cough (wet sounding, occas prod of clear/white sputum). Negative for shortness of breath and wheezing.    Cardiovascular: Positive for leg swelling. Negative for chest pain and palpitations.   Gastrointestinal: Negative for abdominal pain, blood in stool, constipation and diarrhea.   Genitourinary: Negative for dysuria and hematuria.   Musculoskeletal: Positive for arthralgias (improved left upper arm pain since fx).   Skin: Positive for wound (hematoma to left leg).   Neurological: Negative for dizziness and headaches.        Objective:      Vitals:    07/16/20 1422   BP: 136/70   Pulse: 62   SpO2: (!) 94%   Weight: 57.2 kg (126 lb)   Height: 5' 6" (1.676 m)     Physical Exam  Vitals signs and nursing note reviewed.   Constitutional:       General: He is not in acute distress.     Appearance: He is well-developed.      Comments: Feeble elderly WM    HENT:      Head: Normocephalic and atraumatic.      Mouth/Throat:      Mouth: Mucous membranes are moist.      Pharynx: No posterior " oropharyngeal erythema.   Neck:      Musculoskeletal: Neck supple.      Vascular: No carotid bruit.   Cardiovascular:      Rate and Rhythm: Normal rate and regular rhythm.      Heart sounds: No murmur. No friction rub. No gallop.    Pulmonary:      Effort: Pulmonary effort is normal. No respiratory distress.      Breath sounds: Rales (partially cleared with cough) present. No wheezing.   Abdominal:      General: There is no distension.      Palpations: Abdomen is soft.      Tenderness: There is no abdominal tenderness.   Musculoskeletal:      Right lower leg: No edema.      Left lower leg: Edema (1+ pitting edema to ankle/foot) present.      Comments: Using walker today. Wearing left shoulder immobilizer   Lymphadenopathy:      Cervical: No cervical adenopathy.   Skin:     General: Skin is warm and dry.      Findings: No ecchymosis or rash.          Neurological:      General: No focal deficit present.      Mental Status: He is alert and oriented to person, place, and time.      Gait: Gait abnormal (shuffling gait with walker).      Comments: Answers with short answers otherwise daughter mainly speaks for pt         Assessment:       1. Aspiration pneumonia of both lower lobes, unspecified aspiration pneumonia type    2. Leg hematoma, left, initial encounter    3. Closed fracture of head of left humerus with routine healing, subsequent encounter    4. Paroxysmal atrial fibrillation    5. Macrocytic anemia    6. Microhematuria    7. Advanced age         Plan:       Aspiration pneumonia of both lower lobes, unspecified aspiration pneumonia type  Comments:  dtr advised to repeat CXR next week, encouraged IS use regularly. Discussed swallow study and swallowing precautions, dtr would like to hold off on SS for now  Orders:  -     X-Ray Chest PA And Lateral; Future; Expected date: 07/16/2020  -     Ambulatory referral/consult to Home Health; Future; Expected date: 07/23/2020  -     CBC auto differential; Future; Expected  date: 07/16/2020  -     Comprehensive metabolic panel; Future; Expected date: 07/16/2020    Leg hematoma, left, initial encounter  Comments:  advised to elevate leg and apply warm compress- discussed with dtr risk of eliquis use in advanced age and hx of falls and hematuria. She would prefer to continue anticoagulation at this time    Closed fracture of head of left humerus with routine healing, subsequent encounter  Comments:  f/u with ortho as scheduled    Paroxysmal atrial fibrillation  Comments:  stable in SR today, f/u with Dr. Mares- encouraged dtr to discuss eliquis use with Dr. Mares and if he has further falls the risks may outweight benefits    Macrocytic anemia  Comments:  recommend f/u labs in 2-3 weeks    Microhematuria  Comments:  dtr to schedule urology appt- no gross hematuria    Advanced age      Follow up if symptoms worsen or fail to improve, for as scheduled.

## 2020-07-18 ENCOUNTER — HOSPITAL ENCOUNTER (INPATIENT)
Facility: HOSPITAL | Age: 85
LOS: 7 days | Discharge: HOME HEALTH | DRG: 602 | End: 2020-07-27
Attending: EMERGENCY MEDICINE | Admitting: STUDENT IN AN ORGANIZED HEALTH CARE EDUCATION/TRAINING PROGRAM
Payer: MEDICARE

## 2020-07-18 DIAGNOSIS — M79.89 SWELLING OF LOWER LEG: ICD-10-CM

## 2020-07-18 DIAGNOSIS — L03.90 CELLULITIS: Primary | ICD-10-CM

## 2020-07-18 DIAGNOSIS — L03.90 CELLULITIS, UNSPECIFIED CELLULITIS SITE: ICD-10-CM

## 2020-07-18 PROBLEM — J69.0 ASPIRATION PNEUMONIA: Status: RESOLVED | Noted: 2020-07-11 | Resolved: 2020-07-18

## 2020-07-18 PROBLEM — R53.81 DEBILITY: Status: ACTIVE | Noted: 2020-07-18

## 2020-07-18 PROBLEM — I26.09 OTHER PULMONARY EMBOLISM WITH ACUTE COR PULMONALE: Status: RESOLVED | Noted: 2019-03-06 | Resolved: 2020-07-18

## 2020-07-18 LAB
ALBUMIN SERPL BCP-MCNC: 2.6 G/DL (ref 3.5–5.2)
ALP SERPL-CCNC: 65 U/L (ref 55–135)
ALT SERPL W/O P-5'-P-CCNC: 15 U/L (ref 10–44)
ANION GAP SERPL CALC-SCNC: 8 MMOL/L (ref 8–16)
APTT PPP: 70.4 SEC (ref 23.6–33.3)
AST SERPL-CCNC: 16 U/L (ref 10–40)
BASOPHILS # BLD AUTO: 0.06 K/UL (ref 0–0.2)
BASOPHILS NFR BLD: 0.8 % (ref 0–1.9)
BILIRUB SERPL-MCNC: 0.8 MG/DL (ref 0.1–1)
BNP SERPL-MCNC: 221 PG/ML (ref 0–99)
BUN SERPL-MCNC: 22 MG/DL (ref 10–30)
CALCIUM SERPL-MCNC: 8.1 MG/DL (ref 8.7–10.5)
CHLORIDE SERPL-SCNC: 99 MMOL/L (ref 95–110)
CO2 SERPL-SCNC: 27 MMOL/L (ref 23–29)
CREAT SERPL-MCNC: 0.9 MG/DL (ref 0.5–1.4)
DIFFERENTIAL METHOD: ABNORMAL
EOSINOPHIL # BLD AUTO: 0.6 K/UL (ref 0–0.5)
EOSINOPHIL NFR BLD: 7.4 % (ref 0–8)
ERYTHROCYTE [DISTWIDTH] IN BLOOD BY AUTOMATED COUNT: 14.1 % (ref 11.5–14.5)
EST. GFR  (AFRICAN AMERICAN): >60 ML/MIN/1.73 M^2
EST. GFR  (NON AFRICAN AMERICAN): >60 ML/MIN/1.73 M^2
GLUCOSE SERPL-MCNC: 93 MG/DL (ref 70–110)
HCT VFR BLD AUTO: 31.2 % (ref 40–54)
HGB BLD-MCNC: 10.1 G/DL (ref 14–18)
IMM GRANULOCYTES # BLD AUTO: 0.03 K/UL (ref 0–0.04)
IMM GRANULOCYTES NFR BLD AUTO: 0.4 % (ref 0–0.5)
INR PPP: 2.1
LACTATE SERPL-SCNC: 0.9 MMOL/L (ref 0.5–1.9)
LYMPHOCYTES # BLD AUTO: 3 K/UL (ref 1–4.8)
LYMPHOCYTES NFR BLD: 38.3 % (ref 18–48)
MAGNESIUM SERPL-MCNC: 1.8 MG/DL (ref 1.6–2.6)
MCH RBC QN AUTO: 33.2 PG (ref 27–31)
MCHC RBC AUTO-ENTMCNC: 32.4 G/DL (ref 32–36)
MCV RBC AUTO: 103 FL (ref 82–98)
MONOCYTES # BLD AUTO: 1 K/UL (ref 0.3–1)
MONOCYTES NFR BLD: 12.3 % (ref 4–15)
NEUTROPHILS # BLD AUTO: 3.2 K/UL (ref 1.8–7.7)
NEUTROPHILS NFR BLD: 40.8 % (ref 38–73)
NRBC BLD-RTO: 0 /100 WBC
PLATELET # BLD AUTO: 220 K/UL (ref 150–350)
PLATELET BLD QL SMEAR: ABNORMAL
PMV BLD AUTO: 9.7 FL (ref 9.2–12.9)
POTASSIUM SERPL-SCNC: 4 MMOL/L (ref 3.5–5.1)
PROT SERPL-MCNC: 6 G/DL (ref 6–8.4)
PROTHROMBIN TIME: 22.7 SEC (ref 10.6–14.8)
RBC # BLD AUTO: 3.04 M/UL (ref 4.6–6.2)
SARS-COV-2 RDRP RESP QL NAA+PROBE: NEGATIVE
SODIUM SERPL-SCNC: 134 MMOL/L (ref 136–145)
TROPONIN I SERPL DL<=0.01 NG/ML-MCNC: <0.03 NG/ML
WBC # BLD AUTO: 7.88 K/UL (ref 3.9–12.7)

## 2020-07-18 PROCEDURE — G0378 HOSPITAL OBSERVATION PER HR: HCPCS

## 2020-07-18 PROCEDURE — 83880 ASSAY OF NATRIURETIC PEPTIDE: CPT

## 2020-07-18 PROCEDURE — 99285 EMERGENCY DEPT VISIT HI MDM: CPT | Mod: 25

## 2020-07-18 PROCEDURE — 83735 ASSAY OF MAGNESIUM: CPT

## 2020-07-18 PROCEDURE — 36415 COLL VENOUS BLD VENIPUNCTURE: CPT

## 2020-07-18 PROCEDURE — 87040 BLOOD CULTURE FOR BACTERIA: CPT

## 2020-07-18 PROCEDURE — 96374 THER/PROPH/DIAG INJ IV PUSH: CPT

## 2020-07-18 PROCEDURE — 63600175 PHARM REV CODE 636 W HCPCS: Performed by: EMERGENCY MEDICINE

## 2020-07-18 PROCEDURE — 85610 PROTHROMBIN TIME: CPT

## 2020-07-18 PROCEDURE — 80053 COMPREHEN METABOLIC PANEL: CPT

## 2020-07-18 PROCEDURE — 93005 ELECTROCARDIOGRAM TRACING: CPT | Performed by: INTERNAL MEDICINE

## 2020-07-18 PROCEDURE — U0002 COVID-19 LAB TEST NON-CDC: HCPCS

## 2020-07-18 PROCEDURE — 25000003 PHARM REV CODE 250: Performed by: NURSE PRACTITIONER

## 2020-07-18 PROCEDURE — 84484 ASSAY OF TROPONIN QUANT: CPT

## 2020-07-18 PROCEDURE — 83605 ASSAY OF LACTIC ACID: CPT

## 2020-07-18 PROCEDURE — 85025 COMPLETE CBC W/AUTO DIFF WBC: CPT

## 2020-07-18 PROCEDURE — 85730 THROMBOPLASTIN TIME PARTIAL: CPT

## 2020-07-18 RX ORDER — VANCOMYCIN HCL IN 5 % DEXTROSE 1G/250ML
1000 PLASTIC BAG, INJECTION (ML) INTRAVENOUS
Status: DISCONTINUED | OUTPATIENT
Start: 2020-07-19 | End: 2020-07-22

## 2020-07-18 RX ORDER — ATORVASTATIN CALCIUM 10 MG/1
10 TABLET, FILM COATED ORAL DAILY
Status: DISCONTINUED | OUTPATIENT
Start: 2020-07-19 | End: 2020-07-20

## 2020-07-18 RX ORDER — AMLODIPINE BESYLATE 2.5 MG/1
2.5 TABLET ORAL DAILY
Status: DISCONTINUED | OUTPATIENT
Start: 2020-07-19 | End: 2020-07-27 | Stop reason: HOSPADM

## 2020-07-18 RX ORDER — VANCOMYCIN HCL IN 5 % DEXTROSE 1G/250ML
1000 PLASTIC BAG, INJECTION (ML) INTRAVENOUS ONCE
Status: COMPLETED | OUTPATIENT
Start: 2020-07-18 | End: 2020-07-18

## 2020-07-18 RX ORDER — TALC
6 POWDER (GRAM) TOPICAL NIGHTLY PRN
Status: DISCONTINUED | OUTPATIENT
Start: 2020-07-18 | End: 2020-07-27 | Stop reason: HOSPADM

## 2020-07-18 RX ORDER — ACETAMINOPHEN 325 MG/1
650 TABLET ORAL EVERY 4 HOURS PRN
Status: DISCONTINUED | OUTPATIENT
Start: 2020-07-18 | End: 2020-07-27 | Stop reason: HOSPADM

## 2020-07-18 RX ORDER — SODIUM CHLORIDE 0.9 % (FLUSH) 0.9 %
10 SYRINGE (ML) INJECTION
Status: DISCONTINUED | OUTPATIENT
Start: 2020-07-18 | End: 2020-07-27 | Stop reason: HOSPADM

## 2020-07-18 RX ORDER — GUAIFENESIN 100 MG/5ML
200 SOLUTION ORAL 3 TIMES DAILY PRN
Status: DISCONTINUED | OUTPATIENT
Start: 2020-07-18 | End: 2020-07-27 | Stop reason: HOSPADM

## 2020-07-18 RX ORDER — FUROSEMIDE 20 MG/1
20 TABLET ORAL EVERY OTHER DAY
Status: DISCONTINUED | OUTPATIENT
Start: 2020-07-19 | End: 2020-07-20

## 2020-07-18 RX ORDER — PANTOPRAZOLE SODIUM 40 MG/1
40 TABLET, DELAYED RELEASE ORAL DAILY
Status: DISCONTINUED | OUTPATIENT
Start: 2020-07-19 | End: 2020-07-27 | Stop reason: HOSPADM

## 2020-07-18 RX ORDER — LOSARTAN POTASSIUM 25 MG/1
25 TABLET ORAL DAILY
Status: DISCONTINUED | OUTPATIENT
Start: 2020-07-19 | End: 2020-07-20

## 2020-07-18 RX ORDER — ASPIRIN 81 MG/1
81 TABLET ORAL DAILY
Status: DISCONTINUED | OUTPATIENT
Start: 2020-07-19 | End: 2020-07-27 | Stop reason: HOSPADM

## 2020-07-18 RX ORDER — TAMSULOSIN HYDROCHLORIDE 0.4 MG/1
0.4 CAPSULE ORAL DAILY
Status: DISCONTINUED | OUTPATIENT
Start: 2020-07-19 | End: 2020-07-27 | Stop reason: HOSPADM

## 2020-07-18 RX ORDER — ISOSORBIDE MONONITRATE 60 MG/1
60 TABLET, EXTENDED RELEASE ORAL DAILY
Status: DISCONTINUED | OUTPATIENT
Start: 2020-07-19 | End: 2020-07-27 | Stop reason: HOSPADM

## 2020-07-18 RX ORDER — POTASSIUM CHLORIDE 20 MEQ/1
20 TABLET, EXTENDED RELEASE ORAL DAILY
Status: DISCONTINUED | OUTPATIENT
Start: 2020-07-19 | End: 2020-07-27 | Stop reason: HOSPADM

## 2020-07-18 RX ORDER — SOTALOL HYDROCHLORIDE 80 MG/1
80 TABLET ORAL 2 TIMES DAILY
Status: DISCONTINUED | OUTPATIENT
Start: 2020-07-18 | End: 2020-07-27 | Stop reason: HOSPADM

## 2020-07-18 RX ORDER — LANOLIN ALCOHOL/MO/W.PET/CERES
400 CREAM (GRAM) TOPICAL DAILY
Status: DISCONTINUED | OUTPATIENT
Start: 2020-07-19 | End: 2020-07-27 | Stop reason: HOSPADM

## 2020-07-18 RX ADMIN — VANCOMYCIN HYDROCHLORIDE 1000 MG: 1 INJECTION, POWDER, LYOPHILIZED, FOR SOLUTION INTRAVENOUS at 06:07

## 2020-07-18 RX ADMIN — APIXABAN 2.5 MG: 2.5 TABLET, FILM COATED ORAL at 08:07

## 2020-07-18 RX ADMIN — SOTALOL HYDROCHLORIDE 80 MG: 80 TABLET ORAL at 08:07

## 2020-07-18 NOTE — ED PROVIDER NOTES
Encounter Date: 7/18/2020       History     Chief Complaint   Patient presents with    Leg Pain     Patient here with reported left lower leg pain swelling and erythema he was recently admitted to the hospital no and he was discharged home he was having difficulty with a noted hematoma to his left lower extremity uncertain event occurred in the hospital or it occurred on the way home however the swelling became erythematous and spread he began having swelling diffusely to the leg in increasing pain which prompted his daughter to contact home health home health nurse evaluated leg and felt patient should be evaluated emergency department there has been no noted fever but he has had chills does complain of pain to the area he did recently suffer a fracture to his left humerus which is being treated conservatively with splinting he does have some swelling in the left hand but this has been present since his fracture and immobilization patient does have a history of deep vein thrombosis he is on Eliquis which is causing have increased bruising and ecchymosis        Review of patient's allergies indicates:   Allergen Reactions    Sulfa (sulfonamide antibiotics)      Patient can not recall reaction     Iron      Other reaction(s): Unknown     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  Social History     Tobacco Use    Smoking status: Former Smoker     Years: 1.00     Types: Cigarettes    Smokeless tobacco: Never Used   Substance Use Topics    Alcohol use: No    Drug use: No     Review of Systems   Constitutional: Positive for chills and fatigue. Negative for fever.   HENT: Negative for congestion, ear pain, rhinorrhea and sore throat.    Eyes: Negative for discharge.   Respiratory: Negative for cough and shortness of breath.    Cardiovascular: Positive for leg swelling. Negative for chest pain.   Gastrointestinal: Negative for abdominal pain, blood in stool, constipation, diarrhea, nausea and vomiting.   Endocrine: Negative.    Genitourinary: Negative for dysuria and flank pain.   Musculoskeletal: Negative for myalgias.   Skin: Positive for color change. Negative for rash.   Allergic/Immunologic: Negative.    Neurological: Negative for headaches.   Hematological: Negative for adenopathy.   Psychiatric/Behavioral: Negative.        Physical Exam     Initial Vitals   BP Pulse Resp Temp SpO2   -- -- -- -- --      MAP       --         Physical Exam    Constitutional: He appears well-developed and well-nourished. No distress.   HENT:   Head: Normocephalic and atraumatic.   Right Ear: External ear normal.   Left Ear: External ear normal.   Mouth/Throat: Oropharynx is clear and moist.   Eyes: Conjunctivae and EOM are normal. Pupils are equal, round, and reactive to light.   Neck: Normal range of motion. Neck supple.   Cardiovascular: Normal rate, regular rhythm, S1 normal, S2 normal, normal heart sounds and intact distal pulses.   Pulmonary/Chest: He has rhonchi.   Left basilar   Abdominal: Soft. Normal appearance and bowel sounds are normal. There is no abdominal tenderness.   Musculoskeletal: Normal range of motion. Tenderness and edema present.      Comments: Erythema swelling tenderness noted to the left lower extremity 2+ dorsalis pedis posterior tibial pulses  bilaterally right lower extremity is unaffected   Neurological: He is alert and oriented to person, place, and time. He has normal strength. GCS score is 15. GCS eye subscore is 4. GCS verbal subscore is 5. GCS motor subscore is 6.   Skin: Skin is warm and dry. Capillary refill takes less than 2 seconds. No rash noted. There is erythema.   Psychiatric: He has a normal mood and affect. His behavior is normal.         ED Course   Procedures  Labs Reviewed - No data to display       Imaging Results    None          Medical Decision Making:   ED Management:  Patient here with evidence of cellulitis to his left lower extremity is no evidence of deep vein thrombosis on ultrasound I have ordered vancomycin will admit for IV antibiotics                                 Clinical Impression:       ICD-10-CM ICD-9-CM   1. Swelling of lower leg  M79.89 729.81   2. Cellulitis  L03.90 682.9                                Christopher Xiong MD  07/18/20 1912

## 2020-07-19 PROBLEM — E87.1 HYPONATREMIA: Status: ACTIVE | Noted: 2020-07-19
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
## 2020-07-19 PROBLEM — I48.0 PAF (PAROXYSMAL ATRIAL FIBRILLATION): Chronic | Status: ACTIVE | Noted: 2020-07-19

## 2020-07-19 PROBLEM — S42.309A HUMERUS FRACTURE: Chronic | Status: ACTIVE | Noted: 2020-07-19

## 2020-07-19 PROBLEM — T17.928A ASPIRATION OF FOOD: Status: ACTIVE | Noted: 2020-07-19

## 2020-07-19 LAB
ANION GAP SERPL CALC-SCNC: 8 MMOL/L (ref 8–16)
BASOPHILS # BLD AUTO: 0.05 K/UL (ref 0–0.2)
BASOPHILS NFR BLD: 0.7 % (ref 0–1.9)
BUN SERPL-MCNC: 17 MG/DL (ref 10–30)
CALCIUM SERPL-MCNC: 7.9 MG/DL (ref 8.7–10.5)
CHLORIDE SERPL-SCNC: 98 MMOL/L (ref 95–110)
CO2 SERPL-SCNC: 26 MMOL/L (ref 23–29)
CREAT SERPL-MCNC: 0.7 MG/DL (ref 0.5–1.4)
DIFFERENTIAL METHOD: ABNORMAL
EOSINOPHIL # BLD AUTO: 0.6 K/UL (ref 0–0.5)
EOSINOPHIL NFR BLD: 9 % (ref 0–8)
ERYTHROCYTE [DISTWIDTH] IN BLOOD BY AUTOMATED COUNT: 13.9 % (ref 11.5–14.5)
EST. GFR  (AFRICAN AMERICAN): >60 ML/MIN/1.73 M^2
EST. GFR  (NON AFRICAN AMERICAN): >60 ML/MIN/1.73 M^2
GLUCOSE SERPL-MCNC: 94 MG/DL (ref 70–110)
HCT VFR BLD AUTO: 31.2 % (ref 40–54)
HGB BLD-MCNC: 10.4 G/DL (ref 14–18)
IMM GRANULOCYTES # BLD AUTO: 0.03 K/UL (ref 0–0.04)
IMM GRANULOCYTES NFR BLD AUTO: 0.4 % (ref 0–0.5)
LYMPHOCYTES # BLD AUTO: 2.4 K/UL (ref 1–4.8)
LYMPHOCYTES NFR BLD: 34.3 % (ref 18–48)
MAGNESIUM SERPL-MCNC: 1.7 MG/DL (ref 1.6–2.6)
MCH RBC QN AUTO: 33.4 PG (ref 27–31)
MCHC RBC AUTO-ENTMCNC: 33.3 G/DL (ref 32–36)
MCV RBC AUTO: 100 FL (ref 82–98)
MONOCYTES # BLD AUTO: 0.7 K/UL (ref 0.3–1)
MONOCYTES NFR BLD: 10.2 % (ref 4–15)
NEUTROPHILS # BLD AUTO: 3.2 K/UL (ref 1.8–7.7)
NEUTROPHILS NFR BLD: 45.4 % (ref 38–73)
NRBC BLD-RTO: 0 /100 WBC
PLATELET # BLD AUTO: 247 K/UL (ref 150–350)
PMV BLD AUTO: 9.3 FL (ref 9.2–12.9)
POTASSIUM SERPL-SCNC: 3.7 MMOL/L (ref 3.5–5.1)
RBC # BLD AUTO: 3.11 M/UL (ref 4.6–6.2)
SODIUM SERPL-SCNC: 132 MMOL/L (ref 136–145)
VANCOMYCIN TROUGH SERPL-MCNC: 5.8 UG/ML (ref 10–22)
WBC # BLD AUTO: 7.03 K/UL (ref 3.9–12.7)

## 2020-07-19 PROCEDURE — G0378 HOSPITAL OBSERVATION PER HR: HCPCS

## 2020-07-19 PROCEDURE — 83735 ASSAY OF MAGNESIUM: CPT

## 2020-07-19 PROCEDURE — 80202 ASSAY OF VANCOMYCIN: CPT

## 2020-07-19 PROCEDURE — 63600175 PHARM REV CODE 636 W HCPCS: Performed by: STUDENT IN AN ORGANIZED HEALTH CARE EDUCATION/TRAINING PROGRAM

## 2020-07-19 PROCEDURE — 97116 GAIT TRAINING THERAPY: CPT

## 2020-07-19 PROCEDURE — 85025 COMPLETE CBC W/AUTO DIFF WBC: CPT

## 2020-07-19 PROCEDURE — 25000003 PHARM REV CODE 250: Performed by: UROLOGY

## 2020-07-19 PROCEDURE — 97161 PT EVAL LOW COMPLEX 20 MIN: CPT

## 2020-07-19 PROCEDURE — 25000003 PHARM REV CODE 250: Performed by: NURSE PRACTITIONER

## 2020-07-19 PROCEDURE — 63600175 PHARM REV CODE 636 W HCPCS: Performed by: INTERNAL MEDICINE

## 2020-07-19 PROCEDURE — 36415 COLL VENOUS BLD VENIPUNCTURE: CPT

## 2020-07-19 PROCEDURE — 80048 BASIC METABOLIC PNL TOTAL CA: CPT

## 2020-07-19 RX ORDER — LIDOCAINE HYDROCHLORIDE 20 MG/ML
JELLY TOPICAL ONCE
Status: COMPLETED | OUTPATIENT
Start: 2020-07-19 | End: 2020-07-19

## 2020-07-19 RX ORDER — FUROSEMIDE 10 MG/ML
20 INJECTION INTRAMUSCULAR; INTRAVENOUS ONCE
Status: COMPLETED | OUTPATIENT
Start: 2020-07-19 | End: 2020-07-19

## 2020-07-19 RX ADMIN — FUROSEMIDE 20 MG: 20 TABLET ORAL at 11:07

## 2020-07-19 RX ADMIN — AMLODIPINE BESYLATE 2.5 MG: 2.5 TABLET ORAL at 11:07

## 2020-07-19 RX ADMIN — SOTALOL HYDROCHLORIDE 80 MG: 80 TABLET ORAL at 09:07

## 2020-07-19 RX ADMIN — ISOSORBIDE MONONITRATE 60 MG: 60 TABLET, EXTENDED RELEASE ORAL at 11:07

## 2020-07-19 RX ADMIN — PANTOPRAZOLE SODIUM 40 MG: 40 TABLET, DELAYED RELEASE ORAL at 11:07

## 2020-07-19 RX ADMIN — PIPERACILLIN AND TAZOBACTAM 3.38 G: 3; .375 INJECTION, POWDER, FOR SOLUTION INTRAVENOUS at 07:07

## 2020-07-19 RX ADMIN — ASPIRIN 81 MG: 81 TABLET, DELAYED RELEASE ORAL at 11:07

## 2020-07-19 RX ADMIN — MAGNESIUM OXIDE 400 MG: 400 TABLET ORAL at 11:07

## 2020-07-19 RX ADMIN — LOSARTAN POTASSIUM 25 MG: 25 TABLET, FILM COATED ORAL at 11:07

## 2020-07-19 RX ADMIN — LIDOCAINE HYDROCHLORIDE: 20 JELLY TOPICAL at 07:07

## 2020-07-19 RX ADMIN — APIXABAN 2.5 MG: 2.5 TABLET, FILM COATED ORAL at 11:07

## 2020-07-19 RX ADMIN — TAMSULOSIN HYDROCHLORIDE 0.4 MG: 0.4 CAPSULE ORAL at 11:07

## 2020-07-19 RX ADMIN — POTASSIUM CHLORIDE 20 MEQ: 20 TABLET, EXTENDED RELEASE ORAL at 11:07

## 2020-07-19 RX ADMIN — SOTALOL HYDROCHLORIDE 80 MG: 80 TABLET ORAL at 11:07

## 2020-07-19 RX ADMIN — ATORVASTATIN CALCIUM 10 MG: 10 TABLET, FILM COATED ORAL at 11:07

## 2020-07-19 RX ADMIN — FUROSEMIDE 20 MG: 10 INJECTION, SOLUTION INTRAMUSCULAR; INTRAVENOUS at 07:07

## 2020-07-19 RX ADMIN — VANCOMYCIN HYDROCHLORIDE 1000 MG: 1 INJECTION, POWDER, LYOPHILIZED, FOR SOLUTION INTRAVENOUS at 10:07

## 2020-07-19 NOTE — PT/OT/SLP EVAL
Physical Therapy Evaluation    Patient Name:  Marck Ramos   MRN:  [id number]    Recommendations:     Discharge Recommendations:  home with home health   Discharge Equipment Recommendations: walker, lit   Barriers to discharge: None    Assessment:     Marck Ramos is a 89 y.o. male admitted with a medical diagnosis of Cellulitis.  He presents with the following impairments/functional limitations:  impaired self care skills, impaired functional mobilty, weakness, gait instability, decreased lower extremity function, pain, decreased ROM, decreased upper extremity function, impaired skin, edema, impaired cardiopulmonary response to activity, orthopedic precautions ..    Rehab Prognosis: Fair; patient would benefit from acute skilled PT services to address these deficits and reach maximum level of function.    Recent Surgery: * No surgery found *      Plan:     During this hospitalization, patient to be seen 6 x/week to address the identified rehab impairments via gait training, therapeutic activities, therapeutic exercises and progress toward the following goals:    · Plan of Care Expires:  08/10/20    Subjective     Chief Complaint: pain to left lower leg on weight bearing; tired and weak.  Patient/Family Comments/goals: hard of hearing  Pain/Comfort:  · Pain Rating 1: 2/10  · Location - Side 1: Left  · Location - Orientation 1: lower  · Location 1: leg  · Pain Addressed 1: Distraction    Patients cultural, spiritual, Uatsdin conflicts given the current situation:  no    Living Environment:  Lives with daughter, no steps    Prior to admission, patients level of function was ambulating using rollator one handed.  Equipment used at home: rollator. Upon discharge, patient will have assistance from daughter    Objective:     Communicated with nurse Hill prior to session.  Patient found supine with bed alarm  upon PT entry to room.    General Precautions: Standard, fall   Orthopedic Precautions:LUE non weight  bearing   Braces: Sling and swathe     Exams:  · Cognitive Exam:  Patient is oriented to Person, Place and Situation  · RUE ROM: WFL  · RUE Strength: WFL  · LUE ROM: on sling and swathe immobilizer  · LUE Strength: on sling/swathe immobilizer  · RLE ROM: WFL  · RLE Strength: 3+/5  · LLE ROM: WFL  · LLE Strength: 3-/5    Functional Mobility:  · Bed Mobility:     · Rolling Right: maximal assistance  · Bridging: maximal assistance  · Supine to Sit: maximal assistance  · Sit to Supine: maximal assistance  · Transfers:     · Sit to Stand:  moderate assistance with hemiwalker  · Bed to Chair: maximal assistance with  hemiwalker  using  Step Transfer  · Gait: max A on HW. Difficulty intiating step. Tolerate 10 ft only  · Balance: good sitting; poor standing posture    Therapeutic Activities and Exercises:   Patient seen for initial evaluation and treatment. Max A with bed mobility and transfers. GT using Hemiwalker in front to maximize R arm leverage during weight shifts and balancing. Tolerate 10 ft.    AM-PAC 6 CLICK MOBILITY  Total Score:11     Patient left up in chair with call button in reach.    GOALS:   Multidisciplinary Problems     Physical Therapy Goals        Problem: Physical Therapy Goal    Goal Priority Disciplines Outcome Goal Variances Interventions   Physical Therapy Goal     PT, PT/OT Ongoing, Progressing     Description: Goals to be met by: discharge     Patient will increase functional independence with mobility by performin. Supine to sit with Stand-by Assistance  2. Sit to supine with Stand-by Assistance  3. Sit to stand transfer with Stand-by Assistance  4. Bed to chair transfer with Contact Guard Assistance using Benjamin-walker  5. Gait  x 30  feet with Contact Guard Assistance using Benjamin-walker.                      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

## 2020-07-19 NOTE — H&P
[facility] Medicine  History & Physical    DOS: 07/18/2020  7:55 PM      Patient Name: Marck Ramos  MRN: [id number]  Admission Date: 7/18/2020  Attending Physician: Dr. Agustin  Primary Care Provider: Hood Gannon MD         Patient information was obtained from patient and ER records.     Subjective:     Principal Problem:Cellulitis    Chief Complaint:   Chief Complaint   Patient presents with    Leg Pain        HPI: Marck Ramos is a 89 y.o. 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 It is moderate. It is associated with swelling and pain. He denies fever, chills, cough, SOB, N/V/D, dizziness or known trauma. He reports it started as a small hematoma, and progressively got more red and painful. He was recently admitted for a left humerus frx, then pna, and has been receiving PT/OT at home. He states he wants to be a full code, but doesn't think he wants a ventilator long term.     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      Current Outpatient Medications on File Prior to Encounter   Medication Sig    aspirin (ECOTRIN) 81 MG EC tablet Take 81 mg by mouth once daily.      atorvastatin (LIPITOR) 10 MG tablet Take 1 tablet (10 mg total) by mouth once daily.    ELIQUIS 2.5 mg Tab Take 2.5 mg by mouth 2 (two) times daily.     furosemide (LASIX) 40 MG tablet Take 1 tablet (40 mg total) by mouth once daily. (Patient taking differently: Take 20 mg by mouth every other day. )    guaifenesin 100 mg/5 ml (ROBITUSSIN) 100 mg/5 mL syrup Take 10 mLs (200 mg total) by mouth 3 (three) times daily as needed for Congestion.    isosorbide mononitrate (IMDUR) 60 MG 24 hr tablet Take 1 tablet (60 mg total) by mouth once daily.    losartan (COZAAR) 50 MG tablet Take 0.5 tablets (25 mg total) by mouth once daily.    MAGNESIUM ORAL Take 400 mg by mouth once daily.     pantoprazole (PROTONIX) 40 MG tablet Take 40 mg by mouth once daily.    potassium chloride SA (K-DUR,KLOR-CON) 20 MEQ tablet Take 1 tablet (20 mEq total) by mouth once daily.    sotaloL (BETAPACE) 80 MG tablet Take 1 tablet (80 mg total) by mouth 2 (two) times daily.    tamsulosin (FLOMAX) 0.4 mg Cap Take 1 capsule (0.4 mg total) by mouth once daily.    amlodipine (NORVASC) 5 MG tablet Take 2.5 mg by mouth. Take 1/2 tab by mouth daily as needed for systolic 150 or higher     Family History     None        Tobacco Use    Smoking status: Former Smoker     Years: 1.00     Types: Cigarettes    Smokeless tobacco: Never Used   Substance and Sexual Activity    Alcohol use: No    Drug use: No    Sexual activity: Never     Review of Systems   Constitutional: Positive for fatigue. Negative for chills, diaphoresis and fever.   HENT: Negative for congestion, ear pain, sore throat and trouble swallowing.    Eyes: Negative for pain, discharge and  visual disturbance.   Respiratory: Negative for cough, chest tightness, shortness of breath and wheezing.    Cardiovascular: Positive for leg swelling. Negative for chest pain and palpitations.   Gastrointestinal: Negative for abdominal distention, abdominal pain, blood in stool, constipation, diarrhea, nausea and vomiting.   Endocrine: Negative for polydipsia, polyphagia and polyuria.   Genitourinary: Negative for dysuria, flank pain, frequency and urgency.   Musculoskeletal: Negative for back pain, joint swelling, neck pain and neck stiffness.        Left arm in sling   Skin: Positive for color change. Negative for rash and wound.   Allergic/Immunologic: Negative for immunocompromised state.   Neurological: Positive for weakness. Negative for dizziness, syncope, speech difficulty, light-headedness, numbness and headaches.   Hematological: Negative for adenopathy.   Psychiatric/Behavioral: Negative for confusion and suicidal ideas. The patient is not nervous/anxious.    All other systems reviewed and are negative.    Objective:     Vital Signs (Most Recent):  Temp: 97.8 °F (36.6 °C) (07/18/20 2026)  Pulse: 60 (07/18/20 2026)  Resp: 18 (07/18/20 2026)  BP: (!) 181/76 (07/18/20 2026)  SpO2: 95 % (07/18/20 2026) Vital Signs (24h Range):  Temp:  [97.6 °F (36.4 °C)-98 °F (36.7 °C)] 97.8 °F (36.6 °C)  Pulse:  [60-64] 60  Resp:  [15-20] 18  SpO2:  [94 %-96 %] 95 %  BP: (160-184)/(73-85) 181/76     Weight: 56.2 kg (123 lb 14.4 oz)  Body mass index is 20 kg/m².    Physical Exam  Vitals signs and nursing note reviewed.   Constitutional:       Appearance: He is well-developed. He is ill-appearing.   HENT:      Head: Normocephalic and atraumatic.   Eyes:      Conjunctiva/sclera: Conjunctivae normal.      Pupils: Pupils are equal, round, and reactive to light.   Neck:      Musculoskeletal: Normal range of motion and neck supple.   Cardiovascular:      Rate and Rhythm: Normal rate and regular rhythm.      Heart sounds: Normal  heart sounds.   Pulmonary:      Effort: Pulmonary effort is normal.      Breath sounds: Wheezing present.      Comments: Exp wheezes throughout  Abdominal:      General: Bowel sounds are normal.      Palpations: Abdomen is soft.   Musculoskeletal: Normal range of motion.         General: Swelling and signs of injury present.      Comments: Left arm in sling with mild edema   Skin:     General: Skin is warm and dry.      Capillary Refill: Capillary refill takes less than 2 seconds.      Findings: Erythema present.      Comments: Left LE hematoma with surrounding erythema   Neurological:      Mental Status: He is alert and oriented to person, place, and time.   Psychiatric:         Behavior: Behavior normal.         Thought Content: Thought content normal.         Judgment: Judgment normal.           CRANIAL NERVES     CN III, IV, VI   Pupils are equal, round, and reactive to light.       Significant Labs:   CBC:   Recent Labs   Lab 07/18/20  1652   WBC 7.88   HGB 10.1*   HCT 31.2*        CMP:   Recent Labs   Lab 07/18/20  1652   *   K 4.0   CL 99   CO2 27   GLU 93   BUN 22   CREATININE 0.9   CALCIUM 8.1*   PROT 6.0   ALBUMIN 2.6*   BILITOT 0.8   ALKPHOS 65   AST 16   ALT 15   ANIONGAP 8   EGFRNONAA >60.0       Significant Imaging: CXR: I have reviewed all pertinent results/findings within the past 24 hours and my personal findings are:  chronic interstitial opacities, no acute consolidation       Us Lower Extremity Veins Left    Result Date: 7/18/2020  REASON: Swelling. FINDINGS: Grayscale, color and spectral Doppler analysis of the left lower extremity deep venous system was performed. There is normal compressibility, color and spectral Doppler analysis, and augmentation in the left lower extremity deep venous system. IMPRESSION: No DVT of the left lower extremity veins. Electronically Signed by Gilberto Agustin on 7/18/2020 5:50 PM      Assessment/Plan:     * Cellulitis  Admit to med/surg  vanc for now,  renally dose per pharm  Monitor for improvement   No palpable abscess  LE US negative for DVT - pt is on eliquis      Debility  PT eval and treat      Anemia in stage 2 chronic kidney disease  Stable, monitor      Hypertension  Continue home meds  monitor        VTE Risk Mitigation (From admission, onward)         Ordered     apixaban tablet 2.5 mg  2 times daily      07/18/20 1905     IP VTE HIGH RISK PATIENT  Once      07/18/20 1905     Place sequential compression device  Until discontinued      07/18/20 1905                   Isa Rivera NP  Department of Hospital Medicine   [facility]

## 2020-07-19 NOTE — PROGRESS NOTES
[facility] Medicine  Progress Note    Patient Name: Marck Ramos  MRN: [id number]  Patient Class: OP- Observation   Admission Date: 7/18/2020  Length of Stay: 0 days  Attending Physician: Lucille Johns MD  Primary Care Provider: Hood Gannon MD        Subjective:     Principal Problem:Cellulitis        HPI:  Marck Ramos is a 89 y.o. 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 It is moderate. It is associated with swelling and pain. He denies fever, chills, cough, SOB, N/V/D, dizziness or known trauma. He reports it started as a small hematoma, and progressively got more red and painful. He was recently admitted for a left humerus frx, then pna, and has been receiving PT/OT at home. He states he wants to be a full code, but doesn't think he wants a ventilator long term.     Overview/Hospital Course:  Patient with multiple recent hospitalization.  Increasing falls at home with sustained left humerus fracture, currently in sling, recurrent admission for concern of aspiration pneumonia.  He was discharged home on home health about 1 week ago.  Daughter noted to have developing worsening area of erythema, she is unsure if a cat scratch area initially, enlarging with associated warmth and mild swelling, seen by home health with no improvement on instructed to the ED.  He was started on vancomycin on admission.  On 07/19 patient sleeping, daughter states more recently he has been sleeping more throughout the day, there was concern that he may have aspirated while taking medications earlier, leg continues with erythema, warmth and swelling, no active drainage.  Daughter states patient does have advanced directive/living will, would not want intubation.    Interval  History:  Patient is seen with daughter present at bedside.  Patient lives with daughter.  Patient currently sleeping and therefore history obtained from daughter.  States he had a fall with humerus fracture and subsequently was admitted here with aspiration pneumonia.  She states she has noted him to be declining, poor appetite, sleeping more throughout the day, becoming more dependent.  States he follows with cardiologist Dr. Mares and is on Eliquis.  Reports he is being followed by home health, had small area of spreading erythema, daughter is unsure whether 1 of their personal cat initially scratch the area, area was worsening with warmth and swelling, instructed to the ED.  Daughter is concerned this morning while taking morning meds he did have severe coughing spell and may have aspirated.  T-max last 24 hr 98.2.  Blood pressure elevated.  Labs with H&H 10.4/31, macrocytic.  Sodium 132, lactic acid 0.9.  Chest x-ray with bilateral infiltrates concerning for interstitial lung disease.  Lower extremity ultrasound no DVT.  EKG paced.  Discussed with daughter at bedside.     Review of Systems   Constitutional: Positive for activity change and appetite change. Negative for chills and fever.   HENT: Positive for trouble swallowing.    Respiratory: Positive for cough and shortness of breath.    Cardiovascular: Positive for leg swelling.   Genitourinary: Positive for difficulty urinating.   Musculoskeletal: Positive for gait problem.   Skin: Positive for color change and wound.   Neurological: Positive for weakness.   Hematological: Bruises/bleeds easily.   Psychiatric/Behavioral: Positive for confusion.     Objective:     Vital Signs (Most Recent):  Temp: 98.1 °F (36.7 °C) (07/19/20 0737)  Pulse: 65 (07/19/20 0737)  Resp: 18 (07/19/20 0737)  BP: (!) 186/80 (07/19/20 0737)  SpO2: (!) 93 % (07/19/20 0737) Vital Signs (24h Range):  Temp:  [97.5 °F (36.4 °C)-98.2 °F (36.8 °C)] 98.1 °F (36.7 °C)  Pulse:  [60-72]  65  Resp:  [16-20] 18  SpO2:  [93 %-96 %] 93 %  BP: (165-186)/(70-85) 186/80     Weight: 56.2 kg (123 lb 14.4 oz)  Body mass index is 20 kg/m².    Intake/Output Summary (Last 24 hours) at 7/19/2020 1701  Last data filed at 7/19/2020 0600  Gross per 24 hour   Intake 550 ml   Output 750 ml   Net -200 ml      Physical Exam  Vitals signs and nursing note reviewed.   Constitutional:       Comments: Frail elderly male, lying in bed, sleeping, appears comfortable   HENT:      Head: Normocephalic and atraumatic.      Mouth/Throat:      Mouth: Mucous membranes are moist.   Eyes:      General:         Right eye: No discharge.         Left eye: No discharge.   Cardiovascular:      Rate and Rhythm: Normal rate and regular rhythm.   Pulmonary:      Comments: On supplemental oxygen via nasal cannula with rhonchorous breath sounds heard  Abdominal:      Comments: Mild suprapubic tenderness to palpation, no peritoneal signs, bowel sounds present   Genitourinary:     Comments: No Garcia catheter present  Musculoskeletal:      Comments: Evidence of muscle wasting/cachexia   Skin:     Comments: Bruising bilateral upper extremity.  Left lower extremity with erythema//bruising with warmth and edema mid leg spreading proximal and distally, no active drainage   Neurological:      Comments: Sleeping however does briefly awaken, does answer simple questions         Significant Labs:   Blood Culture:   Recent Labs   Lab 07/18/20 1642 07/18/20 1652   LABBLOO No Growth to date No Growth to date     BMP:   Recent Labs   Lab 07/19/20  0600   GLU 94   *   K 3.7   CL 98   CO2 26   BUN 17   CREATININE 0.7   CALCIUM 7.9*   MG 1.7     CBC:   Recent Labs   Lab 07/18/20 1652 07/19/20  0600   WBC 7.88 7.03   HGB 10.1* 10.4*   HCT 31.2* 31.2*    247     CMP:   Recent Labs   Lab 07/18/20 1652 07/19/20  0600   * 132*   K 4.0 3.7   CL 99 98   CO2 27 26   GLU 93 94   BUN 22 17   CREATININE 0.9 0.7   CALCIUM 8.1* 7.9*   PROT 6.0  --     ALBUMIN 2.6*  --    BILITOT 0.8  --    ALKPHOS 65  --    AST 16  --    ALT 15  --    ANIONGAP 8 8   EGFRNONAA >60.0 >60.0     Cardiac Markers:   Recent Labs   Lab 07/18/20  1652   *     Lactic Acid:   Recent Labs   Lab 07/18/20  1716   LACTATE 0.9     Magnesium:   Recent Labs   Lab 07/18/20  1652 07/19/20  0600   MG 1.8 1.7     POCT Glucose: No results for input(s): POCTGLUCOSE in the last 48 hours.  Troponin:   Recent Labs   Lab 07/18/20  1652   TROPONINI <0.030     TSH: No results for input(s): TSH in the last 4320 hours.  Urine Culture: No results for input(s): LABURIN in the last 48 hours.  Urine Studies: No results for input(s): COLORU, APPEARANCEUA, PHUR, SPECGRAV, PROTEINUA, GLUCUA, KETONESU, BILIRUBINUA, OCCULTUA, NITRITE, UROBILINOGEN, LEUKOCYTESUR, RBCUA, WBCUA, BACTERIA, SQUAMEPITHEL, HYALINECASTS in the last 48 hours.    Invalid input(s): WRIGHTSUR  All pertinent labs within the past 24 hours have been reviewed.    Significant Imaging: I have reviewed all pertinent imaging results/findings within the past 24 hours.     Ct Abdomen Pelvis With Contrast    Result Date: 7/10/2020  EXAM DESCRIPTION: CT ABDOMEN PELVIS WITH CONTRAST 7/11/2020 1:32 AM CDT CLINICAL HISTORY: 89 years, Male, Abdominal pain, fever COMPARISON: 04/27/2019 PROCEDURE: Contrast-enhanced images of the abdomen and pelvis were performed utilizing 2 mm slice thickness at 2 mm interval reconstruction interval reconstruction from the lung bases to the ischial tuberosities after the administration of 70 mL of Omnipaque 350 at the rate of 3 cc/sec. 10 minutes delay images of the kidney and bladder were also generated. An individualized dose optimization technique, Automated Exposure Control, was utilized for the performed procedure. FINDINGS: The lung bases demonstrate the presence of interstitial lung changes with atelectasis. Large hiatal hernia. Pacemaker. The liver, gallbladder, pancreas, spleen and adrenal glands demonstrate to be  unremarkable, no focal lesions are noted. The kidneys demonstrate normal uptake of contrast media.  No hydronephrosis and/or stones were identified. Tiny hypodensities within the right kidney suggests the presence of renal cysts, largest one measuring 1.2 cm midpole on image 56. Grossly the unopacified stomach, small bowel and large bowel demonstrate to be within normal limits. Fecal residue within the large bowel limits evaluation. Fecal residue within the left site colon could suggest mild fecal stasis. The appendix was not visualized. The urinary bladder demonstrate to be well distended. The prostate gland is prominent perhaps related to BPH. The presence of metallic hardware within the left hip joint limits evaluation of the pelvis. The aorta demonstrate atherosclerotic disease.  There is no retroperitoneal lymphadenopathy. There is no evidence for ascites. The bone windows demonstrate again mild diffuse bony osteopenia with dextra scoliosis and degenerative changes at the point of curvature. There is ORIF left hip joint. Small umbilical hernia containing omentum. IMPRESSION: MODERATE TO LARGE SIZE HIATAL HERNIA. CHRONIC/INTERSTITIAL LUNG CHANGES. LEFT RENAL CYSTS. ATHEROSCLEROTIC DISEASE OF THE AORTA. ENLARGED PROSTATE GLAND PERHAPS RELATED TO BPH. MILD CONSTIPATION. NO EVIDENCE FOR ACUTE INTRA-ABDOMINAL PROCESS. Electronically signed by:  Fei Do MD  7/11/2020 1:42 AM CDT Workstation: [id number]    X-ray Chest Ap Portable    Result Date: 7/18/2020  REASON: cough Cough FINDINGS: Portable chest radiograph with comparison chest x-ray April 27, 2019. Left dual-lead cardiac pacemaker noted.. The cardiomediastinal silhouette is within normal limits in size.The pulmonary vascular structures are within normal limits. Bilateral diffuse interstitial lung opacities demonstrated. No confluent airspace opacification. No acute osseous abnormality. IMPRESSION: Bilateral diffuse interstitial lung opacities are felt to  reflect chronic interstitial lung disease. No acute airspace opacification. Electronically Signed by Gilberto Agustin on 7/18/2020 5:42 PM    X-ray Chest Ap Portable    Result Date: 7/10/2020  EXAM DESCRIPTION: XR CHEST AP PORTABLE 7/11/2020 1:43 AM CDT CLINICAL HISTORY: 89 years, Male, shortness of breath; Vomiting; Weakness; , cough COMPARISON: 04/27/2019 FINDINGS: Single view of the chest was obtained portable. Prior films were compared. End there is a dual-lead pacemaker via a left clavian. Decreased lung volume. The cardiomediastinal silhouette demonstrate to be unremarkable.  The heart is not enlarged. Double shadow density within the retrocardiac region corresponding to a moderate to large size hiatal hernia. The thoracic aorta is tortuous with atherosclerotic disease. Reticular-nodular densities throughout the lungs corresponding to a most likely chronic interstitial changes. Slight increase densities within the lung bases, greater right lung than left, the possibility of infiltrate within the right lung base cannot be excluded. External EKG leads within the field-of-view limits diagnosis. Scoliosis of the thoracolumbar spine. IMPRESSION: DECREASED LUNG VOLUME. HIATAL HERNIA. ATHEROSCLEROTIC DISEASE OF THE AORTA. PACEMAKER IN PLACE. INTERSTITIAL LUNG DISEASE UIP/IPF PATTERN. ATELECTASIS LUNG BASES, RIGHT GREATER THAN LEFT, SUPERIMPOSED INFILTRATE CANNOT BE EXCLUDED. Electronically signed by:  Fei Do MD  7/11/2020 1:46 AM CDT Workstation: [id number]    Us Lower Extremity Veins Left    Result Date: 7/18/2020  REASON: Swelling. FINDINGS: Grayscale, color and spectral Doppler analysis of the left lower extremity deep venous system was performed. There is normal compressibility, color and spectral Doppler analysis, and augmentation in the left lower extremity deep venous system. IMPRESSION: No DVT of the left lower extremity veins. Electronically Signed by Gilberto Agustin on 7/18/2020 5:50 PM  ECG Results           EKG 12-lead (Final result)  Result time 07/19/20 12:16:57    Final result by Interface, Lab In [facility] (07/19/20 12:16:57)                 Narrative:    Test Reason : M79.89,    Vent. Rate : 060 BPM     Atrial Rate : 060 BPM     P-R Int : 152 ms          QRS Dur : 160 ms      QT Int : 506 ms       P-R-T Axes : 072 -75 049 degrees     QTc Int : 506 ms    AV dual-paced rhythm  Abnormal ECG  When compared with ECG of 10-JUL-2020 23:12,  Vent. rate has decreased BY  26 BPM  Confirmed by Pete Mares MD ([id number]) on 7/19/2020 12:16:46 PM    Referred By: [email or web address]   SELF           Confirmed By:Pete Mares MD                                  Assessment/Plan:      * Left lower extremity cellulitis with possible hematoma  Few days history of progressively worsening erythema with warmth and swelling.  Patient is on Eliquis and there was initially impression of erythema due to Eliquis however worsening.  Continue IV vancomycin  Demarcate area of erythema and monitor  Elevate lower extremity  A.m. labs ordered for review      Possible aspiration pneumonia versus pneumonitis  There was concern patient had severe coughing with possible aspiration earlier this morning.  Recent admission for aspiration pneumonia, declining status, high risk.  Will add IV vancomycin to antibiotic therapy.  Changed to NPO except medications in applesauce.  Speech therapy evaluation.    Consider repeating chest x-ray in 24-48 hours depending on clinical status      Hyponatremia  Sodium level 132.  As per collateral decreasing oral intake.  Possible low solute versus other.  Monitoring.    Hypertension, not well controlled  Continue home Imdur, losartan, amlodipine and monitor.  Add p.r.n. as needed.  More liberal target in this frail elderly male.      BPH with obstruction/lower urinary tract symptoms  Patient does abdominal tenderness to palpation.  Difficult historian.  Will do bladder scan now on q.6 hours.  Continue home medication.  If  retaining consistently will place Garcia catheter.      History of left humerus fracture  Arm currently in sling.      PAF (paroxysmal atrial fibrillation)  History of paroxysmal atrial fibrillation.  Followed by Dr. Mares.  Now with increasing falls and declining status will consult cardiology to re-evaluate need for Eliquis.  Continue sotalol.      Debility  As per collateral patient with declining status following humerus fracture, reduced oral intake, more somnolent, decreased activity level and endurance.  Daughter states she does feel overwhelmed given has to manage at home and frequently has to call for help.  PT/OT.        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  Monitoring.        VTE Risk Mitigation (From admission, onward)         Ordered     apixaban tablet 2.5 mg  2 times daily      07/18/20 1905     IP VTE HIGH RISK PATIENT  Once      07/18/20 1905     Place sequential compression device  Until discontinued      07/18/20 1905                      Lucille Johns MD  Department of Hospital Medicine   [facility]

## 2020-07-19 NOTE — SUBJECTIVE & OBJECTIVE
Interval History:  Patient is seen with daughter present at bedside.  Patient lives with daughter.  Patient currently sleeping and therefore history obtained from daughter.  States he had a fall with humerus fracture and subsequently was admitted here with aspiration pneumonia.  She states she has noted him to be declining, poor appetite, sleeping more throughout the day, becoming more dependent.  States he follows with cardiologist Dr. Mares and is on Eliquis.  Reports he is being followed by home health, had small area of spreading erythema, daughter is unsure whether 1 of their personal cat initially scratch the area, area was worsening with warmth and swelling, instructed to the ED.  Daughter is concerned this morning while taking morning meds he did have severe coughing spell and may have aspirated.  T-max last 24 hr 98.2.  Blood pressure elevated.  Labs with H&H 10.4/31, macrocytic.  Sodium 132, lactic acid 0.9.  Chest x-ray with bilateral infiltrates concerning for interstitial lung disease.  Lower extremity ultrasound no DVT.  EKG paced.  Discussed with daughter at bedside.     Review of Systems   Constitutional: Positive for activity change and appetite change. Negative for chills and fever.   HENT: Positive for trouble swallowing.    Respiratory: Positive for cough and shortness of breath.    Cardiovascular: Positive for leg swelling.   Genitourinary: Positive for difficulty urinating.   Musculoskeletal: Positive for gait problem.   Skin: Positive for color change and wound.   Neurological: Positive for weakness.   Hematological: Bruises/bleeds easily.   Psychiatric/Behavioral: Positive for confusion.     Objective:     Vital Signs (Most Recent):  Temp: 98.1 °F (36.7 °C) (07/19/20 0737)  Pulse: 65 (07/19/20 0737)  Resp: 18 (07/19/20 0737)  BP: (!) 186/80 (07/19/20 0737)  SpO2: (!) 93 % (07/19/20 0737) Vital Signs (24h Range):  Temp:  [97.5 °F (36.4 °C)-98.2 °F (36.8 °C)] 98.1 °F (36.7 °C)  Pulse:   [60-72] 65  Resp:  [16-20] 18  SpO2:  [93 %-96 %] 93 %  BP: (165-186)/(70-85) 186/80     Weight: 56.2 kg (123 lb 14.4 oz)  Body mass index is 20 kg/m².    Intake/Output Summary (Last 24 hours) at 7/19/2020 1701  Last data filed at 7/19/2020 0600  Gross per 24 hour   Intake 550 ml   Output 750 ml   Net -200 ml      Physical Exam  Vitals signs and nursing note reviewed.   Constitutional:       Comments: Frail elderly male, lying in bed, sleeping, appears comfortable   HENT:      Head: Normocephalic and atraumatic.      Mouth/Throat:      Mouth: Mucous membranes are moist.   Eyes:      General:         Right eye: No discharge.         Left eye: No discharge.   Cardiovascular:      Rate and Rhythm: Normal rate and regular rhythm.   Pulmonary:      Comments: On supplemental oxygen via nasal cannula with rhonchorous breath sounds heard  Abdominal:      Comments: Mild suprapubic tenderness to palpation, no peritoneal signs, bowel sounds present   Genitourinary:     Comments: No Garcia catheter present  Musculoskeletal:      Comments: Evidence of muscle wasting/cachexia   Skin:     Comments: Bruising bilateral upper extremity.  Left lower extremity with erythema//bruising with warmth and edema mid leg spreading proximal and distally, no active drainage   Neurological:      Comments: Sleeping however does briefly awaken, does answer simple questions         Significant Labs:   Blood Culture:   Recent Labs   Lab 07/18/20 1642 07/18/20 1652   LABBLOO No Growth to date No Growth to date     BMP:   Recent Labs   Lab 07/19/20  0600   GLU 94   *   K 3.7   CL 98   CO2 26   BUN 17   CREATININE 0.7   CALCIUM 7.9*   MG 1.7     CBC:   Recent Labs   Lab 07/18/20 1652 07/19/20  0600   WBC 7.88 7.03   HGB 10.1* 10.4*   HCT 31.2* 31.2*    247     CMP:   Recent Labs   Lab 07/18/20 1652 07/19/20  0600   * 132*   K 4.0 3.7   CL 99 98   CO2 27 26   GLU 93 94   BUN 22 17   CREATININE 0.9 0.7   CALCIUM 8.1* 7.9*   PROT 6.0   --    ALBUMIN 2.6*  --    BILITOT 0.8  --    ALKPHOS 65  --    AST 16  --    ALT 15  --    ANIONGAP 8 8   EGFRNONAA >60.0 >60.0     Cardiac Markers:   Recent Labs   Lab 07/18/20  1652   *     Lactic Acid:   Recent Labs   Lab 07/18/20  1716   LACTATE 0.9     Magnesium:   Recent Labs   Lab 07/18/20  1652 07/19/20  0600   MG 1.8 1.7     POCT Glucose: No results for input(s): POCTGLUCOSE in the last 48 hours.  Troponin:   Recent Labs   Lab 07/18/20  1652   TROPONINI <0.030     TSH: No results for input(s): TSH in the last 4320 hours.  Urine Culture: No results for input(s): LABURIN in the last 48 hours.  Urine Studies: No results for input(s): COLORU, APPEARANCEUA, PHUR, SPECGRAV, PROTEINUA, GLUCUA, KETONESU, BILIRUBINUA, OCCULTUA, NITRITE, UROBILINOGEN, LEUKOCYTESUR, RBCUA, WBCUA, BACTERIA, SQUAMEPITHEL, HYALINECASTS in the last 48 hours.    Invalid input(s): WRIGHTSUR  All pertinent labs within the past 24 hours have been reviewed.    Significant Imaging: I have reviewed all pertinent imaging results/findings within the past 24 hours.     Ct Abdomen Pelvis With Contrast    Result Date: 7/10/2020  EXAM DESCRIPTION: CT ABDOMEN PELVIS WITH CONTRAST 7/11/2020 1:32 AM CDT CLINICAL HISTORY: 89 years, Male, Abdominal pain, fever COMPARISON: 04/27/2019 PROCEDURE: Contrast-enhanced images of the abdomen and pelvis were performed utilizing 2 mm slice thickness at 2 mm interval reconstruction interval reconstruction from the lung bases to the ischial tuberosities after the administration of 70 mL of Omnipaque 350 at the rate of 3 cc/sec. 10 minutes delay images of the kidney and bladder were also generated. An individualized dose optimization technique, Automated Exposure Control, was utilized for the performed procedure. FINDINGS: The lung bases demonstrate the presence of interstitial lung changes with atelectasis. Large hiatal hernia. Pacemaker. The liver, gallbladder, pancreas, spleen and adrenal glands demonstrate to  be unremarkable, no focal lesions are noted. The kidneys demonstrate normal uptake of contrast media.  No hydronephrosis and/or stones were identified. Tiny hypodensities within the right kidney suggests the presence of renal cysts, largest one measuring 1.2 cm midpole on image 56. Grossly the unopacified stomach, small bowel and large bowel demonstrate to be within normal limits. Fecal residue within the large bowel limits evaluation. Fecal residue within the left site colon could suggest mild fecal stasis. The appendix was not visualized. The urinary bladder demonstrate to be well distended. The prostate gland is prominent perhaps related to BPH. The presence of metallic hardware within the left hip joint limits evaluation of the pelvis. The aorta demonstrate atherosclerotic disease.  There is no retroperitoneal lymphadenopathy. There is no evidence for ascites. The bone windows demonstrate again mild diffuse bony osteopenia with dextra scoliosis and degenerative changes at the point of curvature. There is ORIF left hip joint. Small umbilical hernia containing omentum. IMPRESSION: MODERATE TO LARGE SIZE HIATAL HERNIA. CHRONIC/INTERSTITIAL LUNG CHANGES. LEFT RENAL CYSTS. ATHEROSCLEROTIC DISEASE OF THE AORTA. ENLARGED PROSTATE GLAND PERHAPS RELATED TO BPH. MILD CONSTIPATION. NO EVIDENCE FOR ACUTE INTRA-ABDOMINAL PROCESS. Electronically signed by:  Fei Do MD  7/11/2020 1:42 AM CDT Workstation: [id number]    X-ray Chest Ap Portable    Result Date: 7/18/2020  REASON: cough Cough FINDINGS: Portable chest radiograph with comparison chest x-ray April 27, 2019. Left dual-lead cardiac pacemaker noted.. The cardiomediastinal silhouette is within normal limits in size.The pulmonary vascular structures are within normal limits. Bilateral diffuse interstitial lung opacities demonstrated. No confluent airspace opacification. No acute osseous abnormality. IMPRESSION: Bilateral diffuse interstitial lung opacities are felt to  reflect chronic interstitial lung disease. No acute airspace opacification. Electronically Signed by Gilberto Agustin on 7/18/2020 5:42 PM    X-ray Chest Ap Portable    Result Date: 7/10/2020  EXAM DESCRIPTION: XR CHEST AP PORTABLE 7/11/2020 1:43 AM CDT CLINICAL HISTORY: 89 years, Male, shortness of breath; Vomiting; Weakness; , cough COMPARISON: 04/27/2019 FINDINGS: Single view of the chest was obtained portable. Prior films were compared. End there is a dual-lead pacemaker via a left clavian. Decreased lung volume. The cardiomediastinal silhouette demonstrate to be unremarkable.  The heart is not enlarged. Double shadow density within the retrocardiac region corresponding to a moderate to large size hiatal hernia. The thoracic aorta is tortuous with atherosclerotic disease. Reticular-nodular densities throughout the lungs corresponding to a most likely chronic interstitial changes. Slight increase densities within the lung bases, greater right lung than left, the possibility of infiltrate within the right lung base cannot be excluded. External EKG leads within the field-of-view limits diagnosis. Scoliosis of the thoracolumbar spine. IMPRESSION: DECREASED LUNG VOLUME. HIATAL HERNIA. ATHEROSCLEROTIC DISEASE OF THE AORTA. PACEMAKER IN PLACE. INTERSTITIAL LUNG DISEASE UIP/IPF PATTERN. ATELECTASIS LUNG BASES, RIGHT GREATER THAN LEFT, SUPERIMPOSED INFILTRATE CANNOT BE EXCLUDED. Electronically signed by:  Fei Do MD  7/11/2020 1:46 AM CDT Workstation: [id number]    Us Lower Extremity Veins Left    Result Date: 7/18/2020  REASON: Swelling. FINDINGS: Grayscale, color and spectral Doppler analysis of the left lower extremity deep venous system was performed. There is normal compressibility, color and spectral Doppler analysis, and augmentation in the left lower extremity deep venous system. IMPRESSION: No DVT of the left lower extremity veins. Electronically Signed by Gilberto Agustin on 7/18/2020 5:50 PM  ECG Results           EKG 12-lead (Final result)  Result time 07/19/20 12:16:57    Final result by Interface, Lab In [facility] (07/19/20 12:16:57)                 Narrative:    Test Reason : M79.89,    Vent. Rate : 060 BPM     Atrial Rate : 060 BPM     P-R Int : 152 ms          QRS Dur : 160 ms      QT Int : 506 ms       P-R-T Axes : 072 -75 049 degrees     QTc Int : 506 ms    AV dual-paced rhythm  Abnormal ECG  When compared with ECG of 10-JUL-2020 23:12,  Vent. rate has decreased BY  26 BPM  Confirmed by Pete Mraes MD ([id number]) on 7/19/2020 12:16:46 PM    Referred By: [id number]   SELF           Confirmed By:Pete Mares MD

## 2020-07-19 NOTE — ASSESSMENT & PLAN NOTE
Continue home Imdur, losartan, amlodipine and monitor.  Add p.r.n. as needed.  More liberal target in this frail elderly male.

## 2020-07-19 NOTE — ASSESSMENT & PLAN NOTE
Sodium level 132.  As per collateral decreasing oral intake.  Possible low solute versus other.  Monitoring.

## 2020-07-19 NOTE — ASSESSMENT & PLAN NOTE
Few days history of progressively worsening erythema with warmth and swelling.  Patient is on Eliquis and there was initially impression of erythema due to Eliquis however worsening.  Continue IV vancomycin  Demarcate area of erythema and monitor  Elevate lower extremity  A.m. labs ordered for review

## 2020-07-19 NOTE — PLAN OF CARE
Problem: Physical Therapy Goal  Goal: Physical Therapy Goal  Description: Goals to be met by: discharge     Patient will increase functional independence with mobility by performin. Supine to sit with Stand-by Assistance  2. Sit to supine with Stand-by Assistance  3. Sit to stand transfer with Stand-by Assistance  4. Bed to chair transfer with Contact Guard Assistance using Benjamin-walker  5. Gait  x 30  feet with Contact Guard Assistance using Benjamin-walker.     Outcome: Ongoing, Progressing  Patient seen for intial eval and treatment to determine functional status and establish goals.

## 2020-07-19 NOTE — HOSPITAL COURSE
I, Dr Johns, assumed care of this patient 7/19 to 7/20. Patient with multiple recent hospitalization.  Increasing falls at home with sustained left humerus fracture, currently in sling, recurrent admission for concern of aspiration pneumonia.  He was discharged home on home health about 1 week ago.  Daughter noted to have developing worsening area of erythema, she is unsure if a cat scratch area initially, enlarging with associated warmth and mild swelling, seen by home health with no improvement on instructed to the ED.  He was started on vancomycin on admission.  On 07/19 patient sleeping, daughter states more recently he has been sleeping more throughout the day, there was concern that he may have aspirated while taking medications earlier, leg continues with erythema, warmth and swelling, no active drainage.  Daughter states patient does have advanced directive/living will, would not want intubation. On 7/20 seen by speech therapy with recommendations for trial of mechanical soft diet, still coughing with meals, lower extremity appears more to be hematoma with petechiae rather than cellulitis, son at bedside states Dr. Kuhn now out of town, requesting sling to be removed, will consult orthopedic

## 2020-07-19 NOTE — ASSESSMENT & PLAN NOTE
History of paroxysmal atrial fibrillation.  Followed by Dr. Mares.  Now with increasing falls and declining status will consult cardiology to re-evaluate need for Eliquis.  Continue sotalol.

## 2020-07-19 NOTE — UM SECONDARY REVIEW
Physician Advisor External    Level of Care Issue    Referral to EHR for LOC review 7/18/2020    900pm EHR unable to access EMR, HP emailed to EHR as requested, still awaiting EHR recommendation.

## 2020-07-19 NOTE — ASSESSMENT & PLAN NOTE
Patient does abdominal tenderness to palpation.  Difficult historian.  Will do bladder scan now on q.6 hours.  Continue home medication.  If retaining consistently will place Garcia catheter.

## 2020-07-19 NOTE — HPI
Marck Ramos is a 89 y.o. 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 It is moderate. It is associated with swelling and pain. He denies fever, chills, cough, SOB, N/V/D, dizziness or known trauma. He reports it started as a small hematoma, and progressively got more red and painful. He was recently admitted for a left humerus frx, then pna, and has been receiving PT/OT at home. He states he wants to be a full code, but doesn't think he wants a ventilator long term.

## 2020-07-19 NOTE — PROGRESS NOTES
VANCOMYCIN PHARMACOKINETIC NOTE:  Vancomycin Day # 1    Objective/Assessment:    Diagnosis/Indication for Vancomycin: Cellulitis     89 y.o., male; Actual Body Weight = 56.2 kg (123 lb 14.4 oz).    The patient has the following labs:  7/18/2020 Estimated Creatinine Clearance: 36.4 mL/min (based on SCr of 0.9 mg/dL). Lab Results   Component Value Date    BUN 22 07/18/2020     Lab Results   Component Value Date    WBC 7.88 07/18/2020            Plan:  Adjust vancomycin dose and/or frequency based on the patient's actual weight and renal function:  Initiate Vancomycin 1000 mg IV every 24 hours.  Orders have been entered into patient's chart.      Vancomycin trough level has been ordered for 7/19 @20:00, prior to 2nd dose.    Pharmacy will manage vancomycin therapy, monitor serum vancomycin levels, monitor renal function and adjust regimen as necessary.      Thank you for allowing us to participate in this patient's care.     Chace Rider 7/18/2020 10:22 PM  Department of Pharmacy  Ext [phone]

## 2020-07-19 NOTE — SUBJECTIVE & OBJECTIVE
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    Iron deficiency anemia, unspecified 7/18/2017    Other pulmonary embolism with acute cor pulmonale 3/6/2019    Pulmonary emboli        Past Surgical History:   Procedure Laterality Date    Closed reduction & internal fixation of closed, comminuted, displaced intertrochanteric fx left hip w/ TFN Left 08/29/2018       Review of patient's allergies indicates:   Allergen Reactions    Sulfa (sulfonamide antibiotics)      Patient can not recall reaction     Iron      Other reaction(s): Unknown       No current facility-administered medications on file prior to encounter.      Current Outpatient Medications on File Prior to Encounter   Medication Sig    aspirin (ECOTRIN) 81 MG EC tablet Take 81 mg by mouth once daily.      atorvastatin (LIPITOR) 10 MG tablet Take 1 tablet (10 mg total) by mouth once daily.    ELIQUIS 2.5 mg Tab Take 2.5 mg by mouth 2 (two) times daily.     furosemide (LASIX) 40 MG tablet Take 1 tablet (40 mg total) by mouth once daily. (Patient taking differently: Take 20 mg by mouth every other day. )    guaifenesin 100 mg/5 ml (ROBITUSSIN) 100 mg/5 mL syrup Take 10 mLs (200 mg total) by mouth 3 (three) times daily as needed for Congestion.    isosorbide mononitrate (IMDUR) 60 MG 24 hr tablet Take 1 tablet (60 mg total) by mouth once daily.    losartan (COZAAR) 50 MG tablet Take 0.5 tablets (25 mg total) by mouth once daily.    MAGNESIUM ORAL Take 400 mg by mouth once daily.     pantoprazole (PROTONIX) 40 MG tablet Take 40 mg by mouth once daily.    potassium chloride SA (K-DUR,KLOR-CON) 20 MEQ tablet Take 1 tablet (20 mEq total)  by mouth once daily.    sotaloL (BETAPACE) 80 MG tablet Take 1 tablet (80 mg total) by mouth 2 (two) times daily.    tamsulosin (FLOMAX) 0.4 mg Cap Take 1 capsule (0.4 mg total) by mouth once daily.    amlodipine (NORVASC) 5 MG tablet Take 2.5 mg by mouth. Take 1/2 tab by mouth daily as needed for systolic 150 or higher     Family History     None        Tobacco Use    Smoking status: Former Smoker     Years: 1.00     Types: Cigarettes    Smokeless tobacco: Never Used   Substance and Sexual Activity    Alcohol use: No    Drug use: No    Sexual activity: Never     Review of Systems   Constitutional: Positive for fatigue. Negative for chills, diaphoresis and fever.   HENT: Negative for congestion, ear pain, sore throat and trouble swallowing.    Eyes: Negative for pain, discharge and visual disturbance.   Respiratory: Negative for cough, chest tightness, shortness of breath and wheezing.    Cardiovascular: Positive for leg swelling. Negative for chest pain and palpitations.   Gastrointestinal: Negative for abdominal distention, abdominal pain, blood in stool, constipation, diarrhea, nausea and vomiting.   Endocrine: Negative for polydipsia, polyphagia and polyuria.   Genitourinary: Negative for dysuria, flank pain, frequency and urgency.   Musculoskeletal: Negative for back pain, joint swelling, neck pain and neck stiffness.        Left arm in sling   Skin: Positive for color change. Negative for rash and wound.   Allergic/Immunologic: Negative for immunocompromised state.   Neurological: Positive for weakness. Negative for dizziness, syncope, speech difficulty, light-headedness, numbness and headaches.   Hematological: Negative for adenopathy.   Psychiatric/Behavioral: Negative for confusion and suicidal ideas. The patient is not nervous/anxious.    All other systems reviewed and are negative.    Objective:     Vital Signs (Most Recent):  Temp: 97.8 °F (36.6 °C) (07/18/20 2026)  Pulse: 60 (07/18/20  2026)  Resp: 18 (07/18/20 2026)  BP: (!) 181/76 (07/18/20 2026)  SpO2: 95 % (07/18/20 2026) Vital Signs (24h Range):  Temp:  [97.6 °F (36.4 °C)-98 °F (36.7 °C)] 97.8 °F (36.6 °C)  Pulse:  [60-64] 60  Resp:  [15-20] 18  SpO2:  [94 %-96 %] 95 %  BP: (160-184)/(73-85) 181/76     Weight: 56.2 kg (123 lb 14.4 oz)  Body mass index is 20 kg/m².    Physical Exam  Vitals signs and nursing note reviewed.   Constitutional:       Appearance: He is well-developed. He is ill-appearing.   HENT:      Head: Normocephalic and atraumatic.   Eyes:      Conjunctiva/sclera: Conjunctivae normal.      Pupils: Pupils are equal, round, and reactive to light.   Neck:      Musculoskeletal: Normal range of motion and neck supple.   Cardiovascular:      Rate and Rhythm: Normal rate and regular rhythm.      Heart sounds: Normal heart sounds.   Pulmonary:      Effort: Pulmonary effort is normal.      Breath sounds: Wheezing present.      Comments: Exp wheezes throughout  Abdominal:      General: Bowel sounds are normal.      Palpations: Abdomen is soft.   Musculoskeletal: Normal range of motion.         General: Swelling and signs of injury present.      Comments: Left arm in sling with mild edema   Skin:     General: Skin is warm and dry.      Capillary Refill: Capillary refill takes less than 2 seconds.      Findings: Erythema present.      Comments: Left LE hematoma with surrounding erythema   Neurological:      Mental Status: He is alert and oriented to person, place, and time.   Psychiatric:         Behavior: Behavior normal.         Thought Content: Thought content normal.         Judgment: Judgment normal.           CRANIAL NERVES     CN III, IV, VI   Pupils are equal, round, and reactive to light.       Significant Labs:   CBC:   Recent Labs   Lab 07/18/20  1652   WBC 7.88   HGB 10.1*   HCT 31.2*        CMP:   Recent Labs   Lab 07/18/20  1652   *   K 4.0   CL 99   CO2 27   GLU 93   BUN 22   CREATININE 0.9   CALCIUM 8.1*   PROT  6.0   ALBUMIN 2.6*   BILITOT 0.8   ALKPHOS 65   AST 16   ALT 15   ANIONGAP 8   EGFRNONAA >60.0       Significant Imaging: CXR: I have reviewed all pertinent results/findings within the past 24 hours and my personal findings are:  chronic interstitial opacities, no acute consolidation       Us Lower Extremity Veins Left    Result Date: 7/18/2020  REASON: Swelling. FINDINGS: Grayscale, color and spectral Doppler analysis of the left lower extremity deep venous system was performed. There is normal compressibility, color and spectral Doppler analysis, and augmentation in the left lower extremity deep venous system. IMPRESSION: No DVT of the left lower extremity veins. Electronically Signed by Gilberto Agustin on 7/18/2020 5:50 PM

## 2020-07-19 NOTE — ASSESSMENT & PLAN NOTE
Admit to med/surg  vanc for now, renally dose per pharm  Monitor for improvement   No palpable abscess  LE US negative for DVT - pt is on eliquis

## 2020-07-19 NOTE — ASSESSMENT & PLAN NOTE
As per collateral patient with declining status following humerus fracture, reduced oral intake, more somnolent, decreased activity level and endurance.  Daughter states she does feel overwhelmed given has to manage at home and frequently has to call for help.  PT/OT.

## 2020-07-19 NOTE — ASSESSMENT & PLAN NOTE
There was concern patient had severe coughing with possible aspiration earlier this morning.  Recent admission for aspiration pneumonia, declining status, high risk.  Will add IV vancomycin to antibiotic therapy.  Changed to NPO except medications in applesauce.  Speech therapy evaluation.    Consider repeating chest x-ray in 24-48 hours depending on clinical status

## 2020-07-20 PROBLEM — M79.89 SWELLING OF LOWER LEG: Status: RESOLVED | Noted: 2020-07-20 | Resolved: 2020-07-20

## 2020-07-20 PROBLEM — M79.89 SWELLING OF LOWER LEG: Status: ACTIVE | Noted: 2020-07-20

## 2020-07-20 LAB
ANION GAP SERPL CALC-SCNC: 12 MMOL/L (ref 8–16)
BACTERIA #/AREA URNS HPF: NEGATIVE /HPF
BASOPHILS # BLD AUTO: 0.06 K/UL (ref 0–0.2)
BASOPHILS NFR BLD: 0.8 % (ref 0–1.9)
BILIRUB UR QL STRIP: NEGATIVE
BUN SERPL-MCNC: 15 MG/DL (ref 10–30)
CALCIUM SERPL-MCNC: 8 MG/DL (ref 8.7–10.5)
CHLORIDE SERPL-SCNC: 95 MMOL/L (ref 95–110)
CLARITY UR: CLEAR
CO2 SERPL-SCNC: 26 MMOL/L (ref 23–29)
COLOR UR: YELLOW
CREAT SERPL-MCNC: 0.9 MG/DL (ref 0.5–1.4)
DIFFERENTIAL METHOD: ABNORMAL
EOSINOPHIL # BLD AUTO: 0.5 K/UL (ref 0–0.5)
EOSINOPHIL NFR BLD: 6.6 % (ref 0–8)
ERYTHROCYTE [DISTWIDTH] IN BLOOD BY AUTOMATED COUNT: 13.5 % (ref 11.5–14.5)
EST. GFR  (AFRICAN AMERICAN): >60 ML/MIN/1.73 M^2
EST. GFR  (NON AFRICAN AMERICAN): >60 ML/MIN/1.73 M^2
GLUCOSE SERPL-MCNC: 93 MG/DL (ref 70–110)
GLUCOSE UR QL STRIP: NEGATIVE
HCT VFR BLD AUTO: 34.1 % (ref 40–54)
HGB BLD-MCNC: 11.5 G/DL (ref 14–18)
HGB UR QL STRIP: ABNORMAL
HYALINE CASTS #/AREA URNS LPF: 1 /LPF
IMM GRANULOCYTES # BLD AUTO: 0.05 K/UL (ref 0–0.04)
IMM GRANULOCYTES NFR BLD AUTO: 0.6 % (ref 0–0.5)
KETONES UR QL STRIP: NEGATIVE
LEUKOCYTE ESTERASE UR QL STRIP: NEGATIVE
LYMPHOCYTES # BLD AUTO: 2.2 K/UL (ref 1–4.8)
LYMPHOCYTES NFR BLD: 28.5 % (ref 18–48)
MAGNESIUM SERPL-MCNC: 1.8 MG/DL (ref 1.6–2.6)
MCH RBC QN AUTO: 33.7 PG (ref 27–31)
MCHC RBC AUTO-ENTMCNC: 33.7 G/DL (ref 32–36)
MCV RBC AUTO: 100 FL (ref 82–98)
MICROSCOPIC COMMENT: ABNORMAL
MONOCYTES # BLD AUTO: 0.6 K/UL (ref 0.3–1)
MONOCYTES NFR BLD: 7.9 % (ref 4–15)
NEUTROPHILS # BLD AUTO: 4.3 K/UL (ref 1.8–7.7)
NEUTROPHILS NFR BLD: 55.6 % (ref 38–73)
NITRITE UR QL STRIP: NEGATIVE
NRBC BLD-RTO: 0 /100 WBC
PH UR STRIP: 7 [PH] (ref 5–8)
PLATELET # BLD AUTO: 257 K/UL (ref 150–350)
PMV BLD AUTO: 9.4 FL (ref 9.2–12.9)
POTASSIUM SERPL-SCNC: 3.4 MMOL/L (ref 3.5–5.1)
PROT UR QL STRIP: ABNORMAL
RBC # BLD AUTO: 3.41 M/UL (ref 4.6–6.2)
RBC #/AREA URNS HPF: 19 /HPF (ref 0–4)
SODIUM SERPL-SCNC: 133 MMOL/L (ref 136–145)
SP GR UR STRIP: 1.02 (ref 1–1.03)
SQUAMOUS #/AREA URNS HPF: 1 /HPF
URN SPEC COLLECT METH UR: ABNORMAL
UROBILINOGEN UR STRIP-ACNC: NEGATIVE EU/DL
WBC # BLD AUTO: 7.71 K/UL (ref 3.9–12.7)
WBC #/AREA URNS HPF: 1 /HPF (ref 0–5)

## 2020-07-20 PROCEDURE — 63600175 PHARM REV CODE 636 W HCPCS: Performed by: STUDENT IN AN ORGANIZED HEALTH CARE EDUCATION/TRAINING PROGRAM

## 2020-07-20 PROCEDURE — 81001 URINALYSIS AUTO W/SCOPE: CPT

## 2020-07-20 PROCEDURE — 63600175 PHARM REV CODE 636 W HCPCS: Performed by: INTERNAL MEDICINE

## 2020-07-20 PROCEDURE — 97530 THERAPEUTIC ACTIVITIES: CPT | Mod: CQ

## 2020-07-20 PROCEDURE — 92610 EVALUATE SWALLOWING FUNCTION: CPT

## 2020-07-20 PROCEDURE — 12000002 HC ACUTE/MED SURGE SEMI-PRIVATE ROOM

## 2020-07-20 PROCEDURE — 25000003 PHARM REV CODE 250: Performed by: INTERNAL MEDICINE

## 2020-07-20 PROCEDURE — 83735 ASSAY OF MAGNESIUM: CPT

## 2020-07-20 PROCEDURE — 92526 ORAL FUNCTION THERAPY: CPT

## 2020-07-20 PROCEDURE — 85025 COMPLETE CBC W/AUTO DIFF WBC: CPT

## 2020-07-20 PROCEDURE — 80048 BASIC METABOLIC PNL TOTAL CA: CPT

## 2020-07-20 PROCEDURE — 36415 COLL VENOUS BLD VENIPUNCTURE: CPT

## 2020-07-20 PROCEDURE — 25000003 PHARM REV CODE 250: Performed by: NURSE PRACTITIONER

## 2020-07-20 RX ORDER — MAGNESIUM SULFATE HEPTAHYDRATE 40 MG/ML
4 INJECTION, SOLUTION INTRAVENOUS
Status: DISCONTINUED | OUTPATIENT
Start: 2020-07-20 | End: 2020-07-27 | Stop reason: HOSPADM

## 2020-07-20 RX ORDER — LOSARTAN POTASSIUM 50 MG/1
50 TABLET ORAL DAILY
Status: DISCONTINUED | OUTPATIENT
Start: 2020-07-21 | End: 2020-07-21

## 2020-07-20 RX ORDER — MAGNESIUM SULFATE HEPTAHYDRATE 40 MG/ML
2 INJECTION, SOLUTION INTRAVENOUS
Status: DISCONTINUED | OUTPATIENT
Start: 2020-07-20 | End: 2020-07-27 | Stop reason: HOSPADM

## 2020-07-20 RX ORDER — POTASSIUM CHLORIDE 7.45 MG/ML
40 INJECTION INTRAVENOUS
Status: DISCONTINUED | OUTPATIENT
Start: 2020-07-20 | End: 2020-07-27 | Stop reason: HOSPADM

## 2020-07-20 RX ORDER — LANOLIN ALCOHOL/MO/W.PET/CERES
800 CREAM (GRAM) TOPICAL
Status: DISCONTINUED | OUTPATIENT
Start: 2020-07-20 | End: 2020-07-27 | Stop reason: HOSPADM

## 2020-07-20 RX ORDER — SODIUM CHLORIDE 9 MG/ML
INJECTION, SOLUTION INTRAVENOUS CONTINUOUS
Status: DISCONTINUED | OUTPATIENT
Start: 2020-07-20 | End: 2020-07-22

## 2020-07-20 RX ORDER — POTASSIUM CHLORIDE 20 MEQ/1
20 TABLET, EXTENDED RELEASE ORAL
Status: DISCONTINUED | OUTPATIENT
Start: 2020-07-20 | End: 2020-07-27 | Stop reason: HOSPADM

## 2020-07-20 RX ORDER — POTASSIUM CHLORIDE 20 MEQ/1
40 TABLET, EXTENDED RELEASE ORAL
Status: DISCONTINUED | OUTPATIENT
Start: 2020-07-20 | End: 2020-07-27 | Stop reason: HOSPADM

## 2020-07-20 RX ORDER — SODIUM CHLORIDE 9 MG/ML
INJECTION, SOLUTION INTRAVENOUS ONCE
Status: COMPLETED | OUTPATIENT
Start: 2020-07-20 | End: 2020-07-20

## 2020-07-20 RX ORDER — POTASSIUM CHLORIDE 7.45 MG/ML
20 INJECTION INTRAVENOUS
Status: DISCONTINUED | OUTPATIENT
Start: 2020-07-20 | End: 2020-07-27 | Stop reason: HOSPADM

## 2020-07-20 RX ORDER — MAGNESIUM SULFATE 1 G/100ML
1 INJECTION INTRAVENOUS
Status: DISCONTINUED | OUTPATIENT
Start: 2020-07-20 | End: 2020-07-27 | Stop reason: HOSPADM

## 2020-07-20 RX ADMIN — AMLODIPINE BESYLATE 2.5 MG: 2.5 TABLET ORAL at 09:07

## 2020-07-20 RX ADMIN — MAGNESIUM OXIDE 400 MG: 400 TABLET ORAL at 09:07

## 2020-07-20 RX ADMIN — PANTOPRAZOLE SODIUM 40 MG: 40 TABLET, DELAYED RELEASE ORAL at 09:07

## 2020-07-20 RX ADMIN — PIPERACILLIN AND TAZOBACTAM 3.38 G: 3; .375 INJECTION, POWDER, FOR SOLUTION INTRAVENOUS at 09:07

## 2020-07-20 RX ADMIN — ASPIRIN 81 MG: 81 TABLET, DELAYED RELEASE ORAL at 09:07

## 2020-07-20 RX ADMIN — VANCOMYCIN HYDROCHLORIDE 1000 MG: 1 INJECTION, POWDER, LYOPHILIZED, FOR SOLUTION INTRAVENOUS at 08:07

## 2020-07-20 RX ADMIN — ATORVASTATIN CALCIUM 10 MG: 10 TABLET, FILM COATED ORAL at 09:07

## 2020-07-20 RX ADMIN — SODIUM CHLORIDE: 0.9 INJECTION, SOLUTION INTRAVENOUS at 12:07

## 2020-07-20 RX ADMIN — TAMSULOSIN HYDROCHLORIDE 0.4 MG: 0.4 CAPSULE ORAL at 09:07

## 2020-07-20 RX ADMIN — SOTALOL HYDROCHLORIDE 80 MG: 80 TABLET ORAL at 09:07

## 2020-07-20 RX ADMIN — SOTALOL HYDROCHLORIDE 80 MG: 80 TABLET ORAL at 08:07

## 2020-07-20 RX ADMIN — POTASSIUM CHLORIDE 20 MEQ: 20 TABLET, EXTENDED RELEASE ORAL at 09:07

## 2020-07-20 RX ADMIN — PIPERACILLIN AND TAZOBACTAM 3.38 G: 3; .375 INJECTION, POWDER, FOR SOLUTION INTRAVENOUS at 01:07

## 2020-07-20 RX ADMIN — LOSARTAN POTASSIUM 25 MG: 25 TABLET, FILM COATED ORAL at 09:07

## 2020-07-20 RX ADMIN — PIPERACILLIN AND TAZOBACTAM 3.38 G: 3; .375 INJECTION, POWDER, FOR SOLUTION INTRAVENOUS at 07:07

## 2020-07-20 RX ADMIN — ISOSORBIDE MONONITRATE 60 MG: 60 TABLET, EXTENDED RELEASE ORAL at 09:07

## 2020-07-20 NOTE — NURSING
After primary nurse Sergio attempted to insert catheter without success, I attempted with the coude' 16 Syriac with no success. Primary nurse calling doctors.

## 2020-07-20 NOTE — PLAN OF CARE
07/20/20 1422   Discharge Assessment   Assessment Type Discharge Planning Assessment   Confirmed/corrected address and phone number on facesheet? Yes   Assessment information obtained from? Patient;Other  (Hood Ramos, Son EFREN)   Prior to hospitilization cognitive status: Alert/Oriented   Prior to hospitalization functional status: Partially Dependent   Current cognitive status: Alert/Oriented   Current Functional Status: Partially Dependent   Lives With child(niki), adult  (Kati Diallo, daughter lives with patient)   Able to Return to Prior Arrangements yes   Is patient able to care for self after discharge?   (has supportive family who care for patient)   Readmission Within the Last 30 Days current reason for admission unrelated to previous admission   Patient currently receives any other outside agency services? Yes   Name and contact number of agency or person providing outside services [facility]   Is it the patient/care giver preference to resume care with the current outside agency? Yes   Equipment Currently Used at Home wheelchair;oxygen;bedside commode  (3 in 1 commode, oxygen concentrator but not requiring oxygen, pulse ox)   Do you have any problems affording any of your prescribed medications? No   Is the patient taking medications as prescribed? yes   Does the patient have transportation home? Yes   Transportation Anticipated family or friend will provide   Discharge Plan A Home Health   Discharge Plan B Home Health   DME Needed Upon Discharge  none   Patient/Family in Agreement with Plan yes

## 2020-07-20 NOTE — ASSESSMENT & PLAN NOTE
Known history of BPH, yesterday with volumes greater than 300, Dr. Collins reviewed.  As discussed with nursing as voided about 300 cc 3 times today, will continue with bladder scanning and conservative management.

## 2020-07-20 NOTE — PLAN OF CARE
Problem: Fall Injury Risk  Goal: Absence of Fall and Fall-Related Injury  Outcome: Ongoing, Progressing     Problem: Adult Inpatient Plan of Care  Goal: Plan of Care Review  Outcome: Ongoing, Progressing  Goal: Patient-Specific Goal (Individualization)  Outcome: Ongoing, Progressing  Goal: Absence of Hospital-Acquired Illness or Injury  Outcome: Ongoing, Progressing  Goal: Optimal Comfort and Wellbeing  Outcome: Ongoing, Progressing  Goal: Readiness for Transition of Care  Outcome: Ongoing, Progressing  Goal: Rounds/Family Conference  Outcome: Ongoing, Progressing     Problem: Skin Injury Risk Increased  Goal: Skin Health and Integrity  Outcome: Ongoing, Progressing     Problem: Infection  Goal: Infection Symptom Resolution  Outcome: Ongoing, Progressing     Problem: Wound  Goal: Optimal Wound Healing  Outcome: Ongoing, Progressing

## 2020-07-20 NOTE — SUBJECTIVE & OBJECTIVE
Interval History:  Patient seen with son present at bedside.  Denies any shortness of breath however still coughing, not producing much phlegm.  Does report mild nonradiating persistent pain left lower extremity, he is unsure if had hit the area, seen by speech therapy today with recommendations for mechanically soft diet with no rice/breath, thin liquids.  T-max last 24 hr 98.2.  Blood pressure not well controlled.  Labs with H/H 11.5/34, sodium 133, potassium 3.4, blood cultures no growth to date.  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  Son states he discussed with Dr. Kuhn, orthopedic, no longer in town and recommends Dr. Tenorio, states he was told arm needs to come out of sling.  Discussed with nursing.      Review of Systems   Constitutional: Negative for chills and fever.   HENT: Positive for trouble swallowing.    Respiratory: Positive for cough. Negative for shortness of breath.    Cardiovascular: Positive for leg swelling. Negative for chest pain.   Gastrointestinal: Negative for abdominal pain, nausea and vomiting.   Genitourinary: Positive for difficulty urinating.   Skin: Positive for color change and wound.   Neurological: Positive for weakness.   Psychiatric/Behavioral: Positive for confusion.     Objective:     Vital Signs (Most Recent):  Temp: 97.9 °F (36.6 °C) (07/20/20 1155)  Pulse: 61 (07/20/20 1155)  Resp: 18 (07/20/20 1155)  BP: 122/71 (07/20/20 1155)  SpO2: (!) 92 % (07/20/20 1155) Vital Signs (24h Range):  Temp:  [97.3 °F (36.3 °C)-98.2 °F (36.8 °C)] 97.9 °F (36.6 °C)  Pulse:  [60-64] 61  Resp:  [16-18] 18  SpO2:  [92 %-96 %] 92 %  BP: (122-183)/(60-71) 122/71     Weight: 56.2 kg (123 lb 14.4 oz)  Body mass index is 20 kg/m².    Intake/Output Summary (Last 24 hours) at 7/20/2020 1342  Last data filed at 7/20/2020 1156  Gross per 24 hour   Intake 600 ml   Output 1000 ml   Net -400 ml       Physical Exam  Vitals signs and nursing note reviewed.   Constitutional:       Comments: Frail elderly male, lying in bed, no apparent distress, cooperative   HENT:      Head: Normocephalic and atraumatic.      Mouth/Throat:      Mouth: Mucous membranes are moist.   Eyes:      General:         Right eye: No discharge.         Left eye: No discharge.   Cardiovascular:      Rate and Rhythm: Normal rate and regular rhythm.      Heart sounds: Murmur present.   Pulmonary:      Comments: On supplemental oxygen via nasal cannula, decreased breath sounds at bases with crackles  Abdominal:      General: Bowel sounds are normal. There is no distension.      Tenderness: There is no abdominal tenderness. There is no guarding.   Genitourinary:     Comments: No Garcia catheter present  Musculoskeletal:      Comments: Evidence of muscle wasting/cachexia.  Left upper extremity in sling   Skin:     Comments: Bruising bilateral upper extremity.  Left lower extremity with erythema//bruising with warmth and edema mid leg spreading proximal and distally, no active drainage   Neurological:      Mental Status: He is oriented to person, place, and time.         Significant Labs:   Blood Culture:   Recent Labs   Lab 07/18/20  1642 07/18/20  1652   LABBLOO No Growth to date  No Growth to date No Growth to date  No Growth to date     BMP:   Recent Labs   Lab 07/20/20  0457   GLU 93   *   K 3.4*   CL 95   CO2 26   BUN 15   CREATININE 0.9   CALCIUM 8.0*   MG 1.8     CBC:   Recent Labs   Lab 07/18/20  1652 07/19/20  0600 07/20/20  0457   WBC 7.88 7.03 7.71   HGB 10.1* 10.4* 11.5*   HCT 31.2* 31.2* 34.1*    247 257     CMP:   Recent Labs   Lab 07/18/20  1652 07/19/20  0600 07/20/20  0457   * 132* 133*   K 4.0 3.7 3.4*   CL 99 98 95   CO2 27 26 26   GLU 93 94 93   BUN 22 17 15   CREATININE 0.9 0.7 0.9   CALCIUM 8.1* 7.9* 8.0*   PROT 6.0  --   --    ALBUMIN 2.6*  --   --    BILITOT 0.8  --   --    ALKPHOS 65  --   --    AST 16   --   --    ALT 15  --   --    ANIONGAP 8 8 12   EGFRNONAA >60.0 >60.0 >60.0     Cardiac Markers:   Recent Labs   Lab 07/18/20  1652   *     Lactic Acid:   Recent Labs   Lab 07/18/20  1716   LACTATE 0.9     Lipase: No results for input(s): LIPASE in the last 48 hours.  Magnesium:   Recent Labs   Lab 07/18/20  1652 07/19/20  0600 07/20/20  0457   MG 1.8 1.7 1.8     POCT Glucose: No results for input(s): POCTGLUCOSE in the last 48 hours.  Respiratory Culture: No results for input(s): GSRESP, RESPIRATORYC in the last 48 hours.  Troponin:   Recent Labs   Lab 07/18/20  1652   TROPONINI <0.030     TSH: No results for input(s): TSH in the last 4320 hours.  Urine Culture: No results for input(s): LABURIN in the last 48 hours.  Urine Studies: No results for input(s): COLORU, APPEARANCEUA, PHUR, SPECGRAV, PROTEINUA, GLUCUA, KETONESU, BILIRUBINUA, OCCULTUA, NITRITE, UROBILINOGEN, LEUKOCYTESUR, RBCUA, WBCUA, BACTERIA, SQUAMEPITHEL, HYALINECASTS in the last 48 hours.    Invalid input(s): WRIGHTSUR  All pertinent labs within the past 24 hours have been reviewed.    Significant Imaging: I have reviewed all pertinent imaging results/findings within the past 24 hours.     Ct Abdomen Pelvis With Contrast    Result Date: 7/10/2020  EXAM DESCRIPTION: CT ABDOMEN PELVIS WITH CONTRAST 7/11/2020 1:32 AM CDT CLINICAL HISTORY: 89 years, Male, Abdominal pain, fever COMPARISON: 04/27/2019 PROCEDURE: Contrast-enhanced images of the abdomen and pelvis were performed utilizing 2 mm slice thickness at 2 mm interval reconstruction interval reconstruction from the lung bases to the ischial tuberosities after the administration of 70 mL of Omnipaque 350 at the rate of 3 cc/sec. 10 minutes delay images of the kidney and bladder were also generated. An individualized dose optimization technique, Automated Exposure Control, was utilized for the performed procedure. FINDINGS: The lung bases demonstrate the presence of interstitial lung changes  with atelectasis. Large hiatal hernia. Pacemaker. The liver, gallbladder, pancreas, spleen and adrenal glands demonstrate to be unremarkable, no focal lesions are noted. The kidneys demonstrate normal uptake of contrast media.  No hydronephrosis and/or stones were identified. Tiny hypodensities within the right kidney suggests the presence of renal cysts, largest one measuring 1.2 cm midpole on image 56. Grossly the unopacified stomach, small bowel and large bowel demonstrate to be within normal limits. Fecal residue within the large bowel limits evaluation. Fecal residue within the left site colon could suggest mild fecal stasis. The appendix was not visualized. The urinary bladder demonstrate to be well distended. The prostate gland is prominent perhaps related to BPH. The presence of metallic hardware within the left hip joint limits evaluation of the pelvis. The aorta demonstrate atherosclerotic disease.  There is no retroperitoneal lymphadenopathy. There is no evidence for ascites. The bone windows demonstrate again mild diffuse bony osteopenia with dextra scoliosis and degenerative changes at the point of curvature. There is ORIF left hip joint. Small umbilical hernia containing omentum. IMPRESSION: MODERATE TO LARGE SIZE HIATAL HERNIA. CHRONIC/INTERSTITIAL LUNG CHANGES. LEFT RENAL CYSTS. ATHEROSCLEROTIC DISEASE OF THE AORTA. ENLARGED PROSTATE GLAND PERHAPS RELATED TO BPH. MILD CONSTIPATION. NO EVIDENCE FOR ACUTE INTRA-ABDOMINAL PROCESS. Electronically signed by:  Fei Do MD  7/11/2020 1:42 AM CDT Workstation: [id number]    X-ray Chest Ap Portable    Result Date: 7/18/2020  REASON: cough Cough FINDINGS: Portable chest radiograph with comparison chest x-ray April 27, 2019. Left dual-lead cardiac pacemaker noted.. The cardiomediastinal silhouette is within normal limits in size.The pulmonary vascular structures are within normal limits. Bilateral diffuse interstitial lung opacities demonstrated. No  confluent airspace opacification. No acute osseous abnormality. IMPRESSION: Bilateral diffuse interstitial lung opacities are felt to reflect chronic interstitial lung disease. No acute airspace opacification. Electronically Signed by Gilberto Agustin on 7/18/2020 5:42 PM    X-ray Chest Ap Portable    Result Date: 7/10/2020  EXAM DESCRIPTION: XR CHEST AP PORTABLE 7/11/2020 1:43 AM CDT CLINICAL HISTORY: 89 years, Male, shortness of breath; Vomiting; Weakness; , cough COMPARISON: 04/27/2019 FINDINGS: Single view of the chest was obtained portable. Prior films were compared. End there is a dual-lead pacemaker via a left clavian. Decreased lung volume. The cardiomediastinal silhouette demonstrate to be unremarkable.  The heart is not enlarged. Double shadow density within the retrocardiac region corresponding to a moderate to large size hiatal hernia. The thoracic aorta is tortuous with atherosclerotic disease. Reticular-nodular densities throughout the lungs corresponding to a most likely chronic interstitial changes. Slight increase densities within the lung bases, greater right lung than left, the possibility of infiltrate within the right lung base cannot be excluded. External EKG leads within the field-of-view limits diagnosis. Scoliosis of the thoracolumbar spine. IMPRESSION: DECREASED LUNG VOLUME. HIATAL HERNIA. ATHEROSCLEROTIC DISEASE OF THE AORTA. PACEMAKER IN PLACE. INTERSTITIAL LUNG DISEASE UIP/IPF PATTERN. ATELECTASIS LUNG BASES, RIGHT GREATER THAN LEFT, SUPERIMPOSED INFILTRATE CANNOT BE EXCLUDED. Electronically signed by:  Fei Do MD  7/11/2020 1:46 AM CDT Workstation: [id number]    Us Lower Extremity Veins Left    Result Date: 7/18/2020  REASON: Swelling. FINDINGS: Grayscale, color and spectral Doppler analysis of the left lower extremity deep venous system was performed. There is normal compressibility, color and spectral Doppler analysis, and augmentation in the left lower extremity deep venous  system. IMPRESSION: No DVT of the left lower extremity veins. Electronically Signed by Gilberto Agustin on 7/18/2020 5:50 PM

## 2020-07-20 NOTE — ASSESSMENT & PLAN NOTE
History of paroxysmal atrial fibrillation.  Followed by Dr. Mares.  Now with increasing falls and declining status will consult cardiology to re-evaluate need for Eliquis.  Continue sotalol.  Holding Eliquis.

## 2020-07-20 NOTE — PROGRESS NOTES
[facility] Medicine  Progress Note    Patient Name: Marck Ramos  MRN: [id number]  Patient Class: IP- Inpatient   Admission Date: 7/18/2020  Length of Stay: 0 days  Attending Physician: Lucille Johns MD  Primary Care Provider: Hood Gannon MD        Subjective:     Principal Problem:Cellulitis        HPI:  Marck Ramos is a 89 y.o. 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 It is moderate. It is associated with swelling and pain. He denies fever, chills, cough, SOB, N/V/D, dizziness or known trauma. He reports it started as a small hematoma, and progressively got more red and painful. He was recently admitted for a left humerus frx, then pna, and has been receiving PT/OT at home. He states he wants to be a full code, but doesn't think he wants a ventilator long term.     Overview/Hospital Course:  I, Dr Johns, assumed care of this patient 7/19 to 7/20. Patient with multiple recent hospitalization.  Increasing falls at home with sustained left humerus fracture, currently in sling, recurrent admission for concern of aspiration pneumonia.  He was discharged home on home health about 1 week ago.  Daughter noted to have developing worsening area of erythema, she is unsure if a cat scratch area initially, enlarging with associated warmth and mild swelling, seen by home health with no improvement on instructed to the ED.  He was started on vancomycin on admission.  On 07/19 patient sleeping, daughter states more recently he has been sleeping more throughout the day, there was concern that he may have aspirated while taking medications earlier, leg continues with erythema, warmth and swelling, no active drainage.  Daughter states patient does have advanced  directive/living will, would not want intubation. On 7/20 seen by speech therapy with recommendations for trial of mechanical soft diet, still coughing with meals, lower extremity appears more to be hematoma with petechiae rather than cellulitis, son at bedside states Dr. Kuhn now out of town, requesting sling to be removed, will consult orthopedic    Interval History:  Patient seen with son present at bedside.  Denies any shortness of breath however still coughing, not producing much phlegm.  Does report mild nonradiating persistent pain left lower extremity, he is unsure if had hit the area, seen by speech therapy today with recommendations for mechanically soft diet with no rice/breath, thin liquids.  T-max last 24 hr 98.2.  Blood pressure not well controlled.  Labs with H/H 11.5/34, sodium 133, potassium 3.4, blood cultures no growth to date.  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  Son states he discussed with Dr. Kuhn, orthopedic, no longer in town and recommends Dr. Tenorio, states he was told arm needs to come out of sling.  Discussed with nursing.      Review of Systems   Constitutional: Negative for chills and fever.   HENT: Positive for trouble swallowing.    Respiratory: Positive for cough. Negative for shortness of breath.    Cardiovascular: Positive for leg swelling. Negative for chest pain.   Gastrointestinal: Negative for abdominal pain, nausea and vomiting.   Genitourinary: Positive for difficulty urinating.   Skin: Positive for color change and wound.   Neurological: Positive for weakness.   Psychiatric/Behavioral: Positive for confusion.     Objective:     Vital Signs (Most Recent):  Temp: 97.9 °F (36.6 °C) (07/20/20 1155)  Pulse: 61 (07/20/20 1155)  Resp: 18 (07/20/20 1155)  BP: 122/71 (07/20/20 1155)  SpO2: (!) 92 % (07/20/20 1155) Vital Signs (24h Range):  Temp:  [97.3 °F (36.3 °C)-98.2 °F  (36.8 °C)] 97.9 °F (36.6 °C)  Pulse:  [60-64] 61  Resp:  [16-18] 18  SpO2:  [92 %-96 %] 92 %  BP: (122-183)/(60-71) 122/71     Weight: 56.2 kg (123 lb 14.4 oz)  Body mass index is 20 kg/m².    Intake/Output Summary (Last 24 hours) at 7/20/2020 1342  Last data filed at 7/20/2020 1156  Gross per 24 hour   Intake 600 ml   Output 1000 ml   Net -400 ml      Physical Exam  Vitals signs and nursing note reviewed.   Constitutional:       Comments: Frail elderly male, lying in bed, no apparent distress, cooperative   HENT:      Head: Normocephalic and atraumatic.      Mouth/Throat:      Mouth: Mucous membranes are moist.   Eyes:      General:         Right eye: No discharge.         Left eye: No discharge.   Cardiovascular:      Rate and Rhythm: Normal rate and regular rhythm.      Heart sounds: Murmur present.   Pulmonary:      Comments: On supplemental oxygen via nasal cannula, decreased breath sounds at bases with crackles  Abdominal:      General: Bowel sounds are normal. There is no distension.      Tenderness: There is no abdominal tenderness. There is no guarding.   Genitourinary:     Comments: No Garcia catheter present  Musculoskeletal:      Comments: Evidence of muscle wasting/cachexia.  Left upper extremity in sling   Skin:     Comments: Bruising bilateral upper extremity.  Left lower extremity with erythema//bruising with warmth and edema mid leg spreading proximal and distally, no active drainage   Neurological:      Mental Status: He is oriented to person, place, and time.         Significant Labs:   Blood Culture:   Recent Labs   Lab 07/18/20  1642 07/18/20  1652   LABBLOO No Growth to date  No Growth to date No Growth to date  No Growth to date     BMP:   Recent Labs   Lab 07/20/20  0457   GLU 93   *   K 3.4*   CL 95   CO2 26   BUN 15   CREATININE 0.9   CALCIUM 8.0*   MG 1.8     CBC:   Recent Labs   Lab 07/18/20  1652 07/19/20  0600 07/20/20  0457   WBC 7.88 7.03 7.71   HGB 10.1* 10.4* 11.5*   HCT  31.2* 31.2* 34.1*    247 257     CMP:   Recent Labs   Lab 07/18/20 1652 07/19/20  0600 07/20/20  0457   * 132* 133*   K 4.0 3.7 3.4*   CL 99 98 95   CO2 27 26 26   GLU 93 94 93   BUN 22 17 15   CREATININE 0.9 0.7 0.9   CALCIUM 8.1* 7.9* 8.0*   PROT 6.0  --   --    ALBUMIN 2.6*  --   --    BILITOT 0.8  --   --    ALKPHOS 65  --   --    AST 16  --   --    ALT 15  --   --    ANIONGAP 8 8 12   EGFRNONAA >60.0 >60.0 >60.0     Cardiac Markers:   Recent Labs   Lab 07/18/20 1652   *     Lactic Acid:   Recent Labs   Lab 07/18/20  1716   LACTATE 0.9     Lipase: No results for input(s): LIPASE in the last 48 hours.  Magnesium:   Recent Labs   Lab 07/18/20 1652 07/19/20 0600 07/20/20  0457   MG 1.8 1.7 1.8     POCT Glucose: No results for input(s): POCTGLUCOSE in the last 48 hours.  Respiratory Culture: No results for input(s): GSRESP, RESPIRATORYC in the last 48 hours.  Troponin:   Recent Labs   Lab 07/18/20 1652   TROPONINI <0.030     TSH: No results for input(s): TSH in the last 4320 hours.  Urine Culture: No results for input(s): LABURIN in the last 48 hours.  Urine Studies: No results for input(s): COLORU, APPEARANCEUA, PHUR, SPECGRAV, PROTEINUA, GLUCUA, KETONESU, BILIRUBINUA, OCCULTUA, NITRITE, UROBILINOGEN, LEUKOCYTESUR, RBCUA, WBCUA, BACTERIA, SQUAMEPITHEL, HYALINECASTS in the last 48 hours.    Invalid input(s): WRIGHTSUR  All pertinent labs within the past 24 hours have been reviewed.    Significant Imaging: I have reviewed all pertinent imaging results/findings within the past 24 hours.     Ct Abdomen Pelvis With Contrast    Result Date: 7/10/2020  EXAM DESCRIPTION: CT ABDOMEN PELVIS WITH CONTRAST 7/11/2020 1:32 AM CDT CLINICAL HISTORY: 89 years, Male, Abdominal pain, fever COMPARISON: 04/27/2019 PROCEDURE: Contrast-enhanced images of the abdomen and pelvis were performed utilizing 2 mm slice thickness at 2 mm interval reconstruction interval reconstruction from the lung bases to the ischial  tuberosities after the administration of 70 mL of Omnipaque 350 at the rate of 3 cc/sec. 10 minutes delay images of the kidney and bladder were also generated. An individualized dose optimization technique, Automated Exposure Control, was utilized for the performed procedure. FINDINGS: The lung bases demonstrate the presence of interstitial lung changes with atelectasis. Large hiatal hernia. Pacemaker. The liver, gallbladder, pancreas, spleen and adrenal glands demonstrate to be unremarkable, no focal lesions are noted. The kidneys demonstrate normal uptake of contrast media.  No hydronephrosis and/or stones were identified. Tiny hypodensities within the right kidney suggests the presence of renal cysts, largest one measuring 1.2 cm midpole on image 56. Grossly the unopacified stomach, small bowel and large bowel demonstrate to be within normal limits. Fecal residue within the large bowel limits evaluation. Fecal residue within the left site colon could suggest mild fecal stasis. The appendix was not visualized. The urinary bladder demonstrate to be well distended. The prostate gland is prominent perhaps related to BPH. The presence of metallic hardware within the left hip joint limits evaluation of the pelvis. The aorta demonstrate atherosclerotic disease.  There is no retroperitoneal lymphadenopathy. There is no evidence for ascites. The bone windows demonstrate again mild diffuse bony osteopenia with dextra scoliosis and degenerative changes at the point of curvature. There is ORIF left hip joint. Small umbilical hernia containing omentum. IMPRESSION: MODERATE TO LARGE SIZE HIATAL HERNIA. CHRONIC/INTERSTITIAL LUNG CHANGES. LEFT RENAL CYSTS. ATHEROSCLEROTIC DISEASE OF THE AORTA. ENLARGED PROSTATE GLAND PERHAPS RELATED TO BPH. MILD CONSTIPATION. NO EVIDENCE FOR ACUTE INTRA-ABDOMINAL PROCESS. Electronically signed by:  Fei Do MD  7/11/2020 1:42 AM CDT Workstation: [id number]    X-ray Chest Ap  Portable    Result Date: 7/18/2020  REASON: cough Cough FINDINGS: Portable chest radiograph with comparison chest x-ray April 27, 2019. Left dual-lead cardiac pacemaker noted.. The cardiomediastinal silhouette is within normal limits in size.The pulmonary vascular structures are within normal limits. Bilateral diffuse interstitial lung opacities demonstrated. No confluent airspace opacification. No acute osseous abnormality. IMPRESSION: Bilateral diffuse interstitial lung opacities are felt to reflect chronic interstitial lung disease. No acute airspace opacification. Electronically Signed by Gilberto Agustin on 7/18/2020 5:42 PM    X-ray Chest Ap Portable    Result Date: 7/10/2020  EXAM DESCRIPTION: XR CHEST AP PORTABLE 7/11/2020 1:43 AM CDT CLINICAL HISTORY: 89 years, Male, shortness of breath; Vomiting; Weakness; , cough COMPARISON: 04/27/2019 FINDINGS: Single view of the chest was obtained portable. Prior films were compared. End there is a dual-lead pacemaker via a left clavian. Decreased lung volume. The cardiomediastinal silhouette demonstrate to be unremarkable.  The heart is not enlarged. Double shadow density within the retrocardiac region corresponding to a moderate to large size hiatal hernia. The thoracic aorta is tortuous with atherosclerotic disease. Reticular-nodular densities throughout the lungs corresponding to a most likely chronic interstitial changes. Slight increase densities within the lung bases, greater right lung than left, the possibility of infiltrate within the right lung base cannot be excluded. External EKG leads within the field-of-view limits diagnosis. Scoliosis of the thoracolumbar spine. IMPRESSION: DECREASED LUNG VOLUME. HIATAL HERNIA. ATHEROSCLEROTIC DISEASE OF THE AORTA. PACEMAKER IN PLACE. INTERSTITIAL LUNG DISEASE UIP/IPF PATTERN. ATELECTASIS LUNG BASES, RIGHT GREATER THAN LEFT, SUPERIMPOSED INFILTRATE CANNOT BE EXCLUDED. Electronically signed by:  Fei Do MD  7/11/2020  1:46 AM CDT Workstation: [id number]    Us Lower Extremity Veins Left    Result Date: 7/18/2020  REASON: Swelling. FINDINGS: Grayscale, color and spectral Doppler analysis of the left lower extremity deep venous system was performed. There is normal compressibility, color and spectral Doppler analysis, and augmentation in the left lower extremity deep venous system. IMPRESSION: No DVT of the left lower extremity veins. Electronically Signed by Gilberto Agustin on 7/18/2020 5:50 PM        Assessment/Plan:      * Left lower extremity hematoma versus cellulitis  Few days history of progressively worsening erythema with warmth and swelling.  Patient is on Eliquis and there was initially impression of erythema due to Eliquis however worsening.  Continue IV vancomycin-may consider discontinuing depending on clinical course, more suspect likely hematoma with bruising  Demarcate area of erythema and monitor  Elevate lower extremity  A.m. labs ordered for review      Possible aspiration pneumonia versus pneumonitis  There was concern patient had severe coughing with possible aspiration.  Recent admission for aspiration pneumonia, declining status, high risk.  Seen by speech therapy with recommendations for mechanical soft diet, no rice or bread, will order an trial  Continue Zosyn for empiric coverage  Aspiration precautions  Repeat chest x-ray in a.m.  Speech therapy following    Hyponatremia  Sodium level 133.  Slow IV fluids today and repeat levels tomorrow    Hypertension, not well controlled  Continue home Imdur, losartan-dose increased to 50, amlodipine-keep at low-dose as per Cardiology she was not able to tolerate higher doses in the past and monitor.    Add p.r.n. as needed.  More liberal target in this frail elderly male.      History of left humerus fracture  Arm currently in sling.  As per son discussed with Dr. Kuhn who is no longer seeing patients locally, recommends sling to be removed, will consult   Coby    BPH with obstruction/lower urinary tract symptoms  Known history of BPH, yesterday with volumes greater than 300, Dr. Collins reviewed.  As discussed with nursing as voided about 300 cc 3 times today, will continue with bladder scanning and conservative management.      PAF (paroxysmal atrial fibrillation)  History of paroxysmal atrial fibrillation.  Followed by Dr. Mares.  Now with increasing falls and declining status will consult cardiology to re-evaluate need for Eliquis.  Continue sotalol.  Holding Eliquis.      Debility  As per collateral patient with declining status following humerus fracture, reduced oral intake, more somnolent, decreased activity level and endurance.  Daughter states she does feel overwhelmed given has to manage at home and frequently has to call for help.  PT/OT.        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  Monitoring.        VTE Risk Mitigation (From admission, onward)         Ordered     IP VTE HIGH RISK PATIENT  Once      07/18/20 1905     Place sequential compression device  Until discontinued      07/18/20 1905                      Lucille Johns MD  Department of Hospital Medicine   [facility]

## 2020-07-20 NOTE — PLAN OF CARE
Problem: Physical Therapy Goal  Goal: Physical Therapy Goal  Description: Goals to be met by: discharge     Patient will increase functional independence with mobility by performin. Supine to sit with Stand-by Assistance  2. Sit to supine with Stand-by Assistance  3. Sit to stand transfer with Stand-by Assistance  4. Bed to chair transfer with Contact Guard Assistance using Benjamin-walker  5. Gait  x 30  feet with Contact Guard Assistance using Benjamin-walker.     Outcome: Ongoing, Progressing

## 2020-07-20 NOTE — ASSESSMENT & PLAN NOTE
Few days history of progressively worsening erythema with warmth and swelling.  Patient is on Eliquis and there was initially impression of erythema due to Eliquis however worsening.  Continue IV vancomycin-may consider discontinuing depending on clinical course, more suspect likely hematoma with bruising  Demarcate area of erythema and monitor  Elevate lower extremity  A.m. labs ordered for review

## 2020-07-20 NOTE — ASSESSMENT & PLAN NOTE
Continue home Imdur, losartan-dose increased to 50, amlodipine-keep at low-dose as per Cardiology she was not able to tolerate higher doses in the past and monitor.    Add p.r.n. as needed.  More liberal target in this frail elderly male.

## 2020-07-20 NOTE — PT/OT/SLP PROGRESS
Physical Therapy Treatment    Patient Name:  Marck Ramos   MRN:  [id number]    Recommendations:     Discharge Recommendations:  home with home health   Discharge Equipment Recommendations: walker, lit   Barriers to discharge: None    Assessment:     Marck Ramos is a 89 y.o. male admitted with a medical diagnosis of Cellulitis.  He presents with the following impairments/functional limitations:  weakness, impaired endurance, impaired self care skills, impaired functional mobilty, gait instability, impaired balance, decreased coordination, decreased upper extremity function, decreased lower extremity function, decreased safety awareness, decreased ROM, impaired coordination, impaired joint extensibility, orthopedic precautions. Patient was transferred OOB to chair but c/o left leg pain from hematoma and was transferred BTB. He had no c/o sitting on EOB but he was performing sitting exercises. Continue with PT and POC.    Rehab Prognosis: Fair; patient would benefit from acute skilled PT services to address these deficits and reach maximum level of function.    Recent Surgery: * No surgery found *      Plan:     During this hospitalization, patient to be seen 6 x/week to address the identified rehab impairments via gait training, therapeutic activities, therapeutic exercises and progress toward the following goals:    · Plan of Care Expires:  08/10/20    Subjective     Chief Complaint: pain in left leg  Patient/Family Comments/goals: none stated  Pain/Comfort:  · Pain Rating 1: 0/10  · Pain Rating Post-Intervention 1: 0/10      Objective:     Communicated with ELY Simmons prior to session.  Patient found HOB elevated with bed alarm upon PT entry to room.     General Precautions: Standard, fall   Orthopedic Precautions:LUE weight bearing as tolerated   Braces: Sling and swathe     Functional Mobility:  · Bed Mobility:     · Scooting: moderate assistance and of 2 persons  · Supine to Sit: moderate assistance and  maximal assistance  · Sit to Supine: moderate assistance, maximal assistance and of 2 persons  · Transfers:     · Sit to Stand:  moderate assistance, maximal assistance and of 2 persons with hemiwalker  · Bed to Chair: maximal assistance and of 2 persons with  hemiwalker  using  Stand Pivot  · Gait: 5 steps with benjamin walker and mod A x 2; short steps,guarded      AM-PAC 6 CLICK MOBILITY          Therapeutic Activities and Exercises:   Transfer training. Pt transferred bed to chair and then back to bed. Patient sat on EOB for spinal orientation and to increase proprioception in sitting. Sitting exercises for lower extremity strengthening to increase standing tolerance and decrease fall risk: Ankle pumps, LAQ, Heel slides, Marching x 10. Bed mobility    Patient left HOB elevated with all lines intact, call button in reach, bed alarm on and RN aware..    GOALS:   Multidisciplinary Problems     Physical Therapy Goals        Problem: Physical Therapy Goal    Goal Priority Disciplines Outcome Goal Variances Interventions   Physical Therapy Goal     PT, PT/OT Ongoing, Progressing     Description: Goals to be met by: discharge     Patient will increase functional independence with mobility by performin. Supine to sit with Stand-by Assistance  2. Sit to supine with Stand-by Assistance  3. Sit to stand transfer with Stand-by Assistance  4. Bed to chair transfer with Contact Guard Assistance using Benjamin-walker  5. Gait  x 30  feet with Contact Guard Assistance using Benjamin-walker.                      Time Tracking:     PT Received On: 20  PT Start Time: 40     PT Stop Time: 1005  PT Total Time (min): 25 min     Billable Minutes: Therapeutic Activity 25    Treatment Type: Treatment  PT/PTA: PTA     PTA Visit Number: 1     Karey Cedeno, CHRISTIANA  2020

## 2020-07-20 NOTE — ASSESSMENT & PLAN NOTE
Arm currently in sling.  As per son discussed with Dr. Kuhn who is no longer seeing patients locally, recommends sling to be removed, will consult Dr. Tenorio

## 2020-07-20 NOTE — CONSULTS
[facility]  Department of Cardiology  Consult Note      PATIENT NAME: Marck Ramos  MRN: [id number]  TODAY'S DATE: 07/20/2020  ADMIT DATE: 7/18/2020                          CONSULT REQUESTED BY: Lucille Johns MD    SUBJECTIVE     PRINCIPAL PROBLEM: Cellulitis      REASON FOR CONSULT:  Anticoagulation concerns      HPI:  Mr. Marck Ramos is a 89 year old male patient known to me. He has a PMH significant for PAF, DVT/PE, GERD, HTN, Dyslipidemia, CAD, LVH, and aortic stenosis presented to the ER with Leg pain and Cellulitis. We have been consulted for management of Anticoagulation.  Patient denies having any pain.  No significant shortness of breath is noted.  Hammertoe month in the left leg is noted with some erythema which has been marked      FROM H AND P   Marck Ramos is a 89 y.o. old male with a past medical history of Anemia in stage 2 chronic kidney disease, Anticoagulant long-term use, CHF, GIB, Hypertension, pulmonary embolism with acute cor pulmonale (3/6/2019) who presents to the ED with complaints of left lower extremity erythema. It is moderate. It is associated with swelling and pain. He denies fever, chills, cough, SOB, N/V/D, dizziness or known trauma. He reports it started as a small hematoma, and progressively got more red and painful. He was recently admitted for a left humerus frx, then pna, and has been receiving PT/OT at home. He states he wants to be a full code, but doesn't think he wants a ventilator long term.          Review of patient's allergies indicates:   Allergen Reactions    Sulfa (sulfonamide antibiotics)      Patient can not recall reaction     Iron      Other reaction(s): Unknown       Past Medical History:   Diagnosis Date    Anemia in stage 2 chronic kidney disease 7/18/2017    Anemia of other chronic disease 7/18/2017    Anticoagulant long-term use     CHF (congestive heart failure)     Hemorrhage of gastrointestinal tract, unspecified  7/18/2017    Hypertension     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    Iron deficiency anemia, unspecified 7/18/2017    Other pulmonary embolism with acute cor pulmonale 3/6/2019    Pulmonary emboli      Past Surgical History:   Procedure Laterality Date    Closed reduction & internal fixation of closed, comminuted, displaced intertrochanteric fx left hip w/ TFN Left 08/29/2018     Social History     Tobacco Use    Smoking status: Former Smoker     Years: 1.00     Types: Cigarettes    Smokeless tobacco: Never Used   Substance Use Topics    Alcohol use: No    Drug use: No        REVIEW OF SYSTEMS  CONSTITUTIONAL: Negative for chills, fatigue and fever.   EYES: No double vision, No blurred vision  NEURO: No headaches, No dizziness  RESPIRATORY: Negative for cough, shortness of breath and wheezing.    CARDIOVASCULAR: Negative for chest pain. Negative for palpitations and leg swelling.   GI: Negative for abdominal pain, No melena, diarrhea, nausea and vomiting.   : Negative for dysuria and frequency, Negative for hematuria  SKIN:  + bruising and hematoma in the leg  ENDOCRINE: Negative for polyphagia, Negative for heat intolerance, Negative for cold intolerance  PSYCHIATRIC: Negative for depression, Negative for anxiety, Negative for memory loss  MUSCULOSKELETAL: + leg pain and swelling + left arm sling    OBJECTIVE     VITAL SIGNS (Most Recent)  Temp: 98.1 °F (36.7 °C) (07/20/20 0757)  Pulse: 61 (07/20/20 0757)  Resp: 16 (07/20/20 0757)  BP: (!) 172/69 (07/20/20 0757)  SpO2: (!) 93 % (07/20/20 0757)    VENTILATION STATUS  Resp: 16 (07/20/20 0757)  SpO2: (!) 93 % (07/20/20 0757)       I & O (Last 24H):    Intake/Output Summary (Last 24 hours) at 7/20/2020 0926  Last data filed at 7/20/2020 0613  Gross per 24 hour   Intake 600 ml   Output 850 ml   Net -250 ml       WEIGHTS  Wt Readings from Last 1 Encounters:   07/18/20 2011 56.2 kg (123 lb 14.4 oz)   07/18/20 1541 57.6 kg (127  lb)       PHYSICAL EXAM  GENERAL: well built, well nourished, well-developed in no apparent distress alert and oriented.   HEENT: pallor noted seems dry mucosa.  NECK: No JVD.   THYROID: Thyroid not enlarged. No nodules present..   CARDIAC: Grade 3 systolic murmur noted  CHEST ANATOMY: normal.   LUNGS: Clear to auscultation. No wheezing or rhonchi..   ABDOMEN: Soft no masses or organomegaly.  No abdomen pulsations or bruits.  Normal bowel sounds. No pulsations and no masses felt, No guarding or rebound.   URINARY:  juarez catheter   EXTREMITIES: No cyanosis, clubbing or edema noted at this time., no calf tenderness bilaterally.   PERIPHERAL VASCULAR SYSTEM: Good palpable distal pulses.   CENTRAL NERVOUS SYSTEM: No focal motor or sensory deficits noted.   SKIN: Patient is dry, bruising noted and hematoma noted to leg  MUSCLE STRENGTH & TONE: No noteable weakness, atrophy or abnormal movement.     HOME MEDICATIONS:  No current facility-administered medications on file prior to encounter.      Current Outpatient Medications on File Prior to Encounter   Medication Sig Dispense Refill    aspirin (ECOTRIN) 81 MG EC tablet Take 81 mg by mouth once daily.        atorvastatin (LIPITOR) 10 MG tablet Take 1 tablet (10 mg total) by mouth once daily. 90 tablet 1    ELIQUIS 2.5 mg Tab Take 2.5 mg by mouth 2 (two) times daily.       furosemide (LASIX) 40 MG tablet Take 1 tablet (40 mg total) by mouth once daily. (Patient taking differently: Take 20 mg by mouth every other day. ) 30 tablet 11    guaifenesin 100 mg/5 ml (ROBITUSSIN) 100 mg/5 mL syrup Take 10 mLs (200 mg total) by mouth 3 (three) times daily as needed for Congestion. 100 mL 0    isosorbide mononitrate (IMDUR) 60 MG 24 hr tablet Take 1 tablet (60 mg total) by mouth once daily. 90 tablet 1    losartan (COZAAR) 50 MG tablet Take 0.5 tablets (25 mg total) by mouth once daily. 45 tablet 1    MAGNESIUM ORAL Take 400 mg by mouth once daily.       pantoprazole  (PROTONIX) 40 MG tablet Take 40 mg by mouth once daily.      potassium chloride SA (K-DUR,KLOR-CON) 20 MEQ tablet Take 1 tablet (20 mEq total) by mouth once daily. 30 tablet 0    sotaloL (BETAPACE) 80 MG tablet Take 1 tablet (80 mg total) by mouth 2 (two) times daily. 180 tablet 1    tamsulosin (FLOMAX) 0.4 mg Cap Take 1 capsule (0.4 mg total) by mouth once daily. 30 capsule 11    amlodipine (NORVASC) 5 MG tablet Take 2.5 mg by mouth. Take 1/2 tab by mouth daily as needed for systolic 150 or higher         SCHEDULED MEDS:   amLODIPine  2.5 mg Oral Daily    aspirin  81 mg Oral Daily    atorvastatin  10 mg Oral Daily    furosemide  20 mg Oral Every other day    isosorbide mononitrate  60 mg Oral Daily    losartan  25 mg Oral Daily    magnesium oxide  400 mg Oral Daily    pantoprazole  40 mg Oral Daily    piperacillin-tazobactam (ZOSYN) IVPB  3.375 g Intravenous Q8H    potassium chloride SA  20 mEq Oral Daily    sotaloL  80 mg Oral BID    tamsulosin  0.4 mg Oral Daily    vancomycin (VANCOCIN) IVPB  1,000 mg Intravenous Q24H       CONTINUOUS INFUSIONS:    PRN MEDS:acetaminophen, guaifenesin 100 mg/5 ml, melatonin, sodium chloride 0.9%, Pharmacy to dose Vancomycin consult **AND** vancomycin - pharmacy to dose    LABS AND DIAGNOSTICS     CBC LAST 3 DAYS  Recent Labs   Lab 07/18/20  1652 07/19/20  0600 07/20/20  0457   WBC 7.88 7.03 7.71   RBC 3.04* 3.11* 3.41*   HGB 10.1* 10.4* 11.5*   HCT 31.2* 31.2* 34.1*   * 100* 100*   MCH 33.2* 33.4* 33.7*   MCHC 32.4 33.3 33.7   RDW 14.1 13.9 13.5    247 257   MPV 9.7 9.3 9.4   GRAN 40.8  3.2 45.4  3.2 55.6  4.3   LYMPH 38.3  3.0 34.3  2.4 28.5  2.2   MONO 12.3  1.0 10.2  0.7 7.9  0.6   BASO 0.06 0.05 0.06   NRBC 0 0 0       COAGULATION LAST 3 DAYS  Recent Labs   Lab 07/18/20  1652   LABPT 22.7*   INR 2.1   APTT 70.4*       CHEMISTRY LAST 3 DAYS  Recent Labs   Lab 07/18/20  1652 07/19/20  0600 07/20/20  0457   * 132* 133*   K 4.0 3.7  3.4*   CL 99 98 95   CO2 27 26 26   ANIONGAP 8 8 12   BUN 22 17 15   CREATININE 0.9 0.7 0.9   GLU 93 94 93   CALCIUM 8.1* 7.9* 8.0*   MG 1.8 1.7 1.8   ALBUMIN 2.6*  --   --    PROT 6.0  --   --    ALKPHOS 65  --   --    ALT 15  --   --    AST 16  --   --    BILITOT 0.8  --   --        CARDIAC PROFILE LAST 3 DAYS  Recent Labs   Lab 07/18/20  1652   *   TROPONINI <0.030       ENDOCRINE LAST 3 DAYS  No results for input(s): TSH, PROCAL in the last 168 hours.    LAST ARTERIAL BLOOD GAS  ABG  No results for input(s): PH, PO2, PCO2, HCO3, BE in the last 168 hours.    LAST 7 DAYS MICROBIOLOGY   Microbiology Results (last 7 days)     Procedure Component Value Units Date/Time    Blood culture #1 **CANNOT BE ORDERED STAT** [[id number]] Collected: 07/18/20 1642    Order Status: Completed Specimen: Blood from Peripheral, Upper Arm, Right Updated: 07/19/20 1832     Blood Culture, Routine No Growth to date      No Growth to date    Blood culture #2 **CANNOT BE ORDERED STAT** [[id number]] Collected: 07/18/20 1652    Order Status: Completed Specimen: Blood from Peripheral, Hand, Right Updated: 07/19/20 1832     Blood Culture, Routine No Growth to date      No Growth to date          MOST RECENT IMAGING  US Lower Extremity Veins Left  REASON: Swelling.    FINDINGS:    Grayscale, color and spectral Doppler analysis of the left lower  extremity deep venous system was performed.    There is normal compressibility, color and spectral Doppler analysis,  and augmentation in the left lower extremity deep venous system.    IMPRESSION:    No DVT of the left lower extremity veins.    Electronically Signed by Gilberto Agustin on 7/18/2020 5:50 PM  X-Ray Chest AP Portable  REASON: cough Cough    FINDINGS:    Portable chest radiograph with comparison chest x-ray April 27, 2019.  Left dual-lead cardiac pacemaker noted.. The cardiomediastinal  silhouette is within normal limits in size.The pulmonary vascular  structures are within normal  limits. Bilateral diffuse interstitial  lung opacities demonstrated. No confluent airspace opacification. No  acute osseous abnormality.    IMPRESSION:    Bilateral diffuse interstitial lung opacities are felt to reflect  chronic interstitial lung disease. No acute airspace opacification.    Electronically Signed by Gilberto Agustin on 7/18/2020 5:42 PM      ECHOCARDIOGRAM RESULTS (last 5)  No results found for this or any previous visit.    CURRENT/PREVIOUS VISIT EKG  Results for orders placed or performed during the hospital encounter of 07/18/20   EKG 12-lead    Collection Time: 07/18/20  4:30 PM    Narrative    Test Reason : M79.89,    Vent. Rate : 060 BPM     Atrial Rate : 060 BPM     P-R Int : 152 ms          QRS Dur : 160 ms      QT Int : 506 ms       P-R-T Axes : 072 -75 049 degrees     QTc Int : 506 ms    AV dual-paced rhythm  Abnormal ECG  When compared with ECG of 10-JUL-2020 23:12,  Vent. rate has decreased BY  26 BPM  Confirmed by Pete Mares MD ([id number]) on 7/19/2020 12:16:46 PM    Referred By: [email or web address]   SELF           Confirmed By:Pete Mares MD           ASSESSMENT/PLAN:     Active Hospital Problems    Diagnosis    *Left lower extremity cellulitis with possible hematoma    Possible aspiration pneumonia versus pneumonitis    Hyponatremia    PAF (paroxysmal atrial fibrillation)    History of left humerus fracture    Debility    BPH with obstruction/lower urinary tract symptoms    Pacemaker    Severe malnutrition    Anemia in stage 2 chronic kidney disease    Hypertension, not well controlled       ASSESSMENT & PLAN:     1. Cellulitis and left leg hematoma  2. Recurrent Falls  3. PAF- CURRENTLY AV Paced  4. H/O DVT/PE  5. Anemia  6. HTN  7. CAD  8. Recent humerus fx  9.  Volume depletion and dehydration        RECOMMENDATIONS:    Give  ML/HR X 1 LITER Then 50ml/hr  Ok to hold NOAC patient has had multiple falls recently and is at high risk for IC bleed  Continue ASA 81 mg  daily  Continue Sotalol 80 mg PO BID  Continue Amlodipine 2.5 mg daily-patient does not tolerate any higher of dose may contribute to peripheral edema  Increase Losartan to 50 mg Daily and BID if BP remains elevated  May consider adding hydralazine 25 mg p.o. b.i.d. if blood pressure persists to be an issue.  Hold Statin for now this can cause weakness  No further recommendations at this time  Increase PT/OT    Karey Finch NP  [facility]  Department of Cardiology  Date of Service: 07/20/2020  9:49 AM    I have personally interviewed and examined the patient, I have reviewed the Nurse Practitioner's history and physical, assessment, and plan. I have personally evaluated the patient at bedside and agree with the findings.

## 2020-07-20 NOTE — PT/OT/SLP EVAL
Speech Language Pathology Evaluation  Bedside Swallow    Patient Name:  Marck Ramos   MRN:  [id number]  Admitting Diagnosis: Cellulitis    Recommendations:                 General Recommendations:  Follow to monitor; MBS if Pt does not tolerate diet w/ precautions  Diet recommendations:  Mechanical soft, No Rice, No Bread, Finely chopped meat, Thin   Aspiration Precautions:   · HOB to 90 degrees  · NO STRAWS  · Upright 30 min after meals  · Meds crushed in puree  · Assist w/ meal      General Precautions: Standard, aphasia  Communication strategies:  none    History:     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    Iron deficiency anemia, unspecified 7/18/2017    Other pulmonary embolism with acute cor pulmonale 3/6/2019    Pulmonary emboli        Past Surgical History:   Procedure Laterality Date    Closed reduction & internal fixation of closed, comminuted, displaced intertrochanteric fx left hip w/ TFN Left 08/29/2018       Social History: Patient lives with daughter.    Prior Intubation HX:  none    Modified Barium Swallow: none    Imaging Results          US Lower Extremity Veins Left (Final result)  Result time 07/18/20 17:43:20    Final result by Gilberto Agustin MD (07/18/20 17:43:20)                 Narrative:    REASON: Swelling.    FINDINGS:    Grayscale, color and spectral Doppler analysis of the left lower  extremity deep venous system was performed.    There is normal compressibility, color and spectral Doppler analysis,  and augmentation in the left lower extremity deep venous system.    IMPRESSION:    No DVT of the left lower extremity veins.    Electronically Signed by Gilberto  "Vamsi on 7/18/2020 5:50 PM                             X-Ray Chest AP Portable (Final result)  Result time 07/18/20 17:38:20    Final result by Gilberto Agustin MD (07/18/20 17:38:20)                 Narrative:    REASON: cough Cough    FINDINGS:    Portable chest radiograph with comparison chest x-ray April 27, 2019.  Left dual-lead cardiac pacemaker noted.. The cardiomediastinal  silhouette is within normal limits in size.The pulmonary vascular  structures are within normal limits. Bilateral diffuse interstitial  lung opacities demonstrated. No confluent airspace opacification. No  acute osseous abnormality.    IMPRESSION:    Bilateral diffuse interstitial lung opacities are felt to reflect  chronic interstitial lung disease. No acute airspace opacification.    Electronically Signed by Gilberto Agustin on 7/18/2020 5:42 PM                                Prior diet: unrestricted.    Subjective     "I don't have any"  Patient goals: none stated     Pain/Comfort:  Pain Rating 1: 0/10    Objective:       Pt alert and conversational. Able to follow simple commands promptly.     Oral Musculature Evaluation  Oral Musculature: WFL  Dentition: present and adequate  Secretion Management: adequate  Mucosal Quality: good  Mandibular Strength and Mobility: WFL  Oral Labial Strength and Mobility: WFL  Lingual Strength and Mobility: WFL  Velar Elevation: WFL  Buccal Strength and Mobility: WFL  Volitional Cough: Present; appears effective  Volitional Swallow: Hyolaryngeal movement present  Voice Prior to PO Intake: clear; no dysphonia    Bedside Swallow Eval:   Consistencies Assessed:  · Thin liquids ice chip x3, tsp x3, cup edge x5 and self regulated straw  · Puree tsp x5  · Solids x5     Oral Phase:   · WFL    Pharyngeal Phase:   · Immediate cough response following initial tsp and self regulated straw which was prolonged requiring ongoing coughing and multiple swallows     Assessment:     Marck Ramos is a 89 y.o. male with an " SLP diagnosis of overt s/s of airway compromise with thin liquid eliminated with restrictions   and concern for aspiration after the swallow 2/2 moderate-severe hiatal hernia.Recommend strict adherence to recommendations as above w/ MBS should Pt continue to exhibit overt s/s.      Goals:   Multidisciplinary Problems     SLP Goals        Problem: SLP Goal    Goal Priority Disciplines Outcome   SLP Goal     SLP Ongoing, Progressing   Description: 1. Pt will tolerate least restrictive PO diet without acute dysphagia pulmonary complication.   2. PENDING: Pt will participate in VFSS to define swallow physiology; treatment goal to follow                            Plan:     · Patient to be seen:  3 x/week   · Plan of Care expires:     · Plan of Care reviewed with:  patient   · SLP Follow-Up:  Yes       Discharge recommendations:  home     Time Tracking:     SLP Treatment Date:   07/20/20  Speech Start Time:  1150  Speech Stop Time:  1220     Speech Total Time (min):  30 min    Billable Minutes: Treatment Swallowing Dysfunction 15 and Eval Swallow and Oral Function 15    Jairo Pedersen CCC-SLP  07/20/2020

## 2020-07-20 NOTE — CARE UPDATE
See previous note about recommendations to Sergio, day shift nurse who called about urinary retention and inability to place Garcia catheter.  Patient is well known to me due to his previous history of urinary retention and Garcia catheter attempt trauma needing a cystoscopic guidance, due to prostatic obstruction, and thus the need for an initial attempt at coude Garcia catheter in the future.    Russ, night shift nurse who had the patient last night, called me back after assisting Sergio in placement of 18 [place] coude Garcia catheter which was unable to pass.  Just prior to this attempted 18 [place] coude Garcia catheter placement he was able to void 200 cc.  His bladder scan was only as high as 300 cc, and nurse notes that family was pushing for Garcia catheter placement.  He has been voiding spontaneously, and voided multiple times on the night shift last night as well as throughout the day with 1000 cc urine output on day shift, and just urinated 200 more cc with a postvoid residual of only 240 cc.  At the age of 89 with significant prostatic obstruction, this is not urinary retention however is chronic incomplete emptying, and is perfectly acceptable for this patient rather than traumatize him with repeated attempts at Garcia catheter placement or cystoscopic emergency procedures.  As the patient is voiding spontaneously, and is on Flomax, with appropriate urine output today, would advise no further attempts be made at catheter placement at this time as his urine output is appropriate and he is voiding spontaneously.

## 2020-07-20 NOTE — ASSESSMENT & PLAN NOTE
There was concern patient had severe coughing with possible aspiration.  Recent admission for aspiration pneumonia, declining status, high risk.  Seen by speech therapy with recommendations for mechanical soft diet, no rice or bread, will order an trial  Continue Zosyn for empiric coverage  Aspiration precautions  Repeat chest x-ray in a.m.  Speech therapy following

## 2020-07-20 NOTE — PLAN OF CARE
overt s/s of airway compromise with thin liquid eliminated with restrictions   and concern for aspiration after the swallow 2/2 moderate-severe hiatal hernia. Recommend initiation of PO diet with strict adherence to aspiration precautions posted at \[facility] [place] [facility]\"". Should Pt continue to exhibit overt s/s of aspiration, will need MBS.     Problem: SLP Goal  Goal: SLP Goal  Description: 1. Pt will tolerate least restrictive PO diet without acute dysphagia pulmonary complication.   2. PENDING: Pt will participate in VFSS to define swallow physiology; treatment goal to follow           Outcome: Ongoing, Progressing

## 2020-07-20 NOTE — CARE UPDATE
Called by nursing about patient and urinary retention unable to place Garcia catheter.  Sixteen [place] Garcia catheter was attempted, then a 16 [place] coude catheter was attempted.  Neither would pass.  Review chart and asked if nursing had reviewed chart noting his history of false passage from a nurse attempts at Garcia catheter placement due to prostatic enlargement, needing cystoscopic placement in the past, which could have been avoided with the use of coude Garcia catheter.  Nursing advised this was not reviewed in his chart.  In attempt to avoid operative intervention for this frail patient, advised to use Uro jet per urethra and gently passed an 18 [place] coude Garcia catheter with gentle steady pressure.  I spoke to the house supervisor to gather supplies for the 1200 unit and advise nursing of these recommendations.  Will follow up to see if Garcia catheter was placed

## 2020-07-21 ENCOUNTER — DOCUMENT SCAN (OUTPATIENT)
Dept: HOME HEALTH SERVICES | Facility: HOSPITAL | Age: 85
End: 2020-07-21

## 2020-07-21 LAB
ANION GAP SERPL CALC-SCNC: 11 MMOL/L (ref 8–16)
BASOPHILS # BLD AUTO: 0.07 K/UL (ref 0–0.2)
BASOPHILS NFR BLD: 0.9 % (ref 0–1.9)
BUN SERPL-MCNC: 14 MG/DL (ref 10–30)
CALCIUM SERPL-MCNC: 7.6 MG/DL (ref 8.7–10.5)
CHLORIDE SERPL-SCNC: 96 MMOL/L (ref 95–110)
CO2 SERPL-SCNC: 24 MMOL/L (ref 23–29)
CREAT SERPL-MCNC: 0.8 MG/DL (ref 0.5–1.4)
DIFFERENTIAL METHOD: ABNORMAL
EOSINOPHIL # BLD AUTO: 0.6 K/UL (ref 0–0.5)
EOSINOPHIL NFR BLD: 8.3 % (ref 0–8)
ERYTHROCYTE [DISTWIDTH] IN BLOOD BY AUTOMATED COUNT: 13.4 % (ref 11.5–14.5)
EST. GFR  (AFRICAN AMERICAN): >60 ML/MIN/1.73 M^2
EST. GFR  (NON AFRICAN AMERICAN): >60 ML/MIN/1.73 M^2
GLUCOSE SERPL-MCNC: 93 MG/DL (ref 70–110)
HCT VFR BLD AUTO: 32.1 % (ref 40–54)
HGB BLD-MCNC: 10.8 G/DL (ref 14–18)
IMM GRANULOCYTES # BLD AUTO: 0.03 K/UL (ref 0–0.04)
IMM GRANULOCYTES NFR BLD AUTO: 0.4 % (ref 0–0.5)
LYMPHOCYTES # BLD AUTO: 2.2 K/UL (ref 1–4.8)
LYMPHOCYTES NFR BLD: 28.9 % (ref 18–48)
MAGNESIUM SERPL-MCNC: 1.8 MG/DL (ref 1.6–2.6)
MCH RBC QN AUTO: 33.4 PG (ref 27–31)
MCHC RBC AUTO-ENTMCNC: 33.6 G/DL (ref 32–36)
MCV RBC AUTO: 99 FL (ref 82–98)
MONOCYTES # BLD AUTO: 0.8 K/UL (ref 0.3–1)
MONOCYTES NFR BLD: 10 % (ref 4–15)
NEUTROPHILS # BLD AUTO: 3.9 K/UL (ref 1.8–7.7)
NEUTROPHILS NFR BLD: 51.5 % (ref 38–73)
NRBC BLD-RTO: 0 /100 WBC
PLATELET # BLD AUTO: 252 K/UL (ref 150–350)
PMV BLD AUTO: 9.2 FL (ref 9.2–12.9)
POTASSIUM SERPL-SCNC: 3.7 MMOL/L (ref 3.5–5.1)
RBC # BLD AUTO: 3.23 M/UL (ref 4.6–6.2)
SODIUM SERPL-SCNC: 131 MMOL/L (ref 136–145)
VANCOMYCIN TROUGH SERPL-MCNC: 12.9 UG/ML (ref 10–22)
WBC # BLD AUTO: 7.48 K/UL (ref 3.9–12.7)

## 2020-07-21 PROCEDURE — 25000003 PHARM REV CODE 250: Performed by: NURSE PRACTITIONER

## 2020-07-21 PROCEDURE — 80048 BASIC METABOLIC PNL TOTAL CA: CPT

## 2020-07-21 PROCEDURE — 63600175 PHARM REV CODE 636 W HCPCS: Performed by: INTERNAL MEDICINE

## 2020-07-21 PROCEDURE — 83735 ASSAY OF MAGNESIUM: CPT

## 2020-07-21 PROCEDURE — 85025 COMPLETE CBC W/AUTO DIFF WBC: CPT

## 2020-07-21 PROCEDURE — 97530 THERAPEUTIC ACTIVITIES: CPT

## 2020-07-21 PROCEDURE — 36415 COLL VENOUS BLD VENIPUNCTURE: CPT

## 2020-07-21 PROCEDURE — 92610 EVALUATE SWALLOWING FUNCTION: CPT

## 2020-07-21 PROCEDURE — 12000002 HC ACUTE/MED SURGE SEMI-PRIVATE ROOM

## 2020-07-21 PROCEDURE — 92526 ORAL FUNCTION THERAPY: CPT

## 2020-07-21 PROCEDURE — 99223 1ST HOSP IP/OBS HIGH 75: CPT | Mod: ,,, | Performed by: ORTHOPAEDIC SURGERY

## 2020-07-21 PROCEDURE — 63600175 PHARM REV CODE 636 W HCPCS: Performed by: STUDENT IN AN ORGANIZED HEALTH CARE EDUCATION/TRAINING PROGRAM

## 2020-07-21 PROCEDURE — 80202 ASSAY OF VANCOMYCIN: CPT

## 2020-07-21 PROCEDURE — 99223 PR INITIAL HOSPITAL CARE,LEVL III: ICD-10-PCS | Mod: ,,, | Performed by: ORTHOPAEDIC SURGERY

## 2020-07-21 RX ORDER — LOSARTAN POTASSIUM 50 MG/1
50 TABLET ORAL 2 TIMES DAILY
Status: DISCONTINUED | OUTPATIENT
Start: 2020-07-21 | End: 2020-07-27 | Stop reason: HOSPADM

## 2020-07-21 RX ADMIN — PIPERACILLIN AND TAZOBACTAM 3.38 G: 3; .375 INJECTION, POWDER, FOR SOLUTION INTRAVENOUS at 01:07

## 2020-07-21 RX ADMIN — TAMSULOSIN HYDROCHLORIDE 0.4 MG: 0.4 CAPSULE ORAL at 08:07

## 2020-07-21 RX ADMIN — PIPERACILLIN AND TAZOBACTAM 3.38 G: 3; .375 INJECTION, POWDER, FOR SOLUTION INTRAVENOUS at 12:07

## 2020-07-21 RX ADMIN — ISOSORBIDE MONONITRATE 60 MG: 60 TABLET, EXTENDED RELEASE ORAL at 08:07

## 2020-07-21 RX ADMIN — AMLODIPINE BESYLATE 2.5 MG: 2.5 TABLET ORAL at 08:07

## 2020-07-21 RX ADMIN — VANCOMYCIN HYDROCHLORIDE 1000 MG: 1 INJECTION, POWDER, LYOPHILIZED, FOR SOLUTION INTRAVENOUS at 09:07

## 2020-07-21 RX ADMIN — SOTALOL HYDROCHLORIDE 80 MG: 80 TABLET ORAL at 08:07

## 2020-07-21 RX ADMIN — LOSARTAN POTASSIUM 50 MG: 50 TABLET, FILM COATED ORAL at 09:07

## 2020-07-21 RX ADMIN — PIPERACILLIN AND TAZOBACTAM 3.38 G: 3; .375 INJECTION, POWDER, FOR SOLUTION INTRAVENOUS at 09:07

## 2020-07-21 RX ADMIN — SOTALOL HYDROCHLORIDE 80 MG: 80 TABLET ORAL at 09:07

## 2020-07-21 RX ADMIN — MAGNESIUM OXIDE 400 MG: 400 TABLET ORAL at 08:07

## 2020-07-21 RX ADMIN — ASPIRIN 81 MG: 81 TABLET, DELAYED RELEASE ORAL at 08:07

## 2020-07-21 RX ADMIN — POTASSIUM CHLORIDE 20 MEQ: 20 TABLET, EXTENDED RELEASE ORAL at 08:07

## 2020-07-21 RX ADMIN — PANTOPRAZOLE SODIUM 40 MG: 40 TABLET, DELAYED RELEASE ORAL at 08:07

## 2020-07-21 NOTE — PT/OT/SLP DISCHARGE
Occupational Therapy Discharge Summary    Marck Ramos  MRN: [id number]   Principal Problem: Aspiration pneumonia      Patient Discharged from acute Occupational Therapy on 7/12/2020.  Please refer to prior OT note dated 7/12/2020 for functional status.    Assessment:      Patient has not met goals.    Objective:     GOALS:   Multidisciplinary Problems     Occupational Therapy Goals     Not on file          Multidisciplinary Problems (Resolved)        Problem: Occupational Therapy Goal    Goal Priority Disciplines Outcome Interventions   Occupational Therapy Goal   (Resolved)     OT, PT/OT Met    Description: Goals to be met by: discharge     Patient will increase functional independence with ADLs by performing:    UE Dressing with Minimal Assistance.  LE Dressing with Contact Guard Assistance.  Grooming while standing at sink with Supervision.  Toileting from bedside commode with Stand-by Assistance for hygiene and clothing management.   Toilet transfer to bedside commode with Stand-by Assistance.                     Reasons for Discontinuation of Therapy Services  Patient d/c home.      Plan:     Patient Discharged to: Home with Home Health Service    Han Lou, OT  7/21/2020

## 2020-07-21 NOTE — PROGRESS NOTES
[facility] Medicine  Progress Note    Patient Name: Marck Ramos  MRN: [id number]  Patient Class: IP- Inpatient   Admission Date: 7/18/2020  Attending Physician: Gilberto Bowie MD  Primary Care Provider: Hood Gannon MD  Date of service:  07/21/2020    Subjective:     Principal Problem:Cellulitis    HPI:  Marck Ramos is a 89 y.o. 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 It is moderate. It is associated with swelling and pain. He denies fever, chills, cough, SOB, N/V/D, dizziness or known trauma. He reports it started as a small hematoma, and progressively got more red and painful. He was recently admitted for a left humerus frx, then pna, and has been receiving PT/OT at home. He states he wants to be a full code, but doesn't think he wants a ventilator long term.     Overview/Hospital Course:  Patient with multiple recent hospitalization.  Increasing falls at home with sustained left humerus fracture, currently in sling, recurrent admission for concern of aspiration pneumonia.  He was discharged home on home health about 1 week ago.  Daughter noted to have developing worsening area of erythema, she is unsure if a cat scratch area initially, enlarging with associated warmth and mild swelling, seen by home health with no improvement on instructed to the ED.  He was started on vancomycin on admission.  On 07/19 patient sleeping, daughter states more recently he has been sleeping more throughout the day, there was concern that he may have aspirated while taking medications earlier, leg continues with erythema, warmth and swelling, no active drainage.  Daughter states patient does have advanced directive/living will, would not want intubation. On 7/20 seen by speech  therapy with recommendations for trial of mechanical soft diet, still coughing with meals, lower extremity appears more to be hematoma with petechiae rather than cellulitis, son at bedside states Dr. Kuhn now out of town, requesting sling to be removed, will consult orthopedic.    Interval history:  Today the patient reports persistent redness and swelling of left lower extremity, constant timing, moderate intensity, slowly improving with treatment since admission.  Denies fever or chills.  No chest pain or shortness of breath.  No nausea or vomiting.  Some generalized weakness but working with therapy well.  Speech therapy evaluation today.    Physical exam:  Vital signs reviewed  General:  Frail, nontoxic, comfortable appearing  Head and eyes:  Anicteric sclerae, no conjunctival discharge  ENT:  Moist mucous membranes, no thrush  Cardiovascular:  2+ radial pulses bilaterally, palpable dorsal pedis pulses bilaterally with distal extremities warm and well perfused  Pulmonary:  Comfortable work of breathing, lungs are clear bilaterally  Neuro:  Nonfocal motor exam, fluent speech, follows commands appropriately  GI:  Abdomen soft and nontender, nondistended  Psych:  Mood is calm, affect restricted, insight fair  Skin:  Dry and warm no jaundice, left lower extremity erythema with mild edema from ankle to mid shin with no palpable fluctuance, small focal area of tenderness palpation and induration of anterior shin, surrounding petechiae    Laboratory data:  Hemoglobin 10  Hematocrit 32  Potassium 3.7  Sodium 131    Chest X-ray Impression: No acute findings. Interstitial lung disease. Large hiatal hernia.    Left shoulder x-ray series Impression:  Healing proximal humeral fracture.  Findings suggesting chronic rotator cuff tear.  AC joint arthrosis.  Scattered interstitial and groundglass pulmonary opacities    Left lower extremity venous Doppler ultrasound Impression: No DVT of the left lower extremity  veins.    Assessment/Plan:      * Left lower extremity hematoma versus cellulitis  Few days history of progressively worsening erythema with warmth and swelling.  Patient is on Eliquis and there was initially impression of erythema due to Eliquis however worsening.  Continue IV vancomycin-may consider discontinuing depending on clinical course, more suspect likely hematoma with bruising  Demarcate area of erythema and monitor  Elevate lower extremity  A.m. labs ordered for review      History of left humerus fracture  Arm currently in sling.  As per son discussed with Dr. Kuhn who is no longer seeing patients locally, recommends sling to be removed, will consult Dr. Tenorio    PAF (paroxysmal atrial fibrillation)  History of paroxysmal atrial fibrillation.  Followed by Dr. Mares.  Now with increasing falls and declining status will consult cardiology to re-evaluate need for Eliquis.  Continue sotalol.  Holding Eliquis.      Hyponatremia  Sodium level 133.  Slow IV fluids today and repeat levels tomorrow    Possible aspiration pneumonia versus pneumonitis  There was concern patient had severe coughing with possible aspiration.  Recent admission for aspiration pneumonia, declining status, high risk.  Seen by speech therapy with recommendations for mechanical soft diet, no rice or bread, will order an trial  Continue Zosyn for empiric coverage  Aspiration precautions  Repeat chest x-ray in a.m.  Speech therapy following    Debility  As per collateral patient with declining status following humerus fracture, reduced oral intake, more somnolent, decreased activity level and endurance.  Daughter states she does feel overwhelmed given has to manage at home and frequently has to call for help.  PT/OT.        BPH with obstruction/lower urinary tract symptoms  Known history of BPH, yesterday with volumes greater than 300, Dr. Collins reviewed.  As discussed with nursing as voided about 300 cc 3 times today, will continue  with bladder scanning and conservative management.      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  Monitoring.      Hypertension, not well controlled  Continue home Imdur, losartan-dose increased to 50, amlodipine-keep at low-dose as per Cardiology she was not able to tolerate higher doses in the past and monitor.    Add p.r.n. as needed.  More liberal target in this frail elderly male.    Hypokalemia    Plan update today:  Continue care.  Appreciate consultants.  Patient is slowly improving.  Continue empiric IV antibiotics at least another 24 hr.  Transition oral antibiotics in near future as cellulitis component improves.  Continue supportive care including pain control as needed  Daily labs.  Replace electrolytes as needed.  Continue IV fluids.  Continue PT/OT.  Likely need home health at discharge.  Continue speech therapy.  Dysphagia diet as tolerated.  Modified barium swallow study in a.m..  DNR status noted  Home medicines as able  SCDs for VTE prophylaxis; avoid anticoagulant due to hematoma  High risk secondary to administration of medication requiring close monitoring of levels to prevent toxicity-vancomycin  Disposition:  Possible discharge 24-48 hours pending transition oral antibiotics    VTE Risk Mitigation (From admission, onward)         Ordered     IP VTE HIGH RISK PATIENT  Once      07/18/20 1905     Place sequential compression device  Until discontinued      07/18/20 1905                Gilberto Bowie MD  Department of Hospital Medicine   [facility]

## 2020-07-21 NOTE — PROGRESS NOTES
[facility]  Department of Cardiology  Consult Note      PATIENT NAME: Marck Ramos  MRN: [id number]  TODAY'S DATE: 07/21/2020  ADMIT DATE: 7/18/2020                          CONSULT REQUESTED BY: Gilberto Bowie MD    SUBJECTIVE       7/21/2020-interval history  Patient looks better today.   He denies chest pain or SOB  His leg is looking better today it has less erythema.  Swelling has also improved.        PRINCIPAL PROBLEM: Cellulitis      REASON FOR CONSULT:  Anticoagulation concerns      HPI:  Mr. Marck Ramos is a 89 year old male patient known to me. He has a PMH significant for PAF, DVT/PE, GERD, HTN, Dyslipidemia, CAD, LVH, and aortic stenosis presented to the ER with Leg pain and Cellulitis. We have been consulted for management of Anticoagulation.  Patient denies having any pain.  No significant shortness of breath is noted.  Hammertoe month in the left leg is noted with some erythema which has been marked      FROM H AND P   Marck Ramos is a 89 y.o. old male with a past medical history of Anemia in stage 2 chronic kidney disease, Anticoagulant long-term use, CHF, GIB, Hypertension, pulmonary embolism with acute cor pulmonale (3/6/2019) who presents to the ED with complaints of left lower extremity erythema. It is moderate. It is associated with swelling and pain. He denies fever, chills, cough, SOB, N/V/D, dizziness or known trauma. He reports it started as a small hematoma, and progressively got more red and painful. He was recently admitted for a left humerus frx, then pna, and has been receiving PT/OT at home. He states he wants to be a full code, but doesn't think he wants a ventilator long term.          Review of patient's allergies indicates:   Allergen Reactions    Sulfa (sulfonamide antibiotics)      Patient can not recall reaction     Iron      Other reaction(s): Unknown       Past Medical History:   Diagnosis Date    Anemia in stage 2 chronic kidney disease  7/18/2017    Anemia of other chronic disease 7/18/2017    Anticoagulant long-term use     CHF (congestive heart failure)     Hemorrhage of gastrointestinal tract, unspecified 7/18/2017    Hypertension     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    Iron deficiency anemia, unspecified 7/18/2017    Other pulmonary embolism with acute cor pulmonale 3/6/2019    Pulmonary emboli      Past Surgical History:   Procedure Laterality Date    Closed reduction & internal fixation of closed, comminuted, displaced intertrochanteric fx left hip w/ TFN Left 08/29/2018     Social History     Tobacco Use    Smoking status: Former Smoker     Years: 1.00     Types: Cigarettes    Smokeless tobacco: Never Used   Substance Use Topics    Alcohol use: No    Drug use: No        REVIEW OF SYSTEMS  CONSTITUTIONAL: Negative for chills, fatigue and fever.   EYES: No double vision, No blurred vision  NEURO: No headaches, No dizziness  RESPIRATORY: Negative for cough, shortness of breath and wheezing.    CARDIOVASCULAR: Negative for chest pain. Negative for palpitations and leg swelling.   GI: Negative for abdominal pain, No melena, diarrhea, nausea and vomiting.   : Negative for dysuria and frequency, Negative for hematuria  SKIN:  + bruising and hematoma in the leg  ENDOCRINE: Negative for polyphagia, Negative for heat intolerance, Negative for cold intolerance  PSYCHIATRIC: Negative for depression, Negative for anxiety, Negative for memory loss  MUSCULOSKELETAL: + leg pain and swelling + left arm sling    OBJECTIVE     VITAL SIGNS (Most Recent)  Temp: 98.4 °F (36.9 °C) (07/21/20 0753)  Pulse: 61 (07/21/20 0753)  Resp: 17 (07/21/20 0753)  BP: (!) 183/76 (07/21/20 0753)  SpO2: (!) 94 % (07/21/20 0753)    VENTILATION STATUS  Resp: 17 (07/21/20 0753)  SpO2: (!) 94 % (07/21/20 0753)       I & O (Last 24H):    Intake/Output Summary (Last 24 hours) at 7/21/2020 1209  Last data filed at 7/21/2020 0500  Gross  per 24 hour   Intake 600 ml   Output 325 ml   Net 275 ml       WEIGHTS  Wt Readings from Last 1 Encounters:   07/18/20 2011 56.2 kg (123 lb 14.4 oz)   07/18/20 1541 57.6 kg (127 lb)       PHYSICAL EXAM  GENERAL: well built, well nourished, well-developed in no apparent distress alert and oriented.   HEENT: pallor noted seems dry mucosa.  NECK: No JVD.   THYROID: Thyroid not enlarged. No nodules present..   CARDIAC: Grade 3 systolic murmur noted  CHEST ANATOMY: normal.   LUNGS: Clear to auscultation. No wheezing or rhonchi..   ABDOMEN: Soft no masses or organomegaly.  No abdomen pulsations or bruits.  Normal bowel sounds. No pulsations and no masses felt, No guarding or rebound.   URINARY:  juarez catheter   EXTREMITIES: No cyanosis, clubbing or edema noted at this time., no calf tenderness bilaterally.   PERIPHERAL VASCULAR SYSTEM: Good palpable distal pulses.   CENTRAL NERVOUS SYSTEM: No focal motor or sensory deficits noted.   SKIN: Patient is dry, bruising noted and hematoma noted to leg  MUSCLE STRENGTH & TONE: No noteable weakness, atrophy or abnormal movement.     HOME MEDICATIONS:  No current facility-administered medications on file prior to encounter.      Current Outpatient Medications on File Prior to Encounter   Medication Sig Dispense Refill    aspirin (ECOTRIN) 81 MG EC tablet Take 81 mg by mouth once daily.        atorvastatin (LIPITOR) 10 MG tablet Take 1 tablet (10 mg total) by mouth once daily. 90 tablet 1    ELIQUIS 2.5 mg Tab Take 2.5 mg by mouth 2 (two) times daily.       furosemide (LASIX) 40 MG tablet Take 1 tablet (40 mg total) by mouth once daily. (Patient taking differently: Take 20 mg by mouth every other day. ) 30 tablet 11    guaifenesin 100 mg/5 ml (ROBITUSSIN) 100 mg/5 mL syrup Take 10 mLs (200 mg total) by mouth 3 (three) times daily as needed for Congestion. 100 mL 0    isosorbide mononitrate (IMDUR) 60 MG 24 hr tablet Take 1 tablet (60 mg total) by mouth once daily. 90 tablet 1     losartan (COZAAR) 50 MG tablet Take 0.5 tablets (25 mg total) by mouth once daily. 45 tablet 1    MAGNESIUM ORAL Take 400 mg by mouth once daily.       pantoprazole (PROTONIX) 40 MG tablet Take 40 mg by mouth once daily.      potassium chloride SA (K-DUR,KLOR-CON) 20 MEQ tablet Take 1 tablet (20 mEq total) by mouth once daily. 30 tablet 0    sotaloL (BETAPACE) 80 MG tablet Take 1 tablet (80 mg total) by mouth 2 (two) times daily. 180 tablet 1    tamsulosin (FLOMAX) 0.4 mg Cap Take 1 capsule (0.4 mg total) by mouth once daily. 30 capsule 11    amlodipine (NORVASC) 5 MG tablet Take 2.5 mg by mouth. Take 1/2 tab by mouth daily as needed for systolic 150 or higher         SCHEDULED MEDS:   amLODIPine  2.5 mg Oral Daily    aspirin  81 mg Oral Daily    isosorbide mononitrate  60 mg Oral Daily    losartan  50 mg Oral Daily    magnesium oxide  400 mg Oral Daily    pantoprazole  40 mg Oral Daily    piperacillin-tazobactam (ZOSYN) IVPB  3.375 g Intravenous Q8H    potassium chloride SA  20 mEq Oral Daily    sotaloL  80 mg Oral BID    tamsulosin  0.4 mg Oral Daily    vancomycin (VANCOCIN) IVPB  1,000 mg Intravenous Q24H       CONTINUOUS INFUSIONS:   sodium chloride 0.9% 50 mL/hr at 07/20/20 1215       PRN MEDS:acetaminophen, calcium chloride IVPB, calcium chloride IVPB, calcium chloride IVPB, guaifenesin 100 mg/5 ml, magnesium oxide, magnesium sulfate IVPB, magnesium sulfate IVPB, magnesium sulfate IVPB, magnesium sulfate IVPB, melatonin, potassium chloride in water, potassium chloride in water, potassium chloride in water, potassium chloride in water, potassium chloride, potassium chloride, potassium chloride, potassium chloride, sodium chloride 0.9%, sodium phosphate IVPB, sodium phosphate IVPB, sodium phosphate IVPB, sodium phosphate IVPB, sodium phosphate IVPB, Pharmacy to dose Vancomycin consult **AND** vancomycin - pharmacy to dose    LABS AND DIAGNOSTICS     CBC LAST 3 DAYS  Recent Labs   Lab  07/19/20  0600 07/20/20  0457 07/21/20  0516   WBC 7.03 7.71 7.48   RBC 3.11* 3.41* 3.23*   HGB 10.4* 11.5* 10.8*   HCT 31.2* 34.1* 32.1*   * 100* 99*   MCH 33.4* 33.7* 33.4*   MCHC 33.3 33.7 33.6   RDW 13.9 13.5 13.4    257 252   MPV 9.3 9.4 9.2   GRAN 45.4  3.2 55.6  4.3 51.5  3.9   LYMPH 34.3  2.4 28.5  2.2 28.9  2.2   MONO 10.2  0.7 7.9  0.6 10.0  0.8   BASO 0.05 0.06 0.07   NRBC 0 0 0       COAGULATION LAST 3 DAYS  Recent Labs   Lab 07/18/20 1652   LABPT 22.7*   INR 2.1   APTT 70.4*       CHEMISTRY LAST 3 DAYS  Recent Labs   Lab 07/18/20  1652 07/19/20  0600 07/20/20  0457 07/21/20  0516   * 132* 133* 131*   K 4.0 3.7 3.4* 3.7   CL 99 98 95 96   CO2 27 26 26 24   ANIONGAP 8 8 12 11   BUN 22 17 15 14   CREATININE 0.9 0.7 0.9 0.8   GLU 93 94 93 93   CALCIUM 8.1* 7.9* 8.0* 7.6*   MG 1.8 1.7 1.8 1.8   ALBUMIN 2.6*  --   --   --    PROT 6.0  --   --   --    ALKPHOS 65  --   --   --    ALT 15  --   --   --    AST 16  --   --   --    BILITOT 0.8  --   --   --        CARDIAC PROFILE LAST 3 DAYS  Recent Labs   Lab 07/18/20  1652   *   TROPONINI <0.030       ENDOCRINE LAST 3 DAYS  No results for input(s): TSH, PROCAL in the last 168 hours.    LAST ARTERIAL BLOOD GAS  ABG  No results for input(s): PH, PO2, PCO2, HCO3, BE in the last 168 hours.    LAST 7 DAYS MICROBIOLOGY   Microbiology Results (last 7 days)     Procedure Component Value Units Date/Time    Blood culture #1 **CANNOT BE ORDERED STAT** [[id number]] Collected: 07/18/20 1642    Order Status: Completed Specimen: Blood from Peripheral, Upper Arm, Right Updated: 07/20/20 1832     Blood Culture, Routine No Growth to date      No Growth to date      No Growth to date    Blood culture #2 **CANNOT BE ORDERED STAT** [[id number]] Collected: 07/18/20 1652    Order Status: Completed Specimen: Blood from Peripheral, Hand, Right Updated: 07/20/20 1832     Blood Culture, Routine No Growth to date      No Growth to date      No Growth to  date          MOST RECENT IMAGING  X-Ray Chest AP Portable  PROCEDURE:   XR CHEST AP PORTABLE  dated  7/21/2020 6:18 AM    CLINICAL HISTORY:   Male 89 years of age.   aspiration    TECHNIQUE: AP view of the chest obtained portably at 6:18 AM.    PREVIOUS STUDIES:  July 18, 2020. July 10, 2020. April 27, 2019.    FINDINGS:    CC D leads project over the right atrium and right ventricle. There is  a chronic large hiatal hernia. The heart is not enlarged. Aorta is  calcified. Chronic interstitial coarsening and groundglass. There is  no new opacity. There is no pleural effusion or pneumothorax.    IMPRESSION:    No acute findings. Interstitial lung disease. Large hiatal hernia.    Electronically Signed by Ca Holden on 7/21/2020 7:55 AM      ECHOCARDIOGRAM RESULTS (last 5)  No results found for this or any previous visit.    CURRENT/PREVIOUS VISIT EKG  Results for orders placed or performed during the hospital encounter of 07/18/20   EKG 12-lead    Collection Time: 07/18/20  4:30 PM    Narrative    Test Reason : M79.89,    Vent. Rate : 060 BPM     Atrial Rate : 060 BPM     P-R Int : 152 ms          QRS Dur : 160 ms      QT Int : 506 ms       P-R-T Axes : 072 -75 049 degrees     QTc Int : 506 ms    AV dual-paced rhythm  Abnormal ECG  When compared with ECG of 10-JUL-2020 23:12,  Vent. rate has decreased BY  26 BPM  Confirmed by Pete Mares MD ([id number]) on 7/19/2020 12:16:46 PM    Referred By: [email or web address]   SELF           Confirmed By:Pete Mares MD           ASSESSMENT/PLAN:     Active Hospital Problems    Diagnosis    *Left lower extremity hematoma versus cellulitis    Possible aspiration pneumonia versus pneumonitis    Hyponatremia    PAF (paroxysmal atrial fibrillation)    History of left humerus fracture    Debility    BPH with obstruction/lower urinary tract symptoms    Pacemaker    Severe malnutrition    Anemia in stage 2 chronic kidney disease    Hypertension, not well controlled        ASSESSMENT & PLAN:     1. Cellulitis and left leg hematoma  2. Recurrent Falls  3. PAF- CURRENTLY AV Paced  4. H/O DVT/PE  5. Anemia  6. HTN  7. CAD  8. Recent humerus fx  9.  Volume depletion and dehydration        RECOMMENDATIONS:    Are maintain oral hydration  Ok to hold NOAC patient has had multiple falls recently and is at high risk for IC bleed  Continue ASA 81 mg daily  Continue Sotalol 80 mg PO BID  Continue Amlodipine 2.5 mg daily-patient does not tolerate any higher of dose may contribute to peripheral edema  Increase Losartan to 50 mg Daily and BID if BP remains elevated  May consider adding hydralazine 25 mg p.o. b.i.d. if blood pressure persists to be an issue.  Hold Statin for now this can cause weakness  No further recommendations at this time  Increase PT/OT      7/21/2020- recommendations  Increase Losartan to 50 mg PO BID  Increase activity with PT/OT  Encourage PO Fluids    Karey Finch NP  [facility]  Department of Cardiology  Date of Service: 07/21/2020  9:49 AM    I have personally interviewed and examined the patient, I have reviewed the Nurse Practitioner's history and physical, assessment, and plan. I have personally evaluated the patient at bedside and agree with the findings.

## 2020-07-21 NOTE — NURSING
Siummoned to room by nurse.Pt sitting in chair lethargic and not responding. Vital signs checked BP-59/39,70; oxygen 93%. Placed in trendelenburg  MD notified by VEENA Simmons. Blood pressure came to 145/69. Pt responsive and aware of surroundings  [id number]/65 82,95%

## 2020-07-21 NOTE — CONSULTS
[facility]  Consult Note  7/21/2020      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    Iron deficiency anemia, unspecified 7/18/2017    Other pulmonary embolism with acute cor pulmonale 3/6/2019    Pulmonary emboli        Past Surgical History:   Procedure Laterality Date    Closed reduction & internal fixation of closed, comminuted, displaced intertrochanteric fx left hip w/ TFN Left 08/29/2018         Current Facility-Administered Medications:     0.9%  NaCl infusion, , Intravenous, Continuous, Pete Mares MD, Last Rate: 50 mL/hr at 07/20/20 1215    acetaminophen tablet 650 mg, 650 mg, Oral, Q4H PRN, Isa Rivera NP    amLODIPine tablet 2.5 mg, 2.5 mg, Oral, Daily, Isa Rivera NP, 2.5 mg at 07/20/20 0910    aspirin EC tablet 81 mg, 81 mg, Oral, Daily, Isa Rivera NP, 81 mg at 07/20/20 0910    calcium chloride 1 g in dextrose 5 % 100 mL IVPB, 1 g, Intravenous, PRN, Lucille Johns MD    calcium chloride 1 g in dextrose 5 % 100 mL IVPB, 1 g, Intravenous, PRN, Lucille Johns MD    calcium chloride 1 g in dextrose 5 % 100 mL IVPB, 1 g, Intravenous, PRN, Lucille Johns MD    guaifenesin 100 mg/5 ml syrup 200 mg, 200 mg, Oral, TID PRN, Isa Rivera NP    isosorbide mononitrate 24 hr tablet 60 mg, 60 mg, Oral, Daily, Isa Rivera NP, 60 mg at 07/20/20 0910    losartan tablet 50 mg, 50 mg, Oral, Daily, Karey Finch NP    magnesium oxide tablet 400 mg, 400 mg, Oral, Daily, Isa Rivera NP, 400 mg at 07/20/20 0910    magnesium oxide tablet 800 mg, 800 mg, Oral, PRN, Lucille Johns MD    magnesium sulfate 2g in water 50mL IVPB  (premix), 2 g, Intravenous, PRN, Lucille Johns MD    magnesium sulfate 2g in water 50mL IVPB (premix), 4 g, Intravenous, PRN, Lucille Johns MD    magnesium sulfate 2g in water 50mL IVPB (premix), 2 g, Intravenous, PRN, Lucille Johns MD    magnesium sulfate in dextrose IVPB (premix) 1 g, 1 g, Intravenous, PRN, Lucille Johns MD    melatonin tablet 6 mg, 6 mg, Oral, Nightly PRN, Isa Rivera NP    pantoprazole EC tablet 40 mg, 40 mg, Oral, Daily, Isa Rivera NP, 40 mg at 07/20/20 0910    piperacillin-tazobactam 3.375 g in dextrose 5 % 50 mL IVPB (ready to mix system), 3.375 g, Intravenous, Q8H, Lucille Johns MD, Last Rate: 12.5 mL/hr at 07/21/20 0131, 3.375 g at 07/21/20 0131    potassium chloride 10 mEq in 100 mL IVPB, 20 mEq, Intravenous, PRN, Lucille Johns MD    potassium chloride 10 mEq in 100 mL IVPB, 40 mEq, Intravenous, PRN, Lucille Johns MD    potassium chloride 10 mEq in 100 mL IVPB, 20 mEq, Intravenous, PRN, Lucille Johns MD    potassium chloride 10 mEq in 100 mL IVPB, 40 mEq, Intravenous, PRN, Lucille Johns MD    potassium chloride SA CR tablet 20 mEq, 20 mEq, Oral, Daily, sIa Rivera NP, 20 mEq at 07/20/20 0910    potassium chloride SA CR tablet 20 mEq, 20 mEq, Oral, PRN, Lucille Johns MD    potassium chloride SA CR tablet 20 mEq, 20 mEq, Oral, PRN, Lucille Johns MD    potassium chloride SA CR tablet 40 mEq, 40 mEq, Oral, PRN, Lucille Johns MD    potassium chloride SA CR tablet 40 mEq, 40 mEq, Oral, PRN, Lucille Johns MD    sodium chloride 0.9% flush 10 mL, 10 mL, Intravenous, PRN, Isa Rivera NP    sodium phosphate 15 mmol in dextrose 5 % 250 mL IVPB, 15 mmol, Intravenous, PRN, Lucille Johns MD    sodium phosphate 15 mmol in dextrose 5 % 250 mL IVPB, 15 mmol, Intravenous, PRN, Lucille Johns MD    sodium phosphate 20.01 mmol in dextrose 5 % 250 mL IVPB, 20.01 mmol, Intravenous, PRNLucille  MD Nat    sodium phosphate 20.01 mmol in dextrose 5 % 250 mL IVPB, 20.01 mmol, Intravenous, PRN, Lucille Johns MD    sodium phosphate 30 mmol in dextrose 5 % 250 mL IVPB, 30 mmol, Intravenous, PRN, Lucille Johns MD    sotaloL tablet 80 mg, 80 mg, Oral, BID, Isa Rivera NP, 80 mg at 07/20/20 2058    tamsulosin 24 hr capsule 0.4 mg, 0.4 mg, Oral, Daily, Isa Rivera NP, 0.4 mg at 07/20/20 0910    Pharmacy to dose Vancomycin consult, , , Once **AND** vancomycin - pharmacy to dose, , Intravenous, pharmacy to manage frequency, Isa Rivera NP    vancomycin in dextrose 5 % 1 gram/250 mL IVPB 1,000 mg, 1,000 mg, Intravenous, Q24H, Trinity Agustin, , Last Rate: 166.7 mL/hr at 07/20/20 2058, 1,000 mg at 07/20/20 2058    Allergies as of 07/18/2020 - Reviewed 07/18/2020   Allergen Reaction Noted    Sulfa (sulfonamide antibiotics)  05/15/2014    Iron  10/03/2019       History reviewed. No pertinent family history.    Social History     Socioeconomic History    Marital status:      Spouse name: Not on file    Number of children: Not on file    Years of education: Not on file    Highest education level: Not on file   Occupational History    Not on file   Social Needs    Financial resource strain: Not on file    Food insecurity     Worry: Not on file     Inability: Not on file    Transportation needs     Medical: Not on file     Non-medical: Not on file   Tobacco Use    Smoking status: Former Smoker     Years: 1.00     Types: Cigarettes    Smokeless tobacco: Never Used   Substance and Sexual Activity    Alcohol use: No    Drug use: No    Sexual activity: Never   Lifestyle    Physical activity     Days per week: Not on file     Minutes per session: Not on file    Stress: Not at all   Relationships    Social connections     Talks on phone: Not on file     Gets together: Not on file     Attends Baptism service: Not on file     Active member of club or organization: Not on  file     Attends meetings of clubs or organizations: Not on file     Relationship status: Not on file   Other Topics Concern    Not on file   Social History Narrative    Not on file         HPI:  Patient is a 89-year-old gentleman who appear to have fallen on 06/03/2020 sustaining a left shoulder injury patient has been seen in the past by Dr. Kuhn patient had a nondisplaced fracture of the humeral neck was placed in the sling.  I am being consulted for further treatment of this patient's injury Dr. Kuhn is not in 10 at this time      Review of Systems   Constitution: Negative for chills and fever.   HENT: Negative for headaches and blurry vision.   Cardiovascular: Negative for chest pain, irregular heartbeat, leg swelling and palpitations.   Respiratory: Negative for cough and shortness of breath.   Endocrine: Negative for polyuria.   Hematologic/Lymphatic: Negative for bleeding problem. Does not bruise/bleed easily.   Skin: Negative for poor wound healing and rash.   Musculoskeletal: Negative for joint pain. Negative for arthritis, joint swelling, muscle weakness and myalgias.   Gastrointestinal: Negative for abdominal pain, heartburn, melena, nausea and vomiting.   Genitourinary: Negative for bladder incontinence and dysuria.   Neurological: Negative for numbness.   Psychiatric/Behavioral: Negative for depression. The patient is not nervous/anxious.     PE:  Temp:  [97.5 °F (36.4 °C)-98.5 °F (36.9 °C)] 98 °F (36.7 °C)  Pulse:  [60-62] 60  Resp:  [18] 18  SpO2:  [92 %-94 %] 94 %  BP: (122-174)/(65-74) 174/69    A&Ox3, NAD  Neck-supple with no pain  RUE-no swelling or deformity  LUE-examination of his left arm he is presently in a sling he has no pain at the fracture site  Pelvis-no pelvic pain  Back-no back pain  RLE-no swelling or deformity  LLE-no swelling or deformity    Xrays:  X-ray of the left shoulder shows an impacted nondisplaced fracture of the left proximal humerus  Imaging Results          US  Lower Extremity Veins Left (Final result)  Result time 07/18/20 17:43:20    Final result by Gilberto Agustin MD (07/18/20 17:43:20)                 Narrative:    REASON: Swelling.    FINDINGS:    Grayscale, color and spectral Doppler analysis of the left lower  extremity deep venous system was performed.    There is normal compressibility, color and spectral Doppler analysis,  and augmentation in the left lower extremity deep venous system.    IMPRESSION:    No DVT of the left lower extremity veins.    Electronically Signed by Gilberto Agustin on 7/18/2020 5:50 PM                             X-Ray Chest AP Portable (Final result)  Result time 07/18/20 17:38:20    Final result by Gilberto Agustin MD (07/18/20 17:38:20)                 Narrative:    REASON: cough Cough    FINDINGS:    Portable chest radiograph with comparison chest x-ray April 27, 2019.  Left dual-lead cardiac pacemaker noted.. The cardiomediastinal  silhouette is within normal limits in size.The pulmonary vascular  structures are within normal limits. Bilateral diffuse interstitial  lung opacities demonstrated. No confluent airspace opacification. No  acute osseous abnormality.    IMPRESSION:    Bilateral diffuse interstitial lung opacities are felt to reflect  chronic interstitial lung disease. No acute airspace opacification.    Electronically Signed by Gilberto Agustin on 7/18/2020 5:42 PM                              Labs:    Recent Results (from the past 24 hour(s))   Urinalysis, Reflex to Urine Culture Urine, Clean Catch    Collection Time: 07/20/20 11:07 PM    Specimen: Urine   Result Value Ref Range    Specimen UA Urine, Clean Catch     Color, UA Yellow Yellow, Straw, Chasity    Appearance, UA Clear Clear    pH, UA 7.0 5.0 - 8.0    Specific Gravity, UA 1.020 1.005 - 1.030    Protein, UA Trace (A) Negative    Glucose, UA Negative Negative    Ketones, UA Negative Negative    Bilirubin (UA) Negative Negative    Occult Blood UA 2+ (A) Negative     Nitrite, UA Negative Negative    Urobilinogen, UA Negative Negative EU/dL    Leukocytes, UA Negative Negative   Urinalysis Microscopic    Collection Time: 07/20/20 11:07 PM   Result Value Ref Range    RBC, UA 19 (H) 0 - 4 /hpf    WBC, UA 1 0 - 5 /hpf    Bacteria Negative None-Occ /hpf    Squam Epithel, UA 1 /hpf    Hyaline Casts, UA 1 0-1/lpf /lpf    Microscopic Comment SEE COMMENT    Basic Metabolic Panel (BMP)    Collection Time: 07/21/20  5:16 AM   Result Value Ref Range    Sodium 131 (L) 136 - 145 mmol/L    Potassium 3.7 3.5 - 5.1 mmol/L    Chloride 96 95 - 110 mmol/L    CO2 24 23 - 29 mmol/L    Glucose 93 70 - 110 mg/dL    BUN, Bld 14 10 - 30 mg/dL    Creatinine 0.8 0.5 - 1.4 mg/dL    Calcium 7.6 (L) 8.7 - 10.5 mg/dL    Anion Gap 11 8 - 16 mmol/L    eGFR if African American >60.0 >60 mL/min/1.73 m^2    eGFR if non African American >60.0 >60 mL/min/1.73 m^2   Magnesium    Collection Time: 07/21/20  5:16 AM   Result Value Ref Range    Magnesium 1.8 1.6 - 2.6 mg/dL   CBC with Automated Differential    Collection Time: 07/21/20  5:16 AM   Result Value Ref Range    WBC 7.48 3.90 - 12.70 K/uL    RBC 3.23 (L) 4.60 - 6.20 M/uL    Hemoglobin 10.8 (L) 14.0 - 18.0 g/dL    Hematocrit 32.1 (L) 40.0 - 54.0 %    Mean Corpuscular Volume 99 (H) 82 - 98 fL    Mean Corpuscular Hemoglobin 33.4 (H) 27.0 - 31.0 pg    Mean Corpuscular Hemoglobin Conc 33.6 32.0 - 36.0 g/dL    RDW 13.4 11.5 - 14.5 %    Platelets 252 150 - 350 K/uL    MPV 9.2 9.2 - 12.9 fL    Immature Granulocytes 0.4 0.0 - 0.5 %    Gran # (ANC) 3.9 1.8 - 7.7 K/uL    Immature Grans (Abs) 0.03 0.00 - 0.04 K/uL    Lymph # 2.2 1.0 - 4.8 K/uL    Mono # 0.8 0.3 - 1.0 K/uL    Eos # 0.6 (H) 0.0 - 0.5 K/uL    Baso # 0.07 0.00 - 0.20 K/uL    nRBC 0 0 /100 WBC    Gran% 51.5 38.0 - 73.0 %    Lymph% 28.9 18.0 - 48.0 %    Mono% 10.0 4.0 - 15.0 %    Eosinophil% 8.3 (H) 0.0 - 8.0 %    Basophil% 0.9 0.0 - 1.9 %    Differential Method Automated        Assessment/Plan:  Impression is a  left impacted healing left humeral neck fracture.  Patient can remove the splint this time and physical therapy can start active assisted range of motion of the left shoulder.

## 2020-07-21 NOTE — PT/OT/SLP EVAL
Speech Language Pathology Evaluation  Bedside Swallow    Patient Name:  Marck Ramos   MRN:  [id number]  Admitting Diagnosis: Cellulitis    Recommendations:                 General Recommendations:  Modified barium swallow study to determine efficacy of compensatory strategies and/or rehabilitation program pending Pt goals   Diet recommendations:  Dental Soft, Thin     Aspiration Precautions: HOB to 90 degrees  · NO STRAWS  · Upright 30 min after meals  · Meds crushed in puree  · Assist w/ meal    ·   General Precautions: Standard, aspiration  Communication strategies:  none    History:     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    Iron deficiency anemia, unspecified 7/18/2017    Other pulmonary embolism with acute cor pulmonale 3/6/2019    Pulmonary emboli        Past Surgical History:   Procedure Laterality Date    Closed reduction & internal fixation of closed, comminuted, displaced intertrochanteric fx left hip w/ TFN Left 08/29/2018       Prior Intubation HX:  none    Modified Barium Swallow: none    Imaging Results          US Lower Extremity Veins Left (Final result)  Result time 07/18/20 17:43:20    Final result by Gilberto Agustin MD (07/18/20 17:43:20)                 Narrative:    REASON: Swelling.    FINDINGS:    Grayscale, color and spectral Doppler analysis of the left lower  extremity deep venous system was performed.    There is normal compressibility, color and spectral Doppler analysis,  and augmentation in the left lower extremity deep venous system.    IMPRESSION:    No DVT of the left lower extremity veins.    Electronically Signed by Gilberto Agustin on 7/18/2020 5:50 PM        "                      X-Ray Chest AP Portable (Final result)  Result time 07/18/20 17:38:20    Final result by Gilberto Agustin MD (07/18/20 17:38:20)                 Narrative:    REASON: cough Cough    FINDINGS:    Portable chest radiograph with comparison chest x-ray April 27, 2019.  Left dual-lead cardiac pacemaker noted.. The cardiomediastinal  silhouette is within normal limits in size.The pulmonary vascular  structures are within normal limits. Bilateral diffuse interstitial  lung opacities demonstrated. No confluent airspace opacification. No  acute osseous abnormality.    IMPRESSION:    Bilateral diffuse interstitial lung opacities are felt to reflect  chronic interstitial lung disease. No acute airspace opacification.    Electronically Signed by Gilberto Agustin on 7/18/2020 5:42 PM                                Prior diet: unrestricted.    Subjective     "Why am I having pudding before dinner?"  Patient goals: none stated      Pain/Comfort:  · Pain Rating 1: 0/10    Objective:     **attempted to see Pt prior to repeat eval for followup with meal however, Pt with change in status upon entry c/b decreased responsiveness, drooling, and disorientation. NSG immediately notified. Low BP and managed by RN**    2nd attempt, Pt at baseline. Conversational and able to follow commands.      Oral Musculature Evaluation  · Oral Musculature: WFL  · Dentition: present and adequate  · Secretion Management: problems swallowing secretions  · Mucosal Quality: good  · Mandibular Strength and Mobility: WFL  · Oral Labial Strength and Mobility: WFL  · Lingual Strength and Mobility: WFL  · Velar Elevation: WFL  · Buccal Strength and Mobility: WFL  · Volitional Cough: Present  · Volitional Swallow: Hyolaryngeal movement present  · Voice Prior to PO Intake: clear    Bedside Swallow Eval:   Consistencies Assessed:  · Thin liquids ice chip x3, tsp x3, isolated cup edge x4  · Puree tsp x4  · Solids x4     Oral Phase: "   · WFL    Pharyngeal Phase:   · Consistent cough response following thin liquid beyond ice chip, tsp and initial cup edge   · Cough response following 2/4 puree  · Cough following 2/4 solid     Assessment:     Marck Ramos is a 89 y.o. male with an SLP diagnosis of overt s/s of airway compromise across viscosities and concern for aspiration after the swallow 2/2 moderate-severe hiatal hernia. Recommend strict adherence to recommendations as above w/ MBS to follow 7/22.    Goals:   Multidisciplinary Problems     SLP Goals        Problem: SLP Goal    Goal Priority Disciplines Outcome   SLP Goal     SLP Ongoing, Progressing   Description: 1. Pt will tolerate least restrictive PO diet without acute dysphagia pulmonary complication.   2. PENDING: Pt will participate in VFSS to define swallow physiology; treatment goal to follow                            Plan:     · Patient to be seen:  3 x/week   · Plan of Care expires:     · Plan of Care reviewed with:  patient   · SLP Follow-Up:  Yes       Discharge recommendations:  home       Time Tracking:     SLP Treatment Date:   07/21/20  Speech Start Time:  1635  Speech Stop Time:  1700     Speech Total Time (min):  25 min    Billable Minutes: Treatment Swallowing Dysfunction 10 and Eval Swallow and Oral Function 15    Jairo Pedersen CCC-SLP  07/21/2020

## 2020-07-21 NOTE — PT/OT/SLP PROGRESS
"Physical Therapy Treatment    Patient Name:  Marck Ramos   MRN:  [id number]    Recommendations:     Discharge Recommendations:  nursing facility, skilled, home with home health   Discharge Equipment Recommendations: walker, lit, other (see comments)(if home with family and HH PT)   Barriers to discharge: If family able/willing to assist pt with transfers and gait, no barriers.     Assessment:     Marck Ramos is a 89 y.o. male admitted with a medical diagnosis of Cellulitis.  He presents with the following impairments/functional limitations:  weakness, impaired endurance, impaired self care skills, impaired functional mobilty, gait instability, impaired balance, decreased upper extremity function, decreased safety awareness, orthopedic precautions. Pt alert, but with poor memory/safety awareness. Therefore, pt would benefit from going home with family's assistance and HH PT to progress mobility. If family unable to be with pt 24/7 and assist with mobility, then pt will need facility placement as he will be unable to care for himself upon discharge.    Rehab Prognosis: Fair; patient would benefit from acute skilled PT services to address these deficits and reach maximum level of function.    Recent Surgery: * No surgery found *      Plan:     During this hospitalization, patient to be seen 6 x/week to address the identified rehab impairments via gait training, therapeutic activities, therapeutic exercises and progress toward the following goals:    · Plan of Care Expires:  08/10/20    Subjective     Chief Complaint: soreness L foot in standing/gait  Patient/Family Comments/goals: Pt wants to go home and stated to PT "I'm not crippled you know."  Pain/Comfort:  · Pain Rating 1: (soreness L foot in standing)      Objective:     Communicated with RN prior to session.  Patient found HOB elevated with bed alarm, telemetry upon PT entry to room.     General Precautions: Standard, fall, pacemaker, anti-coagulation " medicine, hearing impaired   Orthopedic Precautions:LUE non weight bearing   Braces: Sling and swathe     Functional Mobility:  · Bed Mobility:     · Scooting: moderate assistance  · Supine to Sit: minimum assistance  · Transfers:     · Sit to Stand:  minimum assistance and of 1-2 persons with hemiwalker  · Gait: x 10 feet with HW and min A for safety/balance and poor management of HW.      AM-PAC 6 CLICK MOBILITY            Patient left up in chair with all lines intact, call button in reach, chair alarm on and RN notified..    GOALS:   Multidisciplinary Problems     Physical Therapy Goals        Problem: Physical Therapy Goal    Goal Priority Disciplines Outcome Goal Variances Interventions   Physical Therapy Goal     PT, PT/OT Ongoing, Progressing     Description: Goals to be met by: discharge     Patient will increase functional independence with mobility by performin. Supine to sit with Stand-by Assistance  2. Sit to supine with Stand-by Assistance  3. Sit to stand transfer with Stand-by Assistance  4. Bed to chair transfer with Contact Guard Assistance using Benjamin-walker  5. Gait  x 30  feet with Contact Guard Assistance using Benjamin-walker.                      Time Tracking:     PT Received On: 20  PT Start Time: 1030     PT Stop Time: 1050  PT Total Time (min): 20 min     Billable Minutes: Gait Training 5 and Therapeutic Activity 15    Treatment Type: Treatment  PT/PTA: PT     PTA Visit Number: 1     Patricia Mack, PT  2020

## 2020-07-22 LAB
ANION GAP SERPL CALC-SCNC: 11 MMOL/L (ref 8–16)
BASOPHILS # BLD AUTO: 0.05 K/UL (ref 0–0.2)
BASOPHILS NFR BLD: 0.8 % (ref 0–1.9)
BUN SERPL-MCNC: 12 MG/DL (ref 10–30)
CALCIUM SERPL-MCNC: 7.9 MG/DL (ref 8.7–10.5)
CHLORIDE SERPL-SCNC: 97 MMOL/L (ref 95–110)
CO2 SERPL-SCNC: 24 MMOL/L (ref 23–29)
CREAT SERPL-MCNC: 0.8 MG/DL (ref 0.5–1.4)
DIFFERENTIAL METHOD: ABNORMAL
EOSINOPHIL # BLD AUTO: 0.7 K/UL (ref 0–0.5)
EOSINOPHIL NFR BLD: 10.5 % (ref 0–8)
ERYTHROCYTE [DISTWIDTH] IN BLOOD BY AUTOMATED COUNT: 13.4 % (ref 11.5–14.5)
EST. GFR  (AFRICAN AMERICAN): >60 ML/MIN/1.73 M^2
EST. GFR  (NON AFRICAN AMERICAN): >60 ML/MIN/1.73 M^2
GLUCOSE SERPL-MCNC: 111 MG/DL (ref 70–110)
HCT VFR BLD AUTO: 31.7 % (ref 40–54)
HGB BLD-MCNC: 10.5 G/DL (ref 14–18)
IMM GRANULOCYTES # BLD AUTO: 0.01 K/UL (ref 0–0.04)
IMM GRANULOCYTES NFR BLD AUTO: 0.2 % (ref 0–0.5)
LYMPHOCYTES # BLD AUTO: 2 K/UL (ref 1–4.8)
LYMPHOCYTES NFR BLD: 31.2 % (ref 18–48)
MAGNESIUM SERPL-MCNC: 1.9 MG/DL (ref 1.6–2.6)
MCH RBC QN AUTO: 32.9 PG (ref 27–31)
MCHC RBC AUTO-ENTMCNC: 33.1 G/DL (ref 32–36)
MCV RBC AUTO: 99 FL (ref 82–98)
MONOCYTES # BLD AUTO: 0.7 K/UL (ref 0.3–1)
MONOCYTES NFR BLD: 10.8 % (ref 4–15)
NEUTROPHILS # BLD AUTO: 3 K/UL (ref 1.8–7.7)
NEUTROPHILS NFR BLD: 46.5 % (ref 38–73)
NRBC BLD-RTO: 0 /100 WBC
PLATELET # BLD AUTO: 249 K/UL (ref 150–350)
PMV BLD AUTO: 9.2 FL (ref 9.2–12.9)
POTASSIUM SERPL-SCNC: 3.6 MMOL/L (ref 3.5–5.1)
RBC # BLD AUTO: 3.19 M/UL (ref 4.6–6.2)
SODIUM SERPL-SCNC: 132 MMOL/L (ref 136–145)
WBC # BLD AUTO: 6.38 K/UL (ref 3.9–12.7)

## 2020-07-22 PROCEDURE — 25000003 PHARM REV CODE 250: Performed by: INTERNAL MEDICINE

## 2020-07-22 PROCEDURE — 97535 SELF CARE MNGMENT TRAINING: CPT

## 2020-07-22 PROCEDURE — 36415 COLL VENOUS BLD VENIPUNCTURE: CPT

## 2020-07-22 PROCEDURE — 12000002 HC ACUTE/MED SURGE SEMI-PRIVATE ROOM

## 2020-07-22 PROCEDURE — 25000003 PHARM REV CODE 250: Performed by: NURSE PRACTITIONER

## 2020-07-22 PROCEDURE — 97116 GAIT TRAINING THERAPY: CPT

## 2020-07-22 PROCEDURE — 97165 OT EVAL LOW COMPLEX 30 MIN: CPT

## 2020-07-22 PROCEDURE — 92611 MOTION FLUOROSCOPY/SWALLOW: CPT

## 2020-07-22 PROCEDURE — 85025 COMPLETE CBC W/AUTO DIFF WBC: CPT

## 2020-07-22 PROCEDURE — 63600175 PHARM REV CODE 636 W HCPCS: Performed by: INTERNAL MEDICINE

## 2020-07-22 PROCEDURE — 80048 BASIC METABOLIC PNL TOTAL CA: CPT

## 2020-07-22 PROCEDURE — 83735 ASSAY OF MAGNESIUM: CPT

## 2020-07-22 RX ORDER — SODIUM CHLORIDE 9 MG/ML
INJECTION, SOLUTION INTRAVENOUS CONTINUOUS
Status: DISCONTINUED | OUTPATIENT
Start: 2020-07-22 | End: 2020-07-24

## 2020-07-22 RX ORDER — CLINDAMYCIN PHOSPHATE 900 MG/50ML
900 INJECTION, SOLUTION INTRAVENOUS
Status: DISCONTINUED | OUTPATIENT
Start: 2020-07-22 | End: 2020-07-27 | Stop reason: HOSPADM

## 2020-07-22 RX ADMIN — MELATONIN 6 MG: at 12:07

## 2020-07-22 RX ADMIN — ISOSORBIDE MONONITRATE 60 MG: 60 TABLET, EXTENDED RELEASE ORAL at 08:07

## 2020-07-22 RX ADMIN — PIPERACILLIN AND TAZOBACTAM 3.38 G: 3; .375 INJECTION, POWDER, FOR SOLUTION INTRAVENOUS at 05:07

## 2020-07-22 RX ADMIN — MAGNESIUM OXIDE 400 MG: 400 TABLET ORAL at 08:07

## 2020-07-22 RX ADMIN — TAMSULOSIN HYDROCHLORIDE 0.4 MG: 0.4 CAPSULE ORAL at 08:07

## 2020-07-22 RX ADMIN — PANTOPRAZOLE SODIUM 40 MG: 40 TABLET, DELAYED RELEASE ORAL at 08:07

## 2020-07-22 RX ADMIN — PIPERACILLIN AND TAZOBACTAM 3.38 G: 3; .375 INJECTION, POWDER, FOR SOLUTION INTRAVENOUS at 12:07

## 2020-07-22 RX ADMIN — AMLODIPINE BESYLATE 2.5 MG: 2.5 TABLET ORAL at 08:07

## 2020-07-22 RX ADMIN — MELATONIN 6 MG: at 08:07

## 2020-07-22 RX ADMIN — LOSARTAN POTASSIUM 50 MG: 50 TABLET, FILM COATED ORAL at 08:07

## 2020-07-22 RX ADMIN — POTASSIUM CHLORIDE 20 MEQ: 20 TABLET, EXTENDED RELEASE ORAL at 08:07

## 2020-07-22 RX ADMIN — CLINDAMYCIN IN 5 PERCENT DEXTROSE 900 MG: 18 INJECTION, SOLUTION INTRAVENOUS at 08:07

## 2020-07-22 RX ADMIN — ASPIRIN 81 MG: 81 TABLET, DELAYED RELEASE ORAL at 08:07

## 2020-07-22 RX ADMIN — SOTALOL HYDROCHLORIDE 80 MG: 80 TABLET ORAL at 08:07

## 2020-07-22 RX ADMIN — SODIUM CHLORIDE: 0.9 INJECTION, SOLUTION INTRAVENOUS at 12:07

## 2020-07-22 NOTE — PROGRESS NOTES
"[facility]  Adult Nutrition   Progress Note (Follow-Up)    SUMMARY     Recommendations  Recommendation/Intervention:   1. Continue diet texture as per SLP recommendations   2. RD to add Ensure Enlive TID (1050 kcal, 60 g pro) to aid in meeting estimated nutritional needs   3. Encourage PO intake and provide meal assist as required  Goals: 1. Patient to meet at least 75% estimated nutritional needs via oral diet/supplements  Nutrition Goal Status: new  Communication of RD Recs: reviewed with RN    Dietitian Rounds Brief    Patient assessed 2' LOS. Noted plans for MBS today per SLP notes. Dental soft diet started yesterday per SLP. Observed bkfst tray; very little oral intake noted. Will add ONS to aid in meeting needs. Unable to perform NFPE at this time.     Reason for Assessment  Reason For Assessment: length of stay  Diagnosis: other (see comments)(LE hematoma vs cellulitis)  Relevant Medical History: CHF, anemia, HTN, PE, CKD stage II  Interdisciplinary Rounds: attended    Nutrition Risk Screen  Nutrition Risk Screen: dysphagia or difficulty swallowing    Nutrition/Diet History  Food Allergies: NKFA  Factors Affecting Nutritional Intake: difficulty/impaired swallowing    Anthropometrics  Temp: 97.6 °F (36.4 °C)  Height Method: Stated  Height: 5' 6" (167.6 cm)  Height (inches): 66 in  Weight Method: Bed Scale  Weight: 56.2 kg (123 lb 14.4 oz)  Weight (lb): 123.9 lb  Ideal Body Weight (IBW), Male: 142 lb  % Ideal Body Weight, Male (lb): 87.25 %  BMI (Calculated): 20  BMI Grade: 18.5-24.9 - normal       Weight History:  Wt Readings from Last 10 Encounters:   07/22/20 56.2 kg (123 lb 14.4 oz)   07/16/20 57.2 kg (126 lb)   07/11/20 60.2 kg (132 lb 11.5 oz)   06/03/20 52.2 kg (115 lb)   01/02/20 59.4 kg (131 lb)   12/13/19 59.4 kg (131 lb)   10/03/19 56.9 kg (125 lb 6.4 oz)   09/05/19 57.7 kg (127 lb 3.2 oz)   08/28/19 56.7 kg (125 lb)   08/19/19 56.9 kg (125 lb 7.1 oz)       Lab/Procedures/Meds: " Pertinent Labs Reviewed  Clinical Chemistry:  Recent Labs   Lab 07/18/20  1652  07/22/20  0523   *   < > 132*   K 4.0   < > 3.6   CL 99   < > 97   CO2 27   < > 24   GLU 93   < > 111*   BUN 22   < > 12   CREATININE 0.9   < > 0.8   CALCIUM 8.1*   < > 7.9*   PROT 6.0  --   --    ALBUMIN 2.6*  --   --    BILITOT 0.8  --   --    ALKPHOS 65  --   --    AST 16  --   --    ALT 15  --   --    ANIONGAP 8   < > 11   ESTGFRAFRICA >60.0   < > >60.0   EGFRNONAA >60.0   < > >60.0   MG 1.8   < > 1.9    < > = values in this interval not displayed.     CBC:   Recent Labs   Lab 07/22/20  0523   WBC 6.38   RBC 3.19*   HGB 10.5*   HCT 31.7*      MCV 99*   MCH 32.9*   MCHC 33.1       Cardiac Profile:  Recent Labs   Lab 07/18/20 1652   *   TROPONINI <0.030       Medications: Pertinent Medications reviewed  Scheduled Meds:   amLODIPine  2.5 mg Oral Daily    aspirin  81 mg Oral Daily    isosorbide mononitrate  60 mg Oral Daily    losartan  50 mg Oral BID    magnesium oxide  400 mg Oral Daily    pantoprazole  40 mg Oral Daily    piperacillin-tazobactam (ZOSYN) IVPB  3.375 g Intravenous Q8H    potassium chloride SA  20 mEq Oral Daily    sotaloL  80 mg Oral BID    tamsulosin  0.4 mg Oral Daily    vancomycin (VANCOCIN) IVPB  1,000 mg Intravenous Q24H     Continuous Infusions:   sodium chloride 0.9% 50 mL/hr at 07/20/20 1215     PRN Meds:.acetaminophen, calcium chloride IVPB, calcium chloride IVPB, calcium chloride IVPB, guaifenesin 100 mg/5 ml, magnesium oxide, magnesium sulfate IVPB, magnesium sulfate IVPB, magnesium sulfate IVPB, magnesium sulfate IVPB, melatonin, potassium chloride in water, potassium chloride in water, potassium chloride in water, potassium chloride in water, potassium chloride, potassium chloride, potassium chloride, potassium chloride, sodium chloride 0.9%, sodium phosphate IVPB, sodium phosphate IVPB, sodium phosphate IVPB, sodium phosphate IVPB, sodium phosphate IVPB, Pharmacy to dose  Vancomycin consult **AND** vancomycin - pharmacy to dose    Estimated/Assessed Needs  Weight Used For Calorie Calculations: 56.2 kg (123 lb 14.4 oz)  Energy Calorie Requirements (kcal): 0596-7277 (25-30 kcal/kg)  Energy Need Method: Kcal/kg  Protein Requirements: 67-84 g (1.2-1.5 g/kg)  Weight Used For Protein Calculations: 56.2 kg (123 lb 14.4 oz)     Estimated Fluid Requirement Method: RDA Method  RDA Method (mL): 1405       Nutrition Prescription Ordered  Current Diet Order: dysphagia soft (IDDSI level 6)    Evaluation of Received Nutrient/Fluid Intake  Energy Calories Required: not meeting needs  Protein Required: not meeting needs  Tolerance: tolerating     Intake/Output Summary (Last 24 hours) at 7/22/2020 0930  Last data filed at 7/22/2020 0800  Gross per 24 hour   Intake --   Output 800 ml   Net -800 ml        % Meal Intake: 25 - 50 %    Nutrition Risk  Level of Risk/Frequency of Follow-up: high     Monitor and Evaluation  Food and Nutrient Intake: food and beverage intake, energy intake  Food and Nutrient Adminstration: diet order  Physical Activity and Function: nutrition-related ADLs and IADLs  Anthropometric Measurements: weight change, weight  Biochemical Data, Medical Tests and Procedures: gastrointestinal profile, electrolyte and renal panel, glucose/endocrine profile  Nutrition-Focused Physical Findings: overall appearance     Nutrition Follow-Up  RD Follow-up?: Yes    Debra Morales RD 07/22/2020 9:30 AM

## 2020-07-22 NOTE — PROGRESS NOTES
[facility]  Department of Cardiology  Progress Note    PATIENT NAME: Marck Ramos  MRN: [id number]  TODAY'S DATE: 07/22/2020  ADMIT DATE: 7/18/2020    SUBJECTIVE     PRINCIPLE PROBLEM: Cellulitis    INTERVAL HISTORY:    7/22/2020  Mr. Ramos is sitting up in the chair currently.  The nurse tells me that he was sitting up in the chair yesterday and became almost unresponsive and BP was 50s SBP. He was not on his fluids prior to this secondary to not having an IV.   Mr. Ramos leg is more edematous and red today.  He is tired. He denies any chest pain or SOB currently.    Review of patient's allergies indicates:   Allergen Reactions    Sulfa (sulfonamide antibiotics)      Patient can not recall reaction     Iron      Other reaction(s): Unknown       REVIEW OF SYSTEMS  CARDIOVASCULAR: No recent chest pain, palpitations, arm, neck, or jaw pain  RESPIRATORY: No recent fever, cough chills, SOB or congestion  : No blood in the urine  GI: No Nausea, vomiting, constipation, diarrhea, blood, or reflux.  MUSCULOSKELETAL: leg pain  NEURO: No lightheadedness or dizziness  EYES: No Double vision, blurry, vision or headache     OBJECTIVE     VITAL SIGNS (Most Recent)  Temp: 97.6 °F (36.4 °C) (07/22/20 0706)  Pulse: 65 (07/22/20 0706)  Resp: 18 (07/22/20 0706)  BP: (!) 155/66 (07/22/20 0706)  SpO2: 95 % (07/22/20 0706)    VENTILATION STATUS  Resp: 18 (07/22/20 0706)  SpO2: 95 % (07/22/20 0706)       I & O (Last 24H):    Intake/Output Summary (Last 24 hours) at 7/22/2020 1055  Last data filed at 7/22/2020 0800  Gross per 24 hour   Intake --   Output 800 ml   Net -800 ml       WEIGHTS  Wt Readings from Last 1 Encounters:   07/22/20 0926 56.2 kg (123 lb 14.4 oz)   07/18/20 2011 56.2 kg (123 lb 14.4 oz)   07/18/20 1541 57.6 kg (127 lb)       PHYSICAL EXAM  CONSTITUTIONAL: Elderly male in no acute distress  NECK:carotid bruit, no JVD  LUNGS: CTA  CHEST WALL: no tenderness  HEART: regular rate and rhythm, S1, S2 normal,  grade 2 systolic murmur   ABDOMEN: soft, non-tender; bowel sounds normal; no masses,  no organomegaly  EXTREMITIES: Hematoma and redness and swelling of the leg  NEURO: AAO X 3    SCHEDULED MEDS:   amLODIPine  2.5 mg Oral Daily    aspirin  81 mg Oral Daily    isosorbide mononitrate  60 mg Oral Daily    losartan  50 mg Oral BID    magnesium oxide  400 mg Oral Daily    pantoprazole  40 mg Oral Daily    piperacillin-tazobactam (ZOSYN) IVPB  3.375 g Intravenous Q8H    potassium chloride SA  20 mEq Oral Daily    sotaloL  80 mg Oral BID    tamsulosin  0.4 mg Oral Daily    vancomycin (VANCOCIN) IVPB  1,000 mg Intravenous Q24H       CONTINUOUS INFUSIONS:   sodium chloride 0.9% 50 mL/hr at 07/20/20 1215       PRN MEDS:acetaminophen, calcium chloride IVPB, calcium chloride IVPB, calcium chloride IVPB, guaifenesin 100 mg/5 ml, magnesium oxide, magnesium sulfate IVPB, magnesium sulfate IVPB, magnesium sulfate IVPB, magnesium sulfate IVPB, melatonin, potassium chloride in water, potassium chloride in water, potassium chloride in water, potassium chloride in water, potassium chloride, potassium chloride, potassium chloride, potassium chloride, sodium chloride 0.9%, sodium phosphate IVPB, sodium phosphate IVPB, sodium phosphate IVPB, sodium phosphate IVPB, sodium phosphate IVPB, Pharmacy to dose Vancomycin consult **AND** vancomycin - pharmacy to dose    LABS AND DIAGNOSTICS     CBC LAST 3 DAYS  Recent Labs   Lab 07/20/20  0457 07/21/20  0516 07/22/20  0523   WBC 7.71 7.48 6.38   RBC 3.41* 3.23* 3.19*   HGB 11.5* 10.8* 10.5*   HCT 34.1* 32.1* 31.7*   * 99* 99*   MCH 33.7* 33.4* 32.9*   MCHC 33.7 33.6 33.1   RDW 13.5 13.4 13.4    252 249   MPV 9.4 9.2 9.2   GRAN 55.6  4.3 51.5  3.9 46.5  3.0   LYMPH 28.5  2.2 28.9  2.2 31.2  2.0   MONO 7.9  0.6 10.0  0.8 10.8  0.7   BASO 0.06 0.07 0.05   NRBC 0 0 0       COAGULATION LAST 3 DAYS  Recent Labs   Lab 07/18/20  1652   LABPT 22.7*   INR 2.1   APTT 70.4*        CHEMISTRY LAST 3 DAYS  Recent Labs   Lab 07/18/20  1652  07/20/20  0457 07/21/20  0516 07/22/20  0523   *   < > 133* 131* 132*   K 4.0   < > 3.4* 3.7 3.6   CL 99   < > 95 96 97   CO2 27   < > 26 24 24   ANIONGAP 8   < > 12 11 11   BUN 22   < > 15 14 12   CREATININE 0.9   < > 0.9 0.8 0.8   GLU 93   < > 93 93 111*   CALCIUM 8.1*   < > 8.0* 7.6* 7.9*   MG 1.8   < > 1.8 1.8 1.9   ALBUMIN 2.6*  --   --   --   --    PROT 6.0  --   --   --   --    ALKPHOS 65  --   --   --   --    ALT 15  --   --   --   --    AST 16  --   --   --   --    BILITOT 0.8  --   --   --   --     < > = values in this interval not displayed.       CARDIAC PROFILE LAST 3 DAYS  Recent Labs   Lab 07/18/20 1652   *   TROPONINI <0.030       ENDOCRINE LAST 3 DAYS  No results for input(s): TSH, PROCAL in the last 168 hours.    LAST ARTERIAL BLOOD GAS  ABG  No results for input(s): PH, PO2, PCO2, HCO3, BE in the last 168 hours.    LAST 7 DAYS MICROBIOLOGY   Microbiology Results (last 7 days)     Procedure Component Value Units Date/Time    Blood culture #1 **CANNOT BE ORDERED STAT** [[id number]] Collected: 07/18/20 1642    Order Status: Completed Specimen: Blood from Peripheral, Upper Arm, Right Updated: 07/21/20 1832     Blood Culture, Routine No Growth to date      No Growth to date      No Growth to date      No Growth to date    Blood culture #2 **CANNOT BE ORDERED STAT** [[id number]] Collected: 07/18/20 1652    Order Status: Completed Specimen: Blood from Peripheral, Hand, Right Updated: 07/21/20 1832     Blood Culture, Routine No Growth to date      No Growth to date      No Growth to date      No Growth to date          MOST RECENT IMAGING  X-Ray Chest AP Portable  PROCEDURE:   XR CHEST AP PORTABLE  dated  7/21/2020 6:18 AM    CLINICAL HISTORY:   Male 89 years of age.   aspiration    TECHNIQUE: AP view of the chest obtained portably at 6:18 AM.    PREVIOUS STUDIES:  July 18, 2020. July 10, 2020. April 27, 2019.    FINDINGS:    CC D  leads project over the right atrium and right ventricle. There is  a chronic large hiatal hernia. The heart is not enlarged. Aorta is  calcified. Chronic interstitial coarsening and groundglass. There is  no new opacity. There is no pleural effusion or pneumothorax.    IMPRESSION:    No acute findings. Interstitial lung disease. Large hiatal hernia.    Electronically Signed by Ca Holden on 7/21/2020 7:55 AM      LASTECHO  No results found for this or any previous visit.    CURRENT/PREVIOUS VISIT EKG  Results for orders placed or performed during the hospital encounter of 07/18/20   EKG 12-lead    Collection Time: 07/18/20  4:30 PM    Narrative    Test Reason : M79.89,    Vent. Rate : 060 BPM     Atrial Rate : 060 BPM     P-R Int : 152 ms          QRS Dur : 160 ms      QT Int : 506 ms       P-R-T Axes : 072 -75 049 degrees     QTc Int : 506 ms    AV dual-paced rhythm  Abnormal ECG  When compared with ECG of 10-JUL-2020 23:12,  Vent. rate has decreased BY  26 BPM  Confirmed by Pete Mares MD ([id number]) on 7/19/2020 12:16:46 PM    Referred By: [email or web address]   SELF           Confirmed By:Pete Mares MD       ASSESSMENT/PLAN:     Active Hospital Problems    Diagnosis    *Left lower extremity hematoma versus cellulitis    Possible aspiration pneumonia versus pneumonitis    Hyponatremia    PAF (paroxysmal atrial fibrillation)    History of left humerus fracture    Debility    BPH with obstruction/lower urinary tract symptoms    Pacemaker    Severe malnutrition    Anemia in stage 2 chronic kidney disease    Hypertension, not well controlled       ASSESSMENT & PLAN:     1. Cellulitis and left leg hematoma  2. Recurrent Falls  3. PAF- CURRENTLY AV Paced on EKG- PATIENT NOT ON TELE  4. H/O DVT/PE  5. Anemia  6. HTN  7. CAD  8. Recent humerus fx  9.  Volume depletion and dehydration  10. Hypotensive episode after long sitting- patient not on telemetry   11. Pacer in situ          RECOMMENDATIONS:    Encourage  Hydration continue IVF for now  When in chair elevate legs  Increase PT/OT  Consider adding patient to telemetry monitor  Continue to hold statin secondary to weakness  Ok to hold NOAC patient has had multiple falls recently and is at high risk for IC bleed  Continue ASA 81 mg daily  Continue Sotalol 80 mg PO BID  Continue Amlodipine 2.5 mg daily-patient does not tolerate any higher of dose may contribute to peripheral edema  Will follow                       Karey Finch NP  [facility]  Department of Cardiology  Date of Service: 07/22/2020  10:55 AM

## 2020-07-22 NOTE — PROGRESS NOTES
[facility] Medicine  Progress Note    Patient Name: Marck Ramos  MRN: [id number]  Patient Class: IP- Inpatient   Admission Date: 7/18/2020  Attending Physician: Gilberto Bowie MD  Primary Care Provider: Hood Gannon MD  Date of service:  07/22/2020    Subjective:     Principal Problem:Cellulitis    HPI:  Marck Ramos is a 89 y.o. 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 It is moderate. It is associated with swelling and pain. He denies fever, chills, cough, SOB, N/V/D, dizziness or known trauma. He reports it started as a small hematoma, and progressively got more red and painful. He was recently admitted for a left humerus frx, then pna, and has been receiving PT/OT at home. He states he wants to be a full code, but doesn't think he wants a ventilator long term.     Overview/Hospital Course:  Patient with multiple recent hospitalization.  Increasing falls at home with sustained left humerus fracture, currently in sling, recurrent admission for concern of aspiration pneumonia.  He was discharged home on home health about 1 week ago.  Daughter noted to have developing worsening area of erythema, she is unsure if a cat scratch area initially, enlarging with associated warmth and mild swelling, seen by home health with no improvement on instructed to the ED.  He was started on vancomycin on admission.  On 07/19 patient sleeping, daughter states more recently he has been sleeping more throughout the day, there was concern that he may have aspirated while taking medications earlier, leg continues with erythema, warmth and swelling, no active drainage.  Daughter states patient does have advanced directive/living will, would not want intubation. On 7/20 seen by speech  therapy with recommendations for trial of mechanical soft diet, still coughing with meals, lower extremity appears more to be hematoma with petechiae rather than cellulitis, son at bedside states Dr. Kuhn now out of town, requesting sling to be removed, will consult orthopedics Dr Tenorio.  The patient's hematoma and cellulitis are slowly improving with treatment.  His mobility is slowly improving.  Anticipate discharge home with home health in next couple days.    Interval history:  Today the patient reports persistent redness and swelling of left lower extremity, constant timing, mild to moderate intensity, slowly improving with treatment since admission and somewhat improved overnight.  Denies fever or chills.  No chest pain or shortness of breath.  No nausea or vomiting.  Persistent generalized weakness but working with therapy.  Speech therapy evaluation ongoing with modified barium swallow study today.  The patient is tolerating dysphagia diet well.    Physical exam:  Vital signs reviewed  General:  Frail, nontoxic, comfortable appearing, sitting up in chair bedside  Head and eyes:  Anicteric sclerae, no conjunctival discharge  ENT:  Moist mucous membranes, no thrush  Cardiovascular:  2+ radial pulses bilaterally, distal extremities warm and well perfused  Pulmonary:  Comfortable work of breathing, lungs are clear bilaterally  Neuro:  Nonfocal motor exam, fluent speech, follows commands appropriately  GI:  Abdomen soft and nontender, nondistended  Psych:  Mood is calm, affect restricted, insight fair  Skin:  Dry and warm no jaundice, slight improvement of left lower extremity erythema with mild edema from ankle to mid shin with no palpable fluctuance, small focal area of tenderness palpation and induration of anterior shin, surrounding petechiae    Laboratory data:  Sodium 132  Hemoglobin 10  Hematocrit 31    Chest X-ray Impression: No acute findings. Interstitial lung disease. Large hiatal hernia.    Left  shoulder x-ray series Impression:  Healing proximal humeral fracture.  Findings suggesting chronic rotator cuff tear.  AC joint arthrosis.  Scattered interstitial and groundglass pulmonary opacities    Left lower extremity venous Doppler ultrasound Impression: No DVT of the left lower extremity veins.    Assessment/Plan:      * Left lower extremity hematoma with superimposed cellulitis  Few days history of progressively worsening erythema with warmth and swelling.  Patient is on Eliquis and there was initially impression of erythema due to Eliquis however worsening.  Continue IV vancomycin-may consider discontinuing depending on clinical course, more suspect likely hematoma with bruising  Demarcate area of erythema and monitor  Elevate lower extremity  A.m. labs ordered for review      History of left humerus fracture  Arm currently in sling.  As per son discussed with Dr. Kuhn who is no longer seeing patients locally, recommends sling to be removed, will consult Dr. Tenorio    PAF (paroxysmal atrial fibrillation)  History of paroxysmal atrial fibrillation.  Followed by Dr. Mares.  Now with increasing falls and declining status will consult cardiology to re-evaluate need for Eliquis.  Continue sotalol.  Holding Eliquis.      Hyponatremia  Sodium level 133.  Slow IV fluids today and repeat levels tomorrow    Possible aspiration pneumonia versus pneumonitis  There was concern patient had severe coughing with possible aspiration.  Recent admission for aspiration pneumonia, declining status, high risk.  Seen by speech therapy with recommendations for mechanical soft diet, no rice or bread, will order an trial  Continue Zosyn for empiric coverage  Aspiration precautions  Repeat chest x-ray in a.m.  Speech therapy following    Debility  As per collateral patient with declining status following humerus fracture, reduced oral intake, more somnolent, decreased activity level and endurance.  Daughter states she does feel  overwhelmed given has to manage at home and frequently has to call for help.  PT/OT.        BPH with obstruction/lower urinary tract symptoms  Known history of BPH, yesterday with volumes greater than 300, Dr. Collins reviewed.  As discussed with nursing as voided about 300 cc 3 times today, will continue with bladder scanning and conservative management.      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  Monitoring.      Hypertension, not well controlled  Continue home Imdur, losartan-dose increased to 50, amlodipine-keep at low-dose as per Cardiology she was not able to tolerate higher doses in the past and monitor.    Add p.r.n. as needed.  More liberal target in this frail elderly male.    Hypokalemia    Plan update today:  Continue care.  Appreciate consultants.  Patient is slowly improving.  Continue IV antibiotics; transition to clindamycin.  High risk of complication continuing combination of vancomycin and Zosyn. Transition oral clindamycin next 24-48 hours.  Continue supportive care including pain control as needed  Daily labs.  Replace electrolytes as needed.  Continue IV fluids.  Continue PT/OT.  Likely need home health at discharge.  Benjamin walker DME per recommendations of therapy.  Continue speech therapy.  Dysphagia diet as tolerated.  Courage oral intake.  DNR status noted  Continue medical management including aspirin, sotalol, and Norvasc.  Holding statin due to muscle weakness and oral anticoagulant due to bleeding risk with multiple falls  SCDs for VTE prophylaxis; avoid anticoagulant due to hematoma  High risk secondary to administration of medication requiring close monitoring of levels to prevent toxicity-vancomycin ; will transition to IV clindamycin this evening  Disposition:  Possible discharge 24-48 hours pending transition oral antibiotics    VTE Risk Mitigation (From admission, onward)          Ordered     IP VTE HIGH RISK PATIENT  Once      07/18/20 1905     Place sequential compression device  Until discontinued      07/18/20 1905                Gilberto Bowie MD  Department of Hospital Medicine   [facility]

## 2020-07-22 NOTE — PT/OT/SLP PROGRESS
Physical Therapy Treatment    Patient Name:  Marck Ramos   MRN:  [id number]    Recommendations:     Discharge Recommendations:  nursing facility, skilled, home with home health   Discharge Equipment Recommendations: walker, lit, other (see comments)(if home with family and HH PT)   Barriers to discharge: None    Assessment:     Marck Ramos is a 89 y.o. male admitted with a medical diagnosis of Cellulitis.  He presents with the following impairments/functional limitations:  weakness, impaired endurance, impaired self care skills, impaired functional mobilty, gait instability, impaired balance, decreased lower extremity function, decreased upper extremity function, decreased safety awareness, pain, orthopedic precautions. Patient agreeable to gait/mobility training.    Rehab Prognosis: Fair; patient would benefit from acute skilled PT services to address these deficits and reach maximum level of function.    Recent Surgery: * No surgery found *      Plan:     During this hospitalization, patient to be seen 6 x/week to address the identified rehab impairments via gait training, therapeutic activities, therapeutic exercises and progress toward the following goals:    · Plan of Care Expires:  08/10/20    Subjective     Chief Complaint: generalized weakness  Patient/Family Comments/goals: improve overall strength/mobility  Pain/Comfort:  · Pain Rating 1: 5/10  · Location - Side 1: Left  · Location - Orientation 1: upper  · Location 1: arm  · Pain Addressed 1: Cessation of Activity  · Pain Rating Post-Intervention 1: 3/10  · Pain Rating 2: 0/10      Objective:     Communicated with nurse prior to session.  Patient found supine with bed alarm, peripheral IV upon PT entry to room.     General Precautions: Standard, fall   Orthopedic Precautions:LUE non weight bearing   Braces: N/A     Functional Mobility:  · Bed Mobility:     · Supine to Sit: moderate assistance  · Transfers:     · Sit to Stand:  moderate assistance  with hemiwalker  · Gait: 40 ft w/ HW and min/mod assist x 2      AM-PAC 6 CLICK MOBILITY  Turning over in bed (including adjusting bedclothes, sheets and blankets)?: 2  Sitting down on and standing up from a chair with arms (e.g., wheelchair, bedside commode, etc.): 2  Moving from lying on back to sitting on the side of the bed?: 2  Moving to and from a bed to a chair (including a wheelchair)?: 2  Need to walk in hospital room?: 2  Climbing 3-5 steps with a railing?: 1  Basic Mobility Total Score: 11       Therapeutic Activities and Exercises:   n/a    Patient left up in chair with all lines intact, call button in reach and OT present..    GOALS:   Multidisciplinary Problems     Physical Therapy Goals        Problem: Physical Therapy Goal    Goal Priority Disciplines Outcome Goal Variances Interventions   Physical Therapy Goal     PT, PT/OT Ongoing, Progressing     Description: Goals to be met by: discharge     Patient will increase functional independence with mobility by performin. Supine to sit with Stand-by Assistance  2. Sit to supine with Stand-by Assistance  3. Sit to stand transfer with Stand-by Assistance  4. Bed to chair transfer with Contact Guard Assistance using Benjamin-walker  5. Gait  x 30  feet with Contact Guard Assistance using Benjamin-walker.                      Time Tracking:     PT Received On: 20  PT Start Time: 905     PT Stop Time: 930  PT Total Time (min): 25 min     Billable Minutes: Gait Training 25    Treatment Type: Treatment  PT/PTA: PT     PTA Visit Number: 0     Temo Herrera, PT  2020

## 2020-07-22 NOTE — PLAN OF CARE
MBS completed; pharyngeal residue increased with liquid and benefits from immediate dry swallow. Unable to completely clear & risk for aspiration after the swallow 2/2 residue.     Recommend  Continue current diet//dental soft solids and thin  Small, isolated sips of liquid---NO STRAWS  Alternate bite and sip throughout meal & dry swallows following liquid   Upright 30 min to 1 hour after intake     Will follow for training    Problem: SLP Goal  Goal: SLP Goal  Description: 1. Pt will tolerate least restrictive PO diet without acute dysphagia pulmonary complication.   2. PENDING: Pt will participate in VFSS to define swallow physiology; treatment goal to follow           Outcome: Ongoing, Progressing

## 2020-07-22 NOTE — PT/OT/SLP EVAL
Occupational Therapy   Evaluation    Name: Marck Ramos  MRN: [id number]  Admitting Diagnosis:  Cellulitis      Recommendations:     Discharge Recommendations: (Home with HHOT and 24/7 assistance vs SNF)  Discharge Equipment Recommendations:  (TBD)  Barriers to discharge:  Decreased caregiver support    Assessment:     Marck Ramos is a 89 y.o. male with a medical diagnosis of Cellulitis.  He presents with general weakness. Performance deficits affecting function: weakness, impaired endurance, impaired self care skills, impaired functional mobilty, gait instability, impaired balance, decreased safety awareness.      Rehab Prognosis: Fair; patient would benefit from acute skilled OT services to address these deficits and reach maximum level of function.       Plan:     Patient to be seen 5 x/week to address the above listed problems via self-care/home management, therapeutic activities, therapeutic exercises  · Plan of Care Expires: 08/22/20  · Plan of Care Reviewed with: patient    Subjective     Chief Complaint: None  Patient/Family Comments/goals: to go home.    Occupational Profile:  Living Environment: lives with daughter who can provide 24/7 assistance at home. But chart reports that patient has experienced multiple falls at home.  Previous level of function: Supervision for ADLs. Recently had HH therapy services at home from prior hospitalization.  Roles and Routines: limited homemaker  Equipment Used at Home:  rollator, wheelchair, 3-in-1 commode  Assistance upon Discharge: daughter    Pain/Comfort:  · Pain Rating 1: 0/10  · Pain Rating Post-Intervention 1: 0/10    Patients cultural, spiritual, Confucianism conflicts given the current situation: no    Objective:     Communicated with: nurse prior to session.  Patient found up in chair with telemetry, peripheral IV upon OT entry to room.    General Precautions: Standard, fall, aspiration   Orthopedic Precautions:N/A   Braces:       Occupational  Performance:    Functional Mobility/Transfers:  · Patient completed Sit <> Stand Transfer with minimum assistance  with  hand-held assist and rolling walker     Activities of Daily Living:  · Grooming: contact guard assistance to wash face sitting in chair.  · Bathing: minimum assistance to bath sitting in chair, requiring assistance for perineal areas and feet.  · Upper Body Dressing: minimum assistance to don/doff gown sitting in chair.  · Lower Body Dressing: minimum assistance to don/doff socks sitting in chair.    Cognitive/Visual Perceptual:  Cognitive/Psychosocial Skills:     -       Oriented to: Person and Place   -       Follows Commands/attention:Follows two-step commands  -       Communication: clear/fluent  -       Memory: Impaired STM  -       Safety awareness/insight to disability: impaired   -       Mood/Affect/Coping skills/emotional control: Cooperative and Pleasant  Visual/Perceptual:      -Intact Acuity    Physical Exam:  Balance:    -       Sitting/Standing: Contact Guard  Upper Extremity Range of Motion:     -       Right Upper Extremity: WFL  -       Left Upper Extremity: WFL  Upper Extremity Strength:    -       Right Upper Extremity: 3+/5  -       Left Upper Extremity: 3+/5   Strength:    -       Right Upper Extremity: fair  -       Left Upper Extremity: fair  Fine Motor Coordination:    -       Intact    AMPAC 6 Click ADL:  AMPAC Total Score: 20    Treatment & Education:  Patient educated on the purpose of Occupational Therapy and the importance of getting OOB.  Education:    Patient left up in chair with all lines intact, call button in reach and bed alarm on    GOALS:   Multidisciplinary Problems     Occupational Therapy Goals        Problem: Occupational Therapy Goal    Goal Priority Disciplines Outcome Interventions   Occupational Therapy Goal     OT, PT/OT     Description: Goals to be met by: discharge     Patient will increase functional independence with ADLs by performing:    UE  Dressing with Supervision.  LE Dressing with Supervision.  Grooming while standing with Supervision.  Toileting from toilet with Supervision for hygiene and clothing management.   Toilet transfer to toilet with Supervision.  Perform AAROM of left shoulder with supervision.                     History:     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    Iron deficiency anemia, unspecified 7/18/2017    Other pulmonary embolism with acute cor pulmonale 3/6/2019    Pulmonary emboli        Past Surgical History:   Procedure Laterality Date    Closed reduction & internal fixation of closed, comminuted, displaced intertrochanteric fx left hip w/ TFN Left 08/29/2018       Time Tracking:     OT Date of Treatment:    OT Start Time: 0931  OT Stop Time: 0959  OT Total Time (min): 28 min    Billable Minutes:Evaluation 10  Self Care/Home Management 18    Han Lou OT  7/22/2020

## 2020-07-22 NOTE — PLAN OF CARE
Problem: Skin Injury Risk Increased  Goal: Skin Health and Integrity  Intervention: Promote and Optimize Oral Intake  Flowsheets (Taken 7/22/2020 0933)  Oral Nutrition Promotion: calorie dense liquids provided     Problem: Swallowing Impairment  Goal: Improved Swallowing Without Aspiration  Outcome: Ongoing, Progressing     Problem: Oral Intake Inadequate  Goal: Improved Oral Intake  Outcome: Ongoing, Progressing  Intervention: Promote and Optimize Oral Intake  Flowsheets (Taken 7/22/2020 0933)  Oral Nutrition Promotion: calorie dense liquids provided       Recommendation/Intervention:   1. Continue diet texture as per SLP recommendations   2. RD to add Ensure Enlive TID (1050 kcal, 60 g pro) to aid in meeting estimated nutritional needs   3. Encourage PO intake and provide meal assist as required    Goals: 1. Patient to meet at least 75% estimated nutritional needs via oral diet/supplements

## 2020-07-22 NOTE — PROGRESS NOTES
VANCOMYCIN PHARMACOKINETIC NOTE:  Vancomycin Day #4    Objective:    89 y.o., male, Actual Body Weight = 56.2 kg (123 lb 14.4 oz)    Diagnosis/Indication for Vancomycin:  Cellulitis   Desired Vancomycin Peak:  30-35 mcg/ml; Desired Trough: 10-15 mcg/ml    Current Vancomycin Regimen:  1000 mg IV every 24 hours  Antibiotics (From admission, onward)      Start     Stop Route Frequency Ordered    07/19/20 2100  vancomycin in dextrose 5 % 1 gram/250 mL IVPB 1,000 mg      -- IV Every 24 hours (non-standard times) 07/18/20 2221    07/19/20 1800  piperacillin-tazobactam 3.375 g in dextrose 5 % 50 mL IVPB (ready to mix system)      -- IV Every 8 hours (non-standard times) 07/19/20 1655    07/18/20 1915  vancomycin - pharmacy to dose  (vancomycin IVPB)      -- IV pharmacy to manage frequency 07/18/20 1815             The patient has the following labs:     7/21/2020 Estimated Creatinine Clearance: 41 mL/min (based on SCr of 0.8 mg/dL). Lab Results   Component Value Date    BUN 14 07/21/2020     Lab Results   Component Value Date    WBC 7.48 07/21/2020        Vancomycin Trough:  12.9 mcg/mL collected 24 hours after Dose #3    Microbiology Results (last 7 days)       Procedure Component Value Units Date/Time    Blood culture #1 **CANNOT BE ORDERED STAT** [[id number]] Collected: 07/18/20 1642    Order Status: Completed Specimen: Blood from Peripheral, Upper Arm, Right Updated: 07/21/20 1832     Blood Culture, Routine No Growth to date      No Growth to date      No Growth to date      No Growth to date    Blood culture #2 **CANNOT BE ORDERED STAT** [[id number]] Collected: 07/18/20 1652    Order Status: Completed Specimen: Blood from Peripheral, Hand, Right Updated: 07/21/20 1832     Blood Culture, Routine No Growth to date      No Growth to date      No Growth to date      No Growth to date             Assessment:  Renal function is: stable  Vancomycin trough is within goal range.    Plan:  No change to current vancomycin  regimen    Will obtain vancomycin trough after 3 more dose(s), prior to dose due 7/24/20 at 20:00    Pharmacy will continue to manage vancomycin therapy and adjust regimen as necessary.    Thank you for allowing us to participate in this patient's care.     Nasir Stiles 7/21/2020 9:39 PM  Department of Pharmacy  Ext [phone]

## 2020-07-22 NOTE — PROCEDURES
Modified Barium Swallow    Patient Name:  Marck Ramos   MRN:  [id number]      Recommendations:     Recommendations:                General Recommendations:  Dysphagia therapy; education & training   Diet recommendations:  Dental Soft, No Bread, No Rice, Thin   Aspiration Precautions:   · Small, isolated sips of liquid---NO STRAWS  · Alternate bite and sip throughout meal & dry swallows following liquid   · Upright 30 min to 1 hour after intake    · Oral care after meals   General Precautions: Standard, aspiration  Communication strategies:  none    Referral     Reason for Referral  Patient was referred for a Modified Barium Swallow Study to assess the efficiency of his/her swallow function, rule out aspiration and make recommendations regarding safe dietary consistencies, effective compensatory strategies, and safe eating environment.     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    Iron deficiency anemia, unspecified 7/18/2017    Other pulmonary embolism with acute cor pulmonale 3/6/2019    Pulmonary emboli        Objective:     Current Respiratory Status: 07/22/20    Alert: yes    Cooperative: yes    Follows Directions: yes    Visualization  · Patient was seen in the lateral view  · Patient was seen in the anterior view   ·     Oral Peripheral Examination  · Oral Musculature: WFL  · Dentition: present and adequate  · Secretion Management: problems swallowing secretions  · Mucosal Quality: good  · Mandibular Strength and Mobility: WFL  · Oral Labial Strength and Mobility: WFL  · Lingual Strength and Mobility: WFL  · Velar Elevation: WFL  · Buccal Strength  and Mobility: WFL  · Volitional Cough: Present  · Volitional Swallow: Hyolaryngeal movement present  · Voice Prior to PO Intake: clear    Consistencies Assessed  · Thin tsp w/ bolus hold x2, cup edge, straw x3, and straw x1 in AP  · Puree tsp x2 and x1 in AP  · Solids x1    Oral Preparation/Oral Phase  · Intact lip closure without labial escape  · Intact tongue control with cohesive bolus between tongue to palatal seal   · Prolonged bolus preparation secondary to Pt c/o xerostomia requiring liquid wash   · Brisk tongue motion for bolus transport  · Complete oral clearance following bolus transport of liquid; lingual residue with increased viscosities including puree and solid     Pharyngeal Phase   · Initiation of pharyngeal swallow with bolus head at vallecule w/ tsp amounts, underside of epiglottis with cup and pyriform with straw   · Intact soft palate elevation without contrast between soft palate and posterior pharyngeal wall  · Reduced laryngeal elevation   · Complete anterior hyoid excursion  · Incomplete epiglottic inversion noted at maximal anterior hyoid displacement   · Complete laryngeal vestibular closure without contrast in laryngeal vestibule  · Diminished pharyngeal stripping wave  · Diminished pharyngeal contraction   · Decreased PES opening with impaired distention obstruction of flow   · Complete tongue base retraction without contrast between tongue base and posterior pharyngeal wall     Cervical Esophageal Phase  · Decreased UES opening   · Delayed emptying noted during AP view and trial of puree     Assessment:     Impressions  · Pt presents with moderate dysphagia involving pharyngeal and esophageal phases. Onset of pharyngeal swallow with liquids considered WNL for Pt age as well as deep penetration which occurred x1 with thin liquid via straw. Residue noted in laryngeal vestibule however, with solid trial presented later in study, Pt with spontaneous cough response, expelling material.  "Notable dysfunctions include diminished pharyngeal stripping wave, reduced laryngeal elevation with diminished epiglottic inversion and impaired UES opening and relaxation. Esophageal retention and delayed emptying noted during AP view suggesting esophageal dyphagia w/ suspected involvement of previously identified moderate-severe hiatal hernia. Pt with moderate pharyngeal retention within valleculae and pyriform across consistencies. Pyriform retention essentially eliminated with cued dry swallows however, minimal displacement of valleculae residue appreciated despite use of alternated viscosities, cued dry swallows (up to 5), chin tuck position and attempted "effortful" swallow.     Prognosis: Fair    Barriers:  · advanced age    Education  Results were discussed with patient. Results were discussed with Medical Team who was in agreement with plan.     Goals:   Multidisciplinary Problems     SLP Goals        Problem: SLP Goal    Goal Priority Disciplines Outcome   SLP Goal     SLP Ongoing, Progressing   Description: 1. Pt will tolerate least restrictive PO diet without acute dysphagia pulmonary complication.   2. PENDING: Pt will participate in VFSS to define swallow physiology; treatment goal to follow                            Plan:   · Patient to be seen:  Therapy Frequency: 3 x/week   · Plan of Care expires:     · Plan of Care reviewed with:  patient        Discharge recommendations:  home    Time Tracking:   SLP Treatment Date:   07/22/20  Speech Start Time:  1130  Speech Stop Time:  1215     Speech Total Time (min):  45 min    Jairo Pedersen CCC-SLP  07/22/2020  "

## 2020-07-23 LAB
ANION GAP SERPL CALC-SCNC: 8 MMOL/L (ref 8–16)
BACTERIA BLD CULT: NORMAL
BACTERIA BLD CULT: NORMAL
BASOPHILS # BLD AUTO: 0.06 K/UL (ref 0–0.2)
BASOPHILS NFR BLD: 0.9 % (ref 0–1.9)
BNP SERPL-MCNC: 530 PG/ML (ref 0–99)
BUN SERPL-MCNC: 14 MG/DL (ref 10–30)
CALCIUM SERPL-MCNC: 7.9 MG/DL (ref 8.7–10.5)
CHLORIDE SERPL-SCNC: 100 MMOL/L (ref 95–110)
CO2 SERPL-SCNC: 25 MMOL/L (ref 23–29)
CREAT SERPL-MCNC: 0.7 MG/DL (ref 0.5–1.4)
DIFFERENTIAL METHOD: ABNORMAL
EOSINOPHIL # BLD AUTO: 0.6 K/UL (ref 0–0.5)
EOSINOPHIL NFR BLD: 9.2 % (ref 0–8)
ERYTHROCYTE [DISTWIDTH] IN BLOOD BY AUTOMATED COUNT: 13.5 % (ref 11.5–14.5)
EST. GFR  (AFRICAN AMERICAN): >60 ML/MIN/1.73 M^2
EST. GFR  (NON AFRICAN AMERICAN): >60 ML/MIN/1.73 M^2
GLUCOSE SERPL-MCNC: 102 MG/DL (ref 70–110)
HCT VFR BLD AUTO: 29 % (ref 40–54)
HGB BLD-MCNC: 9.5 G/DL (ref 14–18)
IMM GRANULOCYTES # BLD AUTO: 0.02 K/UL (ref 0–0.04)
IMM GRANULOCYTES NFR BLD AUTO: 0.3 % (ref 0–0.5)
LYMPHOCYTES # BLD AUTO: 2.4 K/UL (ref 1–4.8)
LYMPHOCYTES NFR BLD: 36.3 % (ref 18–48)
MAGNESIUM SERPL-MCNC: 1.9 MG/DL (ref 1.6–2.6)
MCH RBC QN AUTO: 32.4 PG (ref 27–31)
MCHC RBC AUTO-ENTMCNC: 32.8 G/DL (ref 32–36)
MCV RBC AUTO: 99 FL (ref 82–98)
MONOCYTES # BLD AUTO: 0.7 K/UL (ref 0.3–1)
MONOCYTES NFR BLD: 11 % (ref 4–15)
NEUTROPHILS # BLD AUTO: 2.8 K/UL (ref 1.8–7.7)
NEUTROPHILS NFR BLD: 42.3 % (ref 38–73)
NRBC BLD-RTO: 0 /100 WBC
PLATELET # BLD AUTO: 265 K/UL (ref 150–350)
PMV BLD AUTO: 9.4 FL (ref 9.2–12.9)
POTASSIUM SERPL-SCNC: 4 MMOL/L (ref 3.5–5.1)
RBC # BLD AUTO: 2.93 M/UL (ref 4.6–6.2)
SODIUM SERPL-SCNC: 133 MMOL/L (ref 136–145)
WBC # BLD AUTO: 6.61 K/UL (ref 3.9–12.7)

## 2020-07-23 PROCEDURE — 80048 BASIC METABOLIC PNL TOTAL CA: CPT

## 2020-07-23 PROCEDURE — 99900031 HC PATIENT EDUCATION (STAT)

## 2020-07-23 PROCEDURE — 85025 COMPLETE CBC W/AUTO DIFF WBC: CPT

## 2020-07-23 PROCEDURE — 25000003 PHARM REV CODE 250: Performed by: NURSE PRACTITIONER

## 2020-07-23 PROCEDURE — 94761 N-INVAS EAR/PLS OXIMETRY MLT: CPT

## 2020-07-23 PROCEDURE — 12000002 HC ACUTE/MED SURGE SEMI-PRIVATE ROOM

## 2020-07-23 PROCEDURE — 83735 ASSAY OF MAGNESIUM: CPT

## 2020-07-23 PROCEDURE — 83880 ASSAY OF NATRIURETIC PEPTIDE: CPT

## 2020-07-23 PROCEDURE — 97535 SELF CARE MNGMENT TRAINING: CPT

## 2020-07-23 PROCEDURE — 25000003 PHARM REV CODE 250: Performed by: INTERNAL MEDICINE

## 2020-07-23 PROCEDURE — 36415 COLL VENOUS BLD VENIPUNCTURE: CPT

## 2020-07-23 PROCEDURE — 97116 GAIT TRAINING THERAPY: CPT

## 2020-07-23 PROCEDURE — 99900035 HC TECH TIME PER 15 MIN (STAT)

## 2020-07-23 PROCEDURE — 94640 AIRWAY INHALATION TREATMENT: CPT

## 2020-07-23 PROCEDURE — 25000242 PHARM REV CODE 250 ALT 637 W/ HCPCS: Performed by: INTERNAL MEDICINE

## 2020-07-23 PROCEDURE — 92526 ORAL FUNCTION THERAPY: CPT

## 2020-07-23 RX ORDER — IPRATROPIUM BROMIDE AND ALBUTEROL SULFATE 2.5; .5 MG/3ML; MG/3ML
3 SOLUTION RESPIRATORY (INHALATION) EVERY 4 HOURS PRN
Status: DISCONTINUED | OUTPATIENT
Start: 2020-07-23 | End: 2020-07-27 | Stop reason: HOSPADM

## 2020-07-23 RX ADMIN — SODIUM CHLORIDE: 0.9 INJECTION, SOLUTION INTRAVENOUS at 04:07

## 2020-07-23 RX ADMIN — POTASSIUM CHLORIDE 20 MEQ: 20 TABLET, EXTENDED RELEASE ORAL at 09:07

## 2020-07-23 RX ADMIN — ISOSORBIDE MONONITRATE 60 MG: 60 TABLET, EXTENDED RELEASE ORAL at 09:07

## 2020-07-23 RX ADMIN — CLINDAMYCIN IN 5 PERCENT DEXTROSE 900 MG: 18 INJECTION, SOLUTION INTRAVENOUS at 08:07

## 2020-07-23 RX ADMIN — SOTALOL HYDROCHLORIDE 80 MG: 80 TABLET ORAL at 09:07

## 2020-07-23 RX ADMIN — PANTOPRAZOLE SODIUM 40 MG: 40 TABLET, DELAYED RELEASE ORAL at 09:07

## 2020-07-23 RX ADMIN — MAGNESIUM OXIDE 400 MG: 400 TABLET ORAL at 09:07

## 2020-07-23 RX ADMIN — LOSARTAN POTASSIUM 50 MG: 50 TABLET, FILM COATED ORAL at 08:07

## 2020-07-23 RX ADMIN — CLINDAMYCIN IN 5 PERCENT DEXTROSE 900 MG: 18 INJECTION, SOLUTION INTRAVENOUS at 12:07

## 2020-07-23 RX ADMIN — ASPIRIN 81 MG: 81 TABLET, DELAYED RELEASE ORAL at 09:07

## 2020-07-23 RX ADMIN — IPRATROPIUM BROMIDE AND ALBUTEROL SULFATE 3 ML: .5; 3 SOLUTION RESPIRATORY (INHALATION) at 11:07

## 2020-07-23 RX ADMIN — LOSARTAN POTASSIUM 50 MG: 50 TABLET, FILM COATED ORAL at 09:07

## 2020-07-23 RX ADMIN — AMLODIPINE BESYLATE 2.5 MG: 2.5 TABLET ORAL at 09:07

## 2020-07-23 RX ADMIN — CLINDAMYCIN IN 5 PERCENT DEXTROSE 900 MG: 18 INJECTION, SOLUTION INTRAVENOUS at 04:07

## 2020-07-23 RX ADMIN — TAMSULOSIN HYDROCHLORIDE 0.4 MG: 0.4 CAPSULE ORAL at 09:07

## 2020-07-23 RX ADMIN — SOTALOL HYDROCHLORIDE 80 MG: 80 TABLET ORAL at 08:07

## 2020-07-23 NOTE — PLAN OF CARE
Problem: Occupational Therapy Goal  Goal: Occupational Therapy Goal  Description: Goals to be met by: discharge     Patient will increase functional independence with ADLs by performing:    UE Dressing with Supervision.  LE Dressing with Supervision.  Grooming while standing with Supervision.  Toileting from toilet with Supervision for hygiene and clothing management.   Toilet transfer to toilet with Supervision.  Perform AAROM of left shoulder with supervision.    Outcome: Ongoing, Progressing

## 2020-07-23 NOTE — PT/OT/SLP PROGRESS
Physical Therapy Treatment    Patient Name:  Markc Ramos   MRN:  [id number]    Recommendations:     Discharge Recommendations:  nursing facility, skilled, home with home health   Discharge Equipment Recommendations: walker, lit, other (see comments)(if home with family and HH PT)   Barriers to discharge: None    Assessment:     Marck Ramos is a 89 y.o. male admitted with a medical diagnosis of Cellulitis.  He presents with the following impairments/functional limitations:  weakness, impaired endurance, impaired functional mobilty, gait instability, impaired balance, decreased lower extremity function, decreased ROM . Patient readily agreeable to PT treatment this afternoon.  Patient presented supine in bed and required min assist to transfer supine to sit and CGA to stand.  Patient then ambulated x 160 feet with CGA with RW applying no pressure on left UE only resting left hand on the walker.  Patient reported no pain in the left shoulder/UE with treatment.    Rehab Prognosis: Good; patient would benefit from acute skilled PT services to address these deficits and reach maximum level of function.    Recent Surgery: * No surgery found *      Plan:     During this hospitalization, patient to be seen 6 x/week to address the identified rehab impairments via gait training, therapeutic activities, therapeutic exercises and progress toward the following goals:    · Plan of Care Expires:  08/10/20    Subjective     Chief Complaint: none given  Patient/Family Comments/goals: none given  Pain/Comfort:  ·        Objective:     Communicated with nurse prior to session.  Patient found supine with bed alarm upon PT entry to room.     General Precautions: Standard, fall   Orthopedic Precautions:N/A   Braces:       Functional Mobility:  · Bed Mobility:     · Supine to Sit: minimum assistance  · Sit to Supine: minimum assistance  · Transfers:     · Sit to Stand:  contact guard assistance with rolling walker  · Gait: x 160 feet  with RW with CGA      AM-PAC 6 CLICK MOBILITY          Therapeutic Activities and Exercises:   gait training x 160 feet with RW with CGA applying no pressure on left UE only resting the hand on the walker    Patient left supine with call button in reach, bed alarm on and nurse notified..    GOALS:   Multidisciplinary Problems     Physical Therapy Goals        Problem: Physical Therapy Goal    Goal Priority Disciplines Outcome Goal Variances Interventions   Physical Therapy Goal     PT, PT/OT Ongoing, Progressing     Description: Goals to be met by: discharge     Patient will increase functional independence with mobility by performin. Supine to sit with Stand-by Assistance  2. Sit to supine with Stand-by Assistance  3. Sit to stand transfer with Stand-by Assistance  4. Bed to chair transfer with Contact Guard Assistance using Benjamin-walker  5. Gait  x 30  feet with Contact Guard Assistance using Benjamin-walker.                      Time Tracking:     PT Received On: 20  PT Start Time: 1332     PT Stop Time: 1346  PT Total Time (min): 14 min     Billable Minutes: Gait Training 14    Treatment Type: Treatment  PT/PTA: PT     PTA Visit Number: 0     Chris MeGilligan, PT  2020

## 2020-07-23 NOTE — PT/OT/SLP PROGRESS
"Speech Language Pathology Treatment    Patient Name:  Marck Ramos   MRN:  [id number]  Admitting Diagnosis: Cellulitis    Recommendations:                 General Recommendations:  Dysphagia therapy  Diet recommendations:  Dental Soft, No Bread, No Rice, Liquid Diet Level: Thin   Aspiration Precautions:   · Small, isolated sips of liquid---NO STRAWS  · Alternate bite and sip throughout meal & dry swallows following liquid   · Upright 30 min to 1 hour after intake    · Oral care after meals   General Precautions: Standard, aspiration  Communication strategies:  none    Subjective     "I don't have anything to swallow"  Patient goals: none stated      Pain/Comfort:  · Pain Rating 1: 0/10    Objective:     Has the patient been evaluated by SLP for swallowing?   Yes  Keep patient NPO? No   Current Respiratory Status: room air      Verbal cues required across each trial for dry swallows. Pt with poor understanding, consistently reporting he has nothing to swallow. Ind'ly consuming isolated bites/sips in compliance with precautions.  Daughter present at bedside receptive to education, asking appropriate questions for enhanced learning and demonstrating understanding. Reinforced importance of combating pulmonary complications 2/2 anticipated aspiration with oral care after meals and mobilization as able.     Assessment:     Marck Ramos is a 89 y.o. male with an SLP diagnosis of Dysphagia.     Goals:   Multidisciplinary Problems     SLP Goals        Problem: SLP Goal    Goal Priority Disciplines Outcome   SLP Goal     SLP Ongoing, Progressing   Description: 1. Pt will tolerate least restrictive PO diet without acute dysphagia pulmonary complication.   2. PENDING: Pt will participate in VFSS to define swallow physiology; treatment goal to follow                            Plan:     · Patient to be seen:  3 x/week   · Plan of Care expires:     · Plan of Care reviewed with:  patient, daughter   · SLP Follow-Up:  Yes     "   Discharge recommendations:  home health speech therapy(vs SNF pending PT & OT)     Time Tracking:     SLP Treatment Date:   07/23/20  Speech Start Time:  1222  Speech Stop Time:  1253     Speech Total Time (min):  31 min    Billable Minutes: Treatment Swallowing Dysfunction 31    Jairo Pedersen CCC-SLP  07/23/2020

## 2020-07-23 NOTE — PLAN OF CARE
07/23/20 1137   Patient Assessment/Suction   Level of Consciousness (AVPU) alert   Respiratory Effort Normal;Unlabored   All Lung Fields Breath Sounds wheezes, expiratory   PRE-TX-O2   O2 Device (Oxygen Therapy) room air   SpO2 (!) 94 %   Pulse Oximetry Type Intermittent   $ Pulse Oximetry - Multiple Charge Pulse Oximetry - Multiple   Pulse 67   Resp 18   Aerosol Therapy   $ Aerosol Therapy Charges Aerosol Treatment   Daily Review of Necessity (SVN) completed   Respiratory Treatment Status (SVN) given   Treatment Route (SVN) oxygen;mask   Patient Position (SVN) HOB elevated   Post Treatment Assessment (SVN) breath sounds improved   Signs of Intolerance (SVN) none   Respiratory Evaluation   $ Care Plan Tech Time 15 min

## 2020-07-23 NOTE — PLAN OF CARE
Problem: SLP Goal  Goal: SLP Goal  Description: 1. Pt will tolerate least restrictive PO diet without acute dysphagia pulmonary complication.   2. Pt will participate in VFSS to define swallow physiology; treatment goal to follow- ACHIEVED  3. Pt will implement aspiration precautions with oral feedings given MIN A  4. Caregiver will demonstrate independence in reinforcement of precautions and strategies to combat pulmonary complications associated w/ aspiration           Outcome: Ongoing, Progressing

## 2020-07-23 NOTE — PT/OT/SLP PROGRESS
Occupational Therapy   Treatment    Name: Marck Ramos  MRN: [id number]  Admitting Diagnosis:  Cellulitis       Recommendations:     Discharge Recommendations: (Home with HHOT and 24/7 assistance vs SNF)  Discharge Equipment Recommendations:  (TBD)  Barriers to discharge:  Decreased caregiver support    Assessment:     Marck Ramos is a 89 y.o. male with a medical diagnosis of Cellulitis.  He presents with improving medical acuity and functional mobility. Patient participated in toilet transfer, toilet hygiene/clothing management, ambulation of household distance using rolling walker and AAROM of left shoulder. Performance deficits affecting function are weakness, impaired endurance, impaired self care skills, impaired functional mobilty, gait instability, impaired balance.     Rehab Prognosis:  Fair; patient would benefit from acute skilled OT services to address these deficits and reach maximum level of function.       Plan:     Patient to be seen 5 x/week to address the above listed problems via self-care/home management, therapeutic activities, therapeutic exercises  · Plan of Care Expires: 08/22/20  · Plan of Care Reviewed with: patient    Subjective     Pain/Comfort:  · Pain Rating 1: 0/10  · Pain Rating Post-Intervention 1: 0/10    Objective:     Communicated with: nurse prior to session.  Patient found HOB elevated with telemetry, peripheral IV upon OT entry to room.    General Precautions: Standard, fall   Orthopedic Precautions:N/A   Braces: N/A     Occupational Performance:     Bed Mobility:    · Patient completed Scooting/Bridging with minimum assistance  · Patient completed Supine to Sit with minimum assistance  · Patient completed Sit to Supine with minimum assistance     Functional Mobility/Transfers:  · Patient completed Toilet Transfer Step Transfer technique with minimum assistance with  hand-held assist and rolling walker  · Functional Mobility: ambulated 15 feet using rolling walker with  contact guard assistance.    LUE AAROM:  · Performed 10 repetitions of AAROM of Left shoulder up to 110 degrees of flexion with moderate assistance.    Activities of Daily Living:  · Toileting: minimum assistance to perform toilet hygiene and clothing management from [facility].    [facility] 6 Click ADL:      Treatment & Education:  Patient required frequent rest breaks during session.    Patient left HOB elevated with all lines intact, call button in reach and bed alarm onEducation:      GOALS:   Multidisciplinary Problems     Occupational Therapy Goals        Problem: Occupational Therapy Goal    Goal Priority Disciplines Outcome Interventions   Occupational Therapy Goal     OT, PT/OT Ongoing, Progressing    Description: Goals to be met by: discharge     Patient will increase functional independence with ADLs by performing:    UE Dressing with Supervision.  LE Dressing with Supervision.  Grooming while standing with Supervision.  Toileting from toilet with Supervision for hygiene and clothing management.   Toilet transfer to toilet with Supervision.  Perform AAROM of left shoulder with supervision.                     Time Tracking:     OT Date of Treatment: 07/23/20  OT Start Time: 1103  OT Stop Time: 1134  OT Total Time (min): 31 min    Billable Minutes:Self Care/Home Management 31    Han Lou OT  7/23/2020

## 2020-07-23 NOTE — PROGRESS NOTES
[facility] Medicine  Progress Note    Patient Name: Marck Ramos  MRN: [id number]  Patient Class: IP- Inpatient   Admission Date: 7/18/2020  Attending Physician: Gilberto Bowie MD  Primary Care Provider: Hood Gannon MD  Date of service:  07/23/2020    Subjective:     Principal Problem:Cellulitis    HPI:  Marck Ramos is a 89 y.o. 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 It is moderate. It is associated with swelling and pain. He denies fever, chills, cough, SOB, N/V/D, dizziness or known trauma. He reports it started as a small hematoma, and progressively got more red and painful. He was recently admitted for a left humerus frx, then pna, and has been receiving PT/OT at home. He states he wants to be a full code, but doesn't think he wants a ventilator long term.     Overview/Hospital Course:  Patient with multiple recent hospitalization.  Increasing falls at home with sustained left humerus fracture, currently in sling, recurrent admission for concern of aspiration pneumonia.  He was discharged home on home health about 1 week ago.  Daughter noted to have developing worsening area of erythema, she is unsure if a cat scratch area initially, enlarging with associated warmth and mild swelling, seen by home health with no improvement on instructed to the ED.  He was started on vancomycin on admission.  On 07/19 patient sleeping, daughter states more recently he has been sleeping more throughout the day, there was concern that he may have aspirated while taking medications earlier, leg continues with erythema, warmth and swelling, no active drainage.  Daughter states patient does have advanced directive/living will, would not want intubation. On 7/20 seen by speech  therapy with recommendations for trial of mechanical soft diet, still coughing with meals, lower extremity appears more to be hematoma with petechiae rather than cellulitis, son at bedside states Dr. Kuhn now out of town, requesting sling to be removed, will consult orthopedics Dr Tenorio.  The patient's hematoma and cellulitis are slowly improving with treatment.  His mobility is slowly improving.  Anticipate discharge home with home health in next couple days.    Interval history:  Today the patient reported some wheezing that occurred suddenly, mild intensity, constant timing, resolved after a bronchodilator treatment.  He has persistent redness and swelling of left lower extremity, constant timing, moderate intensity, slowly improving with treatment since admission and somewhat improved overnight.  Denies fever or chills.  No chest pain or shortness of breath.  No nausea or vomiting.  Weakness is improving with therapy. The patient is tolerating dysphagia diet well.    Physical exam:  Vital signs reviewed  General:  Frail, nontoxic, comfortable appearing  Head and eyes:  Anicteric sclerae, no conjunctival discharge  ENT:  Moist mucous membranes, no thrush  Cardiovascular:  2+ radial pulses bilaterally, distal extremities warm and well perfused  Pulmonary:  Comfortable work of breathing, lungs are clear bilaterally  Neuro:  Nonfocal motor exam, fluent speech, follows commands appropriately  GI:  Abdomen soft and nontender, nondistended  Psych:  Mood is calm, affect restricted, insight fair  Skin:  Dry and warm no jaundice, slight improvement of left lower extremity erythema with mild edema from ankle to mid shin with no palpable fluctuance, small focal area of tenderness palpation and induration of anterior shin, surrounding petechiae    Laboratory data:  Hemoglobin 9.5  Hematocrit 29  Sodium 133    Chest X-ray personally reviewed (7/23):  Lung fields are clear without focal consolidation or pulmonary  edema    Left shoulder x-ray series Impression:  Healing proximal humeral fracture.  Findings suggesting chronic rotator cuff tear.  AC joint arthrosis.  Scattered interstitial and groundglass pulmonary opacities    Left lower extremity venous Doppler ultrasound Impression: No DVT of the left lower extremity veins.    Assessment/Plan:      * Left lower extremity hematoma with superimposed cellulitis  Few days history of progressively worsening erythema with warmth and swelling.  Patient is on Eliquis and there was initially impression of erythema due to Eliquis however worsening.  Continue IV vancomycin-may consider discontinuing depending on clinical course, more suspect likely hematoma with bruising  Demarcate area of erythema and monitor  Elevate lower extremity  A.m. labs ordered for review      History of left humerus fracture  Arm currently in sling.  As per son discussed with Dr. Kuhn who is no longer seeing patients locally, recommends sling to be removed, will consult Dr. Tenorio    PAF (paroxysmal atrial fibrillation)  History of paroxysmal atrial fibrillation.  Followed by Dr. Mares.  Now with increasing falls and declining status will consult cardiology to re-evaluate need for Eliquis.  Continue sotalol.  Holding Eliquis.      Hyponatremia  Sodium level 133.  Slow IV fluids today and repeat levels tomorrow    Possible aspiration pneumonia versus pneumonitis  There was concern patient had severe coughing with possible aspiration.  Recent admission for aspiration pneumonia, declining status, high risk.  Seen by speech therapy with recommendations for mechanical soft diet, no rice or bread, will order an trial  Continue Zosyn for empiric coverage  Aspiration precautions  Repeat chest x-ray in a.m.  Speech therapy following    Debility  As per collateral patient with declining status following humerus fracture, reduced oral intake, more somnolent, decreased activity level and endurance.  Daughter states  she does feel overwhelmed given has to manage at home and frequently has to call for help.  PT/OT.        BPH with obstruction/lower urinary tract symptoms  Known history of BPH, yesterday with volumes greater than 300, Dr. Collins reviewed.  As discussed with nursing as voided about 300 cc 3 times today, will continue with bladder scanning and conservative management.      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  Monitoring.      Hypertension, not well controlled  Continue home Imdur, losartan-dose increased to 50, amlodipine-keep at low-dose as per Cardiology she was not able to tolerate higher doses in the past and monitor.    Add p.r.n. as needed.  More liberal target in this frail elderly male.    Hypokalemia  Hypomagnesemia  Hyponatremia    Plan update today:  Continue care.  Appreciate consultants.  Patient is slowly improving.  Continue IV clindamycin.  High risk of complication continuing combination of vancomycin and Zosyn. Transition oral clindamycin next 24-48 hours.  Wheezing improved with DuoNebs.  Will continue as needed.  Follow-up BNP.  Chest x-ray unremarkable.  Continue supportive care including pain control as needed  Daily labs.  Replace electrolytes including magnesium and potassium today.  Continue IV fluids.  Continue PT/OT.  Likely need home health at discharge.  Benjamin walker DME per recommendations of therapy.  Continue speech therapy.  Dysphagia diet as tolerated.  Courage oral intake.  DNR status noted  Continue medical management including aspirin, sotalol, ARB, Norvasc, and p.r.n. nitrates.  Holding statin due to muscle weakness and oral anticoagulant due to bleeding risk with multiple falls  SCDs for VTE prophylaxis; avoid anticoagulant due to hematoma  High risk secondary to multiple medical comorbid conditions with advanced age; IV fluids with additives due to multiple  electrolyte abnormalities; prescription drug management and multiple medications managed  Disposition:  Possible discharge 24-48 hours pending transition oral antibiotics    VTE Risk Mitigation (From admission, onward)         Ordered     IP VTE HIGH RISK PATIENT  Once      07/18/20 1905     Place sequential compression device  Until discontinued      07/18/20 1905                Gilberto Bowie MD  Department of Hospital Medicine   [facility]

## 2020-07-23 NOTE — PLAN OF CARE
Problem: Fall Injury Risk  Goal: Absence of Fall and Fall-Related Injury  Outcome: Ongoing, Progressing     Problem: Adult Inpatient Plan of Care  Goal: Plan of Care Review  Outcome: Ongoing, Progressing  Goal: Patient-Specific Goal (Individualization)  Outcome: Ongoing, Progressing  Goal: Absence of Hospital-Acquired Illness or Injury  Outcome: Ongoing, Progressing  Goal: Optimal Comfort and Wellbeing  Outcome: Ongoing, Progressing  Goal: Readiness for Transition of Care  Outcome: Ongoing, Progressing  Goal: Rounds/Family Conference  Outcome: Ongoing, Progressing     Problem: Skin Injury Risk Increased  Goal: Skin Health and Integrity  Outcome: Ongoing, Progressing     Problem: Infection  Goal: Infection Symptom Resolution  Outcome: Ongoing, Progressing     Problem: Wound  Goal: Optimal Wound Healing  Outcome: Ongoing, Progressing     Problem: Swallowing Impairment  Goal: Improved Swallowing Without Aspiration  Outcome: Ongoing, Progressing     Problem: Oral Intake Inadequate  Goal: Improved Oral Intake  Outcome: Ongoing, Progressing

## 2020-07-24 LAB
ANION GAP SERPL CALC-SCNC: 9 MMOL/L (ref 8–16)
BUN SERPL-MCNC: 17 MG/DL (ref 10–30)
CALCIUM SERPL-MCNC: 7.9 MG/DL (ref 8.7–10.5)
CHLORIDE SERPL-SCNC: 101 MMOL/L (ref 95–110)
CO2 SERPL-SCNC: 23 MMOL/L (ref 23–29)
CREAT SERPL-MCNC: 0.6 MG/DL (ref 0.5–1.4)
EST. GFR  (AFRICAN AMERICAN): >60 ML/MIN/1.73 M^2
EST. GFR  (NON AFRICAN AMERICAN): >60 ML/MIN/1.73 M^2
GLUCOSE SERPL-MCNC: 94 MG/DL (ref 70–110)
MAGNESIUM SERPL-MCNC: 1.8 MG/DL (ref 1.6–2.6)
POTASSIUM SERPL-SCNC: 4 MMOL/L (ref 3.5–5.1)
SODIUM SERPL-SCNC: 133 MMOL/L (ref 136–145)

## 2020-07-24 PROCEDURE — 97535 SELF CARE MNGMENT TRAINING: CPT

## 2020-07-24 PROCEDURE — 63600175 PHARM REV CODE 636 W HCPCS: Performed by: INTERNAL MEDICINE

## 2020-07-24 PROCEDURE — 80048 BASIC METABOLIC PNL TOTAL CA: CPT

## 2020-07-24 PROCEDURE — 83735 ASSAY OF MAGNESIUM: CPT

## 2020-07-24 PROCEDURE — 25000003 PHARM REV CODE 250: Performed by: INTERNAL MEDICINE

## 2020-07-24 PROCEDURE — 25000003 PHARM REV CODE 250: Performed by: NURSE PRACTITIONER

## 2020-07-24 PROCEDURE — 94640 AIRWAY INHALATION TREATMENT: CPT

## 2020-07-24 PROCEDURE — 36415 COLL VENOUS BLD VENIPUNCTURE: CPT

## 2020-07-24 PROCEDURE — 12000002 HC ACUTE/MED SURGE SEMI-PRIVATE ROOM

## 2020-07-24 PROCEDURE — 99900031 HC PATIENT EDUCATION (STAT)

## 2020-07-24 PROCEDURE — 25000242 PHARM REV CODE 250 ALT 637 W/ HCPCS: Performed by: INTERNAL MEDICINE

## 2020-07-24 PROCEDURE — 94761 N-INVAS EAR/PLS OXIMETRY MLT: CPT

## 2020-07-24 PROCEDURE — 99900035 HC TECH TIME PER 15 MIN (STAT)

## 2020-07-24 PROCEDURE — 97116 GAIT TRAINING THERAPY: CPT | Mod: CQ

## 2020-07-24 RX ORDER — FUROSEMIDE 10 MG/ML
20 INJECTION INTRAMUSCULAR; INTRAVENOUS ONCE
Status: COMPLETED | OUTPATIENT
Start: 2020-07-24 | End: 2020-07-24

## 2020-07-24 RX ADMIN — AMLODIPINE BESYLATE 2.5 MG: 2.5 TABLET ORAL at 10:07

## 2020-07-24 RX ADMIN — CLINDAMYCIN IN 5 PERCENT DEXTROSE 900 MG: 18 INJECTION, SOLUTION INTRAVENOUS at 04:07

## 2020-07-24 RX ADMIN — PANTOPRAZOLE SODIUM 40 MG: 40 TABLET, DELAYED RELEASE ORAL at 10:07

## 2020-07-24 RX ADMIN — FUROSEMIDE 20 MG: 10 INJECTION, SOLUTION INTRAMUSCULAR; INTRAVENOUS at 03:07

## 2020-07-24 RX ADMIN — TAMSULOSIN HYDROCHLORIDE 0.4 MG: 0.4 CAPSULE ORAL at 10:07

## 2020-07-24 RX ADMIN — ISOSORBIDE MONONITRATE 60 MG: 60 TABLET, EXTENDED RELEASE ORAL at 09:07

## 2020-07-24 RX ADMIN — SOTALOL HYDROCHLORIDE 80 MG: 80 TABLET ORAL at 10:07

## 2020-07-24 RX ADMIN — CLINDAMYCIN IN 5 PERCENT DEXTROSE 900 MG: 18 INJECTION, SOLUTION INTRAVENOUS at 01:07

## 2020-07-24 RX ADMIN — CLINDAMYCIN IN 5 PERCENT DEXTROSE 900 MG: 18 INJECTION, SOLUTION INTRAVENOUS at 08:07

## 2020-07-24 RX ADMIN — ASPIRIN 81 MG: 81 TABLET, DELAYED RELEASE ORAL at 10:07

## 2020-07-24 RX ADMIN — LOSARTAN POTASSIUM 50 MG: 50 TABLET, FILM COATED ORAL at 08:07

## 2020-07-24 RX ADMIN — POTASSIUM CHLORIDE 20 MEQ: 20 TABLET, EXTENDED RELEASE ORAL at 10:07

## 2020-07-24 RX ADMIN — SOTALOL HYDROCHLORIDE 80 MG: 80 TABLET ORAL at 08:07

## 2020-07-24 RX ADMIN — IPRATROPIUM BROMIDE AND ALBUTEROL SULFATE 3 ML: .5; 3 SOLUTION RESPIRATORY (INHALATION) at 02:07

## 2020-07-24 RX ADMIN — MAGNESIUM OXIDE 400 MG: 400 TABLET ORAL at 10:07

## 2020-07-24 RX ADMIN — LOSARTAN POTASSIUM 50 MG: 50 TABLET, FILM COATED ORAL at 10:07

## 2020-07-24 NOTE — PLAN OF CARE
07/24/20 1600   Discharge Reassessment   Assessment Type Discharge Planning Reassessment   Anticipated Discharge Disposition Home-Health   Post-Acute Status   Post-Acute Authorization Home Health   Home Health Status Awaiting Orders for      Referral initiated to [facility] home health informing of possible discharge home tomorrow. MD aware of need for resumption order to restart home health. CM/JAYDEN following.

## 2020-07-24 NOTE — PROGRESS NOTES
"[facility]  Adult Nutrition   Progress Note (Follow-Up)    SUMMARY     Recommendations  Recommendation/Intervention: 1. Continue least restrictive diet texture as per SLP recommendations and oral supplements to aid in meeting needs 2. Encourage PO intake and provide meal assist as required  Goals: 1. Patient to meet at least 75% estimated nutritional needs via PO diet/supplements  Nutrition Goal Status: progressing towards goal  Communication of RD Recs: reviewed with RN    Dietitian Rounds Brief    Pt seen for f/u. Tolerating diet texture per staff. Intake varied depending upon menu. Consuming 100% ONS per CNA. Requires meal set up assist, but able to feed self without difficulty. SLP continues to follow. LBM 7/22.     Reason for Assessment  Reason For Assessment: RD follow-up  Diagnosis: other (see comments)(LE hematoma vs cellulitis)  Relevant Medical History: CHF, anemia, HTN, PE, CKD stage II  Interdisciplinary Rounds: attended    Nutrition Risk Screen  Nutrition Risk Screen: dysphagia or difficulty swallowing    Nutrition/Diet History  Spiritual, Cultural Beliefs, Mosque Practices, Values that Affect Care: no  Food Allergies: NKFA  Factors Affecting Nutritional Intake: difficulty/impaired swallowing    Anthropometrics  Temp: 98.2 °F (36.8 °C)  Height Method: Stated  Height: 5' 6" (167.6 cm)  Height (inches): 66 in  Weight Method: Bed Scale  Weight: 56.2 kg (123 lb 14.4 oz)  Weight (lb): 123.9 lb  Ideal Body Weight (IBW), Male: 142 lb  % Ideal Body Weight, Male (lb): 87.25 %  BMI (Calculated): 20  BMI Grade: 18.5-24.9 - normal       Weight History:  Wt Readings from Last 10 Encounters:   07/22/20 56.2 kg (123 lb 14.4 oz)   07/16/20 57.2 kg (126 lb)   07/11/20 60.2 kg (132 lb 11.5 oz)   06/03/20 52.2 kg (115 lb)   01/02/20 59.4 kg (131 lb)   12/13/19 59.4 kg (131 lb)   10/03/19 56.9 kg (125 lb 6.4 oz)   09/05/19 57.7 kg (127 lb 3.2 oz)   08/28/19 56.7 kg (125 lb)   08/19/19 56.9 kg (125 lb 7.1 " oz)       Lab/Procedures/Meds: Pertinent Labs Reviewed  Clinical Chemistry:  Recent Labs   Lab 07/18/20  1652  07/24/20  0441   *   < > 133*   K 4.0   < > 4.0   CL 99   < > 101   CO2 27   < > 23   GLU 93   < > 94   BUN 22   < > 17   CREATININE 0.9   < > 0.6   CALCIUM 8.1*   < > 7.9*   PROT 6.0  --   --    ALBUMIN 2.6*  --   --    BILITOT 0.8  --   --    ALKPHOS 65  --   --    AST 16  --   --    ALT 15  --   --    ANIONGAP 8   < > 9   ESTGFRAFRICA >60.0   < > >60.0   EGFRNONAA >60.0   < > >60.0   MG 1.8   < > 1.8    < > = values in this interval not displayed.     CBC:   Recent Labs   Lab 07/23/20  0547   WBC 6.61   RBC 2.93*   HGB 9.5*   HCT 29.0*      MCV 99*   MCH 32.4*   MCHC 32.8     Cardiac Profile:  Recent Labs   Lab 07/18/20 1652 07/23/20  0547   * 530*   TROPONINI <0.030  --        Medications: Pertinent Medications reviewed  Scheduled Meds:   amLODIPine  2.5 mg Oral Daily    aspirin  81 mg Oral Daily    clindamycin (CLEOCIN) IVPB  900 mg Intravenous Q8H    isosorbide mononitrate  60 mg Oral Daily    losartan  50 mg Oral BID    magnesium oxide  400 mg Oral Daily    pantoprazole  40 mg Oral Daily    potassium chloride SA  20 mEq Oral Daily    sotaloL  80 mg Oral BID    tamsulosin  0.4 mg Oral Daily     Continuous Infusions:   sodium chloride 0.9% 75 mL/hr at 07/23/20 0426     PRN Meds:.acetaminophen, albuterol-ipratropium, calcium chloride IVPB, calcium chloride IVPB, calcium chloride IVPB, guaifenesin 100 mg/5 ml, magnesium oxide, magnesium sulfate IVPB, magnesium sulfate IVPB, magnesium sulfate IVPB, magnesium sulfate IVPB, melatonin, potassium chloride in water, potassium chloride in water, potassium chloride in water, potassium chloride in water, potassium chloride, potassium chloride, potassium chloride, potassium chloride, sodium chloride 0.9%, sodium phosphate IVPB, sodium phosphate IVPB, sodium phosphate IVPB, sodium phosphate IVPB, sodium phosphate  IVPB    Estimated/Assessed Needs  Weight Used For Calorie Calculations: 56.2 kg (123 lb 14.4 oz)  Energy Calorie Requirements (kcal): 3229-7091 (25-30 kcal/kg)  Energy Need Method: Kcal/kg  Protein Requirements: 67-84 g (1.2-1.5 g/kg)  Weight Used For Protein Calculations: 56.2 kg (123 lb 14.4 oz)     Estimated Fluid Requirement Method: RDA Method  RDA Method (mL): 1405       Nutrition Prescription Ordered  Current Diet Order: dysphagia soft (IDDSI level 6)  Oral Nutrition Supplement: Ensure Enlive TID (1050 kcal, 60 g pro)    Evaluation of Received Nutrient/Fluid Intake  Energy Calories Required: meeting needs  Protein Required: meeting needs  Fluid Required: meeting needs  Tolerance: tolerating     Intake/Output Summary (Last 24 hours) at 7/24/2020 0849  Last data filed at 7/24/2020 0632  Gross per 24 hour   Intake 1150 ml   Output 300 ml   Net 850 ml        % Meal Intake: Other: 25-75% (varies each meal), 100% ONS per staff    Nutrition Risk  Level of Risk/Frequency of Follow-up: moderate - high     Monitor and Evaluation  Food and Nutrient Intake: food and beverage intake, energy intake  Food and Nutrient Adminstration: diet order  Physical Activity and Function: nutrition-related ADLs and IADLs  Anthropometric Measurements: weight, weight change  Biochemical Data, Medical Tests and Procedures: electrolyte and renal panel, gastrointestinal profile, inflammatory profile, glucose/endocrine profile  Nutrition-Focused Physical Findings: overall appearance     Nutrition Follow-Up  RD Follow-up?: Yes    Debra Morales RD 07/24/2020 8:50 AM

## 2020-07-24 NOTE — RESPIRATORY THERAPY
"   07/23/20 2032   Patient Assessment/Suction   Level of Consciousness (AVPU) alert   Respiratory Effort Normal;Unlabored   Expansion/Accessory Muscles/Retractions expansion symmetric;no retractions;no use of accessory muscles   All Lung Fields Breath Sounds clear   Rhythm/Pattern, Respiratory no shortness of breath reported;pattern regular;unlabored   PRE-TX-O2   O2 Device (Oxygen Therapy) room air   SpO2 98 %   Pulse Oximetry Type Intermittent   $ Pulse Oximetry - Multiple Charge Pulse Oximetry - Multiple   Pulse 60   Resp 16   Aerosol Therapy   $ Aerosol Therapy Charges PRN treatment not required   Respiratory Interventions   Cough And Deep Breathing done with encouragement   Breathing Techniques/Airway Clearance deep/controlled cough encouraged;diaphragmatic breathing promoted   Education   $ Education Bronchodilator;15 min   Respiratory Evaluation   $ Care Plan Tech Time 15 min   Evaluation For   (CARE PLAN)   Admitting Diagnosis hemotama   Home Oxygen   Has Home Oxygen? No   Home Aerosol, MDI, DPI, and Other Treatments/Therapies   Home Respiratory Therapy Per Patient/Review of Chart No   Oxygen Care Plan   SPO2 Goal (%) 92% non-cardiac   Bronchodilator Care Plan   Bronchodilator Care Plan Aerosol   Aerosol Meds w/ frequency Albuterol - Ipratropium (DUO-NEB) 0.5mg-3mg(2.5ml) 3ml Nebulizer Solution2.5mg PRN   Rationale Chest "tightness" with breathing;Shortness of breath (increased WOB)     "

## 2020-07-24 NOTE — PLAN OF CARE
Problem: Physical Therapy Goal  Goal: Physical Therapy Goal  Description: Goals to be met by: discharge     Patient will increase functional independence with mobility by performin. Supine to sit with Stand-by Assistance  2. Sit to supine with Stand-by Assistance  3. Sit to stand transfer with Stand-by Assistance  4. Bed to chair transfer with Contact Guard Assistance using Benjamin-walker  5. Gait  x 30  feet with Contact Guard Assistance using Benjamin-walker.     Outcome: Ongoing, Progressing   Pt continues to progress towards goals.

## 2020-07-24 NOTE — PT/OT/SLP PROGRESS
Occupational Therapy   Treatment    Name: Marck Ramos  MRN: [id number]  Admitting Diagnosis:  Cellulitis       Recommendations:     Discharge Recommendations: (Home with HHOT and 24/7 assistance vs SNF)  Discharge Equipment Recommendations:  (TBD)  Barriers to discharge:       Assessment:     Marck Ramos is a 89 y.o. male with a medical diagnosis of Cellulitis.  Pt agreeable to OT therapy session this AM. Performance deficits affecting function are weakness, impaired endurance, impaired self care skills, impaired functional mobilty, gait instability, impaired balance, decreased upper extremity function, decreased ROM.     Rehab Prognosis:  Good; patient would benefit from acute skilled OT services to address these deficits and reach maximum level of function.       Plan:     Patient to be seen 5 x/week to address the above listed problems via self-care/home management, therapeutic exercises, therapeutic activities  · Plan of Care Expires: 08/22/20  · Plan of Care Reviewed with: patient    Subjective     Pain/Comfort:  · Pain Rating 1: 0/10    Objective:     Communicated with: nursing prior to session.  Patient found up in chair with (chair alarm) upon OT entry to room.    General Precautions: Standard, fall   Orthopedic Precautions:N/A   Braces:       Occupational Performance:     Functional Mobility/Transfers:  · Patient completed Sit <> Stand Transfer with minimum assistance  with  rolling walker     Activities of Daily Living:  · Grooming: CGA to brush teeth while standing with tray table raised; S/u to wash face seated in chair     Treatment & Education:  AAROM of LUE X 10 reps to ~100 degrees shoulder flex with pt tolerating well. Pt educated on use of call bell    Patient left up in chair with all lines intact, call button in reach and chair alarm onEducation:      GOALS:   Multidisciplinary Problems     Occupational Therapy Goals        Problem: Occupational Therapy Goal    Goal Priority Disciplines  Outcome Interventions   Occupational Therapy Goal     OT, PT/OT Ongoing, Progressing    Description: Goals to be met by: discharge     Patient will increase functional independence with ADLs by performing:    UE Dressing with Supervision.  LE Dressing with Supervision.  Grooming while standing with Supervision.  Toileting from toilet with Supervision for hygiene and clothing management.   Toilet transfer to toilet with Supervision.  Perform AAROM of left shoulder with supervision.                     Time Tracking:     OT Date of Treatment: 07/24/20  OT Start Time: 1055  OT Stop Time: 1111  OT Total Time (min): 16 min    Billable Minutes:Self Care/Home Management 16    Willa Lizarraga OT  7/24/2020

## 2020-07-24 NOTE — PT/OT/SLP PROGRESS
Physical Therapy Treatment    Patient Name:  Marck Ramos   MRN:  [id number]    Recommendations:     Discharge Recommendations:  home health PT, nursing facility, skilled(HHPT vs SNF)   Discharge Equipment Recommendations: walker, lit(if discharges home with family)   Barriers to discharge: None    Assessment:     Marck Ramos is a 89 y.o. male admitted with a medical diagnosis of Cellulitis.  He presents with the following impairments/functional limitations:  weakness, impaired endurance, impaired functional mobilty, gait instability, impaired balance, decreased lower extremity function, decreased ROM. Patient presents resting in bed with HOB elevated; agreeable to PT treatment. Patient progressing with functional mobility today.Patient able to ambulate with use of RW however requires assistance from PTA to steer L side 2/2 painful LLE. Patient without LOB or SOB noted however patient's gait distance was limited 2/2 fatigue. Patient would benefit from use of possible lit walker although patient seems to ambulate better with RW. Continue with PT and POC.    Rehab Prognosis: Good; patient would benefit from acute skilled PT services to address these deficits and reach maximum level of function.    Recent Surgery: * No surgery found *      Plan:     During this hospitalization, patient to be seen 6 x/week to address the identified rehab impairments via gait training, therapeutic exercises and progress toward the following goals:    · Plan of Care Expires:  08/10/20    Subjective     Chief Complaint: No complaints  Patient/Family Comments/goals:   Pain/Comfort:  · Pain Rating 1: 0/10      Objective:     Communicated with RN prior to session.  Patient found HOB elevated with bed alarm, pressure relief boots upon PT entry to room.     General Precautions: Standard, fall   Orthopedic Precautions:N/A   Braces:       Functional Mobility:  · Bed Mobility:     · Supine to Sit: moderate assistance  · Transfers:     · Sit  to Stand:  minimum assistance with rolling walker  · Bed to Chair: minimum assistance with  rolling walker  using  Step Transfer  · Gait: 80ft with RW and Min Assist for PTA to steer L side of RW; patient held LUE close to body for support      AM-PAC 6 CLICK MOBILITY          Therapeutic Activities and Exercises:   bed mobility; sitting EOB for trunk control and midline orientation; sit <> stands; transfer training    Patient left up in chair with all lines intact, call button in reach, chair alarm on and pt's daughter present..    GOALS:   Multidisciplinary Problems     Physical Therapy Goals        Problem: Physical Therapy Goal    Goal Priority Disciplines Outcome Goal Variances Interventions   Physical Therapy Goal     PT, PT/OT Ongoing, Progressing     Description: Goals to be met by: discharge     Patient will increase functional independence with mobility by performin. Supine to sit with Stand-by Assistance  2. Sit to supine with Stand-by Assistance  3. Sit to stand transfer with Stand-by Assistance  4. Bed to chair transfer with Contact Guard Assistance using Benjaimn-walker  5. Gait  x 30  feet with Contact Guard Assistance using Benjamin-walker.                      Time Tracking:     PT Received On: 20  PT Start Time: 1014     PT Stop Time: 1030  PT Total Time (min): 16 min     Billable Minutes: Gait Training 16    Treatment Type: Treatment  PT/PTA: PTA     PTA Visit Number: 1     Mae Tomas, CHRISTIANA  2020

## 2020-07-24 NOTE — PROGRESS NOTES
[facility] Medicine  Progress Note    Patient Name: Marck Ramos  MRN: [id number]  Patient Class: IP- Inpatient   Admission Date: 7/18/2020  Attending Physician: Gilberto Bowie MD  Primary Care Provider: Hood Gannon MD  Date of service:  07/24/2020    Subjective:     Principal Problem:Cellulitis    HPI:  Marck Ramos is a 89 y.o. 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 It is moderate. It is associated with swelling and pain. He denies fever, chills, cough, SOB, N/V/D, dizziness or known trauma. He reports it started as a small hematoma, and progressively got more red and painful. He was recently admitted for a left humerus frx, then pna, and has been receiving PT/OT at home. He states he wants to be a full code, but doesn't think he wants a ventilator long term.     Overview/Hospital Course:  Patient with multiple recent hospitalization.  Increasing falls at home with sustained left humerus fracture, currently in sling, recurrent admission for concern of aspiration pneumonia.  He was discharged home on home health about 1 week ago.  Daughter noted to have developing worsening area of erythema, she is unsure if a cat scratch area initially, enlarging with associated warmth and mild swelling, seen by home health with no improvement on instructed to the ED.  He was started on vancomycin on admission.  On 07/19 patient sleeping, daughter states more recently he has been sleeping more throughout the day, there was concern that he may have aspirated while taking medications earlier, leg continues with erythema, warmth and swelling, no active drainage.  Daughter states patient does have advanced directive/living will, would not want intubation. On 7/20 seen by speech  therapy with recommendations for trial of mechanical soft diet, still coughing with meals, lower extremity appears more to be hematoma with petechiae rather than cellulitis, son at bedside states Dr. Kuhn now out of town, requesting sling to be removed, will consult orthopedics Dr Tenorio.  The patient's hematoma and cellulitis are slowly improving with treatment.  His mobility is slowly improving.  Anticipate discharge home with home health in next couple days.    Interval history:  Today the patient reported some increased shortness of breath with wheezing (and crackles reported by nursing), moderate intensity, constant timing, improved somewhat after a bronchodilator treatment.  Redness and swelling of left lower extremity continues to slowly improve, remains constant timing, moderate intensity.  Denies fever or chills.  No chest pain or shortness of breath.  No nausea or vomiting.  He had some coughing after eating and possible vomitus.    Physical exam:  Vital signs reviewed  General:  Frail, nontoxic, comfortable appearing  Cardiovascular:  2+ radial pulses bilaterally, distal extremities warm and well perfused  Pulmonary:  Comfortable work of breathing, faint basilar crackles  Neuro:  Nonfocal motor exam, fluent speech, follows commands appropriately  Psych:  Mood is calm, affect restricted, insight fair  Skin:  Dry and warm no jaundice, continued improvement of erythema and swelling of left lower extremity, persistent small focal area of tenderness palpation and induration of anterior shin, surrounding petechiae    Laboratory data:  Labs reviewed    Chest X-ray personally reviewed (7/24):  Development of mild interstitial pulmonary edema    Left shoulder x-ray series Impression:  Healing proximal humeral fracture.  Findings suggesting chronic rotator cuff tear.  AC joint arthrosis.  Scattered interstitial and groundglass pulmonary opacities    Left lower extremity venous Doppler ultrasound Impression: No  DVT of the left lower extremity veins.    Assessment/Plan:      * Left lower extremity hematoma with superimposed cellulitis  Few days history of progressively worsening erythema with warmth and swelling.  Patient is on Eliquis and there was initially impression of erythema due to Eliquis however worsening.  Continue IV vancomycin-may consider discontinuing depending on clinical course, more suspect likely hematoma with bruising  Demarcate area of erythema and monitor  Elevate lower extremity  A.m. labs ordered for review      History of left humerus fracture  Arm currently in sling.  As per son discussed with Dr. Kuhn who is no longer seeing patients locally, recommends sling to be removed, will consult Dr. Tenorio    PAF (paroxysmal atrial fibrillation)  History of paroxysmal atrial fibrillation.  Followed by Dr. Mares.  Now with increasing falls and declining status will consult cardiology to re-evaluate need for Eliquis.  Continue sotalol.  Holding Eliquis.      Hyponatremia  Sodium level 133.  Slow IV fluids today and repeat levels tomorrow    Possible aspiration pneumonia versus pneumonitis  There was concern patient had severe coughing with possible aspiration.  Recent admission for aspiration pneumonia, declining status, high risk.  Seen by speech therapy with recommendations for mechanical soft diet, no rice or bread, will order an trial  Continue Zosyn for empiric coverage  Aspiration precautions  Repeat chest x-ray in a.m.  Speech therapy following    Debility  As per collateral patient with declining status following humerus fracture, reduced oral intake, more somnolent, decreased activity level and endurance.  Daughter states she does feel overwhelmed given has to manage at home and frequently has to call for help.  PT/OT.        BPH with obstruction/lower urinary tract symptoms  Known history of BPH, yesterday with volumes greater than 300, Dr. Collins reviewed.  As discussed with nursing as voided  about 300 cc 3 times today, will continue with bladder scanning and conservative management.      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  Monitoring.      Hypertension, not well controlled  Continue home Imdur, losartan-dose increased to 50, amlodipine-keep at low-dose as per Cardiology she was not able to tolerate higher doses in the past and monitor.    Add p.r.n. as needed.  More liberal target in this frail elderly male.    Hypokalemia  Hypomagnesemia  Hyponatremia  Mild iatrogenic pulmonary edema    Plan update today:  Continue care.  Appreciate consultants.    Stat chest x-ray with some pulmonary edema.  Stop IV fluids.  IV Lasix 20 mg x1.  May need repeat chest x-ray in 24 hr prior to discharge.  Continue IV clindamycin.  Transition oral clindamycin next 24 hr likely at discharge.  Continue supportive care including pain control as needed  Daily labs.  Replace electrolytes as needed  Continue PT/OT.  Likely need home health at discharge.  Benjamin walker DME per recommendations of therapy.  Continue speech therapy.  Dysphagia diet as tolerated.  Courage oral intake.  DNR status noted  Continue medical management including aspirin, sotalol, ARB, Norvasc, and p.r.n. nitrates.  Holding statin due to muscle weakness and oral anticoagulant due to bleeding risk with multiple falls  SCDs for VTE prophylaxis; avoid anticoagulant due to hematoma  Moderate risk secondary to moderate intensity acute illness from acute pulmonary edema requiring IV Lasix administration; multiple medical comorbid conditions with advanced age; prescription drug management and multiple medications managed  Disposition:  Possible discharge 24 hr pending resolution of pulmonary edema    VTE Risk Mitigation (From admission, onward)         Ordered     IP VTE HIGH RISK PATIENT  Once      07/18/20 1905     Place sequential compression  device  Until discontinued      07/18/20 6385                Gilberto Bowie MD  Department of Hospital Medicine   [facility]

## 2020-07-24 NOTE — PLAN OF CARE
Problem: Occupational Therapy Goal  Goal: Occupational Therapy Goal  Description: Goals to be met by: discharge     Patient will increase functional independence with ADLs by performing:    UE Dressing with Supervision.  LE Dressing with Supervision.  Grooming while standing with Supervision.  Toileting from toilet with Supervision for hygiene and clothing management.   Toilet transfer to toilet with Supervision.  Perform AAROM of left shoulder with supervision.    Outcome: Ongoing, Progressing      Patient systolic BP dropped from 110s to 90s, MAP 71. Levo increased from 0.06 to 0.08. Propofol decreased from 50 to 40, and fentanyl increased from 200 to 250. Dr. Polanco notified.     Labs resulted with a drop in H&H from 8 and 24 to 7.3 and 21.9. Will redraw labs in 2 hours per MD. Will also monitor BP and HR as well as watch for obvious bleeding.     Patient currently  /54 MAP 78.

## 2020-07-24 NOTE — PLAN OF CARE
07/24/20 1556   Medicare Message   Important Message from Medicare regarding Discharge Appeal Rights Given to patient/caregiver;Explained to patient/caregiver;Signed/date by patient/caregiver;Other (comments)  (per daughter, Kati Diallo. Understanding verbalized. D/C IMM)   Date IMM was signed 07/24/20   Time IMM was signed [id number]

## 2020-07-25 PROCEDURE — 94761 N-INVAS EAR/PLS OXIMETRY MLT: CPT

## 2020-07-25 PROCEDURE — 25000003 PHARM REV CODE 250: Performed by: INTERNAL MEDICINE

## 2020-07-25 PROCEDURE — 25000242 PHARM REV CODE 250 ALT 637 W/ HCPCS: Performed by: INTERNAL MEDICINE

## 2020-07-25 PROCEDURE — 63600175 PHARM REV CODE 636 W HCPCS: Performed by: INTERNAL MEDICINE

## 2020-07-25 PROCEDURE — 99900035 HC TECH TIME PER 15 MIN (STAT)

## 2020-07-25 PROCEDURE — 25000003 PHARM REV CODE 250: Performed by: NURSE PRACTITIONER

## 2020-07-25 PROCEDURE — 97530 THERAPEUTIC ACTIVITIES: CPT | Mod: CQ

## 2020-07-25 PROCEDURE — 97116 GAIT TRAINING THERAPY: CPT | Mod: CQ

## 2020-07-25 PROCEDURE — 12000002 HC ACUTE/MED SURGE SEMI-PRIVATE ROOM

## 2020-07-25 PROCEDURE — 94640 AIRWAY INHALATION TREATMENT: CPT

## 2020-07-25 PROCEDURE — 92526 ORAL FUNCTION THERAPY: CPT

## 2020-07-25 RX ORDER — IPRATROPIUM BROMIDE AND ALBUTEROL SULFATE 2.5; .5 MG/3ML; MG/3ML
3 SOLUTION RESPIRATORY (INHALATION)
Status: DISCONTINUED | OUTPATIENT
Start: 2020-07-25 | End: 2020-07-27 | Stop reason: HOSPADM

## 2020-07-25 RX ORDER — FUROSEMIDE 10 MG/ML
20 INJECTION INTRAMUSCULAR; INTRAVENOUS ONCE
Status: COMPLETED | OUTPATIENT
Start: 2020-07-25 | End: 2020-07-25

## 2020-07-25 RX ADMIN — CLINDAMYCIN IN 5 PERCENT DEXTROSE 900 MG: 18 INJECTION, SOLUTION INTRAVENOUS at 05:07

## 2020-07-25 RX ADMIN — MAGNESIUM OXIDE 400 MG: 400 TABLET ORAL at 08:07

## 2020-07-25 RX ADMIN — IPRATROPIUM BROMIDE AND ALBUTEROL SULFATE 3 ML: .5; 3 SOLUTION RESPIRATORY (INHALATION) at 03:07

## 2020-07-25 RX ADMIN — LOSARTAN POTASSIUM 50 MG: 50 TABLET, FILM COATED ORAL at 08:07

## 2020-07-25 RX ADMIN — ASPIRIN 81 MG: 81 TABLET, DELAYED RELEASE ORAL at 08:07

## 2020-07-25 RX ADMIN — TAMSULOSIN HYDROCHLORIDE 0.4 MG: 0.4 CAPSULE ORAL at 08:07

## 2020-07-25 RX ADMIN — ISOSORBIDE MONONITRATE 60 MG: 60 TABLET, EXTENDED RELEASE ORAL at 08:07

## 2020-07-25 RX ADMIN — IPRATROPIUM BROMIDE AND ALBUTEROL SULFATE 3 ML: .5; 3 SOLUTION RESPIRATORY (INHALATION) at 11:07

## 2020-07-25 RX ADMIN — CLINDAMYCIN IN 5 PERCENT DEXTROSE 900 MG: 18 INJECTION, SOLUTION INTRAVENOUS at 12:07

## 2020-07-25 RX ADMIN — POTASSIUM CHLORIDE 20 MEQ: 20 TABLET, EXTENDED RELEASE ORAL at 08:07

## 2020-07-25 RX ADMIN — CLINDAMYCIN IN 5 PERCENT DEXTROSE 900 MG: 18 INJECTION, SOLUTION INTRAVENOUS at 08:07

## 2020-07-25 RX ADMIN — FUROSEMIDE 20 MG: 10 INJECTION, SOLUTION INTRAMUSCULAR; INTRAVENOUS at 06:07

## 2020-07-25 RX ADMIN — SOTALOL HYDROCHLORIDE 80 MG: 80 TABLET ORAL at 08:07

## 2020-07-25 RX ADMIN — IPRATROPIUM BROMIDE AND ALBUTEROL SULFATE 3 ML: .5; 3 SOLUTION RESPIRATORY (INHALATION) at 07:07

## 2020-07-25 RX ADMIN — AMLODIPINE BESYLATE 2.5 MG: 2.5 TABLET ORAL at 08:07

## 2020-07-25 RX ADMIN — PANTOPRAZOLE SODIUM 40 MG: 40 TABLET, DELAYED RELEASE ORAL at 08:07

## 2020-07-25 NOTE — PLAN OF CARE
Continue treatment toward increased ability to follow compensatory swallow strategies for decreased risk of compromised pulmonary status with PO intake.    Yin Ramos MS CCC/SLP

## 2020-07-25 NOTE — PT/OT/SLP PROGRESS
Speech Language Pathology Treatment    Patient Name:  Marck Ramos   MRN:  [id number]  Admitting Diagnosis: Cellulitis    Recommendations:                 General Recommendations:  Dysphagia therapy  Diet recommendations:  Dental Soft, No Bread, No Rice, Liquid Diet Level: Thin   Aspiration Precautions:   · Small, isolated sips of liquid---NO STRAWS  · Alternate bite and sip throughout meal & dry swallows following liquid   · Upright 30 min to 1 hour after intake    · Oral care after meals   General Precautions: Standard, dental soft, aspiration  Communication strategies:  none    Subjective     Patient upright in bed, asleep upon contact.  Easily aroused for participation.  Pleasant and cooperative.  Patient goals: none stated     Pain/Comfort:  · Pain Rating 1: 0/10    Objective:     Has the patient been evaluated by SLP for swallowing?   Yes  Keep patient NPO? No   Current Respiratory Status: room air      Tx continues to address compensatory swallow strategy training    Assessment:     Marck Ramos is a 89 y.o. male with an SLP diagnosis of Dysphagia.  He required MAX assist for recall of compensatory strategies and precautions.  Caregiver/family not present.  He followed verbal cues to utilize aspiration precautions repeat dry swallow with 100% accuracy.  Audible crackles noted after sips of liquid with dry vocal quality.  Delayed strong cough after all presentations were completed.    Goals:   Multidisciplinary Problems     SLP Goals        Problem: SLP Goal    Goal Priority Disciplines Outcome   SLP Goal     SLP Ongoing, Progressing   Description: 1. Pt will tolerate least restrictive PO diet without acute dysphagia pulmonary complication.   2. Pt will participate in VFSS to define swallow physiology; treatment goal to follow- ACHIEVED  3. Pt will implement aspiration precautions with oral feedings given MIN A  4. Caregiver will demonstrate independence in reinforcement of precautions and strategies to  combat pulmonary complications associated w/ aspiration                            Plan:     · Patient to be seen:  3 x/week   · Plan of Care expires:     · Plan of Care reviewed with:  patient   · SLP Follow-Up:  Yes       Discharge recommendations:  home health speech therapy   Barriers to Discharge:  None noted    Time Tracking:     SLP Treatment Date:   07/25/20  Speech Start Time:  1017  Speech Stop Time:  1032     Speech Total Time (min):  15 min    Billable Minutes: Treatment Swallowing Dysfunction 15    Yin Ramos CCC-SLP  07/25/2020

## 2020-07-25 NOTE — PT/OT/SLP PROGRESS
Physical Therapy Treatment    Patient Name:  Marck Ramos   MRN:  [id number]    Recommendations:     Discharge Recommendations:  home health PT, nursing facility, skilled(HHPT vs SNF)   Discharge Equipment Recommendations: ongoing assessment   Barriers to discharge: None    Assessment:     Marck Ramos is a 89 y.o. male admitted with a medical diagnosis of Cellulitis.  He presents with the following impairments/functional limitations:  weakness, impaired endurance, impaired self care skills, impaired functional mobilty, gait instability, impaired balance, decreased upper extremity function, decreased lower extremity function. Pt with improvements in walker management while ambulating in killian with RW  Noting no LOB. Pt ambulated with  decreased stance time on LLE, narrow CELI, and decreased alondra 2/2 decreased step length of BLE. Pt demonstrated ability to correct narrow CELI with verbal cuing and demonstration noting Pt unable to maintain correcting with continued ambulation.  Continue with PT and POC.     Rehab Prognosis: Good; patient would benefit from acute skilled PT services to address these deficits and reach maximum level of function.    Recent Surgery: * No surgery found *      Plan:     During this hospitalization, patient to be seen 6 x/week to address the identified rehab impairments via gait training, therapeutic exercises, therapeutic activities and progress toward the following goals:    · Plan of Care Expires:  08/10/20    Subjective     Chief Complaint: Pt reports he prefers to use RW while ambulating    Patient/Family Comments/goals: return home   Pain/Comfort:  · Pain Rating 1: 0/10      Objective:     Communicated with RN prior to session.  Patient found HOB elevated with bed alarm upon PT entry to room.     General Precautions: Standard, fall   Orthopedic Precautions:N/A   Braces:       Functional Mobility:  · Bed Mobility:     · Rolling Right: contact guard assistance  · Bridging: stand by  assistance and verbal cuing  · Supine to Sit: moderate assistance  · Sit to Supine: moderate assistance  · Transfers:     · Sit to Stand:  CGA/ min A with rolling walker  · Gait: 95 feet with RW and min A/ CGA       AM-PAC 6 CLICK MOBILITY  Turning over in bed (including adjusting bedclothes, sheets and blankets)?: 3  Sitting down on and standing up from a chair with arms (e.g., wheelchair, bedside commode, etc.): 3  Moving from lying on back to sitting on the side of the bed?: 2  Moving to and from a bed to a chair (including a wheelchair)?: 3  Need to walk in hospital room?: 3  Climbing 3-5 steps with a railing?: 1  Basic Mobility Total Score: 15       Therapeutic Activities and Exercises:   bed mobility; sitting EOB for trunk control and midline orientation; sit<> stands; transfer training; gait training     Patient left HOB elevated with all lines intact, call button in reach and bed alarm on..    GOALS:   Multidisciplinary Problems     Physical Therapy Goals        Problem: Physical Therapy Goal    Goal Priority Disciplines Outcome Goal Variances Interventions   Physical Therapy Goal     PT, PT/OT Ongoing, Progressing     Description: Goals to be met by: discharge     Patient will increase functional independence with mobility by performin. Supine to sit with Stand-by Assistance  2. Sit to supine with Stand-by Assistance  3. Sit to stand transfer with Stand-by Assistance  4. Bed to chair transfer with Contact Guard Assistance using Benjamin-walker  5. Gait  x 30  feet with Contact Guard Assistance using Benjamin-walker.                      Time Tracking:     PT Received On: 20  PT Start Time: 0940     PT Stop Time: 1003  PT Total Time (min): 23 min     Billable Minutes: Gait Training 15 and Therapeutic Activity 8    Treatment Type: Treatment  PT/PTA: PTA     PTA Visit Number: 2     Hillary Hammant, PTA  2020

## 2020-07-25 NOTE — RESPIRATORY THERAPY
07/24/20 2222   Patient Assessment/Suction   Level of Consciousness (AVPU) alert   Respiratory Effort Normal;Unlabored   Expansion/Accessory Muscles/Retractions expansion symmetric;no retractions;no use of accessory muscles   All Lung Fields Breath Sounds diminished   Rhythm/Pattern, Respiratory no shortness of breath reported;pattern regular;unlabored   PRE-TX-O2   O2 Device (Oxygen Therapy) nasal cannula   Flow (L/min) 0   SpO2 (!) 92 %   Pulse Oximetry Type Intermittent   $ Pulse Oximetry - Multiple Charge Pulse Oximetry - Multiple   Pulse 62   Resp 18   Aerosol Therapy   $ Aerosol Therapy Charges PRN treatment not required   Respiratory Interventions   Cough And Deep Breathing done with encouragement   Breathing Techniques/Airway Clearance deep/controlled cough encouraged;diaphragmatic breathing promoted   Education   $ Education Bronchodilator;Other (see comment);15 min  (Resp Assessment.Diaphragmatic breathing exercises.)   Respiratory Evaluation   $ Care Plan Tech Time 15 min   Evaluation For   (care plan)        07/24/20 2222   Patient Assessment/Suction   Level of Consciousness (AVPU) alert   Respiratory Effort Normal;Unlabored   Expansion/Accessory Muscles/Retractions expansion symmetric;no retractions;no use of accessory muscles   All Lung Fields Breath Sounds diminished   Rhythm/Pattern, Respiratory no shortness of breath reported;pattern regular;unlabored   PRE-TX-O2   O2 Device (Oxygen Therapy) nasal cannula   Flow (L/min) 0   SpO2 (!) 92 %   Pulse Oximetry Type Intermittent   $ Pulse Oximetry - Multiple Charge Pulse Oximetry - Multiple   Pulse 62   Resp 18   Aerosol Therapy   $ Aerosol Therapy Charges PRN treatment not required   Respiratory Interventions   Cough And Deep Breathing done with encouragement   Breathing Techniques/Airway Clearance deep/controlled cough encouraged;diaphragmatic breathing promoted   Education   $ Education Bronchodilator;Other (see comment);15 min  (Resp  Assessment.Diaphragmatic breathing exercises.)   Respiratory Evaluation   $ Care Plan Tech Time 15 min   Evaluation For   (care plan)

## 2020-07-25 NOTE — PLAN OF CARE
07/25/20 1133   Patient Assessment/Suction   Level of Consciousness (AVPU) alert   Respiratory Effort Normal;Unlabored   Expansion/Accessory Muscles/Retractions accessory muscle use   All Lung Fields Breath Sounds coarse   Rhythm/Pattern, Respiratory shortness of breath   PRE-TX-O2   O2 Device (Oxygen Therapy) room air   SpO2 (!) 90 %   Pulse Oximetry Type Intermittent   $ Pulse Oximetry - Multiple Charge Pulse Oximetry - Multiple   Pulse 67   Resp 19   Aerosol Therapy   $ Aerosol Therapy Charges Aerosol Treatment   Daily Review of Necessity (SVN) completed   Respiratory Treatment Status (SVN) given   Treatment Route (SVN) mask   Patient Position (SVN) semi-Gomes's   Post Treatment Assessment (SVN) breath sounds improved   Signs of Intolerance (SVN) none   Breath Sounds Post-Respiratory Treatment   Throughout All Fields Post-Treatment All Fields   Throughout All Fields Post-Treatment wheezes, expiratory;coarse;aeration increased   Respiratory Evaluation   $ Care Plan Tech Time 15 min

## 2020-07-25 NOTE — PLAN OF CARE
Problem: Physical Therapy Goal  Goal: Physical Therapy Goal  Description: Goals to be met by: discharge     Patient will increase functional independence with mobility by performin. Supine to sit with Stand-by Assistance  2. Sit to supine with Stand-by Assistance  3. Sit to stand transfer with Stand-by Assistance  4. Bed to chair transfer with Contact Guard Assistance using Benjamin-walker  5. Gait  x 30  feet with Contact Guard Assistance using Benjamin-walker.     Outcome: Ongoing, Progressing   Pt progressing toward meeting goals

## 2020-07-25 NOTE — PROGRESS NOTES
[facility]  Progress Note    Patient Name: Marck Ramos  MRN: [id number]  Patient Class: IP- Inpatient   Admission Date: 7/18/2020  Attending Physician: Gilberto Bowie MD  Primary Care Provider: Hood Gannon MD  Date of service:  07/25/2020    Subjective:     Principal Problem:Cellulitis    HPI:  Marck Ramos is a 89 y.o. 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 It is moderate. It is associated with swelling and pain. He denies fever, chills, cough, SOB, N/V/D, dizziness or known trauma. He reports it started as a small hematoma, and progressively got more red and painful. He was recently admitted for a left humerus frx, then pna, and has been receiving PT/OT at home. He states he wants to be a full code, but doesn't think he wants a ventilator long term.     Overview/Hospital Course:  Patient with multiple recent hospitalization.  Increasing falls at home with sustained left humerus fracture, currently in sling, recurrent admission for concern of aspiration pneumonia.  He was discharged home on home health about 1 week ago.  Daughter noted to have developing worsening area of erythema, she is unsure if a cat scratch area initially, enlarging with associated warmth and mild swelling, seen by home health with no improvement on instructed to the ED.  He was started on vancomycin on admission.  On 07/19 patient sleeping, daughter states more recently he has been sleeping more throughout the day, there was concern that he may have aspirated while taking medications earlier, leg continues with erythema, warmth and swelling, no active drainage.  Daughter states patient does have advanced directive/living will, would not want intubation. On 7/20 seen by speech  therapy with recommendations for trial of mechanical soft diet, still coughing with meals, lower extremity appears more to be hematoma with petechiae rather than cellulitis, son at bedside states Dr. Kuhn now out of town, requesting sling to be removed, will consult orthopedics Dr Tenorio.  The patient's hematoma and cellulitis are slowly improving with treatment.  His mobility is slowly improving.  Anticipate discharge home with home health in next couple days.    Interval history:  Today the patient reported some persistent shortness of breath that improved with medical treatment overnight, mild intensity. Redness and swelling of left lower extremity continues to slowly improve, remains constant timing, mild intensity.  Denies fever or chills.  No chest pain or shortness of breath.  No nausea or vomiting.      Physical exam:  Vital signs reviewed  General:  Frail, nontoxic, comfortable appearing  Cardiovascular:  2+ radial pulses bilaterally, distal extremities warm and well perfused  Pulmonary:  Comfortable work of breathing, faint basilar crackles  Neuro:  Nonfocal motor exam, fluent speech, follows commands appropriately  Psych:  Mood is calm, affect restricted, insight fair  Skin:  Dry and warm no jaundice, continued improvement of erythema and swelling of left lower extremity with persistent small focal area of tenderness palpation and induration of anterior shin with surrounding erythema    Laboratory data:  Labs reviewed  A.m. BNP ordered  A.m. chest x-ray ordered    Chest X-ray personally reviewed (7/24):  Development of mild interstitial pulmonary edema    Left shoulder x-ray series Impression:  Healing proximal humeral fracture.  Findings suggesting chronic rotator cuff tear.  AC joint arthrosis.  Scattered interstitial and groundglass pulmonary opacities    Left lower extremity venous Doppler ultrasound Impression: No DVT of the left lower extremity veins.    Assessment/Plan:      * Left lower  extremity hematoma with superimposed cellulitis  Few days history of progressively worsening erythema with warmth and swelling.  Patient is on Eliquis and there was initially impression of erythema due to Eliquis however worsening.  Continue IV vancomycin-may consider discontinuing depending on clinical course, more suspect likely hematoma with bruising  Demarcate area of erythema and monitor  Elevate lower extremity  A.m. labs ordered for review      History of left humerus fracture  Arm currently in sling.  As per son discussed with Dr. Kuhn who is no longer seeing patients locally, recommends sling to be removed, will consult Dr. Tenorio    PAF (paroxysmal atrial fibrillation)  History of paroxysmal atrial fibrillation.  Followed by Dr. Mares.  Now with increasing falls and declining status will consult cardiology to re-evaluate need for Eliquis.  Continue sotalol.  Holding Eliquis.      Hyponatremia  Sodium level 133.  Slow IV fluids today and repeat levels tomorrow    Possible aspiration pneumonia versus pneumonitis  There was concern patient had severe coughing with possible aspiration.  Recent admission for aspiration pneumonia, declining status, high risk.  Seen by speech therapy with recommendations for mechanical soft diet, no rice or bread, will order an trial  Continue Zosyn for empiric coverage  Aspiration precautions  Repeat chest x-ray in a.m.  Speech therapy following    Debility  As per collateral patient with declining status following humerus fracture, reduced oral intake, more somnolent, decreased activity level and endurance.  Daughter states she does feel overwhelmed given has to manage at home and frequently has to call for help.  PT/OT.        BPH with obstruction/lower urinary tract symptoms  Known history of BPH, yesterday with volumes greater than 300, Dr. Collins reviewed.  As discussed with nursing as voided about 300 cc 3 times today, will continue with bladder scanning and  conservative management.      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  Monitoring.      Hypertension, not well controlled  Continue home Imdur, losartan-dose increased to 50, amlodipine-keep at low-dose as per Cardiology she was not able to tolerate higher doses in the past and monitor.    Add p.r.n. as needed.  More liberal target in this frail elderly male.    Hypokalemia  Hypomagnesemia  Hyponatremia  Mild iatrogenic pulmonary edema    Plan update today:  Continue care.  Appreciate consultants.    Persistent crackles on examination.  Will give another Lasix 20 mg IV x1 today.  Repeat a.m. chest x-ray and BNP.  Off IV fluids.  Continue IV clindamycin and transition to oral clindamycin tomorrow.  Continue supportive care including pain control as needed  Daily labs.  Replace electrolytes as needed  Continue PT/OT.  Likely need home health at discharge.  Benjamin walker DME per recommendations of therapy.  Continue speech therapy.  Dysphagia diet as tolerated.  Courage oral intake.  DNR status noted  Continue medical management including aspirin, sotalol, ARB, Norvasc, and p.r.n. nitrates.  Holding statin due to muscle weakness and oral anticoagulant due to bleeding risk with multiple falls  SCDs for VTE prophylaxis; avoid anticoagulant due to hematoma  Moderate risk secondary to moderate intensity acute illness from acute pulmonary edema requiring IV Lasix administration; multiple medical comorbid conditions with advanced age; prescription drug management and multiple medications managed  Disposition:  Possible discharge 24 hr pending resolution of pulmonary edema    VTE Risk Mitigation (From admission, onward)         Ordered     IP VTE HIGH RISK PATIENT  Once      07/18/20 1905     Place sequential compression device  Until discontinued      07/18/20 1905                Gilberto Bowie MD  Department of  [facility]   [facility]

## 2020-07-25 NOTE — RESPIRATORY THERAPY
07/24/20 2222   Patient Assessment/Suction   Level of Consciousness (AVPU) alert   Respiratory Effort Normal;Unlabored   Expansion/Accessory Muscles/Retractions expansion symmetric;no retractions;no use of accessory muscles   All Lung Fields Breath Sounds diminished   Rhythm/Pattern, Respiratory no shortness of breath reported;pattern regular;unlabored   PRE-TX-O2   O2 Device (Oxygen Therapy) room air   SpO2 (!) 92 %   Pulse Oximetry Type Intermittent   $ Pulse Oximetry - Multiple Charge Pulse Oximetry - Multiple   Pulse 62   Resp 18   Aerosol Therapy   $ Aerosol Therapy Charges PRN treatment not required   Respiratory Interventions   Cough And Deep Breathing done with encouragement   Breathing Techniques/Airway Clearance deep/controlled cough encouraged;diaphragmatic breathing promoted   Education   $ Education Bronchodilator;Other (see comment);15 min  (Resp Assessment.Diaphragmatic breathing exercises.)   Respiratory Evaluation   $ Care Plan Tech Time 15 min   Evaluation For   (care plan)

## 2020-07-26 LAB — BNP SERPL-MCNC: 437 PG/ML (ref 0–99)

## 2020-07-26 PROCEDURE — 25000003 PHARM REV CODE 250: Performed by: NURSE PRACTITIONER

## 2020-07-26 PROCEDURE — 25000242 PHARM REV CODE 250 ALT 637 W/ HCPCS: Performed by: INTERNAL MEDICINE

## 2020-07-26 PROCEDURE — 27000221 HC OXYGEN, UP TO 24 HOURS

## 2020-07-26 PROCEDURE — 94761 N-INVAS EAR/PLS OXIMETRY MLT: CPT

## 2020-07-26 PROCEDURE — 25000003 PHARM REV CODE 250: Performed by: INTERNAL MEDICINE

## 2020-07-26 PROCEDURE — 12000002 HC ACUTE/MED SURGE SEMI-PRIVATE ROOM

## 2020-07-26 PROCEDURE — 36415 COLL VENOUS BLD VENIPUNCTURE: CPT

## 2020-07-26 PROCEDURE — 94640 AIRWAY INHALATION TREATMENT: CPT

## 2020-07-26 PROCEDURE — 83880 ASSAY OF NATRIURETIC PEPTIDE: CPT

## 2020-07-26 PROCEDURE — 99900035 HC TECH TIME PER 15 MIN (STAT)

## 2020-07-26 RX ADMIN — CLINDAMYCIN IN 5 PERCENT DEXTROSE 900 MG: 18 INJECTION, SOLUTION INTRAVENOUS at 05:07

## 2020-07-26 RX ADMIN — SOTALOL HYDROCHLORIDE 80 MG: 80 TABLET ORAL at 08:07

## 2020-07-26 RX ADMIN — LOSARTAN POTASSIUM 50 MG: 50 TABLET, FILM COATED ORAL at 08:07

## 2020-07-26 RX ADMIN — PANTOPRAZOLE SODIUM 40 MG: 40 TABLET, DELAYED RELEASE ORAL at 08:07

## 2020-07-26 RX ADMIN — CLINDAMYCIN IN 5 PERCENT DEXTROSE 900 MG: 18 INJECTION, SOLUTION INTRAVENOUS at 08:07

## 2020-07-26 RX ADMIN — AMLODIPINE BESYLATE 2.5 MG: 2.5 TABLET ORAL at 08:07

## 2020-07-26 RX ADMIN — MAGNESIUM OXIDE 400 MG: 400 TABLET ORAL at 08:07

## 2020-07-26 RX ADMIN — POTASSIUM CHLORIDE 20 MEQ: 20 TABLET, EXTENDED RELEASE ORAL at 08:07

## 2020-07-26 RX ADMIN — CLINDAMYCIN IN 5 PERCENT DEXTROSE 900 MG: 18 INJECTION, SOLUTION INTRAVENOUS at 12:07

## 2020-07-26 RX ADMIN — IPRATROPIUM BROMIDE AND ALBUTEROL SULFATE 3 ML: .5; 3 SOLUTION RESPIRATORY (INHALATION) at 02:07

## 2020-07-26 RX ADMIN — IPRATROPIUM BROMIDE AND ALBUTEROL SULFATE 3 ML: .5; 3 SOLUTION RESPIRATORY (INHALATION) at 07:07

## 2020-07-26 RX ADMIN — ASPIRIN 81 MG: 81 TABLET, DELAYED RELEASE ORAL at 08:07

## 2020-07-26 RX ADMIN — ISOSORBIDE MONONITRATE 60 MG: 60 TABLET, EXTENDED RELEASE ORAL at 08:07

## 2020-07-26 RX ADMIN — IPRATROPIUM BROMIDE AND ALBUTEROL SULFATE 3 ML: .5; 3 SOLUTION RESPIRATORY (INHALATION) at 09:07

## 2020-07-26 RX ADMIN — TAMSULOSIN HYDROCHLORIDE 0.4 MG: 0.4 CAPSULE ORAL at 08:07

## 2020-07-26 NOTE — RESPIRATORY THERAPY
07/25/20 1940   Patient Assessment/Suction   Level of Consciousness (AVPU) alert   Respiratory Effort Normal;Unlabored   Expansion/Accessory Muscles/Retractions no use of accessory muscles;no retractions;expansion symmetric   All Lung Fields Breath Sounds Anterior:;coarse   YUNG Breath Sounds crackles, coarse   LLL Breath Sounds coarse;clear   RUL Breath Sounds crackles, coarse   RML Breath Sounds coarse   RLL Breath Sounds clear;coarse   Rhythm/Pattern, Respiratory no shortness of breath reported   Cough Frequency frequent   Cough Type nonproductive;congested   PRE-TX-O2   O2 Device (Oxygen Therapy) room air   SpO2 (!) 93 %   Pulse Oximetry Type Intermittent   $ Pulse Oximetry - Multiple Charge Pulse Oximetry - Multiple   Pulse 66   Resp 17   Positioning HOB elevated 45 degrees   Aerosol Therapy   $ Aerosol Therapy Charges Aerosol Treatment   Daily Review of Necessity (SVN) completed   Respiratory Treatment Status (SVN) given   Treatment Route (SVN) mask;oxygen   Patient Position (SVN) Eliseo's   Post Treatment Assessment (SVN) breath sounds improved;increased aeration   Signs of Intolerance (SVN) none   Breath Sounds Post-Respiratory Treatment   Throughout All Fields Post-Treatment All Fields   Throughout All Fields Post-Treatment aeration increased   Post-treatment Heart Rate (beats/min) 63   Post-treatment Resp Rate (breaths/min) 18   Respiratory Evaluation   $ Care Plan Tech Time 15 min

## 2020-07-27 ENCOUNTER — HOSPITAL ENCOUNTER (EMERGENCY)
Facility: HOSPITAL | Age: 85
Discharge: HOME | End: 2020-07-27
Attending: EMERGENCY MEDICINE
Payer: MEDICARE

## 2020-07-27 VITALS
TEMPERATURE: 97 F | HEIGHT: 66 IN | WEIGHT: 123.88 LBS | WEIGHT: 128 LBS | BODY MASS INDEX: 19.91 KG/M2 | RESPIRATION RATE: 18 BRPM | HEART RATE: 63 BPM | DIASTOLIC BLOOD PRESSURE: 75 MMHG | TEMPERATURE: 98 F | RESPIRATION RATE: 20 BRPM | SYSTOLIC BLOOD PRESSURE: 148 MMHG | BODY MASS INDEX: 20.57 KG/M2 | OXYGEN SATURATION: 100 % | DIASTOLIC BLOOD PRESSURE: 69 MMHG | HEART RATE: 65 BPM | HEIGHT: 66 IN | OXYGEN SATURATION: 98 % | SYSTOLIC BLOOD PRESSURE: 160 MMHG

## 2020-07-27 DIAGNOSIS — S80.12XA HEMATOMA OF LEG, LEFT, INITIAL ENCOUNTER: ICD-10-CM

## 2020-07-27 DIAGNOSIS — L03.116 LEFT LEG CELLULITIS: ICD-10-CM

## 2020-07-27 DIAGNOSIS — R31.9 HEMATURIA, UNSPECIFIED TYPE: Primary | ICD-10-CM

## 2020-07-27 LAB
ALBUMIN SERPL BCP-MCNC: 2.8 G/DL (ref 3.5–5.2)
ALP SERPL-CCNC: 70 U/L (ref 55–135)
ALT SERPL W/O P-5'-P-CCNC: 12 U/L (ref 10–44)
ANION GAP SERPL CALC-SCNC: 11 MMOL/L (ref 8–16)
AST SERPL-CCNC: 20 U/L (ref 10–40)
BACTERIA #/AREA URNS HPF: NEGATIVE /HPF
BASOPHILS # BLD AUTO: 0.06 K/UL (ref 0–0.2)
BASOPHILS NFR BLD: 0.9 % (ref 0–1.9)
BILIRUB SERPL-MCNC: 0.4 MG/DL (ref 0.1–1)
BILIRUB UR QL STRIP: NEGATIVE
BUN SERPL-MCNC: 32 MG/DL (ref 10–30)
CALCIUM SERPL-MCNC: 8.5 MG/DL (ref 8.7–10.5)
CHLORIDE SERPL-SCNC: 93 MMOL/L (ref 95–110)
CLARITY UR: CLEAR
CO2 SERPL-SCNC: 26 MMOL/L (ref 23–29)
COLOR UR: YELLOW
CREAT SERPL-MCNC: 0.8 MG/DL (ref 0.5–1.4)
DIFFERENTIAL METHOD: ABNORMAL
EOSINOPHIL # BLD AUTO: 0.5 K/UL (ref 0–0.5)
EOSINOPHIL NFR BLD: 8.1 % (ref 0–8)
ERYTHROCYTE [DISTWIDTH] IN BLOOD BY AUTOMATED COUNT: 14.2 % (ref 11.5–14.5)
EST. GFR  (AFRICAN AMERICAN): >60 ML/MIN/1.73 M^2
EST. GFR  (NON AFRICAN AMERICAN): >60 ML/MIN/1.73 M^2
GLUCOSE SERPL-MCNC: 119 MG/DL (ref 70–110)
GLUCOSE UR QL STRIP: NEGATIVE
HCT VFR BLD AUTO: 33 % (ref 40–54)
HGB BLD-MCNC: 11 G/DL (ref 14–18)
HGB UR QL STRIP: ABNORMAL
HYALINE CASTS #/AREA URNS LPF: 3 /LPF
IMM GRANULOCYTES # BLD AUTO: 0.02 K/UL (ref 0–0.04)
IMM GRANULOCYTES NFR BLD AUTO: 0.3 % (ref 0–0.5)
KETONES UR QL STRIP: ABNORMAL
LEUKOCYTE ESTERASE UR QL STRIP: NEGATIVE
LYMPHOCYTES # BLD AUTO: 2.2 K/UL (ref 1–4.8)
LYMPHOCYTES NFR BLD: 33.3 % (ref 18–48)
MCH RBC QN AUTO: 33.2 PG (ref 27–31)
MCHC RBC AUTO-ENTMCNC: 33.3 G/DL (ref 32–36)
MCV RBC AUTO: 100 FL (ref 82–98)
MICROSCOPIC COMMENT: ABNORMAL
MONOCYTES # BLD AUTO: 0.7 K/UL (ref 0.3–1)
MONOCYTES NFR BLD: 11.2 % (ref 4–15)
NEUTROPHILS # BLD AUTO: 3 K/UL (ref 1.8–7.7)
NEUTROPHILS NFR BLD: 46.2 % (ref 38–73)
NITRITE UR QL STRIP: NEGATIVE
NRBC BLD-RTO: 0 /100 WBC
OB PNL STL: NEGATIVE
PH UR STRIP: 7 [PH] (ref 5–8)
PLATELET # BLD AUTO: 331 K/UL (ref 150–350)
PMV BLD AUTO: 8.8 FL (ref 9.2–12.9)
POTASSIUM SERPL-SCNC: 4.4 MMOL/L (ref 3.5–5.1)
PROT SERPL-MCNC: 6.5 G/DL (ref 6–8.4)
PROT UR QL STRIP: ABNORMAL
RBC # BLD AUTO: 3.31 M/UL (ref 4.6–6.2)
RBC #/AREA URNS HPF: >100 /HPF (ref 0–4)
SODIUM SERPL-SCNC: 130 MMOL/L (ref 136–145)
SP GR UR STRIP: 1.02 (ref 1–1.03)
SQUAMOUS #/AREA URNS HPF: 1 /HPF
URN SPEC COLLECT METH UR: ABNORMAL
UROBILINOGEN UR STRIP-ACNC: NEGATIVE EU/DL
WBC # BLD AUTO: 6.45 K/UL (ref 3.9–12.7)
WBC #/AREA URNS HPF: 2 /HPF (ref 0–5)

## 2020-07-27 PROCEDURE — 97535 SELF CARE MNGMENT TRAINING: CPT

## 2020-07-27 PROCEDURE — 25000003 PHARM REV CODE 250: Performed by: STUDENT IN AN ORGANIZED HEALTH CARE EDUCATION/TRAINING PROGRAM

## 2020-07-27 PROCEDURE — 99900035 HC TECH TIME PER 15 MIN (STAT)

## 2020-07-27 PROCEDURE — 97110 THERAPEUTIC EXERCISES: CPT | Mod: CQ

## 2020-07-27 PROCEDURE — 97116 GAIT TRAINING THERAPY: CPT | Mod: CQ

## 2020-07-27 PROCEDURE — 85025 COMPLETE CBC W/AUTO DIFF WBC: CPT

## 2020-07-27 PROCEDURE — 82272 OCCULT BLD FECES 1-3 TESTS: CPT

## 2020-07-27 PROCEDURE — 94761 N-INVAS EAR/PLS OXIMETRY MLT: CPT

## 2020-07-27 PROCEDURE — 27000221 HC OXYGEN, UP TO 24 HOURS

## 2020-07-27 PROCEDURE — 25000003 PHARM REV CODE 250: Performed by: NURSE PRACTITIONER

## 2020-07-27 PROCEDURE — 94640 AIRWAY INHALATION TREATMENT: CPT

## 2020-07-27 PROCEDURE — 81001 URINALYSIS AUTO W/SCOPE: CPT

## 2020-07-27 PROCEDURE — 99284 EMERGENCY DEPT VISIT MOD MDM: CPT | Mod: 25

## 2020-07-27 PROCEDURE — 80053 COMPREHEN METABOLIC PANEL: CPT

## 2020-07-27 PROCEDURE — 25000242 PHARM REV CODE 250 ALT 637 W/ HCPCS: Performed by: INTERNAL MEDICINE

## 2020-07-27 PROCEDURE — 25000003 PHARM REV CODE 250: Performed by: INTERNAL MEDICINE

## 2020-07-27 PROCEDURE — 10140 I&D HMTMA SEROMA/FLUID COLLJ: CPT | Mod: LT

## 2020-07-27 RX ORDER — LIDOCAINE HYDROCHLORIDE 10 MG/ML
10 INJECTION, SOLUTION EPIDURAL; INFILTRATION; INTRACAUDAL; PERINEURAL
Status: COMPLETED | OUTPATIENT
Start: 2020-07-27 | End: 2020-07-27

## 2020-07-27 RX ORDER — FUROSEMIDE 40 MG/1
20 TABLET ORAL EVERY OTHER DAY
Qty: 8 TABLET | Refills: 11 | Status: SHIPPED | OUTPATIENT
Start: 2020-07-27 | End: 2021-01-12

## 2020-07-27 RX ORDER — GUAIFENESIN 100 MG/5ML
200 SOLUTION ORAL 3 TIMES DAILY PRN
Qty: 100 ML | Refills: 0 | Status: SHIPPED | OUTPATIENT
Start: 2020-07-27 | End: 2020-08-06

## 2020-07-27 RX ORDER — CLINDAMYCIN HYDROCHLORIDE 300 MG/1
300 CAPSULE ORAL 3 TIMES DAILY
Qty: 18 CAPSULE | Refills: 0 | Status: SHIPPED | OUTPATIENT
Start: 2020-07-27 | End: 2020-08-02

## 2020-07-27 RX ADMIN — Medication: at 09:07

## 2020-07-27 RX ADMIN — MAGNESIUM OXIDE 400 MG: 400 TABLET ORAL at 08:07

## 2020-07-27 RX ADMIN — TAMSULOSIN HYDROCHLORIDE 0.4 MG: 0.4 CAPSULE ORAL at 08:07

## 2020-07-27 RX ADMIN — CLINDAMYCIN IN 5 PERCENT DEXTROSE 900 MG: 18 INJECTION, SOLUTION INTRAVENOUS at 03:07

## 2020-07-27 RX ADMIN — ASPIRIN 81 MG: 81 TABLET, DELAYED RELEASE ORAL at 08:07

## 2020-07-27 RX ADMIN — ISOSORBIDE MONONITRATE 60 MG: 60 TABLET, EXTENDED RELEASE ORAL at 08:07

## 2020-07-27 RX ADMIN — PANTOPRAZOLE SODIUM 40 MG: 40 TABLET, DELAYED RELEASE ORAL at 08:07

## 2020-07-27 RX ADMIN — SOTALOL HYDROCHLORIDE 80 MG: 80 TABLET ORAL at 08:07

## 2020-07-27 RX ADMIN — LOSARTAN POTASSIUM 50 MG: 50 TABLET, FILM COATED ORAL at 08:07

## 2020-07-27 RX ADMIN — POTASSIUM CHLORIDE 20 MEQ: 20 TABLET, EXTENDED RELEASE ORAL at 08:07

## 2020-07-27 RX ADMIN — IPRATROPIUM BROMIDE AND ALBUTEROL SULFATE 3 ML: .5; 3 SOLUTION RESPIRATORY (INHALATION) at 07:07

## 2020-07-27 RX ADMIN — LIDOCAINE HYDROCHLORIDE 100 MG: 10 INJECTION, SOLUTION EPIDURAL; INFILTRATION; INTRACAUDAL; PERINEURAL at 09:07

## 2020-07-27 RX ADMIN — AMLODIPINE BESYLATE 2.5 MG: 2.5 TABLET ORAL at 08:07

## 2020-07-27 NOTE — PT/OT/SLP PROGRESS
Physical Therapy Treatment    Patient Name:  Marck Ramos   MRN:  [id number]    Recommendations:     Discharge Recommendations:  home health PT(home with daughter)   Discharge Equipment Recommendations: none   Barriers to discharge: None    Assessment:     Marck Ramos is a 89 y.o. male admitted with a medical diagnosis of Cellulitis.  He presents with the following impairments/functional limitations:  weakness, impaired endurance, impaired self care skills, impaired functional mobilty, gait instability, impaired balance, decreased upper extremity function, decreased lower extremity function. Patient presents sitting up in chair; just worked with OT. Agreeable to PT treatment. Patient continues to progress with gait and mobility today. Patient appears confused as patient doesn't remember being in the hospital for as long as he has. Patient requires moderate assistance to stand from chair. Patient able to ambulate ~80ft with RW and Min Assist; better RW management noted today. Patient also performed seated therex following gait training and tolerated well. Continue with PT and POC.    Rehab Prognosis: Good; patient would benefit from acute skilled PT services to address these deficits and reach maximum level of function.    Recent Surgery: * No surgery found *      Plan:     During this hospitalization, patient to be seen 6 x/week to address the identified rehab impairments via gait training, therapeutic activities, therapeutic exercises and progress toward the following goals:    · Plan of Care Expires:  08/10/20    Subjective     Chief Complaint: No complaints; frustrated that he can't remember being in the hospital last week  Patient/Family Comments/goals:   Pain/Comfort:  · Pain Rating 1: 0/10      Objective:     Communicated with RN prior to session.  Patient found up in chair with peripheral IV(chair alarm) upon PT entry to room.     General Precautions: Standard, fall   Orthopedic Precautions:N/A   Braces:        Functional Mobility:  · Transfers:     · Sit to Stand:  moderate assistance with rolling walker  · Gait: 80ft with RW and Min Assist for RW management at times      AM-PAC 6 CLICK MOBILITY          Therapeutic Activities and Exercises:   sit <> stands; transfer training    Pt performed the following exercises while seated in the chair with BLEs x 10 reps each:  -ankle pumps  -LAQs  -marches  -hip abduction/adduction    Patient left up in chair with all lines intact, call button in reach and chair alarm on..    GOALS:   Multidisciplinary Problems     Physical Therapy Goals        Problem: Physical Therapy Goal    Goal Priority Disciplines Outcome Goal Variances Interventions   Physical Therapy Goal     PT, PT/OT Ongoing, Progressing     Description: Goals to be met by: discharge     Patient will increase functional independence with mobility by performin. Supine to sit with Stand-by Assistance  2. Sit to supine with Stand-by Assistance  3. Sit to stand transfer with Stand-by Assistance  4. Bed to chair transfer with Contact Guard Assistance using Benjamin-walker  5. Gait  x 30  feet with Contact Guard Assistance using Benjamin-walker.                      Time Tracking:     PT Received On: 20  PT Start Time: 1026     PT Stop Time: 1049  PT Total Time (min): 23 min     Billable Minutes: Gait Training 13 and Therapeutic Exercise 10    Treatment Type: Treatment  PT/PTA: PTA     PTA Visit Number: 3     Mae Tomas, PTA  2020

## 2020-07-27 NOTE — DISCHARGE SUMMARY
[facility] Medicine  Discharge Summary      Patient Name: Marck Ramos  MRN: [id number]  Admission Date: 7/18/2020  Discharge Date and Time:  07/27/2020 12:15 PM  Attending Physician: Gilberto Bowie MD   Primary Care Provider: Hood Gannon MD    HPI:  Marck Ramos is a 89 y.o. old male with a past medical history of Anemia in stage 2 chronic kidney disease, Anticoagulant long-term use, CHF, GIB, Hypertension, pulmonary embolism with acute cor pulmonale (3/6/2019) who presents to the ED with complaints of left lower extremity erythema. It is moderate. It is associated with swelling and pain. He denies fever, chills, cough, SOB, N/V/D, dizziness or known trauma. He reports it started as a small hematoma, and progressively got more red and painful. He was recently admitted for a left humerus frx, then pna, and has been receiving PT/OT at home. He states he wants to be a full code, but doesn't think he wants a ventilator long term.      Overview/Hospital Course:  Patient with multiple recent hospitalization.  Increasing falls at home with sustained left humerus fracture, currently in sling, recurrent admission for concern of aspiration pneumonia.  He was discharged home on home health about 1 week ago.  Daughter noted to have developing worsening area of erythema, she is unsure if a cat scratch area initially, enlarging with associated warmth and mild swelling, seen by home health with no improvement on instructed to the ED.  He was started on vancomycin on admission.  On 07/19 patient sleeping, daughter states more recently he has been sleeping more throughout the day, there was concern that he may have aspirated while taking medications earlier, leg continues with erythema, warmth and swelling, no active drainage.  Daughter states patient does have advanced directive/living will, would not want intubation. On 7/20 seen by speech therapy with recommendations for trial of mechanical  soft diet, still coughing with meals, lower extremity appears more to be hematoma with petechiae rather than cellulitis, son at bedside states Dr. Kuhn now out of town, requesting sling to be removed, will consult orthopedics Dr Tenorio to assist with management.  The patient's hematoma and cellulitis are slowly improving with treatment.  His mobility is slowly improving with therapy.  At this point the patient is medically stable to transition to oral antibiotics to complete treatment for cellulitis.  The associated hematoma is slowly improving.  The patient was discharged home with home health services.  He will follow up with PCP as outpatient.  Patient and family are in understanding and agreement with the discharge plan today.     Chest X-ray personally reviewed (7/26):  Improvement of mild interstitial pulmonary edema     Left shoulder x-ray series Impression:  Healing proximal humeral fracture.  Findings suggesting chronic rotator cuff tear.  AC joint arthrosis.  Scattered interstitial and groundglass pulmonary opacities     Left lower extremity venous Doppler ultrasound Impression: No DVT of the left lower extremity veins.     Consults:   Consults (From admission, onward)        Status Ordering Provider     Inpatient consult to Cardiology  Once     Provider:  Pete Mares MD    Completed ASHA THOMAS     Inpatient consult to Orthopedic Surgery  Once     Provider:  Julian Tenorio MD    Acknowledged ASHA THOMAS     Inpatient consult to Urology  Once     Provider:  (Not yet assigned)    Acknowledged ASHA THOMAS        Discharge diagnoses:  Left lower extremity hematoma with secondary acute bacterial cellulitis  Hyponatremia due to volume depletion  Physical deconditioning  Hypokalemia  Hypomagnesemia  Hyponatremia  Mild iatrogenic pulmonary edema, improved  Severe protein calorie malnutrition  Hypertension  CKD 2  Anemia of CKD  BPH with obstruction/lower urinary tract  symptoms  Paroxysmal atrial fibrillation  History of left humerus fracture  Permanent pacemaker  Final Active Diagnoses:    Diagnosis Date Noted POA    PRINCIPAL PROBLEM:  Left lower extremity hematoma versus cellulitis [L03.90] 07/18/2020 Yes    Possible aspiration pneumonia versus pneumonitis [T17.920A] 07/19/2020 Yes    Hyponatremia [E87.1] 07/19/2020 Yes    PAF (paroxysmal atrial fibrillation) [I48.0] 07/19/2020 Yes     Chronic    History of left humerus fracture [S42.309A] 07/19/2020 Yes     Chronic    Debility [R53.81] 07/18/2020 Yes    BPH with obstruction/lower urinary tract symptoms [N40.1, N13.8] 10/03/2019 Yes    Pacemaker [Z95.0] 10/03/2019 Yes    Severe malnutrition [E43] 09/17/2018 Yes    Anemia in stage 2 chronic kidney disease [N18.2, D63.1] 07/18/2017 Yes    Hypertension, not well controlled [I10] 05/15/2014 Yes     Chronic     Discharge physical exam:  General:  Frail, nontoxic, comfortable appearing  Cardiovascular:  2+ radial pulses bilaterally, distal extremities warm and well perfused  Pulmonary:  Comfortable work of breathing, clear to auscultation bilaterally  Neuro:  Nonfocal motor exam, fluent speech, follows commands appropriately  Psych:  Mood is calm, affect restricted, insight fair  Skin:  Dry and warm no jaundice, continued improvement of erythema and swelling of left lower extremity with persistent small focal area of tenderness palpation and induration of anterior shin with surrounding erythema    Discharged Condition: stable    Disposition: [place]-Health Care [facility]    Follow Up:  Follow-up Information     Hood Gannon MD In 1 week.    Specialty: Family Medicine  Contact information:  [address]  [address]  [place] [address]  [phone]                 Patient Instructions:      Ambulatory referral/consult to Home Health   Standing Status: Future   Referral Priority: Routine Referral Type: Home Health   Referral Reason: Specialty Services  Required   Requested Specialty: Home Health Services   Number of Visits Requested: 1     Diet Cardiac     Notify your health care provider if you experience any of the following:  temperature >100.4     Notify your health care provider if you experience any of the following:  severe uncontrolled pain     Notify your health care provider if you experience any of the following:  worsening rash     Activity as tolerated       Pending Diagnostic Studies:     None         Medications:  Reconciled Home Medications:      Medication List      START taking these medications    clindamycin 300 MG capsule  Commonly known as: CLEOCIN  Take 1 capsule (300 mg total) by mouth 3 (three) times daily. for 6 days        CONTINUE taking these medications    amLODIPine 5 MG tablet  Commonly known as: NORVASC  Take 2.5 mg by mouth. Take 1/2 tab by mouth daily as needed for systolic 150 or higher     aspirin 81 MG EC tablet  Commonly known as: ECOTRIN  Take 81 mg by mouth once daily.     atorvastatin 10 MG tablet  Commonly known as: LIPITOR  Take 1 tablet (10 mg total) by mouth once daily.     ELIQUIS 2.5 mg Tab  Generic drug: apixaban  Take 2.5 mg by mouth 2 (two) times daily.     furosemide 40 MG tablet  Commonly known as: LASIX  Take 0.5 tablets (20 mg total) by mouth every other day.     guaifenesin 100 mg/5 ml 100 mg/5 mL syrup  Commonly known as: ROBITUSSIN  Take 10 mLs (200 mg total) by mouth 3 (three) times daily as needed for Congestion.     isosorbide mononitrate 60 MG 24 hr tablet  Commonly known as: IMDUR  Take 1 tablet (60 mg total) by mouth once daily.     losartan 50 MG tablet  Commonly known as: COZAAR  Take 0.5 tablets (25 mg total) by mouth once daily.     MAGNESIUM ORAL  Take 400 mg by mouth once daily.     pantoprazole 40 MG tablet  Commonly known as: PROTONIX  Take 40 mg by mouth once daily.     potassium chloride SA 20 MEQ tablet  Commonly known as: K-DUR,KLOR-CON  Take 1 tablet (20 mEq total) by mouth once daily.      sotaloL 80 MG tablet  Commonly known as: BETAPACE  Take 1 tablet (80 mg total) by mouth 2 (two) times daily.     tamsulosin 0.4 mg Cap  Commonly known as: FLOMAX  Take 1 capsule (0.4 mg total) by mouth once daily.            Indwelling Lines/Drains at time of discharge:   Lines/Drains/Airways     None                 Time spent on the discharge of patient: 26 minutes  Patient was seen and examined on the date of discharge and determined to be suitable for discharge.         Gilberto Bowie MD  Department of Hospital Medicine  [facility]

## 2020-07-27 NOTE — RESPIRATORY THERAPY
07/26/20 1936   Patient Assessment/Suction   Level of Consciousness (AVPU) alert   Respiratory Effort Normal;Unlabored   Expansion/Accessory Muscles/Retractions no use of accessory muscles;no retractions;expansion symmetric   All Lung Fields Breath Sounds Anterior:;clear;diminished   YUNG Breath Sounds clear   LLL Breath Sounds coarse;clear   RUL Breath Sounds clear   RML Breath Sounds clear;coarse   RLL Breath Sounds clear;diminished   Rhythm/Pattern, Respiratory no shortness of breath reported   Cough Frequency frequent   Cough Type nonproductive;dry;fair   PRE-TX-O2   O2 Device (Oxygen Therapy) nasal cannula   $ Is the patient on Low Flow Oxygen? Yes   Flow (L/min) 2   Oxygen Concentration (%) 28   SpO2 98 %   Pulse Oximetry Type Intermittent   $ Pulse Oximetry - Multiple Charge Pulse Oximetry - Multiple   Pulse 62   Resp 16   Positioning HOB elevated 45 degrees   Aerosol Therapy   $ Aerosol Therapy Charges Aerosol Treatment   Daily Review of Necessity (SVN) completed   Respiratory Treatment Status (SVN) continuous   Treatment Route (SVN) mask;oxygen   Patient Position (SVN) Gomes's   Post Treatment Assessment (SVN) increased aeration   Signs of Intolerance (SVN) none   Breath Sounds Post-Respiratory Treatment   Throughout All Fields Post-Treatment All Fields   Throughout All Fields Post-Treatment aeration increased   Post-treatment Heart Rate (beats/min) 60   Post-treatment Resp Rate (breaths/min) 18   Ready to Wean/Extubation Screen   FIO2<=50 (chart decimal) 0.28   Respiratory Evaluation   $ Care Plan Tech Time 15 min   Discontinued nasal cannula due to continuous adequate oxygen saturations

## 2020-07-27 NOTE — PLAN OF CARE
07/27/20 0745   Patient Assessment/Suction   Level of Consciousness (AVPU) alert   All Lung Fields Breath Sounds clear  (audible exp wheeze upper airway)   PRE-TX-O2   O2 Device (Oxygen Therapy) nasal cannula   $ Is the patient on Low Flow Oxygen? Yes   Flow (L/min) 2   SpO2 97 %   Pulse Oximetry Type Intermittent   $ Pulse Oximetry - Multiple Charge Pulse Oximetry - Multiple   Pulse 63   Resp 15   Aerosol Therapy   $ Aerosol Therapy Charges Aerosol Treatment   Daily Review of Necessity (SVN) completed   Respiratory Treatment Status (SVN) given   Treatment Route (SVN) mask   Patient Position (SVN) semi-Gomes's   Post Treatment Assessment (SVN) increased aeration   Signs of Intolerance (SVN) none   Breath Sounds Post-Respiratory Treatment   Post-treatment Heart Rate (beats/min) 64   Post-treatment Resp Rate (breaths/min) 15   Respiratory Evaluation   $ Care Plan Tech Time 15 min

## 2020-07-27 NOTE — PLAN OF CARE
07/27/20 1231   Final Note   Assessment Type Final Discharge Note   Anticipated Discharge Disposition Home-Health   Post-Acute Status   Post-Acute Authorization Home Health   Home Health Status Set-up Complete     Discharge home with son. Orders to resume home health sent to [facility] via [facility].

## 2020-07-27 NOTE — PLAN OF CARE
07/27/20 1229   Medicare Message   Important Message from Medicare regarding Discharge Appeal Rights Given to patient/caregiver;Explained to patient/caregiver;Signed/date by patient/caregiver  ('Understanding verbalized. D/C IMM)   Date IMM was signed 07/27/20   Time IMM was signed 1219

## 2020-07-27 NOTE — PLAN OF CARE
07/26/20 0915   Patient Assessment/Suction   Level of Consciousness (AVPU) alert   Respiratory Effort Unlabored;Normal   Expansion/Accessory Muscles/Retractions no use of accessory muscles   All Lung Fields Breath Sounds coarse;wheezes, expiratory   Rhythm/Pattern, Respiratory no shortness of breath reported   Cough Frequency infrequent   Cough Type nonproductive   PRE-TX-O2   O2 Device (Oxygen Therapy) nasal cannula   $ Is the patient on Low Flow Oxygen? Yes   Flow (L/min) 2   Oxygen Concentration (%) 0.28   SpO2 99 %   Pulse Oximetry Type Intermittent   $ Pulse Oximetry - Multiple Charge Pulse Oximetry - Multiple   Oximetry Probe Site Applied   Pulse 64   Resp 18   Aerosol Therapy   $ Aerosol Therapy Charges Aerosol Treatment   Daily Review of Necessity (SVN) completed   Respiratory Treatment Status (SVN) given   Treatment Route (SVN) mask;oxygen   Patient Position (SVN) Gomes's   Post Treatment Assessment (SVN) breath sounds improved   Signs of Intolerance (SVN) none   Breath Sounds Post-Respiratory Treatment   Throughout All Fields Post-Treatment All Fields   Throughout All Fields Post-Treatment aeration increased   Post-treatment Heart Rate (beats/min) 62   Post-treatment Resp Rate (breaths/min) 16   Respiratory Evaluation   $ Care Plan Tech Time 15 min

## 2020-07-27 NOTE — PT/OT/SLP PROGRESS
Occupational Therapy   Treatment    Name: Marck Ramos  MRN: [id number]  Admitting Diagnosis:  Cellulitis       Recommendations:     Discharge Recommendations: (Home with HHOT and 24/7 assistance vs SNF)  Discharge Equipment Recommendations:  (TBD)  Barriers to discharge:  None    Assessment:     Marck Ramos is a 89 y.o. male with a medical diagnosis of Cellulitis.  He presents with improving medical acuity and functional mobility. Patient participated in bathing, sitting grooming activity and bed/chair transfer this session.. Performance deficits affecting function are weakness, impaired endurance, impaired self care skills, impaired functional mobilty, gait instability, impaired balance.     Rehab Prognosis:  Fair; patient would benefit from acute skilled OT services to address these deficits and reach maximum level of function.       Plan:     Patient to be seen 5 x/week to address the above listed problems via self-care/home management, therapeutic activities, therapeutic exercises  · Plan of Care Expires: 08/22/20  · Plan of Care Reviewed with: patient    Subjective     Pain/Comfort:  · Pain Rating 1: 0/10  · Pain Rating Post-Intervention 1: 0/10    Objective:     Communicated with: nurse prior to session.  Patient found HOB elevated with telemetry, peripheral IV upon OT entry to room.    General Precautions: Standard, fall   Orthopedic Precautions:N/A   Braces: N/A     Occupational Performance:     Bed Mobility:    · Patient completed Scooting/Bridging with minimum assistance  · Patient completed Supine to Sit with minimum assistance     Functional Mobility/Transfers:  · Patient completed Bed <> Chair Transfer using Step Transfer technique with contact guard assistance with hand-held assist and rolling walker    Activities of Daily Living:  · Grooming: contact guard assistance to wash face sitting EOB.  · Bathing: moderate assistance to wash self bed level.  · Lower Body Dressing: minimum assistance to don  socks sitting EOB.      [facility] 6 Click ADL: 19    Treatment & Education:  Patient continues to make positive progress towards all OT goals.    Patient left up in chair with all lines intact and call button in reachEducation:      GOALS:   Multidisciplinary Problems     Occupational Therapy Goals        Problem: Occupational Therapy Goal    Goal Priority Disciplines Outcome Interventions   Occupational Therapy Goal     OT, PT/OT Ongoing, Progressing    Description: Goals to be met by: discharge     Patient will increase functional independence with ADLs by performing:    UE Dressing with Supervision.  LE Dressing with Supervision.  Grooming while standing with Supervision.  Toileting from toilet with Supervision for hygiene and clothing management.   Toilet transfer to toilet with Supervision.  Perform AAROM of left shoulder with supervision.                     Time Tracking:     OT Date of Treatment: 07/27/20  OT Start Time: 1002  OT Stop Time: 1027  OT Total Time (min): 25 min    Billable Minutes:Self Care/Home Management 25    Han Lou OT  7/27/2020

## 2020-07-27 NOTE — PROGRESS NOTES
[facility] Medicine  Progress Note    Patient Name: Marck Ramos  MRN: [id number]  Patient Class: IP- Inpatient   Admission Date: 7/18/2020  Attending Physician: Gilberto Bowie MD  Primary Care Provider: Hood Gannon MD  Date of service:  07/26/2020    Subjective:     Principal Problem:Cellulitis    HPI:  Marck Ramos is a 89 y.o. 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 It is moderate. It is associated with swelling and pain. He denies fever, chills, cough, SOB, N/V/D, dizziness or known trauma. He reports it started as a small hematoma, and progressively got more red and painful. He was recently admitted for a left humerus frx, then pna, and has been receiving PT/OT at home. He states he wants to be a full code, but doesn't think he wants a ventilator long term.     Overview/Hospital Course:  Patient with multiple recent hospitalization.  Increasing falls at home with sustained left humerus fracture, currently in sling, recurrent admission for concern of aspiration pneumonia.  He was discharged home on home health about 1 week ago.  Daughter noted to have developing worsening area of erythema, she is unsure if a cat scratch area initially, enlarging with associated warmth and mild swelling, seen by home health with no improvement on instructed to the ED.  He was started on vancomycin on admission.  On 07/19 patient sleeping, daughter states more recently he has been sleeping more throughout the day, there was concern that he may have aspirated while taking medications earlier, leg continues with erythema, warmth and swelling, no active drainage.  Daughter states patient does have advanced directive/living will, would not want intubation. On 7/20 seen by speech  therapy with recommendations for trial of mechanical soft diet, still coughing with meals, lower extremity appears more to be hematoma with petechiae rather than cellulitis, son at bedside states Dr. Kuhn now out of town, requesting sling to be removed, will consult orthopedics Dr Tenorio.  The patient's hematoma and cellulitis are slowly improving with treatment.  His mobility is slowly improving.  Anticipate discharge home with home health in next couple days.    Interval history:  Today the patient reported redness and swelling of left lower extremity continues to slowly improve, remains constant timing, mild intensity.  Denies shortness of breath which resolved today with medical treatment.  Denies fever or chills.  No chest pain or shortness of breath.  No nausea or vomiting.      Physical exam:  Vital signs reviewed  General:  Frail, nontoxic, comfortable appearing  Cardiovascular:  2+ radial pulses bilaterally, distal extremities warm and well perfused  Pulmonary:  Comfortable work of breathing, faint basilar crackles  Neuro:  Nonfocal motor exam, fluent speech, follows commands appropriately  Psych:  Mood is calm, affect restricted, insight fair  Skin:  Dry and warm no jaundice, continued improvement of erythema and swelling of left lower extremity with persistent small focal area of tenderness palpation and induration of anterior shin with surrounding erythema    Laboratory data:  Labs reviewed    Chest X-ray personally reviewed (7/26):  Improvement of mild interstitial pulmonary edema    Left shoulder x-ray series Impression:  Healing proximal humeral fracture.  Findings suggesting chronic rotator cuff tear.  AC joint arthrosis.  Scattered interstitial and groundglass pulmonary opacities    Left lower extremity venous Doppler ultrasound Impression: No DVT of the left lower extremity veins.    Assessment/Plan:      * Left lower extremity hematoma with superimposed cellulitis  Few days history of  progressively worsening erythema with warmth and swelling.  Patient is on Eliquis and there was initially impression of erythema due to Eliquis however worsening.  Continue IV vancomycin-may consider discontinuing depending on clinical course, more suspect likely hematoma with bruising  Demarcate area of erythema and monitor  Elevate lower extremity  A.m. labs ordered for review      History of left humerus fracture  Arm currently in sling.  As per son discussed with Dr. Kuhn who is no longer seeing patients locally, recommends sling to be removed, will consult Dr. Tenorio    PAF (paroxysmal atrial fibrillation)  History of paroxysmal atrial fibrillation.  Followed by Dr. Mares.  Now with increasing falls and declining status will consult cardiology to re-evaluate need for Eliquis.  Continue sotalol.  Holding Eliquis.      Hyponatremia  Sodium level 133.  Slow IV fluids today and repeat levels tomorrow    Possible aspiration pneumonia versus pneumonitis  There was concern patient had severe coughing with possible aspiration.  Recent admission for aspiration pneumonia, declining status, high risk.  Seen by speech therapy with recommendations for mechanical soft diet, no rice or bread, will order an trial  Continue Zosyn for empiric coverage  Aspiration precautions  Repeat chest x-ray in a.m.  Speech therapy following    Debility  As per collateral patient with declining status following humerus fracture, reduced oral intake, more somnolent, decreased activity level and endurance.  Daughter states she does feel overwhelmed given has to manage at home and frequently has to call for help.  PT/OT.        BPH with obstruction/lower urinary tract symptoms  Known history of BPH, yesterday with volumes greater than 300, Dr. Collins reviewed.  As discussed with nursing as voided about 300 cc 3 times today, will continue with bladder scanning and conservative management.      Pacemaker        Severe malnutrition  With  declining status as outlined      Anemia in stage 2 chronic kidney disease  Chronic anemia, appears stable.  Monitoring.      Hypertension, not well controlled  Continue home Imdur, losartan-dose increased to 50, amlodipine-keep at low-dose as per Cardiology she was not able to tolerate higher doses in the past and monitor.    Add p.r.n. as needed.  More liberal target in this frail elderly male.    Hypokalemia  Hypomagnesemia  Hyponatremia  Mild iatrogenic pulmonary edema    Plan update today:  Continue care.  Appreciate consultants.    Will give another Lasix 20 mg IV x1 today.  Repeat a.m. chest x-ray and BNP.  Off IV fluids.  Continue IV clindamycin and transition to oral clindamycin tomorrow (at [place]).  Continue supportive care including pain control as needed  Daily labs.  Replace electrolytes as needed  Continue PT/OT.  Likely need home health at discharge.  [facility] walker DME per recommendations of therapy.  Continue speech therapy.  Dysphagia diet as tolerated.  Courage oral intake.  DNR status noted  Continue medical management including aspirin, sotalol, ARB, Norvasc, and p.r.n. nitrates.  Holding statin due to muscle weakness and oral anticoagulant due to bleeding risk with multiple falls  SCDs for VTE prophylaxis; avoid anticoagulant due to hematoma  Moderate risk secondary to moderate intensity acute illness from acute pulmonary edema requiring IV Lasix administration; multiple medical comorbid conditions with advanced age; prescription drug management and multiple medications managed  Disposition:  Possible discharge 24 hr pending resolution of pulmonary edema    VTE Risk Mitigation (From admission, onward)         Ordered     IP VTE HIGH RISK PATIENT  Once      07/18/20 1905     Place sequential compression device  Until discontinued      07/18/20 1905                Gilberto Bowie MD  Department of Hospital Medicine   [facility]

## 2020-07-28 ENCOUNTER — TELEPHONE (OUTPATIENT)
Dept: UROLOGY | Facility: CLINIC | Age: 85
End: 2020-07-28

## 2020-07-28 ENCOUNTER — TELEPHONE (OUTPATIENT)
Dept: FAMILY MEDICINE | Facility: CLINIC | Age: 85
End: 2020-07-28

## 2020-07-28 ENCOUNTER — PATIENT OUTREACH (OUTPATIENT)
Dept: FAMILY MEDICINE | Facility: CLINIC | Age: 85
End: 2020-07-28

## 2020-07-28 ENCOUNTER — HOSPITAL ENCOUNTER (EMERGENCY)
Facility: HOSPITAL | Age: 85
Discharge: HOME | End: 2020-07-29
Attending: EMERGENCY MEDICINE
Payer: MEDICARE

## 2020-07-28 VITALS
WEIGHT: 117 LBS | SYSTOLIC BLOOD PRESSURE: 186 MMHG | OXYGEN SATURATION: 95 % | HEIGHT: 66 IN | RESPIRATION RATE: 16 BRPM | HEART RATE: 59 BPM | BODY MASS INDEX: 18.8 KG/M2 | TEMPERATURE: 98 F | DIASTOLIC BLOOD PRESSURE: 81 MMHG

## 2020-07-28 DIAGNOSIS — I10 HYPERTENSION: ICD-10-CM

## 2020-07-28 DIAGNOSIS — J90 PLEURAL EFFUSION: ICD-10-CM

## 2020-07-28 DIAGNOSIS — N48.89 PENILE BLEEDING: Primary | ICD-10-CM

## 2020-07-28 LAB
ALBUMIN SERPL BCP-MCNC: 2.7 G/DL (ref 3.5–5.2)
ALP SERPL-CCNC: 64 U/L (ref 55–135)
ALT SERPL W/O P-5'-P-CCNC: 12 U/L (ref 10–44)
ANION GAP SERPL CALC-SCNC: 9 MMOL/L (ref 8–16)
AST SERPL-CCNC: 19 U/L (ref 10–40)
BASOPHILS # BLD AUTO: 0.06 K/UL (ref 0–0.2)
BASOPHILS NFR BLD: 0.9 % (ref 0–1.9)
BILIRUB SERPL-MCNC: 0.6 MG/DL (ref 0.1–1)
BUN SERPL-MCNC: 32 MG/DL (ref 10–30)
CALCIUM SERPL-MCNC: 8.5 MG/DL (ref 8.7–10.5)
CHLORIDE SERPL-SCNC: 93 MMOL/L (ref 95–110)
CO2 SERPL-SCNC: 28 MMOL/L (ref 23–29)
CREAT SERPL-MCNC: 0.9 MG/DL (ref 0.5–1.4)
DIFFERENTIAL METHOD: ABNORMAL
EOSINOPHIL # BLD AUTO: 0.5 K/UL (ref 0–0.5)
EOSINOPHIL NFR BLD: 8 % (ref 0–8)
ERYTHROCYTE [DISTWIDTH] IN BLOOD BY AUTOMATED COUNT: 14.3 % (ref 11.5–14.5)
EST. GFR  (AFRICAN AMERICAN): >60 ML/MIN/1.73 M^2
EST. GFR  (NON AFRICAN AMERICAN): >60 ML/MIN/1.73 M^2
GLUCOSE SERPL-MCNC: 114 MG/DL (ref 70–110)
HCT VFR BLD AUTO: 30.3 % (ref 40–54)
HGB BLD-MCNC: 10 G/DL (ref 14–18)
IMM GRANULOCYTES # BLD AUTO: 0.02 K/UL (ref 0–0.04)
IMM GRANULOCYTES NFR BLD AUTO: 0.3 % (ref 0–0.5)
LYMPHOCYTES # BLD AUTO: 2.7 K/UL (ref 1–4.8)
LYMPHOCYTES NFR BLD: 40.8 % (ref 18–48)
MCH RBC QN AUTO: 32.8 PG (ref 27–31)
MCHC RBC AUTO-ENTMCNC: 33 G/DL (ref 32–36)
MCV RBC AUTO: 99 FL (ref 82–98)
MONOCYTES # BLD AUTO: 0.9 K/UL (ref 0.3–1)
MONOCYTES NFR BLD: 13.8 % (ref 4–15)
NEUTROPHILS # BLD AUTO: 2.4 K/UL (ref 1.8–7.7)
NEUTROPHILS NFR BLD: 36.2 % (ref 38–73)
NRBC BLD-RTO: 0 /100 WBC
PLATELET # BLD AUTO: 296 K/UL (ref 150–350)
PMV BLD AUTO: 8.7 FL (ref 9.2–12.9)
POTASSIUM SERPL-SCNC: 4.2 MMOL/L (ref 3.5–5.1)
PROT SERPL-MCNC: 6.3 G/DL (ref 6–8.4)
RBC # BLD AUTO: 3.05 M/UL (ref 4.6–6.2)
SODIUM SERPL-SCNC: 130 MMOL/L (ref 136–145)
WBC # BLD AUTO: 6.61 K/UL (ref 3.9–12.7)

## 2020-07-28 PROCEDURE — 36415 COLL VENOUS BLD VENIPUNCTURE: CPT

## 2020-07-28 PROCEDURE — 80053 COMPREHEN METABOLIC PANEL: CPT

## 2020-07-28 PROCEDURE — 85025 COMPLETE CBC W/AUTO DIFF WBC: CPT

## 2020-07-28 PROCEDURE — 51798 US URINE CAPACITY MEASURE: CPT

## 2020-07-28 PROCEDURE — 99285 EMERGENCY DEPT VISIT HI MDM: CPT | Mod: 25

## 2020-07-28 NOTE — PROGRESS NOTES
"Discharge Information     Discharge Date: 07/27/2020      Primary Discharge Diagnosis: DVT         How patient is feeling since discharge from the hospital?  Patient is doing okay per daughter.         Medication & Order Review     Discharge Medication Review:    Medication reconciliation performed Yes   If no, state reason why not performed: N/A       Did patient have any difficulty/problems filling prescriptions?  No           If yes, state reason and steps taken to assist in resolving issue: N/A     Does patient have any questions regarding medications?  No           If yes, state question and steps taken to answer question:  N/A    Was Home Health and/or any equipment ordered for patient upon discharge?  Yes          Home Health Yes   If yes, has home health contacted patient and/or initiated services? Yes   Name of Home Health Agency    Durable Medical Equipment (DME)  No (Example: walker, wheelchair, nebulizer)   If yes, has the DME provider contacted patient and delivered equipment? N/A    DME Company N/A     Red Flag Review     Was patient educated on "red flags" or things to watch for?  Yes       If yes:  "Red flags" patient was told to watch for:     Chest Pain                SOB                Increased leg swelling / pain               Other:            Is patient experiencing any red flags today (or any of these symptoms) (List examples of red flags specifically)?  No  Notes:                    If no:    Educated the patient on 3 "red flags" to watch for:     Chest pain                SOB                Increased leg swelling / pain               Other:               Is patient experiencing any red flags today (or any of these symptoms) (List examples of red flags specifically)?  No      Notes:      Patient Education & Follow Up               Phone number patient will call if having any questions or problems:  Patient was able to state the phone number for [facility] " accurately.    Appointment scheduled?  Yes      Follow-up/transition of care appointment, including the date/time and location of your appointment:  Patient was able to state the follow-up appointment correctly.        Notes:            Provided patient with date, time and location of follow-up appointment if they do not have it:  Yes      Rescheduled transition of care appointment if the patient has a conflict:    Appointment re-scheduled?  No        Patient informed of this appointment?        Notes:         3 questions patient would like to ask physician during appointment:                    

## 2020-07-28 NOTE — TELEPHONE ENCOUNTER
----- Message from Gris Strauss sent at 7/28/2020 11:02 AM CDT -----  Regarding: Needs Drain removed  Contact: Kati pt's daughter  Pt had a hematoma on his leg and went back to the ER . The ER doctor put a drain in his left leg and says that it needs to be removed by Thursday . I scheduled the patient with Vanessa on 07/30 . Never mind, I just checked the patient's encounter . And I see he is supposed to be set up for a HFU . The daughter didn't tell me that , she just said he need the drain removed by Thursday. Im going to cancel his appt with Vanessa this Thursday and let Phoebe schedule his follow up appointment instead.   I tried calling the daughter to let her know, but she didn't answer.   Kati# [phone]

## 2020-07-28 NOTE — TELEPHONE ENCOUNTER
----- Message from Zarina Vicente sent at 7/28/2020 11:11 AM CDT -----  Regarding: Appt  Contact: pt's daughter Kati  Appointment     Patient 's daughter Kati called to make an appt for her father to be seen for his Enlarged   Prostate. He was D/C from [facility] yesterday. They are asking to be seen as soon as possible.  Call back [phone]

## 2020-07-28 NOTE — PT/OT/SLP DISCHARGE
Occupational Therapy Discharge Summary    Marck Ramos  MRN: [id number]   Principal Problem: Cellulitis      Patient Discharged from acute Occupational Therapy on 7/27/2020.  Please refer to prior OT note dated 7/27/2020 for functional status.    Assessment:      Patient has not met goals.    Objective:     GOALS:   Multidisciplinary Problems     Occupational Therapy Goals     Not on file          Multidisciplinary Problems (Resolved)        Problem: Occupational Therapy Goal    Goal Priority Disciplines Outcome Interventions   Occupational Therapy Goal   (Resolved)     OT, PT/OT Met    Description: Goals to be met by: discharge     Patient will increase functional independence with ADLs by performing:    UE Dressing with Supervision.  LE Dressing with Supervision.  Grooming while standing with Supervision.  Toileting from toilet with Supervision for hygiene and clothing management.   Toilet transfer to toilet with Supervision.  Perform AAROM of left shoulder with supervision.                     Reasons for Discontinuation of Therapy Services  Patient d/c home.      Plan:     Patient Discharged to: Home with Home Health Service    Han Lou, OT  7/28/2020

## 2020-07-28 NOTE — TELEPHONE ENCOUNTER
Called and spoke to patient daughter who stated that patient was just d/c from [facility] and need an appt. Informed daughter that patient can be seen sooner with NP as provider is out of office and has been intermittently in and and out for a while with family emergency. Daughter verbalized understanding. Appt scheduled with NP for 8/3 for 10:30am. Daughter verbalized understanding.

## 2020-07-28 NOTE — ED PROVIDER NOTES
Encounter Date: 7/27/2020       History     Chief Complaint   Patient presents with    Hematuria     Marck Ramos is a 89 y.o. male with multiple comorbidities who was just discharged for IV ABX for RLE cellulitis and just before that was admitted for pneumonia who presents for evaluation of hematuria.     His daughter noted ashly blood in his diaper today. She saw it come from the penis as well. He denies dysuria, fever. This has not happened before. He is on eloquis for AF. Does have hx of BPH. No fever. Says cellulitic leg is healing well.        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  Social History     Tobacco Use    Smoking status: Former Smoker     Years: 1.00     Types: Cigarettes    Smokeless tobacco: Never Used   Substance Use Topics    Alcohol use: No    Drug use: No     Review of Systems   Constitutional: Negative for diaphoresis and fever.   HENT: Negative for sore throat and trouble swallowing.    Eyes: Negative for visual disturbance.   Respiratory: Negative for cough and shortness of breath.    Cardiovascular: Positive for leg swelling (Left leg). Negative  for chest pain.   Gastrointestinal: Negative for abdominal pain, diarrhea and vomiting.   Genitourinary: Positive for hematuria. Negative for dysuria, flank pain and penile pain.   Musculoskeletal: Negative for gait problem and neck stiffness.   Skin: Positive for color change (Erythematous LLE). Negative for rash and wound.   Neurological: Negative for syncope, weakness and headaches.   Psychiatric/Behavioral: Negative for confusion.       Physical Exam     Initial Vitals [07/27/20 1759]   BP Pulse Resp Temp SpO2   (!) 163/70 63 18 97.6 °F (36.4 °C) (!) 93 %      MAP       --         Physical Exam    Nursing note and vitals reviewed.  Constitutional: He is not diaphoretic.   Pleasant, non-toxic. Appears stated age.   HENT:   Head: Normocephalic and atraumatic.   Mouth/Throat: Oropharynx is clear and moist. No oropharyngeal exudate.   Eyes: EOM are normal. Pupils are equal, round, and reactive to light.   Neck: Normal range of motion. Neck supple.   Cardiovascular: Normal rate, normal heart sounds and intact distal pulses.   Pulmonary/Chest: Breath sounds normal. No respiratory distress.   Abdominal: Soft. There is no abdominal tenderness.   Genitourinary:    Genitourinary Comments: Dry red blood at urethral meatus. Rectal exam w/ no melena or gross blood.     Musculoskeletal: Normal range of motion. Tenderness and edema present.      Comments: Distal LLE (over shin) w/ erythema. Erythema is improving / receeding from black outline. There is tender fluctuance in the area; consistent w/ abscess.   Neurological: He is alert and oriented to person, place, and time.   Skin: Skin is warm and dry. Capillary refill takes less than 2 seconds. There is erythema (Distal LLE).   Psychiatric: He has a normal mood and affect.         ED Course   I & D - Incision and Drainage    Date/Time: 7/27/2020 11:05 PM  Location procedure was performed: [facility] EMERGENCY DEPARTMENT  Performed by: Brant Solares MD  Authorized by: Tee  DENZEL Beauchamp MD   Assisting provider: Tee Beauchamp MD  Pre-operative diagnosis: abscess  Post-operative diagnosis: hematoma  Consent Done: Yes  Consent: Verbal consent obtained.  Risks and benefits: risks, benefits and alternatives were discussed  Consent given by: patient  Indications for incision and drainage: Presumed abscess.  Body area: lower extremity  Location details: left leg  Anesthesia: local infiltration (LET. )    Anesthesia:  Local Anesthetic: lidocaine 1% without epinephrine  Patient sedated: no  Scalpel size: 11  Incision type: single straight  Complexity: simple  Drainage: bloody  Drainage amount: moderate  Wound treatment: expression of material and  wound packed  Packing material: 1/4 in gauze  Comments: Consistent w/ hematoma.    Brant Solares MD, Emergency Medicine, PGY-4, 11:07 PM 7/27/2020         Labs Reviewed   CBC W/ AUTO DIFFERENTIAL - Abnormal; Notable for the following components:       Result Value    RBC 3.31 (*)     Hemoglobin 11.0 (*)     Hematocrit 33.0 (*)     Mean Corpuscular Volume 100 (*)     Mean Corpuscular Hemoglobin 33.2 (*)     MPV 8.8 (*)     Eosinophil% 8.1 (*)     All other components within normal limits   COMPREHENSIVE METABOLIC PANEL - Abnormal; Notable for the following components:    Sodium 130 (*)     Chloride 93 (*)     Glucose 119 (*)     BUN, Bld 32 (*)     Calcium 8.5 (*)     Albumin 2.8 (*)     All other components within normal limits   URINALYSIS, REFLEX TO URINE CULTURE   OCCULT BLOOD X 1, STOOL          Imaging Results    None          Medical Decision Making:   Initial Assessment:   Vitals stable on arrival. Here for what they describe as gross hematuria. They show me diaper w/ red blood. He is on eloquis for AF. No s/sx of symptomatic anemia. Left shin w/ what appears to be improving cellulitis but what may be the formation of an abscess .    Ordered screening labs and a UA.  Also ordered US of the LLE to evaluate for the abscess.     Labs  reviewed. Chronic hyponatremia. Stable H/H. No leukocytosis; reassuring against systemic infection. US of the leg shows a fluid collection. UA w/ blood but no UTI.    I performed I&D of the leg and it only produced blood. No pus. More consistent w/ hematoma.   Patient is stable for discharge. He needs to f/u w/ his urologist and will do so ASAP.  He will have packing removed in 2-3 days. He will continue outpatient ABX.  Discussed red flag return precautions and appropriate follow up.     Brant Solares MD, Emergency Medicine, PGY-4, 11:05 PM 7/27/2020                                  Clinical Impression:       ICD-10-CM ICD-9-CM   1. Hematuria, unspecified type  R31.9 599.70   2. Left leg cellulitis  L03.116 682.6   3. Hematoma of leg, left, initial encounter  S80.12XA 924.5                                Brant Solares MD  Resident  07/27/20 [id number]

## 2020-07-29 PROCEDURE — 93005 ELECTROCARDIOGRAM TRACING: CPT | Performed by: INTERNAL MEDICINE

## 2020-07-29 NOTE — DISCHARGE INSTRUCTIONS
Please hold your AM dose of of eliquis and contact Dr. Mares's office to determine how long you can hold this medicine.

## 2020-07-29 NOTE — ED NOTES
Dressed wound noted to left lower leg. Scab noted to right knee. (chronic) Left arm swollen with pink sleeve for do not stick. Daughter states she put that on pt bc arm is still swollen from when he had a fx a month ago.  Bruising noted on bilateral arms. Pulses normal.

## 2020-07-30 ENCOUNTER — DOCUMENT SCAN (OUTPATIENT)
Dept: HOME HEALTH SERVICES | Facility: HOSPITAL | Age: 85
End: 2020-07-30

## 2020-07-30 ENCOUNTER — OFFICE VISIT (OUTPATIENT)
Dept: FAMILY MEDICINE | Facility: CLINIC | Age: 85
End: 2020-07-30
Payer: MEDICARE

## 2020-07-30 VITALS
SYSTOLIC BLOOD PRESSURE: 134 MMHG | TEMPERATURE: 98 F | HEIGHT: 66 IN | DIASTOLIC BLOOD PRESSURE: 80 MMHG | HEART RATE: 56 BPM | BODY MASS INDEX: 18.64 KG/M2 | WEIGHT: 116 LBS

## 2020-07-30 DIAGNOSIS — R54 ADVANCED AGE: ICD-10-CM

## 2020-07-30 DIAGNOSIS — R29.6 FREQUENT FALLS: ICD-10-CM

## 2020-07-30 DIAGNOSIS — R31.29 MICROHEMATURIA: ICD-10-CM

## 2020-07-30 DIAGNOSIS — I48.0 PAF (PAROXYSMAL ATRIAL FIBRILLATION): ICD-10-CM

## 2020-07-30 DIAGNOSIS — Z79.01 CURRENT USE OF LONG TERM ANTICOAGULATION: ICD-10-CM

## 2020-07-30 DIAGNOSIS — S80.12XD HEMATOMA OF LEFT LOWER EXTREMITY, SUBSEQUENT ENCOUNTER: Primary | ICD-10-CM

## 2020-07-30 DIAGNOSIS — S42.292S CLOSED FRACTURE OF HEAD OF LEFT HUMERUS, SEQUELA: ICD-10-CM

## 2020-07-30 PROCEDURE — 99495 TCM SERVICES (MODERATE COMPLEXITY): ICD-10-PCS | Mod: S$GLB,,, | Performed by: NURSE PRACTITIONER

## 2020-07-30 PROCEDURE — 99495 TRANSJ CARE MGMT MOD F2F 14D: CPT | Mod: S$GLB,,, | Performed by: NURSE PRACTITIONER

## 2020-07-30 RX ORDER — TAMSULOSIN HYDROCHLORIDE 0.4 MG/1
0.4 CAPSULE ORAL DAILY
Qty: 90 CAPSULE | Refills: 1 | Status: SHIPPED | OUTPATIENT
Start: 2020-07-30 | End: 2020-08-03 | Stop reason: SDUPTHER

## 2020-07-30 NOTE — ED PROVIDER NOTES
Encounter Date: 7/28/2020       History     Chief Complaint   Patient presents with    Hematuria     HPI     Seen and evaluated.  Presents with a chief complaint of bleeding from his penis.  Patient's daughter notes that today he passed ashly blood from his penis.  He was found to have clot in his depends.  A he denies active complaints.  Last night he was seen here and had an incision and drainage of the suspected infected lesion to his left lower extremity.  He also had bleeding from his penis but then had clear urine in the emergency department.  He denies any new complaints except for repeat episode of bleeding and has a known history of enlarged prostate.    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  Social History     Tobacco Use    Smoking status: Former Smoker     Years: 1.00     Types: Cigarettes    Smokeless tobacco: Never Used   Substance Use Topics    Alcohol use: No    Drug use: No     Review of Systems   Constitutional: Negative for fever.   HENT: Negative for sore throat.    Respiratory: Negative for shortness of breath.    Cardiovascular: Negative for chest pain.    Gastrointestinal: Negative for nausea.   Genitourinary: Negative for dysuria.        Penile bleeding   Musculoskeletal: Negative for back pain.   Skin: Negative for rash.   Neurological: Negative for weakness.   Hematological: Does not bruise/bleed easily.   All other systems reviewed and are negative.      Physical Exam     Initial Vitals [07/28/20 1921]   BP Pulse Resp Temp SpO2   (!) 192/81 62 16 97.8 °F (36.6 °C) 96 %      MAP       --         Physical Exam    Nursing note and vitals reviewed.  Constitutional: He appears well-developed and well-nourished.   HENT:   Head: Normocephalic and atraumatic.   Eyes: Conjunctivae are normal.   Cardiovascular: Normal rate and regular rhythm.   Abdominal: Soft. Normal appearance.   Genitourinary:    Penis normal.      Genitourinary Comments: Bleeding noted     Musculoskeletal: Normal range of motion.   Neurological: He is alert and oriented to person, place, and time.   Skin: Skin is warm and dry.   Psychiatric: He has a normal mood and affect. His speech is normal.         ED Course   Procedures  Labs Reviewed   CBC W/ AUTO DIFFERENTIAL - Abnormal; Notable for the following components:       Result Value    RBC 3.05 (*)     Hemoglobin 10.0 (*)     Hematocrit 30.3 (*)     Mean Corpuscular Volume 99 (*)     Mean Corpuscular Hemoglobin 32.8 (*)     MPV 8.7 (*)     Gran% 36.2 (*)     All other components within normal limits   COMPREHENSIVE METABOLIC PANEL - Abnormal; Notable for the following components:    Sodium 130 (*)     Chloride 93 (*)     Glucose 114 (*)     BUN, Bld 32 (*)     Calcium 8.5 (*)     Albumin 2.7 (*)     All other components within normal limits        ECG Results          EKG 12-lead (In process)  Result time 07/29/20 05:35:14    In process by Interface, Lab In [facility] (07/29/20 05:35:14)                 Narrative:    Test Reason : I10,    Vent. Rate : 060 BPM     Atrial Rate : 059 BPM     P-R Int : 138 ms          QRS Dur : 170 ms      QT Int : 522 ms        P-R-T Axes : 000 -73 098 degrees     QTc Int : 522 ms    AV dual-paced rhythm  Abnormal ECG  When compared with ECG of 18-JUL-2020 16:30,  No significant change was found    Referred By: [email or web address]   SELF           Confirmed By:                             Imaging Results          X-Ray Chest AP Portable (Final result)  Result time 07/29/20 06:22:02   Procedure changed from X-Ray Chest 1 View     Final result by Marilyn Tapia MD (07/29/20 06:22:02)                 Impression:      Stable mild cardiomegaly with increased interstitial markings suggestive of chronic changes versus mild edema      Electronically signed by: Marilyn Tapia MD  Date:    07/29/2020  Time:    06:22             Narrative:    EXAMINATION:  XR CHEST AP PORTABLE    CLINICAL HISTORY:  hematuria; Pleural effusion, not elsewhere classified    FINDINGS:  Portable chest at 23:50 hours is is compared to 07/26/2020 shows mild cardiomegaly there is a left subclavian vein implantable cardiac device in stable position.    There are mild increased interstitial markings suggestive of chronic changes versus mild edema.  There are no confluent infiltrates or pleural effusions.  Pulmonary vasculature is normal. No acute osseous abnormality.                               CT Renal Stone Study ABD Pelvis WO (Final result)  Result time 07/28/20 20:19:00    Final result by Dennis Mcdaniel MD (07/28/20 20:19:00)                 Narrative:    History: Hematuria, unknown cause . 89-year-old male    Comparison: CT scan abdomen pelvis enhanced July 11, 2020. Also compared with portable chest film July 26, 2020.    Procedure:  CT scan abdomen and pelvis; stone protocol, dated 7/28/2020 9:06 PM CDT.  Axial images obtained followed by coronal and sagittal reconstructions. Oral contrast utilized. Study performed unenhanced. .9    Findings: Septal thickening noted at the lung bases. Bilateral pleural effusions present. Large nonreducible hiatal hernia to the  stomach again noted The unenhanced liver, spleen, pancreas, and adrenal glands are unremarkable.  Gallbladder and gallbladder fossa normal in appearance.    No intrarenal calculi identified.  No evidence of right or left-sided hydronephrosis, hydroureter, ureteral or bladder calculi. Marked enlargement of the prostate gland with a mass impression upon the base of the bladder. Seminal vesicles normal configuration.    Rectum and perirectal space unremarkable. Scattered diverticuli sigmoid colon. Terminal ileum normal in appearance. Appendix not visualized. No inflammatory changes of the large or small bowel identified.    No free intraperitoneal air or fluid identified. Small umbilical hernia, fat-containing only.  No additional abdominal wall defects noted. The abdominal aorta/branch vessels/IVC normal configuration. Calcification seen at the origins of the branch vessels of the abdominal aorta.    Patient status post left hip pinning. Levoscoliotic curvature thoracolumbar junction; related to multilevel degenerative disc disease at this level.    Technique:  This exam was performed according to our departmental dose-optimization program, which includes automated exposure control, adjustment of the mA and/or kV according to patient size and/or use of iterative reconstruction technique.    Impression:  1. No evidence of upper urinary tract calculi or obstruction.  2. Marked enlargement of the prostate gland. Further evaluation of the prostate gland recommended.  3. Large nonreducible hiatal hernia to the stomach also previously identified.  4. Bilateral pleural effusions with septal thickening at the lung bases may relate to chronic CHF this patient.    Electronically signed by:  Dennis Mcdaniel MD  7/28/2020 9:16 PM CDT Workstation: [id number]                               Medical Decision Making:   Initial Assessment:   Presented with bleeding from his penis.  Discussed with Neurology.  Presumptively, this is from and  will Eliquis and his prostate being irritated.  Considered holding Eliquis.  Discussed this with Cardiology on-call, the agreed to consider holding Eliquis.  Will hold tonight and tomorrow morning's dose and then discuss with primary cardiologist.  He will be discharged home strong return precautions and outpatient follow-up plan.  Of note, he had small bilateral pleural effusions consistent with his heart failure, but does not appear to be hypoxic.                                 Clinical Impression:       ICD-10-CM ICD-9-CM   1. Penile bleeding  N48.89 607.82   2. Pleural effusion  J90 511.9   3. Hypertension  I10 401.9             ED Disposition Condition    Discharge Stable        ED Prescriptions     None        Follow-up Information     Follow up With Specialties Details Why Contact Info Additional Information    Hood Gannon MD Family Medicine Go to  As needed [address]  [address]  [place] [address]  [phone]       [facility] Go to  As needed [address] [facility] [facility] [address]  [phone] 1st floor    Please follow up with Dr. Jai Gibson Jr., MD  07/30/20 [id number]

## 2020-07-30 NOTE — PROGRESS NOTES
"  SUBJECTIVE:    Patient ID: Marck Ramos is a 89 y.o. male.    Chief Complaint: Hospital Follow Up (list of meds tb )    Pt presents for hospital follow-up. Hospitalized 7/18-7/27 for cellulitis.  Hospital course per D/C summary:   Patient with multiple recent hospitalization.  Increasing falls at home with sustained left humerus fracture, currently in sling, recurrent admission for concern of aspiration pneumonia.  He was discharged home on home health about 1 week ago.  Daughter noted to have developing worsening area of erythema, she is unsure if a cat scratch area initially, enlarging with associated warmth and mild swelling, seen by home health with no improvement on instructed to the ED.  He was started on vancomycin on admission.  On 07/19 patient sleeping, daughter states more recently he has been sleeping more throughout the day, there was concern that he may have aspirated while taking medications earlier, leg continues with erythema, warmth and swelling, no active drainage.  Daughter states patient does have advanced directive/living will, would not want intubation. On 7/20 seen by speech therapy with recommendations for trial of mechanical soft diet, still coughing with meals, lower extremity appears more to be hematoma with petechiae rather than cellulitis, son at bedside states Dr. Kuhn now out of town, requesting sling to be removed, will consult orthopedics Dr Tenorio to assist with management.  The patient's hematoma and cellulitis are slowly improving with treatment.  His mobility is slowly improving with therapy.      Additionally, pt was hospitalized 7/10-7/12 for possible aspiration pneumonia.    Returned to ER on 7/28 for hematuria and "abscess" on left lower leg. Hematuria resolved while in ER and he was advised to follow-up with Urology. Additionally, abscess with I&D found to be blood, no purulent drainage. Left open with wick to be removed in office today. Cardiology consulted and " advised pt hold Eliquis for the next few days and follow-up with Dr. Mares's office.     Pt doing well today. Per chart, appears he has had several falls in recent time. Pain is improved since humerus fracture last month. Currently on Clindamycin for cellulitis. Tolerating without GI issues. Currently receiving home health.       Admission on 07/28/2020, Discharged on 07/29/2020   Component Date Value Ref Range Status    WBC 07/28/2020 6.61  3.90 - 12.70 K/uL Final    RBC 07/28/2020 3.05* 4.60 - 6.20 M/uL Final    Hemoglobin 07/28/2020 10.0* 14.0 - 18.0 g/dL Final    Hematocrit 07/28/2020 30.3* 40.0 - 54.0 % Final    Mean Corpuscular Volume 07/28/2020 99* 82 - 98 fL Final    Mean Corpuscular Hemoglobin 07/28/2020 32.8* 27.0 - 31.0 pg Final    Mean Corpuscular Hemoglobin Conc 07/28/2020 33.0  32.0 - 36.0 g/dL Final    RDW 07/28/2020 14.3  11.5 - 14.5 % Final    Platelets 07/28/2020 296  150 - 350 K/uL Final    MPV 07/28/2020 8.7* 9.2 - 12.9 fL Final    Immature Granulocytes 07/28/2020 0.3  0.0 - 0.5 % Final    Gran # (ANC) 07/28/2020 2.4  1.8 - 7.7 K/uL Final    Immature Grans (Abs) 07/28/2020 0.02  0.00 - 0.04 K/uL Final    Lymph # 07/28/2020 2.7  1.0 - 4.8 K/uL Final    Mono # 07/28/2020 0.9  0.3 - 1.0 K/uL Final    Eos # 07/28/2020 0.5  0.0 - 0.5 K/uL Final    Baso # 07/28/2020 0.06  0.00 - 0.20 K/uL Final    nRBC 07/28/2020 0  0 /100 WBC Final    Gran% 07/28/2020 36.2* 38.0 - 73.0 % Final    Lymph% 07/28/2020 40.8  18.0 - 48.0 % Final    Mono% 07/28/2020 13.8  4.0 - 15.0 % Final    Eosinophil% 07/28/2020 8.0  0.0 - 8.0 % Final    Basophil% 07/28/2020 0.9  0.0 - 1.9 % Final    Differential Method 07/28/2020 Automated   Final    Sodium 07/28/2020 130* 136 - 145 mmol/L Final    Potassium 07/28/2020 4.2  3.5 - 5.1 mmol/L Final    Chloride 07/28/2020 93* 95 - 110 mmol/L Final    CO2 07/28/2020 28  23 - 29 mmol/L Final    Glucose 07/28/2020 114* 70 - 110 mg/dL Final    BUN, Bld  07/28/2020 32* 10 - 30 mg/dL Final    Creatinine 07/28/2020 0.9  0.5 - 1.4 mg/dL Final    Calcium 07/28/2020 8.5* 8.7 - 10.5 mg/dL Final    Total Protein 07/28/2020 6.3  6.0 - 8.4 g/dL Final    Albumin 07/28/2020 2.7* 3.5 - 5.2 g/dL Final    Total Bilirubin 07/28/2020 0.6  0.1 - 1.0 mg/dL Final    Alkaline Phosphatase 07/28/2020 64  55 - 135 U/L Final    AST 07/28/2020 19  10 - 40 U/L Final    ALT 07/28/2020 12  10 - 44 U/L Final    Anion Gap 07/28/2020 9  8 - 16 mmol/L Final    eGFR if African American 07/28/2020 >60.0  >60 mL/min/1.73 m^2 Final    eGFR if non African American 07/28/2020 >60.0  >60 mL/min/1.73 m^2 Final   Admission on 07/27/2020, Discharged on 07/27/2020   Component Date Value Ref Range Status    WBC 07/27/2020 6.45  3.90 - 12.70 K/uL Final    RBC 07/27/2020 3.31* 4.60 - 6.20 M/uL Final    Hemoglobin 07/27/2020 11.0* 14.0 - 18.0 g/dL Final    Hematocrit 07/27/2020 33.0* 40.0 - 54.0 % Final    Mean Corpuscular Volume 07/27/2020 100* 82 - 98 fL Final    Mean Corpuscular Hemoglobin 07/27/2020 33.2* 27.0 - 31.0 pg Final    Mean Corpuscular Hemoglobin Conc 07/27/2020 33.3  32.0 - 36.0 g/dL Final    RDW 07/27/2020 14.2  11.5 - 14.5 % Final    Platelets 07/27/2020 331  150 - 350 K/uL Final    MPV 07/27/2020 8.8* 9.2 - 12.9 fL Final    Immature Granulocytes 07/27/2020 0.3  0.0 - 0.5 % Final    Gran # (ANC) 07/27/2020 3.0  1.8 - 7.7 K/uL Final    Immature Grans (Abs) 07/27/2020 0.02  0.00 - 0.04 K/uL Final    Lymph # 07/27/2020 2.2  1.0 - 4.8 K/uL Final    Mono # 07/27/2020 0.7  0.3 - 1.0 K/uL Final    Eos # 07/27/2020 0.5  0.0 - 0.5 K/uL Final    Baso # 07/27/2020 0.06  0.00 - 0.20 K/uL Final    nRBC 07/27/2020 0  0 /100 WBC Final    Gran% 07/27/2020 46.2  38.0 - 73.0 % Final    Lymph% 07/27/2020 33.3  18.0 - 48.0 % Final    Mono% 07/27/2020 11.2  4.0 - 15.0 % Final    Eosinophil% 07/27/2020 8.1* 0.0 - 8.0 % Final    Basophil% 07/27/2020 0.9  0.0 - 1.9 % Final     Differential Method 07/27/2020 Automated   Final    Sodium 07/27/2020 130* 136 - 145 mmol/L Final    Potassium 07/27/2020 4.4  3.5 - 5.1 mmol/L Final    Chloride 07/27/2020 93* 95 - 110 mmol/L Final    CO2 07/27/2020 26  23 - 29 mmol/L Final    Glucose 07/27/2020 119* 70 - 110 mg/dL Final    BUN, Bld 07/27/2020 32* 10 - 30 mg/dL Final    Creatinine 07/27/2020 0.8  0.5 - 1.4 mg/dL Final    Calcium 07/27/2020 8.5* 8.7 - 10.5 mg/dL Final    Total Protein 07/27/2020 6.5  6.0 - 8.4 g/dL Final    Albumin 07/27/2020 2.8* 3.5 - 5.2 g/dL Final    Total Bilirubin 07/27/2020 0.4  0.1 - 1.0 mg/dL Final    Alkaline Phosphatase 07/27/2020 70  55 - 135 U/L Final    AST 07/27/2020 20  10 - 40 U/L Final    ALT 07/27/2020 12  10 - 44 U/L Final    Anion Gap 07/27/2020 11  8 - 16 mmol/L Final    eGFR if African American 07/27/2020 >60.0  >60 mL/min/1.73 m^2 Final    eGFR if non African American 07/27/2020 >60.0  >60 mL/min/1.73 m^2 Final    Specimen UA 07/27/2020 Urine, Catheterized   Final    Color, UA 07/27/2020 Yellow  Yellow, Straw, Chasity Final    Appearance, UA 07/27/2020 Clear  Clear Final    pH, UA 07/27/2020 7.0  5.0 - 8.0 Final    Specific [place], UA 07/27/2020 1.020  1.005 - 1.030 Final    Protein, UA 07/27/2020 Trace* Negative Final    Glucose, UA 07/27/2020 Negative  Negative Final    Ketones, UA 07/27/2020 Trace* Negative Final    Bilirubin (UA) 07/27/2020 Negative  Negative Final    Occult Blood UA 07/27/2020 2+* Negative Final    Nitrite, UA 07/27/2020 Negative  Negative Final    Urobilinogen, UA 07/27/2020 Negative  Negative EU/dL Final    Leukocytes, UA 07/27/2020 Negative  Negative Final    Occult Blood 07/27/2020 Negative  Negative Final    RBC, UA 07/27/2020 >100* 0 - 4 /hpf Final    WBC, UA 07/27/2020 2  0 - 5 /hpf Final    Bacteria 07/27/2020 Negative  None-Occ /hpf Final    Squam Epithel, UA 07/27/2020 1  /hpf Final    Hyaline Casts, UA 07/27/2020 3* 0-1/lpf /lpf Final     Microscopic Comment 07/27/2020 SEE COMMENT   Final   No results displayed because visit has over 200 results.      Admission on 07/10/2020, Discharged on 07/12/2020   Component Date Value Ref Range Status    WBC 07/10/2020 9.68  3.90 - 12.70 K/uL Final    RBC 07/10/2020 3.70* 4.60 - 6.20 M/uL Final    Hemoglobin 07/10/2020 12.4* 14.0 - 18.0 g/dL Final    Hematocrit 07/10/2020 37.4* 40.0 - 54.0 % Final    Mean Corpuscular Volume 07/10/2020 101* 82 - 98 fL Final    Mean Corpuscular Hemoglobin 07/10/2020 33.5* 27.0 - 31.0 pg Final    Mean Corpuscular Hemoglobin Conc 07/10/2020 33.2  32.0 - 36.0 g/dL Final    RDW 07/10/2020 14.1  11.5 - 14.5 % Final    Platelets 07/10/2020 206  150 - 350 K/uL Final    MPV 07/10/2020 10.0  9.2 - 12.9 fL Final    Immature Granulocytes 07/10/2020 0.3  0.0 - 0.5 % Final    Gran # (ANC) 07/10/2020 6.8  1.8 - 7.7 K/uL Final    Immature Grans (Abs) 07/10/2020 0.03  0.00 - 0.04 K/uL Final    Lymph # 07/10/2020 2.0  1.0 - 4.8 K/uL Final    Mono # 07/10/2020 0.6  0.3 - 1.0 K/uL Final    Eos # 07/10/2020 0.3  0.0 - 0.5 K/uL Final    Baso # 07/10/2020 0.03  0.00 - 0.20 K/uL Final    nRBC 07/10/2020 0  0 /100 WBC Final    Gran% 07/10/2020 69.7  38.0 - 73.0 % Final    Lymph% 07/10/2020 20.9  18.0 - 48.0 % Final    Mono% 07/10/2020 6.0  4.0 - 15.0 % Final    Eosinophil% 07/10/2020 2.8  0.0 - 8.0 % Final    Basophil% 07/10/2020 0.3  0.0 - 1.9 % Final    Differential Method 07/10/2020 Automated   Final    Sodium 07/10/2020 138  136 - 145 mmol/L Final    Potassium 07/10/2020 4.4  3.5 - 5.1 mmol/L Final    Chloride 07/10/2020 103  95 - 110 mmol/L Final    CO2 07/10/2020 26  23 - 29 mmol/L Final    Glucose 07/10/2020 118* 70 - 110 mg/dL Final    BUN, Bld 07/10/2020 30  10 - 30 mg/dL Final    Creatinine 07/10/2020 1.0  0.5 - 1.4 mg/dL Final    Calcium 07/10/2020 8.4* 8.7 - 10.5 mg/dL Final    Total Protein 07/10/2020 6.6  6.0 - 8.4 g/dL Final    Albumin 07/10/2020 3.1* 3.5  - 5.2 g/dL Final    Total Bilirubin 07/10/2020 0.7  0.1 - 1.0 mg/dL Final    Alkaline Phosphatase 07/10/2020 88  55 - 135 U/L Final    AST 07/10/2020 25  10 - 40 U/L Final    ALT 07/10/2020 22  10 - 44 U/L Final    Anion Gap 07/10/2020 9  8 - 16 mmol/L Final    eGFR if African American 07/10/2020 >60.0  >60 mL/min/1.73 m^2 Final    eGFR if non African American 07/10/2020 >60.0  >60 mL/min/1.73 m^2 Final    BNP 07/10/2020 376* 0 - 99 pg/mL Final    Troponin I 07/10/2020 <0.030  <=0.040 ng/mL Final    PT 07/10/2020 16.3* 10.6 - 14.8 sec Final    INR 07/10/2020 1.4   Final    Lipase 07/10/2020 32  4 - 60 U/L Final    Magnesium 07/10/2020 1.7  1.6 - 2.6 mg/dL Final    SARS-CoV-2 RNA, Amplification, Qual 07/10/2020 Negative  Negative Final    Specimen UA 07/11/2020 Urine, Clean Catch   Final    Color, UA 07/11/2020 Yellow  Yellow, Straw, Chasity Final    Appearance, UA 07/11/2020 Hazy* Clear Final    pH, UA 07/11/2020 6.0  5.0 - 8.0 Final    Specific [place], UA 07/11/2020 1.020  1.005 - 1.030 Final    Protein, UA 07/11/2020 Trace* Negative Final    Glucose, UA 07/11/2020 Negative  Negative Final    Ketones, UA 07/11/2020 Negative  Negative Final    Bilirubin (UA) 07/11/2020 Negative  Negative Final    Occult Blood UA 07/11/2020 2+* Negative Final    Nitrite, UA 07/11/2020 Negative  Negative Final    Urobilinogen, UA 07/11/2020 Negative  Negative EU/dL Final    Leukocytes, UA 07/11/2020 Negative  Negative Final    Blood Culture, Routine 07/10/2020 No growth after 5 days.   Final    Blood Culture, Routine 07/10/2020 No growth after 5 days.   Final    Lactate (Lactic Acid) 07/10/2020 1.4  0.5 - 1.9 mmol/L Final    RBC, UA 07/11/2020 >100* 0 - 4 /hpf Final    WBC, UA 07/11/2020 3  0 - 5 /hpf Final    Bacteria 07/11/2020 Negative  None-Occ /hpf Final    Squam Epithel, UA 07/11/2020 15  /hpf Final    Hyaline Casts, UA 07/11/2020 41* 0-1/lpf /lpf Final    Microscopic Comment 07/11/2020 SEE  COMMENT   Final    Sodium 07/11/2020 138  136 - 145 mmol/L Final    Potassium 07/11/2020 4.4  3.5 - 5.1 mmol/L Final    Chloride 07/11/2020 103  95 - 110 mmol/L Final    CO2 07/11/2020 26  23 - 29 mmol/L Final    Glucose 07/11/2020 132* 70 - 110 mg/dL Final    BUN, Bld 07/11/2020 29  10 - 30 mg/dL Final    Creatinine 07/11/2020 0.9  0.5 - 1.4 mg/dL Final    Calcium 07/11/2020 8.1* 8.7 - 10.5 mg/dL Final    Anion Gap 07/11/2020 9  8 - 16 mmol/L Final    eGFR if African American 07/11/2020 >60.0  >60 mL/min/1.73 m^2 Final    eGFR if non African American 07/11/2020 >60.0  >60 mL/min/1.73 m^2 Final    Magnesium 07/11/2020 1.8  1.6 - 2.6 mg/dL Final    Phosphorus 07/11/2020 3.4  2.7 - 4.5 mg/dL Final    WBC 07/11/2020 13.71* 3.90 - 12.70 K/uL Final    RBC 07/11/2020 3.50* 4.60 - 6.20 M/uL Final    Hemoglobin 07/11/2020 11.8* 14.0 - 18.0 g/dL Final    Hematocrit 07/11/2020 36.3* 40.0 - 54.0 % Final    Mean Corpuscular Volume 07/11/2020 104* 82 - 98 fL Final    Mean Corpuscular Hemoglobin 07/11/2020 33.7* 27.0 - 31.0 pg Final    Mean Corpuscular Hemoglobin Conc 07/11/2020 32.5  32.0 - 36.0 g/dL Final    RDW 07/11/2020 14.3  11.5 - 14.5 % Final    Platelets 07/11/2020 180  150 - 350 K/uL Final    MPV 07/11/2020 10.3  9.2 - 12.9 fL Final    Immature Granulocytes 07/11/2020 0.4  0.0 - 0.5 % Final    Gran # (ANC) 07/11/2020 10.0* 1.8 - 7.7 K/uL Final    Immature Grans (Abs) 07/11/2020 0.05* 0.00 - 0.04 K/uL Final    Lymph # 07/11/2020 2.7  1.0 - 4.8 K/uL Final    Mono # 07/11/2020 0.9  0.3 - 1.0 K/uL Final    Eos # 07/11/2020 0.0  0.0 - 0.5 K/uL Final    Baso # 07/11/2020 0.03  0.00 - 0.20 K/uL Final    nRBC 07/11/2020 0  0 /100 WBC Final    Gran% 07/11/2020 72.8  38.0 - 73.0 % Final    Lymph% 07/11/2020 19.8  18.0 - 48.0 % Final    Mono% 07/11/2020 6.7  4.0 - 15.0 % Final    Eosinophil% 07/11/2020 0.1  0.0 - 8.0 % Final    Basophil% 07/11/2020 0.2  0.0 - 1.9 % Final    Differential Method  07/11/2020 Automated   Final    Procalcitonin 07/11/2020 0.27  0.00 - 0.50 ng/mL Final    Specimen UA 07/11/2020 Urine, Clean Catch   Final    Color, UA 07/11/2020 Yellow  Yellow, Straw, Chasity Final    Appearance, UA 07/11/2020 Clear  Clear Final    pH, UA 07/11/2020 6.0  5.0 - 8.0 Final    Specific Gravity, UA 07/11/2020 >1.030* 1.005 - 1.030 Final    Protein, UA 07/11/2020 Trace* Negative Final    Glucose, UA 07/11/2020 Negative  Negative Final    Ketones, UA 07/11/2020 Negative  Negative Final    Bilirubin (UA) 07/11/2020 Negative  Negative Final    Occult Blood UA 07/11/2020 1+* Negative Final    Nitrite, UA 07/11/2020 Negative  Negative Final    Urobilinogen, UA 07/11/2020 Negative  Negative EU/dL Final    Leukocytes, UA 07/11/2020 Negative  Negative Final    RBC, UA 07/11/2020 54* 0 - 4 /hpf Final    WBC, UA 07/11/2020 3  0 - 5 /hpf Final    Bacteria 07/11/2020 Negative  None-Occ /hpf Final    Squam Epithel, UA 07/11/2020 1  /hpf Final    Hyaline Casts, UA 07/11/2020 3* 0-1/lpf /lpf Final    Microscopic Comment 07/11/2020 SEE COMMENT   Final    Sodium 07/12/2020 137  136 - 145 mmol/L Final    Potassium 07/12/2020 3.4* 3.5 - 5.1 mmol/L Final    Chloride 07/12/2020 102  95 - 110 mmol/L Final    CO2 07/12/2020 27  23 - 29 mmol/L Final    Glucose 07/12/2020 98  70 - 110 mg/dL Final    BUN, Bld 07/12/2020 25  10 - 30 mg/dL Final    Creatinine 07/12/2020 1.0  0.5 - 1.4 mg/dL Final    Calcium 07/12/2020 7.8* 8.7 - 10.5 mg/dL Final    Anion Gap 07/12/2020 8  8 - 16 mmol/L Final    eGFR if African American 07/12/2020 >60.0  >60 mL/min/1.73 m^2 Final    eGFR if non African American 07/12/2020 >60.0  >60 mL/min/1.73 m^2 Final    Magnesium 07/12/2020 1.9  1.6 - 2.6 mg/dL Final    Phosphorus 07/12/2020 3.0  2.7 - 4.5 mg/dL Final    Procalcitonin 07/12/2020 0.29  0.00 - 0.50 ng/mL Final    WBC 07/12/2020 10.01  3.90 - 12.70 K/uL Final    RBC 07/12/2020 3.12* 4.60 - 6.20 M/uL Final     Hemoglobin 07/12/2020 10.6* 14.0 - 18.0 g/dL Final    Hematocrit 07/12/2020 31.8* 40.0 - 54.0 % Final    Mean Corpuscular Volume 07/12/2020 102* 82 - 98 fL Final    Mean Corpuscular Hemoglobin 07/12/2020 34.0* 27.0 - 31.0 pg Final    Mean Corpuscular Hemoglobin Conc 07/12/2020 33.3  32.0 - 36.0 g/dL Final    RDW 07/12/2020 14.4  11.5 - 14.5 % Final    Platelets 07/12/2020 154  150 - 350 K/uL Final    MPV 07/12/2020 9.2  9.2 - 12.9 fL Final    Immature Granulocytes 07/12/2020 0.4  0.0 - 0.5 % Final    Gran # (ANC) 07/12/2020 6.3  1.8 - 7.7 K/uL Final    Immature Grans (Abs) 07/12/2020 0.04  0.00 - 0.04 K/uL Final    Lymph # 07/12/2020 2.3  1.0 - 4.8 K/uL Final    Mono # 07/12/2020 0.7  0.3 - 1.0 K/uL Final    Eos # 07/12/2020 0.6* 0.0 - 0.5 K/uL Final    Baso # 07/12/2020 0.05  0.00 - 0.20 K/uL Final    nRBC 07/12/2020 0  0 /100 WBC Final    Gran% 07/12/2020 62.9  38.0 - 73.0 % Final    Lymph% 07/12/2020 23.4  18.0 - 48.0 % Final    Mono% 07/12/2020 7.2  4.0 - 15.0 % Final    Eosinophil% 07/12/2020 5.6  0.0 - 8.0 % Final    Basophil% 07/12/2020 0.5  0.0 - 1.9 % Final    Differential Method 07/12/2020 Automated   Final       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    Marital Status:   Alcohol History:  reports no history of alcohol  use.  Tobacco History:  reports that he has quit smoking. His smoking use included cigarettes. He quit after 1.00 year of use. He has never used smokeless tobacco.  Drug History:  reports no history of drug use.    Review of patient's allergies indicates:   Allergen Reactions    Sulfa (sulfonamide antibiotics)      Patient can not recall reaction     Iron      Other reaction(s): Unknown       Current Outpatient Medications:     amlodipine (NORVASC) 5 MG tablet, Take 2.5 mg by mouth. Take 1/2 tab by mouth daily as needed for systolic 150 or higher, Disp: , Rfl:     aspirin (ECOTRIN) 81 MG EC tablet, Take 81 mg by mouth once daily.  , Disp: , Rfl:     atorvastatin (LIPITOR) 10 MG tablet, Take 1 tablet (10 mg total) by mouth once daily., Disp: 90 tablet, Rfl: 1    clindamycin (CLEOCIN) 300 MG capsule, Take 1 capsule (300 mg total) by mouth 3 (three) times daily. for 6 days, Disp: 18 capsule, Rfl: 0    ELIQUIS 2.5 mg Tab, Take 2.5 mg by mouth 2 (two) times daily. , Disp: , Rfl:     furosemide (LASIX) 40 MG tablet, Take 0.5 tablets (20 mg total) by mouth every other day., Disp: 8 tablet, Rfl: 11    guaifenesin 100 mg/5 ml (ROBITUSSIN) 100 mg/5 mL syrup, Take 10 mLs (200 mg total) by mouth 3 (three) times daily as needed for Congestion., Disp: 100 mL, Rfl: 0    isosorbide mononitrate (IMDUR) 60 MG 24 hr tablet, Take 1 tablet (60 mg total) by mouth once daily., Disp: 90 tablet, Rfl: 1    losartan (COZAAR) 50 MG tablet, Take 0.5 tablets (25 mg total) by mouth once daily., Disp: 45 tablet, Rfl: 1    MAGNESIUM ORAL, Take 400 mg by mouth once daily. , Disp: , Rfl:     pantoprazole (PROTONIX) 40 MG tablet, Take 40 mg by mouth once daily., Disp: , Rfl:     potassium chloride SA (K-DUR,KLOR-CON) 20 MEQ tablet, Take 1 tablet (20 mEq total) by mouth once daily., Disp: 30 tablet, Rfl: 0    sotaloL (BETAPACE) 80 MG tablet, Take 1 tablet (80 mg total) by mouth 2 (two) times daily., Disp: 180 tablet, Rfl: 1     "tamsulosin (FLOMAX) 0.4 mg Cap, Take 1 capsule (0.4 mg total) by mouth once daily., Disp: 90 capsule, Rfl: 1    Review of Systems   Constitutional: Positive for fatigue. Negative for chills and fever.   HENT: Negative for congestion, ear pain, sinus pressure, sinus pain, tinnitus and trouble swallowing.    Eyes: Negative for pain and redness.   Respiratory: Positive for cough (mild). Negative for chest tightness, shortness of breath and wheezing.    Cardiovascular: Negative for chest pain and palpitations.   Gastrointestinal: Negative for abdominal pain, nausea and vomiting.   Genitourinary: Positive for hematuria (seems resolved). Negative for dysuria, frequency and urgency.   Musculoskeletal: Negative for arthralgias, back pain and myalgias.   Skin: Positive for wound. Negative for rash.   Neurological: Positive for weakness. Negative for dizziness, light-headedness and headaches.   Psychiatric/Behavioral: Negative.           Transitional Care Note    Family and/or Caretaker present at visit?  Yes.  Diagnostic tests reviewed/disposition: No diagnosic tests pending after this hospitalization.  Disease/illness education: risk of anticoagulation  Home health/community services discussion/referrals: Patient has home health established.   Establishment or re-establishment of referral orders for community resources: No other necessary community resources.   Discussion with other health care providers: No discussion with other health care providers necessary.         Objective:      Vitals:    07/30/20 1216   BP: 134/80   Pulse: (!) 56   Temp: 98 °F (36.7 °C)   Weight: 52.6 kg (116 lb)   Height: 5' 6" (1.676 m)     Physical Exam  Vitals signs reviewed.   Constitutional:       General: He is not in acute distress.     Appearance: Normal appearance. He is underweight.   HENT:      Head: Normocephalic and atraumatic.      Right Ear: Tympanic membrane and external ear normal.      Left Ear: Tympanic membrane and external ear " normal.      Nose: Nose normal. No nasal deformity or mucosal edema.      Mouth/Throat:      Dentition: No dental caries or dental abscesses.      Pharynx: No oropharyngeal exudate.   Eyes:      General: Lids are normal.      Conjunctiva/sclera: Conjunctivae normal.      Pupils: Pupils are equal, round, and reactive to light.   Neck:      Musculoskeletal: Normal range of motion.      Thyroid: No thyromegaly.      Vascular: No JVD.      Trachea: No tracheal tenderness or tracheal deviation.   Cardiovascular:      Rate and Rhythm: Normal rate and regular rhythm.      Heart sounds: Normal heart sounds. No murmur.   Pulmonary:      Effort: Pulmonary effort is normal. No accessory muscle usage or respiratory distress.      Breath sounds: Normal breath sounds.   Abdominal:      General: Bowel sounds are normal.      Palpations: Abdomen is soft.      Tenderness: There is no abdominal tenderness.   Musculoskeletal: Normal range of motion.         General: No tenderness.   Lymphadenopathy:      Cervical: No cervical adenopathy.   Skin:     General: Skin is warm and dry.      Capillary Refill: Capillary refill takes less than 2 seconds.      Findings: Bruising and wound present. No ecchymosis or erythema.          Neurological:      Mental Status: He is alert and oriented to person, place, and time.   Psychiatric:         Mood and Affect: Mood normal.         Behavior: Behavior normal.           Assessment:       1. Hematoma of left lower extremity, subsequent encounter    2. Microhematuria    3. Advanced age    4. Frequent falls    5. Current use of long term anticoagulation    6. PAF (paroxysmal atrial fibrillation)    7. Closed fracture of head of left humerus, sequela         Plan:       Hematoma of left lower extremity, subsequent encounter  - Site is clean and dry. Advised daughter to keep covered until wound closes. Continue Clindamycin.    Microhematuria  - Will follow-up with Jai next week    Advanced  age    Frequent falls    Current use of long term anticoagulation  - Advised to continue holding Eliquis at this time as I feel risk outweighs benefit at this point. Advised to follow-up with Dr. Mares in regards to stopping anticoagulation completely.    PAF (paroxysmal atrial fibrillation)    Closed fracture of head of left humerus, sequela  - Healing without issue    Other orders  -     tamsulosin (FLOMAX) 0.4 mg Cap; Take 1 capsule (0.4 mg total) by mouth once daily.  Dispense: 90 capsule; Refill: 1      Follow up as scheduled.

## 2020-07-31 ENCOUNTER — DOCUMENT SCAN (OUTPATIENT)
Dept: HOME HEALTH SERVICES | Facility: HOSPITAL | Age: 85
End: 2020-07-31

## 2020-07-31 ENCOUNTER — TELEPHONE (OUTPATIENT)
Dept: ORTHOPEDICS | Facility: CLINIC | Age: 85
End: 2020-07-31

## 2020-07-31 NOTE — TELEPHONE ENCOUNTER
----- Message from Nissa Arciniega MA sent at 7/31/2020 12:27 PM CDT -----  Contact: [facility] VIKY alfonso  Is pt free and clear to perform activity with Left arm as tolerated   Call back

## 2020-07-31 NOTE — TELEPHONE ENCOUNTER
We have not seen patient in our office since incident. Patient is to call his PCP which looks like MONALISA Mancini. I also stated if he needed an appt scheduled he is to call back to the scheduling dept to obtain an appt with our office. Looks as if Dr. Bowie prescribed HH for this patient as well.

## 2020-08-03 ENCOUNTER — OFFICE VISIT (OUTPATIENT)
Dept: UROLOGY | Facility: CLINIC | Age: 85
End: 2020-08-03
Payer: MEDICARE

## 2020-08-03 VITALS
RESPIRATION RATE: 18 BRPM | HEART RATE: 66 BPM | SYSTOLIC BLOOD PRESSURE: 152 MMHG | WEIGHT: 123 LBS | BODY MASS INDEX: 18.22 KG/M2 | HEIGHT: 69 IN | DIASTOLIC BLOOD PRESSURE: 73 MMHG

## 2020-08-03 DIAGNOSIS — N40.1 BPH WITH OBSTRUCTION/LOWER URINARY TRACT SYMPTOMS: ICD-10-CM

## 2020-08-03 DIAGNOSIS — R33.9 URINARY RETENTION: Primary | ICD-10-CM

## 2020-08-03 DIAGNOSIS — N13.8 BPH WITH OBSTRUCTION/LOWER URINARY TRACT SYMPTOMS: ICD-10-CM

## 2020-08-03 LAB
BILIRUB SERPL-MCNC: NORMAL MG/DL
BLOOD URINE, POC: NORMAL
CLARITY, POC UA: CLEAR
COLOR, POC UA: YELLOW
GLUCOSE UR QL STRIP: NORMAL
KETONES UR QL STRIP: NORMAL
LEUKOCYTE ESTERASE URINE, POC: NORMAL
NITRITE, POC UA: NORMAL
PH, POC UA: 7
POC RESIDUAL URINE VOLUME: 108 ML (ref 0–100)
PROTEIN, POC: NORMAL
SPECIFIC GRAVITY, POC UA: 1.02
UROBILINOGEN, POC UA: 0.2

## 2020-08-03 PROCEDURE — 99999 PR PBB SHADOW E&M-EST. PATIENT-LVL V: CPT | Mod: PBBFAC,,, | Performed by: NURSE PRACTITIONER

## 2020-08-03 PROCEDURE — 99999 PR PBB SHADOW E&M-EST. PATIENT-LVL V: ICD-10-PCS | Mod: PBBFAC,,, | Performed by: NURSE PRACTITIONER

## 2020-08-03 PROCEDURE — 51798 US URINE CAPACITY MEASURE: CPT | Mod: PBBFAC,PN | Performed by: NURSE PRACTITIONER

## 2020-08-03 PROCEDURE — 99215 OFFICE O/P EST HI 40 MIN: CPT | Mod: PBBFAC,PN | Performed by: NURSE PRACTITIONER

## 2020-08-03 PROCEDURE — 99214 PR OFFICE/OUTPT VISIT, EST, LEVL IV, 30-39 MIN: ICD-10-PCS | Mod: S$PBB,,, | Performed by: NURSE PRACTITIONER

## 2020-08-03 PROCEDURE — 81002 URINALYSIS NONAUTO W/O SCOPE: CPT | Mod: PBBFAC,PN | Performed by: NURSE PRACTITIONER

## 2020-08-03 PROCEDURE — 99214 OFFICE O/P EST MOD 30 MIN: CPT | Mod: S$PBB,,, | Performed by: NURSE PRACTITIONER

## 2020-08-03 RX ORDER — TAMSULOSIN HYDROCHLORIDE 0.4 MG/1
0.4 CAPSULE ORAL DAILY
Qty: 90 CAPSULE | Refills: 3 | Status: SHIPPED | OUTPATIENT
Start: 2020-08-03 | End: 2020-10-08 | Stop reason: SDUPTHER

## 2020-08-03 RX ORDER — FINASTERIDE 5 MG/1
5 TABLET, FILM COATED ORAL DAILY
Qty: 90 TABLET | Refills: 4 | Status: SHIPPED | OUTPATIENT
Start: 2020-08-03 | End: 2020-08-12 | Stop reason: SDUPTHER

## 2020-08-03 NOTE — PROGRESS NOTES
[facility] Note  Staff: CHEYENNE Cardoza    PCP: Jagdeep    Chief Complaint: Hospital f/up-Urinary retention    Subjective:        HPI: Marck Ramos is a 89 y.o. male presents today for Hospital F/UP visit due to urinary retention issues.  In the past month, pt has been to hospital on multiple occasions and found to be in urinary retention, which pt has hx of.    Pt is here today with son and caregiver for hospital f/up visit.     HX:  The pt was last seen by Dr. Collins on 08/24/2019 for BPH f/u with hx urinary retention issues.  Pt had postop retention after L hip fracture repair 8/29 at [facility]. Reportedly on 8/27 presentation to er from fall, juarez placement caused significant gross hematuria. When removed 8/30 also noted to be bloody. Pt chronically anticoagulated. Nursing attempted juarez replacement multiple times but could not pass juarez. This required bedside cystoscopic juarez placement 2/2 prostatic urethral trauma/false passage to place juarez on 8/30/18.  He was discharged to [facility] inpatient rehab, and juarez was removed 9/11/18. Post void residual was checked and found to be 0cc. He was advised to continue flomax and f/u 4-6 weeks later  Was on flomax at baseline though had never seen urologist.  PMHx: CAD, PAF, RCA stent, HTN, HLD, GERD, BPH     Last seen 11/2018 with no interim voiding issues. He states that he was never having any voiding difficulty prior to his hip fracture as above.  Since his Juarez catheter has been removed he has been urinating well. Denies hesitancy, intermittency, weak stream, hematuria, dysuria. AUA symptom score:  2/1, pleased (nocturia x2).  PVR 110cc  He did unfortunately have shortness of breath 2 weeks and his outpatient rehab and was found to have 4 small pulmonary embolisms and is now on blood thinners      OV with MD (08/2019):    He denies all obstructive lower urinary tract symptoms except for mild weak stream  He has continued Flomax  He  denies hematuria, dysuria  Rarely gets up at night to urinate.  Urinated just prior to arrival in postvoid residual by bladder scan is 41 cc     TODAY OV:  Pt accompanied today by son and personal nurse assistant  UA today shows normal findings at this time.  PVR by bladder scan performed by MA today:  108 mL  Family denies any gross hematuria, pain, or problems with urination.  Current  med:  Flomax 0.4 mg one tablet daily  Since last hospitalization, pt off Eliquis, and only taking Ecotrin 81 mg daily as blood thinner med.    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  Objective:     Vitals:    08/03/20 1038   BP: (!) 152/73   Pulse: 66   Resp: 18     General:WDWN in NAD  Eyes: PERRLA, normal conjunctiva  Respiratory: no increased work on breathing, clear to auscultation  Cardiovascular: regular rate and rhythm. No obvious extremity edema.  GI: palpation of masses. No tenderness. No hepatosplenomegaly to palpation.  Musculoskeletal: normal range of motion of bilateral upper extremities. Normal muscle strength and tone.  Skin: no obvious rashes or  lesions. No tightening of skin noted.  Neurologic: CN grossly normal. Normal sensation.   Psychiatric: awake, alert and oriented x 3. Mood and affect normal. Cooperative.    LABS REVIEW:  UA today:  Color:Clear, Yellow  Spec. Grav.  1.020  PH  7.0  Negative for leukocytes, nitrates, protein, glucose, ketones, urobili, bili, and blood.    Assessment:       1. Urinary retention    2. BPH with obstruction/lower urinary tract symptoms          Plan:   BPH s/p Urinary Retention issues:    1.  Continue Flomax 0.4 mg one tablet daily as previously scheduled at this time.  2.  We will also start today-Finasteride 5 mg daily to be taken in am.  Meds prescribed to pt today to see if med improves pt's current LUTS.  Benefits, risks and side affects were thoroughly explained to pt today in office with all questions answered.  3.  It was explained to pt and family during ov today, if at anytime in the future, pt needs an indwelling catheter, needs to be COUDE catheter.    F/u with me in 3-4 weeks for repeat PVR level and to check pt's symptoms at that time.    [facility]: N/A    Shwetha Osman, CHEYENNE

## 2020-08-04 ENCOUNTER — TELEPHONE (OUTPATIENT)
Dept: ORTHOPEDICS | Facility: CLINIC | Age: 85
End: 2020-08-04

## 2020-08-04 ENCOUNTER — DOCUMENT SCAN (OUTPATIENT)
Dept: HOME HEALTH SERVICES | Facility: HOSPITAL | Age: 85
End: 2020-08-04

## 2020-08-04 NOTE — TELEPHONE ENCOUNTER
----- Message from Nissa Arciniega MA sent at 8/4/2020 12:32 PM CDT -----  Contact: [facility], Anitha  Wants to know if Chavokumaramari has seen pt?  Does he have any restrictions with Left arm?   Call back  [phone]

## 2020-08-04 NOTE — TELEPHONE ENCOUNTER
Left Message for a return call. I have already left a message last week addressing this situation. He has not been seen in our office. Dr. Tenorio did not write HH orders another physician placed those orders.

## 2020-08-05 ENCOUNTER — OFFICE VISIT (OUTPATIENT)
Dept: ORTHOPEDICS | Facility: CLINIC | Age: 85
End: 2020-08-05
Payer: MEDICARE

## 2020-08-05 ENCOUNTER — HOSPITAL ENCOUNTER (OUTPATIENT)
Dept: RADIOLOGY | Facility: HOSPITAL | Age: 85
Discharge: HOME | End: 2020-08-05
Attending: ORTHOPAEDIC SURGERY
Payer: MEDICARE

## 2020-08-05 VITALS — WEIGHT: 123 LBS | TEMPERATURE: 98 F | BODY MASS INDEX: 18.22 KG/M2 | HEIGHT: 69 IN | RESPIRATION RATE: 16 BRPM

## 2020-08-05 DIAGNOSIS — S42.292D HUMERUS HEAD FRACTURE, LEFT, WITH ROUTINE HEALING, SUBSEQUENT ENCOUNTER: Primary | ICD-10-CM

## 2020-08-05 DIAGNOSIS — M25.522 LEFT ELBOW PAIN: ICD-10-CM

## 2020-08-05 DIAGNOSIS — S42.292D HUMERUS HEAD FRACTURE, LEFT, WITH ROUTINE HEALING, SUBSEQUENT ENCOUNTER: ICD-10-CM

## 2020-08-05 DIAGNOSIS — S42.225D CLOSED 2-PART NONDISPLACED FRACTURE OF SURGICAL NECK OF LEFT HUMERUS WITH ROUTINE HEALING, SUBSEQUENT ENCOUNTER: Primary | ICD-10-CM

## 2020-08-05 PROCEDURE — 99213 PR OFFICE/OUTPT VISIT, EST, LEVL III, 20-29 MIN: ICD-10-PCS | Mod: S$PBB,,, | Performed by: ORTHOPAEDIC SURGERY

## 2020-08-05 PROCEDURE — 99214 OFFICE O/P EST MOD 30 MIN: CPT | Mod: PBBFAC,25,PN | Performed by: ORTHOPAEDIC SURGERY

## 2020-08-05 PROCEDURE — 73030 X-RAY EXAM OF SHOULDER: CPT | Mod: 26,LT,, | Performed by: RADIOLOGY

## 2020-08-05 PROCEDURE — 99999 PR PBB SHADOW E&M-EST. PATIENT-LVL IV: CPT | Mod: PBBFAC,,, | Performed by: ORTHOPAEDIC SURGERY

## 2020-08-05 PROCEDURE — 73030 XR SHOULDER COMPLETE 2 OR MORE VIEWS LEFT: ICD-10-PCS | Mod: 26,LT,, | Performed by: RADIOLOGY

## 2020-08-05 PROCEDURE — 99999 PR PBB SHADOW E&M-EST. PATIENT-LVL IV: ICD-10-PCS | Mod: PBBFAC,,, | Performed by: ORTHOPAEDIC SURGERY

## 2020-08-05 PROCEDURE — 99213 OFFICE O/P EST LOW 20 MIN: CPT | Mod: S$PBB,,, | Performed by: ORTHOPAEDIC SURGERY

## 2020-08-05 PROCEDURE — 73030 X-RAY EXAM OF SHOULDER: CPT | Mod: TC,PN,LT

## 2020-08-05 NOTE — PROGRESS NOTES
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 Take 1/2 tab by mouth daily as needed for systolic 150 or higher    aspirin (ECOTRIN) 81 MG EC tablet Take 81 mg by mouth once daily.      atorvastatin (LIPITOR) 10 MG tablet Take 1 tablet (10 mg total) by mouth once daily.    finasteride (PROSCAR) 5 mg tablet Take 1 tablet (5 mg total) by mouth once daily.    furosemide (LASIX) 40 MG tablet Take 0.5 tablets (20 mg total) by mouth every other day.    losartan (COZAAR) 50 MG tablet Take 0.5 tablets (25 mg total) by mouth once daily.    MAGNESIUM ORAL Take 400 mg by mouth once daily.     pantoprazole (PROTONIX) 40 MG tablet Take 40 mg by mouth once daily.    potassium chloride SA (K-DUR,KLOR-CON) 20 MEQ tablet Take 1 tablet (20 mEq total) by mouth once daily.    tamsulosin (FLOMAX) 0.4 mg Cap Take 1 capsule (0.4 mg total) by mouth once daily.    vit A/vit C/vit E/zinc/copper (PRESERVISION AREDS ORAL) Take by mouth.    ELIQUIS 2.5 mg Tab Take 2.5 mg by mouth 2 (two) times daily.     guaifenesin 100 mg/5 ml (ROBITUSSIN) 100 mg/5 mL syrup Take 10 mLs (200 mg total) by mouth 3 (three) times daily as needed for Congestion. (Patient not taking: Reported on 8/5/2020)     isosorbide mononitrate (IMDUR) 60 MG 24 hr tablet Take 1 tablet (60 mg total) by mouth once daily.    sotaloL (BETAPACE) 80 MG tablet Take 1 tablet (80 mg total) by mouth 2 (two) times daily.     No current facility-administered medications for this visit.        Review of patient's allergies indicates:   Allergen Reactions    Sulfa (sulfonamide antibiotics)      Patient can not recall reaction     Iron      Other reaction(s): Unknown       History reviewed. No pertinent family history.    Social History     Socioeconomic History    Marital status:      Spouse name: Not on file    Number of children: Not on file    Years of education: Not on file    Highest education level: Not on file   Occupational History    Not on file   Social Needs    Financial resource strain: Not on file    Food insecurity     Worry: Not on file     Inability: Not on file    Transportation needs     Medical: Not on file     Non-medical: Not on file   Tobacco Use    Smoking status: Former Smoker     Years: 1.00     Types: Cigarettes    Smokeless tobacco: Never Used   Substance and Sexual Activity    Alcohol use: No    Drug use: No    Sexual activity: Never   Lifestyle    Physical activity     Days per week: Not on file     Minutes per session: Not on file    Stress: Not at all   Relationships    Social connections     Talks on phone: Not on file     Gets together: Not on file     Attends Qu[employer] service: Not on file     Active member of club or organization: Not on file     Attends meetings of clubs or organizations: Not on file     Relationship status: Not on file   Other Topics Concern    Not on file   Social History Narrative    Not on file       Chief Complaint:   Chief Complaint   Patient presents with    Left Shoulder - Pain     ED & DOI 6/03/20: Patient tripped over dog while getting up to use restroom from his bed falling onto his left side, landing on his left shoulder. Sustaining a humeral head FX.  "Placed in a sling.      Shoulder Injury     Denies having any pain in shoulder today. Ambulates with walker. Does have some limited ROM in his left shoulder, otherwise fine.          History of present illness:    This is a 89 y.o. year old male who complains of patient has a left proximal humerus fracture which is now about 2-month-old he tripped over a dog or getting up to use the restroom patient was seen initially by Dr. Kuhn was treated conservatively he had an impacted humeral neck fracture of the left shoulder patient was seen about a week ago at [facility] by me patient denies any pain in the shoulder he is ambulating with a walker he has some limited range of motion    Review of Systems:    Constitution: Denies chills, fever, and sweats.  HENT: Denies headaches or blurry vision.  Cardiovascular: Denies chest pain or irregular heart beat.  Respiratory: Denies cough or shortness of breath.  Gastrointestinal: Denies abdominal pain, nausea, or vomiting.  Musculoskeletal:  Denies muscle cramps.  Neurological: Denies dizziness or focal weakness.  Psychiatric/Behavioral: Normal mental status.  Hematologic/Lymphatic: Denies bleeding problem or easy bruising/bleeding.  Skin: Denies rash or suspicious lesions.    Examination:    Vital Signs:    Vitals:    08/05/20 1527   Resp: 16   Temp: 98 °F (36.7 °C)   Weight: 55.8 kg (123 lb 0.3 oz)   Height: 5' 9" (1.753 m)   PainSc: 0-No pain   PainLoc: Shoulder       Body mass index is 18.17 kg/m².    This a well-developed, well nourished patient in no acute distress.    Alert and oriented x 3 and cooperative to examination.       Physical Exam:  Left shoulder-patient has active abduction to about 90° with no pain    Imaging:  X-rays show a proximal humerus fracture left shoulder with no displacement appears to be healing well       Assessment: Closed 2-part nondisplaced fracture of surgical neck of left humerus with routine healing, subsequent " encounter        Plan:  Patient continue active assisted range of motion of the left shoulder      DISCLAIMER: This note may have been dictated using voice recognition software and may contain grammatical errors.     NOTE: Consult report sent to referring provider via [employer] EMR.

## 2020-08-12 ENCOUNTER — DOCUMENT SCAN (OUTPATIENT)
Dept: HOME HEALTH SERVICES | Facility: HOSPITAL | Age: 85
End: 2020-08-12

## 2020-08-12 ENCOUNTER — TELEPHONE (OUTPATIENT)
Dept: UROLOGY | Facility: CLINIC | Age: 85
End: 2020-08-12

## 2020-08-12 RX ORDER — FINASTERIDE 5 MG/1
5 TABLET, FILM COATED ORAL DAILY
Qty: 90 TABLET | Refills: 4 | Status: SHIPPED | OUTPATIENT
Start: 2020-08-12 | End: 2020-10-08 | Stop reason: SDUPTHER

## 2020-08-12 RX ORDER — CLINDAMYCIN HYDROCHLORIDE 300 MG/1
300 CAPSULE ORAL 3 TIMES DAILY
Qty: 30 CAPSULE | Refills: 0 | Status: SHIPPED | OUTPATIENT
Start: 2020-08-12 | End: 2020-10-08

## 2020-08-12 NOTE — TELEPHONE ENCOUNTER
----- Message from Chace Sanchez sent at 8/12/2020 12:58 PM CDT -----  Type: Needs Medical Advice  Who Called:  Aicha (Caregiver)  Best Call Back Number: [phone]  Additional Information: Caller would like to confirm medication list. Please call to advise. Thanks!

## 2020-08-12 NOTE — TELEPHONE ENCOUNTER
Spoke w pts caretaker voiced 2 prescriptions were supposed to be sent to pharm only received which was the flomax she voiced pharm stated they never received fill for finasteride.

## 2020-08-12 NOTE — TELEPHONE ENCOUNTER
Finasteride documented sent to [facility] on 08/03/2020 by me, 90-day supply with 4 refills.    Second prescription sent into [facility] this afternoon.  Finasteride 90-Day Supply.

## 2020-08-12 NOTE — TELEPHONE ENCOUNTER
----- Message from Elaina Llanos sent at 8/12/2020  2:48 PM CDT -----  Contact: Joselyn  [facility]. Pt has completed his RX for clindamycin but the leg is still red and some drainage. Do you want to put him on another round or try something else? Joselyn @[phone]

## 2020-08-14 ENCOUNTER — PATIENT OUTREACH (OUTPATIENT)
Dept: HOME HEALTH SERVICES | Facility: HOSPITAL | Age: 85
End: 2020-08-14

## 2020-08-17 ENCOUNTER — EXTERNAL HOME HEALTH (OUTPATIENT)
Dept: HOME HEALTH SERVICES | Facility: HOSPITAL | Age: 85
End: 2020-08-17

## 2020-08-25 ENCOUNTER — OFFICE VISIT (OUTPATIENT)
Dept: UROLOGY | Facility: CLINIC | Age: 85
End: 2020-08-25
Payer: MEDICARE

## 2020-08-25 VITALS
HEART RATE: 66 BPM | HEIGHT: 69 IN | RESPIRATION RATE: 18 BRPM | DIASTOLIC BLOOD PRESSURE: 79 MMHG | SYSTOLIC BLOOD PRESSURE: 162 MMHG | BODY MASS INDEX: 18.17 KG/M2

## 2020-08-25 DIAGNOSIS — R33.9 URINARY RETENTION: ICD-10-CM

## 2020-08-25 DIAGNOSIS — N13.8 BPH WITH OBSTRUCTION/LOWER URINARY TRACT SYMPTOMS: Primary | ICD-10-CM

## 2020-08-25 DIAGNOSIS — N40.1 BPH WITH OBSTRUCTION/LOWER URINARY TRACT SYMPTOMS: Primary | ICD-10-CM

## 2020-08-25 LAB
BILIRUB SERPL-MCNC: ABNORMAL MG/DL
BLOOD URINE, POC: ABNORMAL
CLARITY, POC UA: CLEAR
COLOR, POC UA: YELLOW
GLUCOSE UR QL STRIP: ABNORMAL
KETONES UR QL STRIP: ABNORMAL
LEUKOCYTE ESTERASE URINE, POC: ABNORMAL
NITRITE, POC UA: ABNORMAL
PH, POC UA: 7
POC RESIDUAL URINE VOLUME: 60 ML (ref 0–100)
PROTEIN, POC: ABNORMAL
SPECIFIC GRAVITY, POC UA: 1.02
UROBILINOGEN, POC UA: 0.2

## 2020-08-25 PROCEDURE — 99213 PR OFFICE/OUTPT VISIT, EST, LEVL III, 20-29 MIN: ICD-10-PCS | Mod: S$PBB,,, | Performed by: NURSE PRACTITIONER

## 2020-08-25 PROCEDURE — 99213 OFFICE O/P EST LOW 20 MIN: CPT | Mod: S$PBB,,, | Performed by: NURSE PRACTITIONER

## 2020-08-25 PROCEDURE — 51798 US URINE CAPACITY MEASURE: CPT | Mod: PBBFAC,PN | Performed by: NURSE PRACTITIONER

## 2020-08-25 PROCEDURE — 99999 PR PBB SHADOW E&M-EST. PATIENT-LVL III: CPT | Mod: PBBFAC,,, | Performed by: NURSE PRACTITIONER

## 2020-08-25 PROCEDURE — 81002 URINALYSIS NONAUTO W/O SCOPE: CPT | Mod: PBBFAC,PN | Performed by: NURSE PRACTITIONER

## 2020-08-25 PROCEDURE — 99213 OFFICE O/P EST LOW 20 MIN: CPT | Mod: PBBFAC,PN,25 | Performed by: NURSE PRACTITIONER

## 2020-08-25 PROCEDURE — 99999 PR PBB SHADOW E&M-EST. PATIENT-LVL III: ICD-10-PCS | Mod: PBBFAC,,, | Performed by: NURSE PRACTITIONER

## 2020-08-25 NOTE — PROGRESS NOTES
[facility] Note  Staff: NEERU Cardoza-C    PCP: Dr. Hood Gannon    Chief Complaint: Routine recheck-BPH with urinary retention    Subjective:        HPI: Marck Ramos is a 89 y.o. male presents today for routine recheck of his LUTS.    The pt was last seen by me on 08/03/2020 for hospital f/up visit due to urinary retention issues.    In the past month, pt has been to hospital on multiple occasions and found to be in urinary retention, which pt has hx of.    Pt is here today with son and caregiver for hospital f/up visit.      HX:  The pt was last seen by Dr. Collins on 08/24/2019 for BPH f/u with hx urinary retention issues.  Pt had postop retention after L hip fracture repair 8/29 at [facility]. Reportedly on 8/27 presentation to er from fall, juarez placement caused significant gross hematuria. When removed 8/30 also noted to be bloody. Pt chronically anticoagulated. Nursing attempted juarez replacement multiple times but could not pass juarez. This required bedside cystoscopic juarez placement 2/2 prostatic urethral trauma/false passage to place juarez on 8/30/18.  He was discharged to [facility] inpatient re[facility], and juarez was removed 9/11/18. Post void residual was checked and found to be 0cc. He was advised to continue flomax and f/u 4-6 weeks later  Was on flomax at baseline though had never seen urologist.  PMHx: CAD, PAF, RCA stent, HTN, HLD, GERD, BPH     Last seen 11/2018 with no interim voiding issues. He states that he was never having any voiding difficulty prior to his hip fracture as above.  Since his Juarez catheter has been removed he has been urinating well. Denies hesitancy, intermittency, weak stream, hematuria, dysuria. AUA symptom score:  2/1, pleased (nocturia x2).  PVR 110cc  He did unfortunately have shortness of breath 2 weeks and his outpatient rehab and was found to have 4 small pulmonary embolisms and is now on blood thinners      OV with MD (08/2019):    He denies  all obstructive lower urinary tract symptoms except for mild weak stream  He has continued Flomax  He denies hematuria, dysuria  Rarely gets up at night to urinate.  Urinated just prior to arrival in postvoid residual by bladder scan is 41 cc     OV 08/2020:  Pt accompanied today by son and personal nurse assistant  UA today shows normal findings at this time.  PVR by bladder scan performed by MA today:  108 mL  Family denies any gross hematuria, pain, or problems with urination.  Current  med:  Flomax 0.4 mg one tablet daily  Since last hospitalization, pt off Eliquis, and only taking Ecotrin 81 mg daily as blood thinner med.     TODAY:  UA today shows trace WBCs but otherwise normal findings at this time.  PVR by bladder scan performed by MA today:  60 mL  Current  meds:  Finasteride 5 mg daily and Flomax 0.4 mg daily.  Pt is tolerating his meds with no problems noted at this time.  No gross hematuria, no dysuria or problems with urination noted at this time.    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    Iron deficiency anemia, unspecified 7/18/2017    Other pulmonary embolism with acute cor pulmonale 3/6/2019    Pulmonary emboli      PSHx:  Past Surgical History:   Procedure Laterality Date    Closed reduction & internal fixation of closed, comminuted, displaced intertrochanteric fx left hip w/ TFN Left 08/29/2018     Allergies:  Sulfa (sulfonamide antibiotics) and Iron    Medications: reviewed   Objective:     Vitals:    08/25/20 1018   BP: (!) 162/79   Pulse: 66    Resp: 18     General:WDWN in NAD  Eyes: PERRLA, normal conjunctiva  Respiratory: no increased work on breathing, clear to auscultation  Cardiovascular: regular rate and rhythm. No obvious extremity edema.  GI: palpation of masses. No tenderness. No hepatosplenomegaly to palpation.  Musculoskeletal: normal range of motion of bilateral upper extremities. Normal muscle strength and tone.  Skin: no obvious rashes or lesions. No tightening of skin noted.  Neurologic: CN grossly normal. Normal sensation.   Psychiatric: awake, alert and oriented x 3. Mood and affect normal. Cooperative.    LABS REVIEW:  UA today:  Color:Clear, Yellow  Spec. Grav.  1.020  PH  7.0  Trace leukocytes  Negative for nitrates, protein, glucose, ketones, urobili, bili, and blood.    Assessment:       1. BPH with obstruction/lower urinary tract symptoms    2. Urinary retention          Plan:     Continue the Finasteride and Flomax as previously prescribed at this time.    F/u with me in six months for repeat PVR and routine recheck of his LUTS.    [facility]: N/A    Shwetha Osman, MANISHAC

## 2020-08-31 ENCOUNTER — DOCUMENT SCAN (OUTPATIENT)
Dept: HOME HEALTH SERVICES | Facility: HOSPITAL | Age: 85
End: 2020-08-31

## 2020-10-08 ENCOUNTER — OFFICE VISIT (OUTPATIENT)
Dept: FAMILY MEDICINE | Facility: CLINIC | Age: 85
End: 2020-10-08
Payer: MEDICARE

## 2020-10-08 VITALS
WEIGHT: 124 LBS | HEIGHT: 69 IN | HEART RATE: 64 BPM | BODY MASS INDEX: 18.37 KG/M2 | TEMPERATURE: 98 F | SYSTOLIC BLOOD PRESSURE: 136 MMHG | DIASTOLIC BLOOD PRESSURE: 82 MMHG

## 2020-10-08 DIAGNOSIS — N40.1 BPH WITH OBSTRUCTION/LOWER URINARY TRACT SYMPTOMS: ICD-10-CM

## 2020-10-08 DIAGNOSIS — I48.0 PAROXYSMAL ATRIAL FIBRILLATION: ICD-10-CM

## 2020-10-08 DIAGNOSIS — R53.81 DEBILITY: ICD-10-CM

## 2020-10-08 DIAGNOSIS — E78.2 MIXED HYPERLIPIDEMIA: ICD-10-CM

## 2020-10-08 DIAGNOSIS — I10 ESSENTIAL HYPERTENSION: Primary | ICD-10-CM

## 2020-10-08 DIAGNOSIS — Z95.0 PACEMAKER: ICD-10-CM

## 2020-10-08 DIAGNOSIS — I48.0 PAF (PAROXYSMAL ATRIAL FIBRILLATION): Chronic | ICD-10-CM

## 2020-10-08 DIAGNOSIS — N13.8 BPH WITH OBSTRUCTION/LOWER URINARY TRACT SYMPTOMS: ICD-10-CM

## 2020-10-08 DIAGNOSIS — E87.1 HYPONATREMIA: ICD-10-CM

## 2020-10-08 DIAGNOSIS — R54 ADVANCED AGE: ICD-10-CM

## 2020-10-08 PROCEDURE — 99214 OFFICE O/P EST MOD 30 MIN: CPT | Mod: S$GLB,,, | Performed by: FAMILY MEDICINE

## 2020-10-08 PROCEDURE — 99214 PR OFFICE/OUTPT VISIT, EST, LEVL IV, 30-39 MIN: ICD-10-PCS | Mod: S$GLB,,, | Performed by: FAMILY MEDICINE

## 2020-10-08 RX ORDER — LOSARTAN POTASSIUM 50 MG/1
25 TABLET ORAL DAILY
Qty: 45 TABLET | Refills: 1 | Status: SHIPPED | OUTPATIENT
Start: 2020-10-08 | End: 2021-01-12 | Stop reason: SDUPTHER

## 2020-10-08 RX ORDER — TAMSULOSIN HYDROCHLORIDE 0.4 MG/1
0.4 CAPSULE ORAL DAILY
Qty: 90 CAPSULE | Refills: 3 | Status: SHIPPED | OUTPATIENT
Start: 2020-10-08 | End: 2021-01-12 | Stop reason: SDUPTHER

## 2020-10-08 RX ORDER — FUROSEMIDE 40 MG/1
40 TABLET ORAL EVERY OTHER DAY
Qty: 15 TABLET | Refills: 11 | Status: CANCELLED | OUTPATIENT
Start: 2020-10-08 | End: 2021-10-08

## 2020-10-08 RX ORDER — PANTOPRAZOLE SODIUM 40 MG/1
40 TABLET, DELAYED RELEASE ORAL DAILY
Qty: 90 TABLET | Refills: 1 | Status: SHIPPED | OUTPATIENT
Start: 2020-10-08 | End: 2021-01-12 | Stop reason: SDUPTHER

## 2020-10-08 RX ORDER — POTASSIUM CHLORIDE 20 MEQ/1
20 TABLET, EXTENDED RELEASE ORAL DAILY
Qty: 90 TABLET | Refills: 1 | Status: SHIPPED | OUTPATIENT
Start: 2020-10-08 | End: 2021-01-12 | Stop reason: SDUPTHER

## 2020-10-08 RX ORDER — SOTALOL HYDROCHLORIDE 80 MG/1
80 TABLET ORAL 2 TIMES DAILY
Qty: 180 TABLET | Refills: 1 | Status: SHIPPED | OUTPATIENT
Start: 2020-10-08 | End: 2020-10-19

## 2020-10-08 RX ORDER — ISOSORBIDE MONONITRATE 60 MG/1
60 TABLET, EXTENDED RELEASE ORAL DAILY
Qty: 90 TABLET | Refills: 1 | Status: SHIPPED | OUTPATIENT
Start: 2020-10-08 | End: 2021-01-07 | Stop reason: SDUPTHER

## 2020-10-08 RX ORDER — FINASTERIDE 5 MG/1
5 TABLET, FILM COATED ORAL DAILY
Qty: 90 TABLET | Refills: 4 | Status: SHIPPED | OUTPATIENT
Start: 2020-10-08 | End: 2021-01-12 | Stop reason: SDUPTHER

## 2020-10-08 RX ORDER — ATORVASTATIN CALCIUM 10 MG/1
10 TABLET, FILM COATED ORAL DAILY
Qty: 90 TABLET | Refills: 1 | Status: SHIPPED | OUTPATIENT
Start: 2020-10-08 | End: 2021-01-12 | Stop reason: SDUPTHER

## 2020-10-08 NOTE — PROGRESS NOTES
SUBJECTIVE:    Patient ID: Marck Ramos is a 89 y.o. male.    Chief Complaint: Edema (brought bottles // daughter refused flu/pna shot ac) and Recurrent Skin Infections    This 89-year-old male is having multiple falls at home.  He has a caregiver with him most of the day.  He has short-term memory loss.  Appetite seems good and he did not complain of any pain.  Has history of her left humerus fracture earlier in the summer.  His caregiver helps to range-of-motion exercises shoulder involving a pulley system.      Office Visit on 08/25/2020   Component Date Value Ref Range Status    Color, UA 08/25/2020 Yellow   Final    Spec Grav UA 08/25/2020 1.020   Final    pH, UA 08/25/2020 7.0   Final    WBC, UA 08/25/2020 trace   Final    Nitrite, UA 08/25/2020 neg   Final    Protein 08/25/2020 neg   Final    Glucose, UA 08/25/2020 neg   Final    Ketones, UA 08/25/2020 neg   Final    Urobilinogen, UA 08/25/2020 0.2   Final    Bilirubin 08/25/2020 neg   Final    Blood, UA 08/25/2020 neg   Final    Clarity, UA 08/25/2020 Clear   Final    POC Residual Urine Volume 08/25/2020 60  0 - 100 mL Final   Office Visit on 08/03/2020   Component Date Value Ref Range Status    Color, UA 08/03/2020 Yellow   Final    Spec Grav UA 08/03/2020 1.020   Final    pH, UA 08/03/2020 7.0   Final    WBC, UA 08/03/2020 neg   Final    Nitrite, UA 08/03/2020 neg   Final    Protein 08/03/2020 neg   Final    Glucose, UA 08/03/2020 neg   Final    Ketones, UA 08/03/2020 neg   Final    Urobilinogen, UA 08/03/2020 0.2   Final    Bilirubin 08/03/2020 neg   Final    Blood, UA 08/03/2020 neg   Final    Clarity, UA 08/03/2020 Clear   Final    POC Residual Urine Volume 08/03/2020 108* 0 - 100 mL Final   Admission on 07/28/2020, Discharged on 07/29/2020   Component Date Value Ref Range Status    WBC 07/28/2020 6.61  3.90 - 12.70 K/uL Final    RBC 07/28/2020 3.05* 4.60 - 6.20 M/uL Final    Hemoglobin 07/28/2020 10.0* 14.0 - 18.0 g/dL  Final    Hematocrit 07/28/2020 30.3* 40.0 - 54.0 % Final    Mean Corpuscular Volume 07/28/2020 99* 82 - 98 fL Final    Mean Corpuscular Hemoglobin 07/28/2020 32.8* 27.0 - 31.0 pg Final    Mean Corpuscular Hemoglobin Conc 07/28/2020 33.0  32.0 - 36.0 g/dL Final    RDW 07/28/2020 14.3  11.5 - 14.5 % Final    Platelets 07/28/2020 296  150 - 350 K/uL Final    MPV 07/28/2020 8.7* 9.2 - 12.9 fL Final    Immature Granulocytes 07/28/2020 0.3  0.0 - 0.5 % Final    Gran # (ANC) 07/28/2020 2.4  1.8 - 7.7 K/uL Final    Immature Grans (Abs) 07/28/2020 0.02  0.00 - 0.04 K/uL Final    Lymph # 07/28/2020 2.7  1.0 - 4.8 K/uL Final    Mono # 07/28/2020 0.9  0.3 - 1.0 K/uL Final    Eos # 07/28/2020 0.5  0.0 - 0.5 K/uL Final    Baso # 07/28/2020 0.06  0.00 - 0.20 K/uL Final    nRBC 07/28/2020 0  0 /100 WBC Final    Gran% 07/28/2020 36.2* 38.0 - 73.0 % Final    Lymph% 07/28/2020 40.8  18.0 - 48.0 % Final    Mono% 07/28/2020 13.8  4.0 - 15.0 % Final    Eosinophil% 07/28/2020 8.0  0.0 - 8.0 % Final    Basophil% 07/28/2020 0.9  0.0 - 1.9 % Final    Differential Method 07/28/2020 Automated   Final    Sodium 07/28/2020 130* 136 - 145 mmol/L Final    Potassium 07/28/2020 4.2  3.5 - 5.1 mmol/L Final    Chloride 07/28/2020 93* 95 - 110 mmol/L Final    CO2 07/28/2020 28  23 - 29 mmol/L Final    Glucose 07/28/2020 114* 70 - 110 mg/dL Final    BUN, Bld 07/28/2020 32* 10 - 30 mg/dL Final    Creatinine 07/28/2020 0.9  0.5 - 1.4 mg/dL Final    Calcium 07/28/2020 8.5* 8.7 - 10.5 mg/dL Final    Total Protein 07/28/2020 6.3  6.0 - 8.4 g/dL Final    Albumin 07/28/2020 2.7* 3.5 - 5.2 g/dL Final    Total Bilirubin 07/28/2020 0.6  0.1 - 1.0 mg/dL Final    Alkaline Phosphatase 07/28/2020 64  55 - 135 U/L Final    AST 07/28/2020 19  10 - 40 U/L Final    ALT 07/28/2020 12  10 - 44 U/L Final    Anion Gap 07/28/2020 9  8 - 16 mmol/L Final    eGFR if African American 07/28/2020 >60.0  >60 mL/min/1.73 m^2 Final    eGFR if non   07/28/2020 >60.0  >60 mL/min/1.73 m^2 Final   Admission on 07/27/2020, Discharged on 07/27/2020   Component Date Value Ref Range Status    WBC 07/27/2020 6.45  3.90 - 12.70 K/uL Final    RBC 07/27/2020 3.31* 4.60 - 6.20 M/uL Final    Hemoglobin 07/27/2020 11.0* 14.0 - 18.0 g/dL Final    Hematocrit 07/27/2020 33.0* 40.0 - 54.0 % Final    Mean Corpuscular Volume 07/27/2020 100* 82 - 98 fL Final    Mean Corpuscular Hemoglobin 07/27/2020 33.2* 27.0 - 31.0 pg Final    Mean Corpuscular Hemoglobin Conc 07/27/2020 33.3  32.0 - 36.0 g/dL Final    RDW 07/27/2020 14.2  11.5 - 14.5 % Final    Platelets 07/27/2020 331  150 - 350 K/uL Final    MPV 07/27/2020 8.8* 9.2 - 12.9 fL Final    Immature Granulocytes 07/27/2020 0.3  0.0 - 0.5 % Final    Gran # (ANC) 07/27/2020 3.0  1.8 - 7.7 K/uL Final    Immature Grans (Abs) 07/27/2020 0.02  0.00 - 0.04 K/uL Final    Lymph # 07/27/2020 2.2  1.0 - 4.8 K/uL Final    Mono # 07/27/2020 0.7  0.3 - 1.0 K/uL Final    Eos # 07/27/2020 0.5  0.0 - 0.5 K/uL Final    Baso # 07/27/2020 0.06  0.00 - 0.20 K/uL Final    nRBC 07/27/2020 0  0 /100 WBC Final    Gran% 07/27/2020 46.2  38.0 - 73.0 % Final    Lymph% 07/27/2020 33.3  18.0 - 48.0 % Final    Mono% 07/27/2020 11.2  4.0 - 15.0 % Final    Eosinophil% 07/27/2020 8.1* 0.0 - 8.0 % Final    Basophil% 07/27/2020 0.9  0.0 - 1.9 % Final    Differential Method 07/27/2020 Automated   Final    Sodium 07/27/2020 130* 136 - 145 mmol/L Final    Potassium 07/27/2020 4.4  3.5 - 5.1 mmol/L Final    Chloride 07/27/2020 93* 95 - 110 mmol/L Final    CO2 07/27/2020 26  23 - 29 mmol/L Final    Glucose 07/27/2020 119* 70 - 110 mg/dL Final    BUN, Bld 07/27/2020 32* 10 - 30 mg/dL Final    Creatinine 07/27/2020 0.8  0.5 - 1.4 mg/dL Final    Calcium 07/27/2020 8.5* 8.7 - 10.5 mg/dL Final    Total Protein 07/27/2020 6.5  6.0 - 8.4 g/dL Final    Albumin 07/27/2020 2.8* 3.5 - 5.2 g/dL Final    Total Bilirubin 07/27/2020 0.4   0.1 - 1.0 mg/dL Final    Alkaline Phosphatase 07/27/2020 70  55 - 135 U/L Final    AST 07/27/2020 20  10 - 40 U/L Final    ALT 07/27/2020 12  10 - 44 U/L Final    Anion Gap 07/27/2020 11  8 - 16 mmol/L Final    eGFR if African American 07/27/2020 >60.0  >60 mL/min/1.73 m^2 Final    eGFR if non African American 07/27/2020 >60.0  >60 mL/min/1.73 m^2 Final    Specimen UA 07/27/2020 Urine, Catheterized   Final    Color, UA 07/27/2020 Yellow  Yellow, Straw, Chasity Final    Appearance, UA 07/27/2020 Clear  Clear Final    pH, UA 07/27/2020 7.0  5.0 - 8.0 Final    Specific [place], UA 07/27/2020 1.020  1.005 - 1.030 Final    Protein, UA 07/27/2020 Trace* Negative Final    Glucose, UA 07/27/2020 Negative  Negative Final    Ketones, UA 07/27/2020 Trace* Negative Final    Bilirubin (UA) 07/27/2020 Negative  Negative Final    Occult Blood UA 07/27/2020 2+* Negative Final    Nitrite, UA 07/27/2020 Negative  Negative Final    Urobilinogen, UA 07/27/2020 Negative  Negative EU/dL Final    Leukocytes, UA 07/27/2020 Negative  Negative Final    Occult Blood 07/27/2020 Negative  Negative Final    RBC, UA 07/27/2020 >100* 0 - 4 /hpf Final    WBC, UA 07/27/2020 2  0 - 5 /hpf Final    Bacteria 07/27/2020 Negative  None-Occ /hpf Final    Squam Epithel, UA 07/27/2020 1  /hpf Final    Hyaline Casts, UA 07/27/2020 3* 0-1/lpf /lpf Final    Microscopic Comment 07/27/2020 SEE COMMENT   Final   No results displayed because visit has over 200 results.      Admission on 07/10/2020, Discharged on 07/12/2020   Component Date Value Ref Range Status    WBC 07/10/2020 9.68  3.90 - 12.70 K/uL Final    RBC 07/10/2020 3.70* 4.60 - 6.20 M/uL Final    Hemoglobin 07/10/2020 12.4* 14.0 - 18.0 g/dL Final    Hematocrit 07/10/2020 37.4* 40.0 - 54.0 % Final    Mean Corpuscular Volume 07/10/2020 101* 82 - 98 fL Final    Mean Corpuscular Hemoglobin 07/10/2020 33.5* 27.0 - 31.0 pg Final    Mean Corpuscular Hemoglobin Conc 07/10/2020  33.2  32.0 - 36.0 g/dL Final    RDW 07/10/2020 14.1  11.5 - 14.5 % Final    Platelets 07/10/2020 206  150 - 350 K/uL Final    MPV 07/10/2020 10.0  9.2 - 12.9 fL Final    Immature Granulocytes 07/10/2020 0.3  0.0 - 0.5 % Final    Gran # (ANC) 07/10/2020 6.8  1.8 - 7.7 K/uL Final    Immature Grans (Abs) 07/10/2020 0.03  0.00 - 0.04 K/uL Final    Lymph # 07/10/2020 2.0  1.0 - 4.8 K/uL Final    Mono # 07/10/2020 0.6  0.3 - 1.0 K/uL Final    Eos # 07/10/2020 0.3  0.0 - 0.5 K/uL Final    Baso # 07/10/2020 0.03  0.00 - 0.20 K/uL Final    nRBC 07/10/2020 0  0 /100 WBC Final    Gran% 07/10/2020 69.7  38.0 - 73.0 % Final    Lymph% 07/10/2020 20.9  18.0 - 48.0 % Final    Mono% 07/10/2020 6.0  4.0 - 15.0 % Final    Eosinophil% 07/10/2020 2.8  0.0 - 8.0 % Final    Basophil% 07/10/2020 0.3  0.0 - 1.9 % Final    Differential Method 07/10/2020 Automated   Final    Sodium 07/10/2020 138  136 - 145 mmol/L Final    Potassium 07/10/2020 4.4  3.5 - 5.1 mmol/L Final    Chloride 07/10/2020 103  95 - 110 mmol/L Final    CO2 07/10/2020 26  23 - 29 mmol/L Final    Glucose 07/10/2020 118* 70 - 110 mg/dL Final    BUN, Bld 07/10/2020 30  10 - 30 mg/dL Final    Creatinine 07/10/2020 1.0  0.5 - 1.4 mg/dL Final    Calcium 07/10/2020 8.4* 8.7 - 10.5 mg/dL Final    Total Protein 07/10/2020 6.6  6.0 - 8.4 g/dL Final    Albumin 07/10/2020 3.1* 3.5 - 5.2 g/dL Final    Total Bilirubin 07/10/2020 0.7  0.1 - 1.0 mg/dL Final    Alkaline Phosphatase 07/10/2020 88  55 - 135 U/L Final    AST 07/10/2020 25  10 - 40 U/L Final    ALT 07/10/2020 22  10 - 44 U/L Final    Anion Gap 07/10/2020 9  8 - 16 mmol/L Final    eGFR if African American 07/10/2020 >60.0  >60 mL/min/1.73 m^2 Final    eGFR if non African American 07/10/2020 >60.0  >60 mL/min/1.73 m^2 Final    BNP 07/10/2020 376* 0 - 99 pg/mL Final    Troponin I 07/10/2020 <0.030  <=0.040 ng/mL Final    PT 07/10/2020 16.3* 10.6 - 14.8 sec Final    INR 07/10/2020 1.4   Final     Lipase 07/10/2020 32  4 - 60 U/L Final    Magnesium 07/10/2020 1.7  1.6 - 2.6 mg/dL Final    SARS-CoV-2 RNA, Amplification, Qual 07/10/2020 Negative  Negative Final    Specimen UA 07/11/2020 Urine, Clean Catch   Final    Color, UA 07/11/2020 Yellow  Yellow, Straw, Chasity Final    Appearance, UA 07/11/2020 Hazy* Clear Final    pH, UA 07/11/2020 6.0  5.0 - 8.0 Final    Specific [place], UA 07/11/2020 1.020  1.005 - 1.030 Final    Protein, UA 07/11/2020 Trace* Negative Final    Glucose, UA 07/11/2020 Negative  Negative Final    Ketones, UA 07/11/2020 Negative  Negative Final    Bilirubin (UA) 07/11/2020 Negative  Negative Final    Occult Blood UA 07/11/2020 2+* Negative Final    Nitrite, UA 07/11/2020 Negative  Negative Final    Urobilinogen, UA 07/11/2020 Negative  Negative EU/dL Final    Leukocytes, UA 07/11/2020 Negative  Negative Final    Blood Culture, Routine 07/10/2020 No growth after 5 days.   Final    Blood Culture, Routine 07/10/2020 No growth after 5 days.   Final    Lactate (Lactic Acid) 07/10/2020 1.4  0.5 - 1.9 mmol/L Final    RBC, UA 07/11/2020 >100* 0 - 4 /hpf Final    WBC, UA 07/11/2020 3  0 - 5 /hpf Final    Bacteria 07/11/2020 Negative  None-Occ /hpf Final    Squam Epithel, UA 07/11/2020 15  /hpf Final    Hyaline Casts, UA 07/11/2020 41* 0-1/lpf /lpf Final    Microscopic Comment 07/11/2020 SEE COMMENT   Final    Sodium 07/11/2020 138  136 - 145 mmol/L Final    Potassium 07/11/2020 4.4  3.5 - 5.1 mmol/L Final    Chloride 07/11/2020 103  95 - 110 mmol/L Final    CO2 07/11/2020 26  23 - 29 mmol/L Final    Glucose 07/11/2020 132* 70 - 110 mg/dL Final    BUN, Bld 07/11/2020 29  10 - 30 mg/dL Final    Creatinine 07/11/2020 0.9  0.5 - 1.4 mg/dL Final    Calcium 07/11/2020 8.1* 8.7 - 10.5 mg/dL Final    Anion Gap 07/11/2020 9  8 - 16 mmol/L Final    eGFR if African American 07/11/2020 >60.0  >60 mL/min/1.73 m^2 Final    eGFR if non African American 07/11/2020 >60.0  >60  mL/min/1.73 m^2 Final    Magnesium 07/11/2020 1.8  1.6 - 2.6 mg/dL Final    Phosphorus 07/11/2020 3.4  2.7 - 4.5 mg/dL Final    WBC 07/11/2020 13.71* 3.90 - 12.70 K/uL Final    RBC 07/11/2020 3.50* 4.60 - 6.20 M/uL Final    Hemoglobin 07/11/2020 11.8* 14.0 - 18.0 g/dL Final    Hematocrit 07/11/2020 36.3* 40.0 - 54.0 % Final    Mean Corpuscular Volume 07/11/2020 104* 82 - 98 fL Final    Mean Corpuscular Hemoglobin 07/11/2020 33.7* 27.0 - 31.0 pg Final    Mean Corpuscular Hemoglobin Conc 07/11/2020 32.5  32.0 - 36.0 g/dL Final    RDW 07/11/2020 14.3  11.5 - 14.5 % Final    Platelets 07/11/2020 180  150 - 350 K/uL Final    MPV 07/11/2020 10.3  9.2 - 12.9 fL Final    Immature Granulocytes 07/11/2020 0.4  0.0 - 0.5 % Final    Gran # (ANC) 07/11/2020 10.0* 1.8 - 7.7 K/uL Final    Immature Grans (Abs) 07/11/2020 0.05* 0.00 - 0.04 K/uL Final    Lymph # 07/11/2020 2.7  1.0 - 4.8 K/uL Final    Mono # 07/11/2020 0.9  0.3 - 1.0 K/uL Final    Eos # 07/11/2020 0.0  0.0 - 0.5 K/uL Final    Baso # 07/11/2020 0.03  0.00 - 0.20 K/uL Final    nRBC 07/11/2020 0  0 /100 WBC Final    Gran% 07/11/2020 72.8  38.0 - 73.0 % Final    Lymph% 07/11/2020 19.8  18.0 - 48.0 % Final    Mono% 07/11/2020 6.7  4.0 - 15.0 % Final    Eosinophil% 07/11/2020 0.1  0.0 - 8.0 % Final    Basophil% 07/11/2020 0.2  0.0 - 1.9 % Final    Differential Method 07/11/2020 Automated   Final    Procalcitonin 07/11/2020 0.27  0.00 - 0.50 ng/mL Final    Specimen UA 07/11/2020 Urine, Clean Catch   Final    Color, UA 07/11/2020 Yellow  Yellow, Straw, Chasity Final    Appearance, UA 07/11/2020 Clear  Clear Final    pH, UA 07/11/2020 6.0  5.0 - 8.0 Final    Specific [place]ity, UA 07/11/2020 >1.030* 1.005 - 1.030 Final    Protein, UA 07/11/2020 Trace* Negative Final    Glucose, UA 07/11/2020 Negative  Negative Final    Ketones, UA 07/11/2020 Negative  Negative Final    Bilirubin (UA) 07/11/2020 Negative  Negative Final    Occult Blood UA  07/11/2020 1+* Negative Final    Nitrite, UA 07/11/2020 Negative  Negative Final    Urobilinogen, UA 07/11/2020 Negative  Negative EU/dL Final    Leukocytes, UA 07/11/2020 Negative  Negative Final    RBC, UA 07/11/2020 54* 0 - 4 /hpf Final    WBC, UA 07/11/2020 3  0 - 5 /hpf Final    Bacteria 07/11/2020 Negative  None-Occ /hpf Final    Squam Epithel, UA 07/11/2020 1  /hpf Final    Hyaline Casts, UA 07/11/2020 3* 0-1/lpf /lpf Final    Microscopic Comment 07/11/2020 SEE COMMENT   Final    Sodium 07/12/2020 137  136 - 145 mmol/L Final    Potassium 07/12/2020 3.4* 3.5 - 5.1 mmol/L Final    Chloride 07/12/2020 102  95 - 110 mmol/L Final    CO2 07/12/2020 27  23 - 29 mmol/L Final    Glucose 07/12/2020 98  70 - 110 mg/dL Final    BUN, Bld 07/12/2020 25  10 - 30 mg/dL Final    Creatinine 07/12/2020 1.0  0.5 - 1.4 mg/dL Final    Calcium 07/12/2020 7.8* 8.7 - 10.5 mg/dL Final    Anion Gap 07/12/2020 8  8 - 16 mmol/L Final    eGFR if African American 07/12/2020 >60.0  >60 mL/min/1.73 m^2 Final    eGFR if non African American 07/12/2020 >60.0  >60 mL/min/1.73 m^2 Final    Magnesium 07/12/2020 1.9  1.6 - 2.6 mg/dL Final    Phosphorus 07/12/2020 3.0  2.7 - 4.5 mg/dL Final    Procalcitonin 07/12/2020 0.29  0.00 - 0.50 ng/mL Final    WBC 07/12/2020 10.01  3.90 - 12.70 K/uL Final    RBC 07/12/2020 3.12* 4.60 - 6.20 M/uL Final    Hemoglobin 07/12/2020 10.6* 14.0 - 18.0 g/dL Final    Hematocrit 07/12/2020 31.8* 40.0 - 54.0 % Final    Mean Corpuscular Volume 07/12/2020 102* 82 - 98 fL Final    Mean Corpuscular Hemoglobin 07/12/2020 34.0* 27.0 - 31.0 pg Final    Mean Corpuscular Hemoglobin Conc 07/12/2020 33.3  32.0 - 36.0 g/dL Final    RDW 07/12/2020 14.4  11.5 - 14.5 % Final    Platelets 07/12/2020 154  150 - 350 K/uL Final    MPV 07/12/2020 9.2  9.2 - 12.9 fL Final    Immature Granulocytes 07/12/2020 0.4  0.0 - 0.5 % Final    Gran # (ANC) 07/12/2020 6.3  1.8 - 7.7 K/uL Final    Immature Grans (Abs)  07/12/2020 0.04  0.00 - 0.04 K/uL Final    Lymph # 07/12/2020 2.3  1.0 - 4.8 K/uL Final    Mono # 07/12/2020 0.7  0.3 - 1.0 K/uL Final    Eos # 07/12/2020 0.6* 0.0 - 0.5 K/uL Final    Baso # 07/12/2020 0.05  0.00 - 0.20 K/uL Final    nRBC 07/12/2020 0  0 /100 WBC Final    Gran% 07/12/2020 62.9  38.0 - 73.0 % Final    Lymph% 07/12/2020 23.4  18.0 - 48.0 % Final    Mono% 07/12/2020 7.2  4.0 - 15.0 % Final    Eosinophil% 07/12/2020 5.6  0.0 - 8.0 % Final    Basophil% 07/12/2020 0.5  0.0 - 1.9 % Final    Differential Method 07/12/2020 Automated   Final       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    Iron deficiency anemia, unspecified 7/18/2017    Other pulmonary embolism with acute cor pulmonale 3/6/2019    Pulmonary emboli      Social History     Socioeconomic History    Marital status:      Spouse name: Not on file    Number of children: Not on file    Years of education: Not on file    Highest education level: Not on file   Occupational History    Not on file   Social Needs    Financial resource strain: Not on file    Food insecurity     Worry: Not on file     Inability: Not on file    Transportation needs     Medical: Not on file     Non-medical: Not on file   Tobacco Use    Smoking status: Former Smoker     Years: 1.00     Types: Cigarettes    Smokeless tobacco: Never Used   Substance and Sexual Activity    Alcohol use: No    Drug use: No    Sexual activity: Never   Lifestyle    Physical activity     Days per week: Not on file     Minutes per session: Not on file    Stress: Not at all   Relationships    Social connections     Talks on phone: Not on file      Gets together: Not on file     Attends [employer] service: Not on file     Active member of club or organization: Not on file     Attends meetings of clubs or organizations: Not on file     Relationship status: Not on file   Other Topics Concern    Not on file   Social History Narrative    Not on file     Past Surgical History:   Procedure Laterality Date    Closed reduction & internal fixation of closed, comminuted, displaced intertrochanteric fx left hip w/ TFN Left 08/29/2018     No family history on file.    Review of patient's allergies indicates:   Allergen Reactions    Sulfa (sulfonamide antibiotics)      Patient can not recall reaction     Iron      Other reaction(s): Unknown       Current Outpatient Medications:     aspirin (ECOTRIN) 81 MG EC tablet, Take 81 mg by mouth once daily.  , Disp: , Rfl:     atorvastatin (LIPITOR) 10 MG tablet, Take 1 tablet (10 mg total) by mouth once daily., Disp: 90 tablet, Rfl: 1    finasteride (PROSCAR) 5 mg tablet, Take 1 tablet (5 mg total) by mouth once daily., Disp: 90 tablet, Rfl: 4    furosemide (LASIX) 40 MG tablet, Take 0.5 tablets (20 mg total) by mouth every other day., Disp: 8 tablet, Rfl: 11    isosorbide mononitrate (IMDUR) 60 MG 24 hr tablet, Take 1 tablet (60 mg total) by mouth once daily., Disp: 90 tablet, Rfl: 1    losartan (COZAAR) 50 MG tablet, Take 0.5 tablets (25 mg total) by mouth once daily., Disp: 45 tablet, Rfl: 1    MAGNESIUM ORAL, Take 400 mg by mouth once daily. , Disp: , Rfl:     pantoprazole (PROTONIX) 40 MG tablet, Take 1 tablet (40 mg total) by mouth once daily., Disp: 90 tablet, Rfl: 1    potassium chloride SA (K-DUR,KLOR-CON) 20 MEQ tablet, Take 1 tablet (20 mEq total) by mouth once daily., Disp: 90 tablet, Rfl: 1    sotaloL (BETAPACE) 80 MG tablet, Take 1 tablet (80 mg total) by mouth 2 (two) times daily., Disp: 180 tablet, Rfl: 1    tamsulosin (FLOMAX) 0.4 mg Cap, Take 1 capsule (0.4 mg total) by mouth once daily., Disp: 90  "capsule, Rfl: 3    vit A/vit C/vit E/zinc/copper (PRESERVISION AREDS ORAL), Take by mouth., Disp: , Rfl:     Review of Systems   Constitutional: Negative for appetite change, chills, fatigue, fever and unexpected weight change.   HENT: Negative for congestion, ear pain and trouble swallowing.    Eyes: Negative for pain, discharge and visual disturbance.   Respiratory: Negative for apnea, cough, shortness of breath and wheezing.    Cardiovascular: Negative for chest pain and leg swelling (Right leg has trace edema).        Pacemaker in place   Gastrointestinal: Negative for abdominal pain, blood in stool, constipation, diarrhea, nausea and vomiting.   Endocrine: Negative for cold intolerance, heat intolerance and polydipsia.   Genitourinary: Negative for dysuria, hematuria, testicular pain and urgency.   Musculoskeletal: Positive for arthralgias (Fall on left shoulder but no residual tenderness has some decreased rotation flexion). Negative for gait problem (Rollator mobility), joint swelling and myalgias.   Skin: Positive for color change (Left leg erythema to mid calf).   Neurological: Negative for dizziness, seizures and numbness.         5 Falls since August   Psychiatric/Behavioral: Negative for agitation, behavioral problems and hallucinations. The patient is not nervous/anxious.           Objective:      Vitals:    10/08/20 1134   BP: 136/82   Pulse: 64   Temp: 98.2 °F (36.8 °C)   Weight: 56.2 kg (124 lb)   Height: 5' 9" (1.753 m)     Physical Exam  Vitals signs and nursing note reviewed.   Constitutional:       Appearance: He is well-developed.      Comments: 89-year-old male with advanced age and memory loss.  he walks with a walker.   HENT:      Head: Normocephalic and atraumatic.      Right Ear: External ear normal.      Left Ear: External ear normal.      Nose: Nose normal.   Eyes:      Pupils: Pupils are equal, round, and reactive to light.   Neck:      Musculoskeletal: Normal range of motion and neck " supple.      Thyroid: No thyromegaly.      Vascular: No carotid bruit.   Cardiovascular:      Rate and Rhythm: Normal rate and regular rhythm.      Heart sounds: Normal heart sounds. No murmur.      Comments: Pacemaker in left chest, sinus rhythm today  Pulmonary:      Effort: Pulmonary effort is normal.      Breath sounds: Normal breath sounds. No wheezing or rales.   Abdominal:      General: Bowel sounds are normal. There is no distension.      Palpations: Abdomen is soft.      Tenderness: There is no abdominal tenderness.   Musculoskeletal: Normal range of motion.         General: No tenderness or deformity.      Lumbar back: Normal. He exhibits no pain and no spasm.      Comments: Bends 90 degrees at  waist right shoulder has full range of motion left shoulder has decreased active flexion to 90°.  Knees have good range of motion bilaterally.  No pitting edema to the lower legs.   Lymphadenopathy:      Cervical: No cervical adenopathy.   Skin:     General: Skin is warm and dry.      Findings: No rash.      Comments: Left pretibial area has dry scaly cracked skin and compatible with stasis dermatitis.   Neurological:      Mental Status: He is alert and oriented to person, place, and time.      Cranial Nerves: No cranial nerve deficit.      Coordination: Coordination normal.      Gait: Gait abnormal (Wobbly slow gait).   Psychiatric:         Behavior: Behavior normal.         Thought Content: Thought content normal.         Judgment: Judgment normal.           Assessment:       1. Essential hypertension    2. Mixed hyperlipidemia    3. PAF (paroxysmal atrial fibrillation)    4. Pacemaker    5. Paroxysmal atrial fibrillation    6. BPH with obstruction/lower urinary tract symptoms    7. Hyponatremia    8. Advanced age    9. Debility         Plan:       Essential hypertension  -     sotaloL (BETAPACE) 80 MG tablet; Take 1 tablet (80 mg total) by mouth 2 (two) times daily.  Dispense: 180 tablet; Refill: 1  -      losartan (COZAAR) 50 MG tablet; Take 0.5 tablets (25 mg total) by mouth once daily.  Dispense: 45 tablet; Refill: 1  -     isosorbide mononitrate (IMDUR) 60 MG 24 hr tablet; Take 1 tablet (60 mg total) by mouth once daily.  Dispense: 90 tablet; Refill: 1  Blood pressure well controlled with current regimen.  Mixed hyperlipidemia  Continue current medications  PAF (paroxysmal atrial fibrillation)  In sinus rhythm today  Pacemaker  Working well  Paroxysmal atrial fibrillation    BPH with obstruction/lower urinary tract symptoms    Hyponatremia    Advanced age    Debility    Other orders  -     tamsulosin (FLOMAX) 0.4 mg Cap; Take 1 capsule (0.4 mg total) by mouth once daily.  Dispense: 90 capsule; Refill: 3  -     potassium chloride SA (K-DUR,KLOR-CON) 20 MEQ tablet; Take 1 tablet (20 mEq total) by mouth once daily.  Dispense: 90 tablet; Refill: 1  -     pantoprazole (PROTONIX) 40 MG tablet; Take 1 tablet (40 mg total) by mouth once daily.  Dispense: 90 tablet; Refill: 1  -     finasteride (PROSCAR) 5 mg tablet; Take 1 tablet (5 mg total) by mouth once daily.  Dispense: 90 tablet; Refill: 4  -     atorvastatin (LIPITOR) 10 MG tablet; Take 1 tablet (10 mg total) by mouth once daily.  Dispense: 90 tablet; Refill: 1  Refill all medications.    Follow up in about 3 months (around 1/8/2021).        10/8/2020 Hood Gannon

## 2020-10-09 DIAGNOSIS — I49.5 SSS (SICK SINUS SYNDROME): Primary | ICD-10-CM

## 2020-11-05 ENCOUNTER — TELEPHONE (OUTPATIENT)
Dept: FAMILY MEDICINE | Facility: CLINIC | Age: 85
End: 2020-11-05

## 2020-11-05 NOTE — TELEPHONE ENCOUNTER
----- Message from Margret Garrett sent at 11/5/2020 10:05 AM CST -----  Regarding: needing fax  Needing office notes faxed to [facility] for his 02  Fax [phone]  [phone]-[phone]

## 2020-11-11 ENCOUNTER — TELEPHONE (OUTPATIENT)
Dept: FAMILY MEDICINE | Facility: CLINIC | Age: 85
End: 2020-11-11

## 2020-11-11 NOTE — TELEPHONE ENCOUNTER
----- Message from Yani Landry sent at 11/11/2020  2:08 PM CST -----  [facility] / [facility] calling requesting chart notes from the 10/8/2020 appt.  Originally requested on 11/5/2020.   Fax [phone]   # [phone] ext [phone]

## 2020-11-12 ENCOUNTER — OFFICE VISIT (OUTPATIENT)
Dept: CARDIOLOGY | Facility: CLINIC | Age: 85
End: 2020-11-12
Payer: MEDICARE

## 2020-11-12 VITALS
OXYGEN SATURATION: 93 % | SYSTOLIC BLOOD PRESSURE: 136 MMHG | BODY MASS INDEX: 17.92 KG/M2 | HEIGHT: 69 IN | DIASTOLIC BLOOD PRESSURE: 80 MMHG | HEART RATE: 84 BPM | WEIGHT: 121 LBS | RESPIRATION RATE: 16 BRPM | TEMPERATURE: 97 F

## 2020-11-12 DIAGNOSIS — I25.10 ATHEROSCLEROSIS OF NATIVE CORONARY ARTERY OF NATIVE HEART WITHOUT ANGINA PECTORIS: ICD-10-CM

## 2020-11-12 DIAGNOSIS — R06.02 SOB (SHORTNESS OF BREATH): Primary | ICD-10-CM

## 2020-11-12 DIAGNOSIS — E11.65 CONTROLLED TYPE 2 DIABETES MELLITUS WITH HYPERGLYCEMIA, WITHOUT LONG-TERM CURRENT USE OF INSULIN: ICD-10-CM

## 2020-11-12 DIAGNOSIS — I48.0 PAROXYSMAL ATRIAL FIBRILLATION: ICD-10-CM

## 2020-11-12 DIAGNOSIS — D64.9 CHRONIC ANEMIA: ICD-10-CM

## 2020-11-12 DIAGNOSIS — R05.9 COUGH: ICD-10-CM

## 2020-11-12 DIAGNOSIS — I10 ESSENTIAL HYPERTENSION: Primary | ICD-10-CM

## 2020-11-12 PROCEDURE — 99214 OFFICE O/P EST MOD 30 MIN: CPT | Mod: S$GLB,,, | Performed by: INTERNAL MEDICINE

## 2020-11-12 PROCEDURE — 99214 PR OFFICE/OUTPT VISIT, EST, LEVL IV, 30-39 MIN: ICD-10-PCS | Mod: S$GLB,,, | Performed by: INTERNAL MEDICINE

## 2020-11-12 NOTE — PROGRESS NOTES
Subjective:    Patient ID:  Marck Ramos is a 89 y.o. male patient here for evaluation Atrial Fibrillation and Gastroesophageal Reflux      History of Present Illness:   Patient is a 89-year-old gentleman who has history of chronic anemia essential hypertension and proximal atrial fibrillation is seeking follow-up evaluation.  He has been doing fairly well edema is been controlled shortness of breath of breath has been stable.  Reportedly his caretaker was exposed to some family function who had COVID positivity.  All the patient denies having any symptoms of fevers chills no cough or congestion and no symptoms of COVID infection at this time.  He is attended by his daughter today at the visit.  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    Iron deficiency anemia, unspecified 7/18/2017    Other pulmonary embolism with acute cor pulmonale 3/6/2019    Pulmonary emboli      Past Surgical History:   Procedure Laterality Date    Closed reduction & internal fixation of closed, comminuted, displaced intertrochanteric fx left hip w/ TFN Left 08/29/2018     Social History     Tobacco Use    Smoking status: Former Smoker     Years: 1.00     Types: Cigarettes    Smokeless tobacco: Never Used   Substance Use Topics    Alcohol use: No    Drug use: No        Review of Systems:    As noted in HPI in addition      REVIEW OF SYSTEMS  CARDIOVASCULAR: No recent  chest pain, palpitations, arm, neck, or jaw pain  RESPIRATORY: No recent fever, cough chills, SOB or congestion  : No blood in the urine  GI: No Nausea, vomiting, constipation, diarrhea, blood, or reflux.  MUSCULOSKELETAL: No myalgias  NEURO: No lightheadedness or dizziness  EYES: No Double vision, blurry, vision or headache              Objective:        Vitals:    11/12/20 1404   BP: 136/80   Pulse: 84   Resp: 16   Temp: 97.2 °F (36.2 °C)       Lab Results   Component Value Date    WBC 6.61 07/28/2020    HGB 10.0 (L) 07/28/2020    HCT 30.3 (L) 07/28/2020     07/28/2020    CHOL 96 (L) 12/17/2019    TRIG 21 (L) 12/17/2019    HDL 35 (L) 12/17/2019    ALT 12 07/28/2020    AST 19 07/28/2020     (L) 07/28/2020    K 4.2 07/28/2020    CL 93 (L) 07/28/2020    CREATININE 0.9 07/28/2020    BUN 32 (H) 07/28/2020    CO2 28 07/28/2020    PSA 1.88 06/29/2012    INR 2.1 07/18/2020        ECHOCARDIOGRAM RESULTS  No results found for this or any previous visit.      CURRENT/PREVIOUS VISIT EKG  Results for orders placed or performed during the hospital encounter of 07/28/20   EKG 12-lead    Collection Time: 07/29/20 12:07 AM    Narrative    Test Reason : I10,    Vent. Rate : 060 BPM     Atrial Rate : 059 BPM     P-R Int : 138 ms          QRS Dur : 170 ms      QT Int : 522 ms       P-R-T Axes : 000 -73 098 degrees     QTc Int : 522 ms    AV dual-paced rhythm  Abnormal ECG  When compared with ECG of 18-JUL-2020 16:30,  No significant change was found  Confirmed by Pete Mares MD ([id number]) on 7/30/2020 12:24:43 PM    Referred By: RHONDA   SELF           Confirmed By:Pete Mares MD     No procedure found.   No results found for this or any previous visit.  No procedure found.    PHYSICAL EXAM  CONSTITUTIONAL: Well built, well nourished in no apparent distress  NECK: no carotid bruit, no JVD  LUNGS: CTA  CHEST WALL: no tenderness,  HEART: regular rate and rhythm, S1, S2 normal, systolic murmurs present  ABDOMEN:  soft, non-tender; bowel sounds normal; no masses,  no organomegaly  EXTREMITIES: Extremities normal, no edema, no calf tenderness noted  NEURO: AAO X 3    I HAVE REVIEWED :    The vital signs, nurses notes, and all the pertinent radiology and labs.         Current Outpatient Medications   Medication Instructions    aspirin (ECOTRIN) 81 mg, Oral, Daily    atorvastatin (LIPITOR) 10 mg, Oral, Daily    finasteride (PROSCAR) 5 mg, Oral, Daily    furosemide (LASIX) 20 mg, Oral, Every other day    isosorbide mononitrate (IMDUR) 60 mg, Oral, Daily    losartan (COZAAR) 25 mg, Oral, Daily    MAGNESIUM ORAL 400 mg, Oral, Daily    pantoprazole (PROTONIX) 40 mg, Oral, Daily    potassium chloride SA (K-DUR,KLOR-CON) 20 MEQ tablet 20 mEq, Oral, Daily    sotaloL (BETAPACE) 80 MG tablet TAKE ONE TABLET BY MOUTH TWO TIMES A DAY    tamsulosin (FLOMAX) 0.4 mg, Oral, Daily    vit A/vit C/vit E/zinc/copper (PRESERVISION AREDS ORAL) Oral          Assessment:     #1.  Paroxysmal atrial fibrillation, now in regular rhythm.  #2.  Pacemaker in situ.  #3.  History of pulmonary embolism and DVT.  #4.  History of GERD.  #5.  Status post fall and fracture of the humerus.  #6. Hematoma and cellulits recently      Plan:   Plan at this time is to continue on aspirin, isosorbide mononitrate 60 mg daily, Cozaar 25 mg a day and sotalol 80 mg p.o. b.i.d..  He is maintaining on regular rhythm at this time appears fairly stable encouraged him to continue same along with Lasix 20 mg a day and potassium supplements 20 daily also maintain low-salt low-fat diet.  I have encouraged him to consider having a COVID test done if he has any symptoms related to it as he had a secondhand exposure.  I will see him back in the office in 4 months time          No follow-ups on file.

## 2020-12-04 DIAGNOSIS — I48.0 PAROXYSMAL ATRIAL FIBRILLATION: Primary | ICD-10-CM

## 2020-12-29 ENCOUNTER — TELEPHONE (OUTPATIENT)
Dept: FAMILY MEDICINE | Facility: CLINIC | Age: 85
End: 2020-12-29

## 2021-01-07 DIAGNOSIS — I10 ESSENTIAL HYPERTENSION: ICD-10-CM

## 2021-01-07 RX ORDER — ISOSORBIDE MONONITRATE 60 MG/1
60 TABLET, EXTENDED RELEASE ORAL DAILY
Qty: 90 TABLET | Refills: 1 | Status: SHIPPED | OUTPATIENT
Start: 2021-01-07 | End: 2021-01-12 | Stop reason: SDUPTHER

## 2021-01-12 ENCOUNTER — OFFICE VISIT (OUTPATIENT)
Dept: FAMILY MEDICINE | Facility: CLINIC | Age: 86
End: 2021-01-12
Payer: MEDICARE

## 2021-01-12 VITALS
HEIGHT: 69 IN | BODY MASS INDEX: 18.22 KG/M2 | HEART RATE: 72 BPM | OXYGEN SATURATION: 97 % | WEIGHT: 123 LBS | DIASTOLIC BLOOD PRESSURE: 86 MMHG | SYSTOLIC BLOOD PRESSURE: 136 MMHG

## 2021-01-12 DIAGNOSIS — N40.1 BPH WITH OBSTRUCTION/LOWER URINARY TRACT SYMPTOMS: ICD-10-CM

## 2021-01-12 DIAGNOSIS — K21.9 GASTROESOPHAGEAL REFLUX DISEASE WITHOUT ESOPHAGITIS: ICD-10-CM

## 2021-01-12 DIAGNOSIS — I10 ESSENTIAL HYPERTENSION: ICD-10-CM

## 2021-01-12 DIAGNOSIS — I13.2 HYPERTENSIVE HEART AND CHRONIC KIDNEY DISEASE WITH HEART FAILURE AND WITH STAGE 5 CHRONIC KIDNEY DISEASE, OR END STAGE RENAL DISEASE: ICD-10-CM

## 2021-01-12 DIAGNOSIS — R53.81 DEBILITY: ICD-10-CM

## 2021-01-12 DIAGNOSIS — I48.0 PAROXYSMAL ATRIAL FIBRILLATION: Primary | ICD-10-CM

## 2021-01-12 DIAGNOSIS — Z95.0 PACEMAKER: ICD-10-CM

## 2021-01-12 DIAGNOSIS — N13.8 BPH WITH OBSTRUCTION/LOWER URINARY TRACT SYMPTOMS: ICD-10-CM

## 2021-01-12 DIAGNOSIS — E78.2 MIXED HYPERLIPIDEMIA: ICD-10-CM

## 2021-01-12 DIAGNOSIS — D63.8 ANEMIA, CHRONIC DISEASE: ICD-10-CM

## 2021-01-12 DIAGNOSIS — S42.202S CLOSED FRACTURE OF PROXIMAL END OF LEFT HUMERUS, UNSPECIFIED FRACTURE MORPHOLOGY, SEQUELA: Chronic | ICD-10-CM

## 2021-01-12 DIAGNOSIS — R54 ADVANCED AGE: ICD-10-CM

## 2021-01-12 DIAGNOSIS — L57.0 ACTINIC KERATOSIS: ICD-10-CM

## 2021-01-12 PROCEDURE — 17110 DESTRUCTION, BENIGN LESION: ICD-10-PCS | Mod: S$GLB,,, | Performed by: FAMILY MEDICINE

## 2021-01-12 PROCEDURE — 17110 DESTRUCTION B9 LES UP TO 14: CPT | Mod: S$GLB,,, | Performed by: FAMILY MEDICINE

## 2021-01-12 PROCEDURE — 99214 PR OFFICE/OUTPT VISIT, EST, LEVL IV, 30-39 MIN: ICD-10-PCS | Mod: 25,S$GLB,, | Performed by: FAMILY MEDICINE

## 2021-01-12 PROCEDURE — 99214 OFFICE O/P EST MOD 30 MIN: CPT | Mod: 25,S$GLB,, | Performed by: FAMILY MEDICINE

## 2021-01-12 RX ORDER — FUROSEMIDE 20 MG/1
20 TABLET ORAL DAILY
Qty: 90 TABLET | Refills: 1 | Status: ON HOLD | OUTPATIENT
Start: 2021-01-12 | End: 2021-04-03 | Stop reason: HOSPADM

## 2021-01-12 RX ORDER — POTASSIUM CHLORIDE 20 MEQ/1
20 TABLET, EXTENDED RELEASE ORAL DAILY
Qty: 90 TABLET | Refills: 1 | Status: SHIPPED | OUTPATIENT
Start: 2021-01-12 | End: 2021-04-12 | Stop reason: SDUPTHER

## 2021-01-12 RX ORDER — ATORVASTATIN CALCIUM 10 MG/1
10 TABLET, FILM COATED ORAL DAILY
Qty: 90 TABLET | Refills: 1 | Status: SHIPPED | OUTPATIENT
Start: 2021-01-12 | End: 2021-04-12 | Stop reason: SDUPTHER

## 2021-01-12 RX ORDER — TAMSULOSIN HYDROCHLORIDE 0.4 MG/1
0.4 CAPSULE ORAL DAILY
Qty: 90 CAPSULE | Refills: 3 | Status: SHIPPED | OUTPATIENT
Start: 2021-01-12 | End: 2021-04-12 | Stop reason: SDUPTHER

## 2021-01-12 RX ORDER — ISOSORBIDE MONONITRATE 60 MG/1
60 TABLET, EXTENDED RELEASE ORAL DAILY
Qty: 90 TABLET | Refills: 1 | Status: SHIPPED | OUTPATIENT
Start: 2021-01-12 | End: 2021-04-12 | Stop reason: SDUPTHER

## 2021-01-12 RX ORDER — FINASTERIDE 5 MG/1
5 TABLET, FILM COATED ORAL DAILY
Qty: 90 TABLET | Refills: 4 | Status: SHIPPED | OUTPATIENT
Start: 2021-01-12 | End: 2021-04-12 | Stop reason: SDUPTHER

## 2021-01-12 RX ORDER — LOSARTAN POTASSIUM 50 MG/1
25 TABLET ORAL DAILY
Qty: 45 TABLET | Refills: 1 | Status: SHIPPED | OUTPATIENT
Start: 2021-01-12 | End: 2021-04-12 | Stop reason: SDUPTHER

## 2021-01-12 RX ORDER — PANTOPRAZOLE SODIUM 40 MG/1
40 TABLET, DELAYED RELEASE ORAL DAILY
Qty: 90 TABLET | Refills: 1 | Status: SHIPPED | OUTPATIENT
Start: 2021-01-12 | End: 2021-04-12 | Stop reason: SDUPTHER

## 2021-02-03 ENCOUNTER — EXTERNAL HOME HEALTH (OUTPATIENT)
Dept: HOME HEALTH SERVICES | Facility: HOSPITAL | Age: 86
End: 2021-02-03

## 2021-02-17 ENCOUNTER — TELEPHONE (OUTPATIENT)
Dept: FAMILY MEDICINE | Facility: CLINIC | Age: 86
End: 2021-02-17

## 2021-03-11 ENCOUNTER — OFFICE VISIT (OUTPATIENT)
Dept: CARDIOLOGY | Facility: CLINIC | Age: 86
End: 2021-03-11
Payer: MEDICARE

## 2021-03-11 VITALS
RESPIRATION RATE: 16 BRPM | HEART RATE: 64 BPM | BODY MASS INDEX: 18.22 KG/M2 | SYSTOLIC BLOOD PRESSURE: 126 MMHG | OXYGEN SATURATION: 94 % | HEIGHT: 69 IN | WEIGHT: 123 LBS | DIASTOLIC BLOOD PRESSURE: 62 MMHG

## 2021-03-11 DIAGNOSIS — N40.1 BPH WITH OBSTRUCTION/LOWER URINARY TRACT SYMPTOMS: ICD-10-CM

## 2021-03-11 DIAGNOSIS — E78.00 PURE HYPERCHOLESTEROLEMIA: ICD-10-CM

## 2021-03-11 DIAGNOSIS — I48.0 PAF (PAROXYSMAL ATRIAL FIBRILLATION): Chronic | ICD-10-CM

## 2021-03-11 DIAGNOSIS — N13.8 BPH WITH OBSTRUCTION/LOWER URINARY TRACT SYMPTOMS: ICD-10-CM

## 2021-03-11 DIAGNOSIS — I10 ESSENTIAL HYPERTENSION: Primary | ICD-10-CM

## 2021-03-11 DIAGNOSIS — Z95.0 PACEMAKER: ICD-10-CM

## 2021-03-11 PROCEDURE — 99214 PR OFFICE/OUTPT VISIT, EST, LEVL IV, 30-39 MIN: ICD-10-PCS | Mod: S$GLB,,, | Performed by: INTERNAL MEDICINE

## 2021-03-11 PROCEDURE — 99214 OFFICE O/P EST MOD 30 MIN: CPT | Mod: S$GLB,,, | Performed by: INTERNAL MEDICINE

## 2021-03-29 ENCOUNTER — HOSPITAL ENCOUNTER (INPATIENT)
Facility: HOSPITAL | Age: 86
LOS: 4 days | Discharge: HOME HEALTH | DRG: 177 | End: 2021-04-03
Attending: EMERGENCY MEDICINE | Admitting: INTERNAL MEDICINE
Payer: MEDICARE

## 2021-03-29 DIAGNOSIS — J96.01 ACUTE RESPIRATORY FAILURE WITH HYPOXIA: Primary | ICD-10-CM

## 2021-03-29 DIAGNOSIS — T17.908A ASPIRATION INTO AIRWAY: ICD-10-CM

## 2021-03-29 DIAGNOSIS — I13.2 HYPERTENSIVE HEART AND CHRONIC KIDNEY DISEASE WITH HEART FAILURE AND WITH STAGE 5 CHRONIC KIDNEY DISEASE, OR END STAGE RENAL DISEASE: ICD-10-CM
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
## 2021-03-29 DIAGNOSIS — T17.928A ASPIRATION OF FOOD, INITIAL ENCOUNTER: ICD-10-CM

## 2021-03-29 DIAGNOSIS — R07.9 CHEST PAIN: ICD-10-CM

## 2021-03-29 DIAGNOSIS — R06.02 SHORTNESS OF BREATH: ICD-10-CM

## 2021-03-29 LAB
ALBUMIN SERPL BCP-MCNC: 3.3 G/DL (ref 3.5–5.2)
ALP SERPL-CCNC: 77 U/L (ref 55–135)
ALT SERPL W/O P-5'-P-CCNC: 21 U/L (ref 10–44)
ANION GAP SERPL CALC-SCNC: 10 MMOL/L (ref 8–16)
AST SERPL-CCNC: 23 U/L (ref 10–40)
BASOPHILS # BLD AUTO: 0.04 K/UL (ref 0–0.2)
BASOPHILS NFR BLD: 0.4 % (ref 0–1.9)
BILIRUB SERPL-MCNC: 0.9 MG/DL (ref 0.1–1)
BUN SERPL-MCNC: 39 MG/DL (ref 10–30)
CALCIUM SERPL-MCNC: 8.7 MG/DL (ref 8.7–10.5)
CHLORIDE SERPL-SCNC: 100 MMOL/L (ref 95–110)
CO2 SERPL-SCNC: 28 MMOL/L (ref 23–29)
CREAT SERPL-MCNC: 1.1 MG/DL (ref 0.5–1.4)
DIFFERENTIAL METHOD: ABNORMAL
EOSINOPHIL # BLD AUTO: 0.2 K/UL (ref 0–0.5)
EOSINOPHIL NFR BLD: 1.8 % (ref 0–8)
ERYTHROCYTE [DISTWIDTH] IN BLOOD BY AUTOMATED COUNT: 16.3 % (ref 11.5–14.5)
EST. GFR  (AFRICAN AMERICAN): >60 ML/MIN/1.73 M^2
EST. GFR  (NON AFRICAN AMERICAN): 55.2 ML/MIN/1.73 M^2
GLUCOSE SERPL-MCNC: 135 MG/DL (ref 70–110)
HCT VFR BLD AUTO: 41 % (ref 40–54)
HGB BLD-MCNC: 13.5 G/DL (ref 14–18)
IMM GRANULOCYTES # BLD AUTO: 0.04 K/UL (ref 0–0.04)
IMM GRANULOCYTES NFR BLD AUTO: 0.4 % (ref 0–0.5)
LACTATE SERPL-SCNC: 1.8 MMOL/L (ref 0.5–1.9)
LIPASE SERPL-CCNC: 19 U/L (ref 4–60)
LYMPHOCYTES # BLD AUTO: 2.2 K/UL (ref 1–4.8)
LYMPHOCYTES NFR BLD: 21 % (ref 18–48)
MCH RBC QN AUTO: 32.3 PG (ref 27–31)
MCHC RBC AUTO-ENTMCNC: 32.9 G/DL (ref 32–36)
MCV RBC AUTO: 98 FL (ref 82–98)
MONOCYTES # BLD AUTO: 0.5 K/UL (ref 0.3–1)
MONOCYTES NFR BLD: 4.9 % (ref 4–15)
NEUTROPHILS # BLD AUTO: 7.5 K/UL (ref 1.8–7.7)
NEUTROPHILS NFR BLD: 71.5 % (ref 38–73)
NRBC BLD-RTO: 0 /100 WBC
PLATELET # BLD AUTO: 186 K/UL (ref 150–450)
PMV BLD AUTO: 9.9 FL (ref 9.2–12.9)
POTASSIUM SERPL-SCNC: 4.2 MMOL/L (ref 3.5–5.1)
PROT SERPL-MCNC: 7.8 G/DL (ref 6–8.4)
RBC # BLD AUTO: 4.18 M/UL (ref 4.6–6.2)
SODIUM SERPL-SCNC: 138 MMOL/L (ref 136–145)
TROPONIN I SERPL DL<=0.01 NG/ML-MCNC: <0.03 NG/ML
WBC # BLD AUTO: 10.49 K/UL (ref 3.9–12.7)

## 2021-03-29 PROCEDURE — 99285 EMERGENCY DEPT VISIT HI MDM: CPT | Mod: 25

## 2021-03-29 PROCEDURE — 83880 ASSAY OF NATRIURETIC PEPTIDE: CPT | Performed by: STUDENT IN AN ORGANIZED HEALTH CARE EDUCATION/TRAINING PROGRAM

## 2021-03-29 PROCEDURE — 96365 THER/PROPH/DIAG IV INF INIT: CPT

## 2021-03-29 PROCEDURE — 80053 COMPREHEN METABOLIC PANEL: CPT | Performed by: EMERGENCY MEDICINE

## 2021-03-29 PROCEDURE — 25000003 PHARM REV CODE 250: Performed by: STUDENT IN AN ORGANIZED HEALTH CARE EDUCATION/TRAINING PROGRAM

## 2021-03-29 PROCEDURE — 96375 TX/PRO/DX INJ NEW DRUG ADDON: CPT

## 2021-03-29 PROCEDURE — U0002 COVID-19 LAB TEST NON-CDC: HCPCS | Performed by: STUDENT IN AN ORGANIZED HEALTH CARE EDUCATION/TRAINING PROGRAM

## 2021-03-29 PROCEDURE — 87502 INFLUENZA DNA AMP PROBE: CPT | Performed by: STUDENT IN AN ORGANIZED HEALTH CARE EDUCATION/TRAINING PROGRAM

## 2021-03-29 PROCEDURE — 87040 BLOOD CULTURE FOR BACTERIA: CPT | Mod: 59 | Performed by: EMERGENCY MEDICINE

## 2021-03-29 PROCEDURE — 84484 ASSAY OF TROPONIN QUANT: CPT | Performed by: STUDENT IN AN ORGANIZED HEALTH CARE EDUCATION/TRAINING PROGRAM

## 2021-03-29 PROCEDURE — 93005 ELECTROCARDIOGRAM TRACING: CPT | Performed by: GENERAL PRACTICE

## 2021-03-29 PROCEDURE — 85025 COMPLETE CBC W/AUTO DIFF WBC: CPT | Performed by: EMERGENCY MEDICINE

## 2021-03-29 PROCEDURE — 63600175 PHARM REV CODE 636 W HCPCS: Performed by: STUDENT IN AN ORGANIZED HEALTH CARE EDUCATION/TRAINING PROGRAM

## 2021-03-29 PROCEDURE — 83690 ASSAY OF LIPASE: CPT | Performed by: STUDENT IN AN ORGANIZED HEALTH CARE EDUCATION/TRAINING PROGRAM

## 2021-03-29 PROCEDURE — 83605 ASSAY OF LACTIC ACID: CPT | Performed by: EMERGENCY MEDICINE

## 2021-03-29 RX ORDER — ONDANSETRON 2 MG/ML
4 INJECTION INTRAMUSCULAR; INTRAVENOUS
Status: COMPLETED | OUTPATIENT
Start: 2021-03-29 | End: 2021-03-29

## 2021-03-29 RX ORDER — FUROSEMIDE 40 MG/1
40 TABLET ORAL DAILY
COMMUNITY

## 2021-03-29 RX ORDER — SODIUM CHLORIDE 9 MG/ML
INJECTION, SOLUTION INTRAVENOUS
Status: COMPLETED | OUTPATIENT
Start: 2021-03-29 | End: 2021-03-29

## 2021-03-29 RX ADMIN — ONDANSETRON 4 MG: 2 INJECTION INTRAMUSCULAR; INTRAVENOUS at 11:03

## 2021-03-29 RX ADMIN — CEFTRIAXONE 1 G: 1 INJECTION, SOLUTION INTRAVENOUS at 11:03

## 2021-03-29 RX ADMIN — SODIUM CHLORIDE: 0.9 INJECTION, SOLUTION INTRAVENOUS at 11:03

## 2021-03-30 PROBLEM — J96.01 ACUTE RESPIRATORY FAILURE WITH HYPOXIA: Status: ACTIVE | Noted: 2021-03-30

## 2021-03-30 PROBLEM — T17.908A ASPIRATION INTO AIRWAY: Status: ACTIVE | Noted: 2021-03-30

## 2021-03-30 LAB
ALBUMIN SERPL BCP-MCNC: 2.9 G/DL (ref 3.5–5.2)
ALP SERPL-CCNC: 59 U/L (ref 55–135)
ALT SERPL W/O P-5'-P-CCNC: 19 U/L (ref 10–44)
ANION GAP SERPL CALC-SCNC: 8 MMOL/L (ref 8–16)
AST SERPL-CCNC: 22 U/L (ref 10–40)
BASOPHILS # BLD AUTO: 0.03 K/UL (ref 0–0.2)
BASOPHILS NFR BLD: 0.3 % (ref 0–1.9)
BILIRUB SERPL-MCNC: 1.2 MG/DL (ref 0.1–1)
BNP SERPL-MCNC: 337 PG/ML (ref 0–99)
BUN SERPL-MCNC: 41 MG/DL (ref 10–30)
CALCIUM SERPL-MCNC: 8.1 MG/DL (ref 8.7–10.5)
CHLORIDE SERPL-SCNC: 103 MMOL/L (ref 95–110)
CO2 SERPL-SCNC: 26 MMOL/L (ref 23–29)
CREAT SERPL-MCNC: 1.1 MG/DL (ref 0.5–1.4)
DIFFERENTIAL METHOD: ABNORMAL
EOSINOPHIL # BLD AUTO: 0 K/UL (ref 0–0.5)
EOSINOPHIL NFR BLD: 0 % (ref 0–8)
ERYTHROCYTE [DISTWIDTH] IN BLOOD BY AUTOMATED COUNT: 16.2 % (ref 11.5–14.5)
EST. GFR  (AFRICAN AMERICAN): >60 ML/MIN/1.73 M^2
EST. GFR  (NON AFRICAN AMERICAN): 55.2 ML/MIN/1.73 M^2
GLUCOSE SERPL-MCNC: 154 MG/DL (ref 70–110)
GLUCOSE SERPL-MCNC: 168 MG/DL (ref 70–110)
GLUCOSE SERPL-MCNC: 168 MG/DL (ref 70–110)
HCT VFR BLD AUTO: 38.3 % (ref 40–54)
HGB BLD-MCNC: 12.6 G/DL (ref 14–18)
IMM GRANULOCYTES # BLD AUTO: 0.05 K/UL (ref 0–0.04)
IMM GRANULOCYTES NFR BLD AUTO: 0.5 % (ref 0–0.5)
INFLUENZA A, MOLECULAR: NEGATIVE
INFLUENZA B, MOLECULAR: NEGATIVE
LACTATE SERPL-SCNC: 2.1 MMOL/L (ref 0.5–1.9)
LACTATE SERPL-SCNC: 2.5 MMOL/L (ref 0.5–1.9)
LYMPHOCYTES # BLD AUTO: 1.9 K/UL (ref 1–4.8)
LYMPHOCYTES NFR BLD: 17.7 % (ref 18–48)
MAGNESIUM SERPL-MCNC: 1.8 MG/DL (ref 1.6–2.6)
MAGNESIUM SERPL-MCNC: 1.9 MG/DL (ref 1.6–2.6)
MCH RBC QN AUTO: 32.1 PG (ref 27–31)
MCHC RBC AUTO-ENTMCNC: 32.9 G/DL (ref 32–36)
MCV RBC AUTO: 98 FL (ref 82–98)
MONOCYTES # BLD AUTO: 0.5 K/UL (ref 0.3–1)
MONOCYTES NFR BLD: 5 % (ref 4–15)
NEUTROPHILS # BLD AUTO: 8.1 K/UL (ref 1.8–7.7)
NEUTROPHILS NFR BLD: 76.5 % (ref 38–73)
NRBC BLD-RTO: 0 /100 WBC
PHOSPHATE SERPL-MCNC: 3.5 MG/DL (ref 2.7–4.5)
PLATELET # BLD AUTO: 164 K/UL (ref 150–450)
PMV BLD AUTO: 10 FL (ref 9.2–12.9)
POTASSIUM SERPL-SCNC: 3.9 MMOL/L (ref 3.5–5.1)
PROT SERPL-MCNC: 6.9 G/DL (ref 6–8.4)
RBC # BLD AUTO: 3.93 M/UL (ref 4.6–6.2)
SARS-COV-2 RDRP RESP QL NAA+PROBE: NEGATIVE
SODIUM SERPL-SCNC: 137 MMOL/L (ref 136–145)
SPECIMEN SOURCE: NORMAL
WBC # BLD AUTO: 10.63 K/UL (ref 3.9–12.7)

## 2021-03-30 PROCEDURE — 63600175 PHARM REV CODE 636 W HCPCS: Performed by: INTERNAL MEDICINE

## 2021-03-30 PROCEDURE — 92526 ORAL FUNCTION THERAPY: CPT

## 2021-03-30 PROCEDURE — 94761 N-INVAS EAR/PLS OXIMETRY MLT: CPT

## 2021-03-30 PROCEDURE — 84100 ASSAY OF PHOSPHORUS: CPT | Performed by: INTERNAL MEDICINE

## 2021-03-30 PROCEDURE — 25000003 PHARM REV CODE 250: Performed by: INTERNAL MEDICINE

## 2021-03-30 PROCEDURE — 99900035 HC TECH TIME PER 15 MIN (STAT)

## 2021-03-30 PROCEDURE — 99900031 HC PATIENT EDUCATION (STAT)

## 2021-03-30 PROCEDURE — 20000000 HC ICU ROOM

## 2021-03-30 PROCEDURE — 83735 ASSAY OF MAGNESIUM: CPT | Mod: 91 | Performed by: INTERNAL MEDICINE

## 2021-03-30 PROCEDURE — 27000221 HC OXYGEN, UP TO 24 HOURS

## 2021-03-30 PROCEDURE — 92610 EVALUATE SWALLOWING FUNCTION: CPT

## 2021-03-30 PROCEDURE — 25000003 PHARM REV CODE 250: Performed by: STUDENT IN AN ORGANIZED HEALTH CARE EDUCATION/TRAINING PROGRAM

## 2021-03-30 PROCEDURE — 80053 COMPREHEN METABOLIC PANEL: CPT | Performed by: INTERNAL MEDICINE

## 2021-03-30 PROCEDURE — 85025 COMPLETE CBC W/AUTO DIFF WBC: CPT | Performed by: INTERNAL MEDICINE

## 2021-03-30 PROCEDURE — 25000242 PHARM REV CODE 250 ALT 637 W/ HCPCS: Performed by: INTERNAL MEDICINE

## 2021-03-30 PROCEDURE — 83605 ASSAY OF LACTIC ACID: CPT | Mod: 91 | Performed by: INTERNAL MEDICINE

## 2021-03-30 PROCEDURE — 83605 ASSAY OF LACTIC ACID: CPT | Performed by: EMERGENCY MEDICINE

## 2021-03-30 PROCEDURE — 25000003 PHARM REV CODE 250

## 2021-03-30 PROCEDURE — 63600175 PHARM REV CODE 636 W HCPCS: Performed by: STUDENT IN AN ORGANIZED HEALTH CARE EDUCATION/TRAINING PROGRAM

## 2021-03-30 PROCEDURE — 96375 TX/PRO/DX INJ NEW DRUG ADDON: CPT

## 2021-03-30 PROCEDURE — 36415 COLL VENOUS BLD VENIPUNCTURE: CPT | Performed by: INTERNAL MEDICINE

## 2021-03-30 PROCEDURE — 94640 AIRWAY INHALATION TREATMENT: CPT

## 2021-03-30 RX ORDER — ONDANSETRON 4 MG/1
8 TABLET, ORALLY DISINTEGRATING ORAL EVERY 8 HOURS PRN
Status: DISCONTINUED | OUTPATIENT
Start: 2021-03-30 | End: 2021-04-03 | Stop reason: HOSPADM

## 2021-03-30 RX ORDER — IBUPROFEN 200 MG
16 TABLET ORAL
Status: DISCONTINUED | OUTPATIENT
Start: 2021-03-30 | End: 2021-04-03 | Stop reason: HOSPADM

## 2021-03-30 RX ORDER — MUPIROCIN 20 MG/G
OINTMENT TOPICAL 2 TIMES DAILY
Status: DISCONTINUED | OUTPATIENT
Start: 2021-03-30 | End: 2021-04-03 | Stop reason: HOSPADM

## 2021-03-30 RX ORDER — TALC
9 POWDER (GRAM) TOPICAL NIGHTLY PRN
Status: DISCONTINUED | OUTPATIENT
Start: 2021-03-30 | End: 2021-04-03 | Stop reason: HOSPADM

## 2021-03-30 RX ORDER — FINASTERIDE 5 MG/1
5 TABLET, FILM COATED ORAL DAILY
Status: DISCONTINUED | OUTPATIENT
Start: 2021-03-30 | End: 2021-04-03 | Stop reason: HOSPADM

## 2021-03-30 RX ORDER — GLUCAGON 1 MG
1 KIT INJECTION
Status: DISCONTINUED | OUTPATIENT
Start: 2021-03-30 | End: 2021-04-03 | Stop reason: HOSPADM

## 2021-03-30 RX ORDER — ENOXAPARIN SODIUM 100 MG/ML
30 INJECTION SUBCUTANEOUS EVERY 24 HOURS
Status: DISCONTINUED | OUTPATIENT
Start: 2021-03-30 | End: 2021-04-02

## 2021-03-30 RX ORDER — IPRATROPIUM BROMIDE AND ALBUTEROL SULFATE 2.5; .5 MG/3ML; MG/3ML
3 SOLUTION RESPIRATORY (INHALATION) EVERY 4 HOURS
Status: DISCONTINUED | OUTPATIENT
Start: 2021-03-30 | End: 2021-03-30

## 2021-03-30 RX ORDER — PANTOPRAZOLE SODIUM 40 MG/1
40 TABLET, DELAYED RELEASE ORAL DAILY
Status: DISCONTINUED | OUTPATIENT
Start: 2021-03-30 | End: 2021-04-03 | Stop reason: HOSPADM

## 2021-03-30 RX ORDER — LOSARTAN POTASSIUM 25 MG/1
25 TABLET ORAL DAILY
Status: DISCONTINUED | OUTPATIENT
Start: 2021-03-30 | End: 2021-04-03 | Stop reason: HOSPADM

## 2021-03-30 RX ORDER — ATORVASTATIN CALCIUM 10 MG/1
10 TABLET, FILM COATED ORAL NIGHTLY
Status: DISCONTINUED | OUTPATIENT
Start: 2021-03-30 | End: 2021-04-03 | Stop reason: HOSPADM

## 2021-03-30 RX ORDER — ISOSORBIDE MONONITRATE 60 MG/1
60 TABLET, EXTENDED RELEASE ORAL DAILY
Status: DISCONTINUED | OUTPATIENT
Start: 2021-03-30 | End: 2021-04-03 | Stop reason: HOSPADM

## 2021-03-30 RX ORDER — HYDROCODONE BITARTRATE AND ACETAMINOPHEN 5; 325 MG/1; MG/1
1 TABLET ORAL EVERY 6 HOURS PRN
Status: DISCONTINUED | OUTPATIENT
Start: 2021-03-30 | End: 2021-04-03 | Stop reason: HOSPADM

## 2021-03-30 RX ORDER — IPRATROPIUM BROMIDE AND ALBUTEROL SULFATE 2.5; .5 MG/3ML; MG/3ML
3 SOLUTION RESPIRATORY (INHALATION) EVERY 4 HOURS PRN
Status: DISCONTINUED | OUTPATIENT
Start: 2021-03-30 | End: 2021-04-03 | Stop reason: HOSPADM

## 2021-03-30 RX ORDER — ACETAMINOPHEN 325 MG/1
650 TABLET ORAL EVERY 4 HOURS PRN
Status: DISCONTINUED | OUTPATIENT
Start: 2021-03-30 | End: 2021-04-03 | Stop reason: HOSPADM

## 2021-03-30 RX ORDER — SOTALOL HYDROCHLORIDE 80 MG/1
80 TABLET ORAL 2 TIMES DAILY
Status: DISCONTINUED | OUTPATIENT
Start: 2021-03-30 | End: 2021-04-03 | Stop reason: HOSPADM

## 2021-03-30 RX ORDER — TAMSULOSIN HYDROCHLORIDE 0.4 MG/1
0.4 CAPSULE ORAL DAILY
Status: DISCONTINUED | OUTPATIENT
Start: 2021-03-30 | End: 2021-04-03 | Stop reason: HOSPADM

## 2021-03-30 RX ORDER — FUROSEMIDE 40 MG/1
40 TABLET ORAL DAILY
Status: DISCONTINUED | OUTPATIENT
Start: 2021-03-30 | End: 2021-04-03 | Stop reason: HOSPADM

## 2021-03-30 RX ORDER — IBUPROFEN 200 MG
24 TABLET ORAL
Status: DISCONTINUED | OUTPATIENT
Start: 2021-03-30 | End: 2021-04-03 | Stop reason: HOSPADM

## 2021-03-30 RX ORDER — SODIUM CHLORIDE 0.9 % (FLUSH) 0.9 %
10 SYRINGE (ML) INJECTION EVERY 6 HOURS PRN
Status: DISCONTINUED | OUTPATIENT
Start: 2021-03-30 | End: 2021-04-03 | Stop reason: HOSPADM

## 2021-03-30 RX ORDER — CHLORHEXIDINE GLUCONATE ORAL RINSE 1.2 MG/ML
15 SOLUTION DENTAL 2 TIMES DAILY
Status: DISCONTINUED | OUTPATIENT
Start: 2021-03-30 | End: 2021-04-03 | Stop reason: HOSPADM

## 2021-03-30 RX ORDER — ASPIRIN 81 MG/1
81 TABLET ORAL DAILY
Status: DISCONTINUED | OUTPATIENT
Start: 2021-03-30 | End: 2021-04-03 | Stop reason: HOSPADM

## 2021-03-30 RX ADMIN — ENOXAPARIN SODIUM 30 MG: 30 INJECTION SUBCUTANEOUS at 06:03

## 2021-03-30 RX ADMIN — MUPIROCIN: 20 OINTMENT TOPICAL at 08:03

## 2021-03-30 RX ADMIN — LOSARTAN POTASSIUM 25 MG: 25 TABLET, FILM COATED ORAL at 08:03

## 2021-03-30 RX ADMIN — SOTALOL HYDROCHLORIDE 80 MG: 80 TABLET ORAL at 09:03

## 2021-03-30 RX ADMIN — ASPIRIN 81 MG: 81 TABLET, DELAYED RELEASE ORAL at 08:03

## 2021-03-30 RX ADMIN — AZITHROMYCIN MONOHYDRATE 500 MG: 500 INJECTION, POWDER, LYOPHILIZED, FOR SOLUTION INTRAVENOUS at 09:03

## 2021-03-30 RX ADMIN — HYDROCODONE BITARTRATE AND ACETAMINOPHEN 1 TABLET: 5; 325 TABLET ORAL at 07:03

## 2021-03-30 RX ADMIN — CHLORHEXIDINE GLUCONATE 15 ML: 1.2 RINSE ORAL at 08:03

## 2021-03-30 RX ADMIN — CHLORHEXIDINE GLUCONATE 15 ML: 1.2 RINSE ORAL at 09:03

## 2021-03-30 RX ADMIN — ISOSORBIDE MONONITRATE 60 MG: 60 TABLET, EXTENDED RELEASE ORAL at 08:03

## 2021-03-30 RX ADMIN — PANTOPRAZOLE SODIUM 40 MG: 40 TABLET, DELAYED RELEASE ORAL at 08:03

## 2021-03-30 RX ADMIN — MUPIROCIN: 20 OINTMENT TOPICAL at 09:03

## 2021-03-30 RX ADMIN — CEFTRIAXONE SODIUM 1 G: 1 INJECTION, POWDER, FOR SOLUTION INTRAMUSCULAR; INTRAVENOUS at 09:03

## 2021-03-30 RX ADMIN — AZITHROMYCIN MONOHYDRATE 500 MG: 500 INJECTION, POWDER, LYOPHILIZED, FOR SOLUTION INTRAVENOUS at 12:03

## 2021-03-30 RX ADMIN — IPRATROPIUM BROMIDE AND ALBUTEROL SULFATE 3 ML: .5; 3 SOLUTION RESPIRATORY (INHALATION) at 03:03

## 2021-03-30 RX ADMIN — FUROSEMIDE 40 MG: 40 TABLET ORAL at 08:03

## 2021-03-30 RX ADMIN — TAMSULOSIN HYDROCHLORIDE 0.4 MG: 0.4 CAPSULE ORAL at 08:03

## 2021-03-30 RX ADMIN — IPRATROPIUM BROMIDE AND ALBUTEROL SULFATE 3 ML: .5; 3 SOLUTION RESPIRATORY (INHALATION) at 07:03

## 2021-03-30 RX ADMIN — ATORVASTATIN CALCIUM 10 MG: 10 TABLET, FILM COATED ORAL at 09:03

## 2021-03-30 RX ADMIN — FINASTERIDE 5 MG: 5 TABLET, FILM COATED ORAL at 08:03

## 2021-03-31 LAB
ALBUMIN SERPL BCP-MCNC: 2.5 G/DL (ref 3.5–5.2)
ALP SERPL-CCNC: 56 U/L (ref 55–135)
ALT SERPL W/O P-5'-P-CCNC: 16 U/L (ref 10–44)
ANION GAP SERPL CALC-SCNC: 9 MMOL/L (ref 8–16)
ANISOCYTOSIS BLD QL SMEAR: SLIGHT
AST SERPL-CCNC: 20 U/L (ref 10–40)
BASOPHILS NFR BLD: 0 % (ref 0–1.9)
BILIRUB SERPL-MCNC: 0.8 MG/DL (ref 0.1–1)
BUN SERPL-MCNC: 50 MG/DL (ref 10–30)
CALCIUM SERPL-MCNC: 8.2 MG/DL (ref 8.7–10.5)
CHLORIDE SERPL-SCNC: 100 MMOL/L (ref 95–110)
CO2 SERPL-SCNC: 27 MMOL/L (ref 23–29)
CREAT SERPL-MCNC: 1.2 MG/DL (ref 0.5–1.4)
DIFFERENTIAL METHOD: ABNORMAL
EOSINOPHIL NFR BLD: 2 % (ref 0–8)
ERYTHROCYTE [DISTWIDTH] IN BLOOD BY AUTOMATED COUNT: 16.6 % (ref 11.5–14.5)
EST. GFR  (AFRICAN AMERICAN): 57.4 ML/MIN/1.73 M^2
EST. GFR  (NON AFRICAN AMERICAN): 49.7 ML/MIN/1.73 M^2
GLUCOSE SERPL-MCNC: 103 MG/DL (ref 70–110)
GLUCOSE SERPL-MCNC: 131 MG/DL (ref 70–110)
GLUCOSE SERPL-MCNC: 73 MG/DL (ref 70–110)
HCT VFR BLD AUTO: 33.2 % (ref 40–54)
HGB BLD-MCNC: 11.1 G/DL (ref 14–18)
IMM GRANULOCYTES # BLD AUTO: ABNORMAL K/UL (ref 0–0.04)
IMM GRANULOCYTES NFR BLD AUTO: ABNORMAL % (ref 0–0.5)
LYMPHOCYTES NFR BLD: 15 % (ref 18–48)
MAGNESIUM SERPL-MCNC: 2 MG/DL (ref 1.6–2.6)
MCH RBC QN AUTO: 32.6 PG (ref 27–31)
MCHC RBC AUTO-ENTMCNC: 33.4 G/DL (ref 32–36)
MCV RBC AUTO: 98 FL (ref 82–98)
MONOCYTES NFR BLD: 1 % (ref 4–15)
NEUTROPHILS NFR BLD: 65 % (ref 38–73)
NEUTS BAND NFR BLD MANUAL: 17 %
NRBC BLD-RTO: 0 /100 WBC
PLATELET # BLD AUTO: 133 K/UL (ref 150–450)
PMV BLD AUTO: 10.3 FL (ref 9.2–12.9)
POTASSIUM SERPL-SCNC: 3.7 MMOL/L (ref 3.5–5.1)
PROT SERPL-MCNC: 6.2 G/DL (ref 6–8.4)
RBC # BLD AUTO: 3.4 M/UL (ref 4.6–6.2)
SODIUM SERPL-SCNC: 136 MMOL/L (ref 136–145)
WBC # BLD AUTO: 10.74 K/UL (ref 3.9–12.7)

## 2021-03-31 PROCEDURE — 25000003 PHARM REV CODE 250: Performed by: INTERNAL MEDICINE

## 2021-03-31 PROCEDURE — 94799 UNLISTED PULMONARY SVC/PX: CPT

## 2021-03-31 PROCEDURE — 94761 N-INVAS EAR/PLS OXIMETRY MLT: CPT

## 2021-03-31 PROCEDURE — 85007 BL SMEAR W/DIFF WBC COUNT: CPT | Performed by: INTERNAL MEDICINE

## 2021-03-31 PROCEDURE — 83735 ASSAY OF MAGNESIUM: CPT | Performed by: INTERNAL MEDICINE

## 2021-03-31 PROCEDURE — 80053 COMPREHEN METABOLIC PANEL: CPT | Performed by: INTERNAL MEDICINE

## 2021-03-31 PROCEDURE — 85027 COMPLETE CBC AUTOMATED: CPT | Performed by: INTERNAL MEDICINE

## 2021-03-31 PROCEDURE — 27000221 HC OXYGEN, UP TO 24 HOURS

## 2021-03-31 PROCEDURE — 99900031 HC PATIENT EDUCATION (STAT)

## 2021-03-31 PROCEDURE — 99900035 HC TECH TIME PER 15 MIN (STAT)

## 2021-03-31 PROCEDURE — 97530 THERAPEUTIC ACTIVITIES: CPT

## 2021-03-31 PROCEDURE — 63600175 PHARM REV CODE 636 W HCPCS: Performed by: INTERNAL MEDICINE

## 2021-03-31 PROCEDURE — 36415 COLL VENOUS BLD VENIPUNCTURE: CPT | Performed by: INTERNAL MEDICINE

## 2021-03-31 PROCEDURE — 97162 PT EVAL MOD COMPLEX 30 MIN: CPT

## 2021-03-31 PROCEDURE — 20000000 HC ICU ROOM

## 2021-03-31 PROCEDURE — 92526 ORAL FUNCTION THERAPY: CPT

## 2021-03-31 PROCEDURE — 25000003 PHARM REV CODE 250

## 2021-03-31 RX ORDER — LOPERAMIDE HYDROCHLORIDE 2 MG/1
2 CAPSULE ORAL 4 TIMES DAILY PRN
Status: DISCONTINUED | OUTPATIENT
Start: 2021-03-31 | End: 2021-04-03 | Stop reason: HOSPADM

## 2021-03-31 RX ADMIN — MUPIROCIN: 20 OINTMENT TOPICAL at 09:03

## 2021-03-31 RX ADMIN — LOSARTAN POTASSIUM 25 MG: 25 TABLET, FILM COATED ORAL at 09:03

## 2021-03-31 RX ADMIN — FINASTERIDE 5 MG: 5 TABLET, FILM COATED ORAL at 09:03

## 2021-03-31 RX ADMIN — ATORVASTATIN CALCIUM 10 MG: 10 TABLET, FILM COATED ORAL at 09:03

## 2021-03-31 RX ADMIN — ASPIRIN 81 MG: 81 TABLET, DELAYED RELEASE ORAL at 09:03

## 2021-03-31 RX ADMIN — SOTALOL HYDROCHLORIDE 80 MG: 80 TABLET ORAL at 09:03

## 2021-03-31 RX ADMIN — ENOXAPARIN SODIUM 30 MG: 30 INJECTION SUBCUTANEOUS at 06:03

## 2021-03-31 RX ADMIN — CHLORHEXIDINE GLUCONATE 15 ML: 1.2 RINSE ORAL at 09:03

## 2021-03-31 RX ADMIN — FUROSEMIDE 40 MG: 40 TABLET ORAL at 09:03

## 2021-03-31 RX ADMIN — PANTOPRAZOLE SODIUM 40 MG: 40 TABLET, DELAYED RELEASE ORAL at 09:03

## 2021-03-31 RX ADMIN — CEFTRIAXONE SODIUM 1 G: 1 INJECTION, POWDER, FOR SOLUTION INTRAMUSCULAR; INTRAVENOUS at 09:03

## 2021-03-31 RX ADMIN — ISOSORBIDE MONONITRATE 60 MG: 60 TABLET, EXTENDED RELEASE ORAL at 09:03

## 2021-03-31 RX ADMIN — TAMSULOSIN HYDROCHLORIDE 0.4 MG: 0.4 CAPSULE ORAL at 09:03

## 2021-03-31 RX ADMIN — AZITHROMYCIN MONOHYDRATE 500 MG: 500 INJECTION, POWDER, LYOPHILIZED, FOR SOLUTION INTRAVENOUS at 10:03

## 2021-04-01 LAB
ANION GAP SERPL CALC-SCNC: 11 MMOL/L (ref 8–16)
BASOPHILS # BLD AUTO: 0.03 K/UL (ref 0–0.2)
BASOPHILS NFR BLD: 0.3 % (ref 0–1.9)
BUN SERPL-MCNC: 39 MG/DL (ref 10–30)
CALCIUM SERPL-MCNC: 8.2 MG/DL (ref 8.7–10.5)
CHLORIDE SERPL-SCNC: 102 MMOL/L (ref 95–110)
CO2 SERPL-SCNC: 27 MMOL/L (ref 23–29)
CREAT SERPL-MCNC: 0.8 MG/DL (ref 0.5–1.4)
DIFFERENTIAL METHOD: ABNORMAL
EOSINOPHIL # BLD AUTO: 0.2 K/UL (ref 0–0.5)
EOSINOPHIL NFR BLD: 2.5 % (ref 0–8)
ERYTHROCYTE [DISTWIDTH] IN BLOOD BY AUTOMATED COUNT: 16.3 % (ref 11.5–14.5)
EST. GFR  (AFRICAN AMERICAN): >60 ML/MIN/1.73 M^2
EST. GFR  (NON AFRICAN AMERICAN): >60 ML/MIN/1.73 M^2
GLUCOSE SERPL-MCNC: 128 MG/DL (ref 70–110)
HCT VFR BLD AUTO: 33.7 % (ref 40–54)
HGB BLD-MCNC: 11.1 G/DL (ref 14–18)
IMM GRANULOCYTES # BLD AUTO: 0.09 K/UL (ref 0–0.04)
IMM GRANULOCYTES NFR BLD AUTO: 1 % (ref 0–0.5)
LYMPHOCYTES # BLD AUTO: 2.1 K/UL (ref 1–4.8)
LYMPHOCYTES NFR BLD: 22.9 % (ref 18–48)
MAGNESIUM SERPL-MCNC: 1.9 MG/DL (ref 1.6–2.6)
MCH RBC QN AUTO: 31.8 PG (ref 27–31)
MCHC RBC AUTO-ENTMCNC: 32.9 G/DL (ref 32–36)
MCV RBC AUTO: 97 FL (ref 82–98)
MONOCYTES # BLD AUTO: 0.6 K/UL (ref 0.3–1)
MONOCYTES NFR BLD: 6.6 % (ref 4–15)
NEUTROPHILS # BLD AUTO: 6.1 K/UL (ref 1.8–7.7)
NEUTROPHILS NFR BLD: 66.7 % (ref 38–73)
NRBC BLD-RTO: 0 /100 WBC
PLATELET # BLD AUTO: 150 K/UL (ref 150–450)
PMV BLD AUTO: 10.2 FL (ref 9.2–12.9)
POTASSIUM SERPL-SCNC: 3.2 MMOL/L (ref 3.5–5.1)
RBC # BLD AUTO: 3.49 M/UL (ref 4.6–6.2)
SODIUM SERPL-SCNC: 140 MMOL/L (ref 136–145)
WBC # BLD AUTO: 9.13 K/UL (ref 3.9–12.7)

## 2021-04-01 PROCEDURE — 94761 N-INVAS EAR/PLS OXIMETRY MLT: CPT

## 2021-04-01 PROCEDURE — 99900035 HC TECH TIME PER 15 MIN (STAT)

## 2021-04-01 PROCEDURE — 36415 COLL VENOUS BLD VENIPUNCTURE: CPT | Performed by: INTERNAL MEDICINE

## 2021-04-01 PROCEDURE — 25000003 PHARM REV CODE 250

## 2021-04-01 PROCEDURE — 97165 OT EVAL LOW COMPLEX 30 MIN: CPT

## 2021-04-01 PROCEDURE — 97116 GAIT TRAINING THERAPY: CPT | Mod: CQ

## 2021-04-01 PROCEDURE — 27000221 HC OXYGEN, UP TO 24 HOURS

## 2021-04-01 PROCEDURE — 97530 THERAPEUTIC ACTIVITIES: CPT | Mod: CQ

## 2021-04-01 PROCEDURE — 25000003 PHARM REV CODE 250: Performed by: INTERNAL MEDICINE

## 2021-04-01 PROCEDURE — 92526 ORAL FUNCTION THERAPY: CPT

## 2021-04-01 PROCEDURE — 94618 PULMONARY STRESS TESTING: CPT

## 2021-04-01 PROCEDURE — 94640 AIRWAY INHALATION TREATMENT: CPT

## 2021-04-01 PROCEDURE — 25000242 PHARM REV CODE 250 ALT 637 W/ HCPCS: Performed by: INTERNAL MEDICINE

## 2021-04-01 PROCEDURE — 85025 COMPLETE CBC W/AUTO DIFF WBC: CPT | Performed by: INTERNAL MEDICINE

## 2021-04-01 PROCEDURE — 83735 ASSAY OF MAGNESIUM: CPT | Performed by: INTERNAL MEDICINE

## 2021-04-01 PROCEDURE — 20000000 HC ICU ROOM

## 2021-04-01 PROCEDURE — 63600175 PHARM REV CODE 636 W HCPCS: Performed by: INTERNAL MEDICINE

## 2021-04-01 PROCEDURE — 80048 BASIC METABOLIC PNL TOTAL CA: CPT | Performed by: INTERNAL MEDICINE

## 2021-04-01 PROCEDURE — 97535 SELF CARE MNGMENT TRAINING: CPT

## 2021-04-01 RX ORDER — MAGNESIUM SULFATE HEPTAHYDRATE 40 MG/ML
4 INJECTION, SOLUTION INTRAVENOUS
Status: DISCONTINUED | OUTPATIENT
Start: 2021-04-01 | End: 2021-04-03 | Stop reason: HOSPADM

## 2021-04-01 RX ORDER — POTASSIUM CHLORIDE 7.45 MG/ML
20 INJECTION INTRAVENOUS
Status: DISCONTINUED | OUTPATIENT
Start: 2021-04-01 | End: 2021-04-03 | Stop reason: HOSPADM

## 2021-04-01 RX ORDER — MAGNESIUM SULFATE 1 G/100ML
1 INJECTION INTRAVENOUS
Status: DISCONTINUED | OUTPATIENT
Start: 2021-04-01 | End: 2021-04-03 | Stop reason: HOSPADM

## 2021-04-01 RX ORDER — POTASSIUM CHLORIDE 20 MEQ/1
40 TABLET, EXTENDED RELEASE ORAL
Status: DISCONTINUED | OUTPATIENT
Start: 2021-04-01 | End: 2021-04-03 | Stop reason: HOSPADM

## 2021-04-01 RX ORDER — MAGNESIUM SULFATE HEPTAHYDRATE 40 MG/ML
2 INJECTION, SOLUTION INTRAVENOUS
Status: DISCONTINUED | OUTPATIENT
Start: 2021-04-01 | End: 2021-04-03 | Stop reason: HOSPADM

## 2021-04-01 RX ORDER — POTASSIUM CHLORIDE 7.45 MG/ML
40 INJECTION INTRAVENOUS
Status: DISCONTINUED | OUTPATIENT
Start: 2021-04-01 | End: 2021-04-03 | Stop reason: HOSPADM

## 2021-04-01 RX ORDER — POTASSIUM CHLORIDE 20 MEQ/1
20 TABLET, EXTENDED RELEASE ORAL
Status: DISCONTINUED | OUTPATIENT
Start: 2021-04-01 | End: 2021-04-03 | Stop reason: HOSPADM

## 2021-04-01 RX ORDER — LANOLIN ALCOHOL/MO/W.PET/CERES
800 CREAM (GRAM) TOPICAL
Status: DISCONTINUED | OUTPATIENT
Start: 2021-04-01 | End: 2021-04-03 | Stop reason: HOSPADM

## 2021-04-01 RX ORDER — LABETALOL HYDROCHLORIDE 5 MG/ML
10 INJECTION, SOLUTION INTRAVENOUS EVERY 6 HOURS PRN
Status: DISCONTINUED | OUTPATIENT
Start: 2021-04-01 | End: 2021-04-03 | Stop reason: HOSPADM

## 2021-04-01 RX ORDER — AMLODIPINE BESYLATE 5 MG/1
5 TABLET ORAL DAILY
Status: DISCONTINUED | OUTPATIENT
Start: 2021-04-01 | End: 2021-04-03 | Stop reason: HOSPADM

## 2021-04-01 RX ADMIN — LOPERAMIDE HYDROCHLORIDE 2 MG: 2 CAPSULE ORAL at 03:04

## 2021-04-01 RX ADMIN — AMLODIPINE BESYLATE 5 MG: 5 TABLET ORAL at 10:04

## 2021-04-01 RX ADMIN — LOSARTAN POTASSIUM 25 MG: 25 TABLET, FILM COATED ORAL at 10:04

## 2021-04-01 RX ADMIN — LABETALOL HYDROCHLORIDE 10 MG: 5 INJECTION, SOLUTION INTRAVENOUS at 06:04

## 2021-04-01 RX ADMIN — CHLORHEXIDINE GLUCONATE 15 ML: 1.2 RINSE ORAL at 08:04

## 2021-04-01 RX ADMIN — FUROSEMIDE 40 MG: 40 TABLET ORAL at 10:04

## 2021-04-01 RX ADMIN — FINASTERIDE 5 MG: 5 TABLET, FILM COATED ORAL at 10:04

## 2021-04-01 RX ADMIN — CEFTRIAXONE SODIUM 1 G: 1 INJECTION, POWDER, FOR SOLUTION INTRAMUSCULAR; INTRAVENOUS at 08:04

## 2021-04-01 RX ADMIN — MUPIROCIN: 20 OINTMENT TOPICAL at 10:04

## 2021-04-01 RX ADMIN — ENOXAPARIN SODIUM 30 MG: 30 INJECTION SUBCUTANEOUS at 06:04

## 2021-04-01 RX ADMIN — TAMSULOSIN HYDROCHLORIDE 0.4 MG: 0.4 CAPSULE ORAL at 10:04

## 2021-04-01 RX ADMIN — ATORVASTATIN CALCIUM 10 MG: 10 TABLET, FILM COATED ORAL at 08:04

## 2021-04-01 RX ADMIN — ASPIRIN 81 MG: 81 TABLET, DELAYED RELEASE ORAL at 10:04

## 2021-04-01 RX ADMIN — MUPIROCIN: 20 OINTMENT TOPICAL at 08:04

## 2021-04-01 RX ADMIN — PANTOPRAZOLE SODIUM 40 MG: 40 TABLET, DELAYED RELEASE ORAL at 10:04

## 2021-04-01 RX ADMIN — SOTALOL HYDROCHLORIDE 80 MG: 80 TABLET ORAL at 08:04

## 2021-04-01 RX ADMIN — POTASSIUM BICARBONATE 40 MEQ: 391 TABLET, EFFERVESCENT ORAL at 06:04

## 2021-04-01 RX ADMIN — AZITHROMYCIN MONOHYDRATE 500 MG: 500 INJECTION, POWDER, LYOPHILIZED, FOR SOLUTION INTRAVENOUS at 09:04

## 2021-04-01 RX ADMIN — CHLORHEXIDINE GLUCONATE 15 ML: 1.2 RINSE ORAL at 10:04

## 2021-04-01 RX ADMIN — SOTALOL HYDROCHLORIDE 80 MG: 80 TABLET ORAL at 10:04

## 2021-04-01 RX ADMIN — ISOSORBIDE MONONITRATE 60 MG: 60 TABLET, EXTENDED RELEASE ORAL at 10:04

## 2021-04-01 RX ADMIN — IPRATROPIUM BROMIDE AND ALBUTEROL SULFATE 3 ML: .5; 3 SOLUTION RESPIRATORY (INHALATION) at 01:04

## 2021-04-02 LAB
ANION GAP SERPL CALC-SCNC: 10 MMOL/L (ref 8–16)
BASOPHILS # BLD AUTO: 0.03 K/UL (ref 0–0.2)
BASOPHILS NFR BLD: 0.3 % (ref 0–1.9)
BUN SERPL-MCNC: 30 MG/DL (ref 10–30)
CALCIUM SERPL-MCNC: 8 MG/DL (ref 8.7–10.5)
CHLORIDE SERPL-SCNC: 96 MMOL/L (ref 95–110)
CO2 SERPL-SCNC: 30 MMOL/L (ref 23–29)
CREAT SERPL-MCNC: 0.7 MG/DL (ref 0.5–1.4)
DIFFERENTIAL METHOD: ABNORMAL
EOSINOPHIL # BLD AUTO: 0.4 K/UL (ref 0–0.5)
EOSINOPHIL NFR BLD: 4.2 % (ref 0–8)
ERYTHROCYTE [DISTWIDTH] IN BLOOD BY AUTOMATED COUNT: 16 % (ref 11.5–14.5)
EST. GFR  (AFRICAN AMERICAN): >60 ML/MIN/1.73 M^2
EST. GFR  (NON AFRICAN AMERICAN): >60 ML/MIN/1.73 M^2
GLUCOSE SERPL-MCNC: 93 MG/DL (ref 70–110)
HCT VFR BLD AUTO: 32.7 % (ref 40–54)
HGB BLD-MCNC: 10.8 G/DL (ref 14–18)
IMM GRANULOCYTES # BLD AUTO: 0.03 K/UL (ref 0–0.04)
IMM GRANULOCYTES NFR BLD AUTO: 0.3 % (ref 0–0.5)
LYMPHOCYTES # BLD AUTO: 1.9 K/UL (ref 1–4.8)
LYMPHOCYTES NFR BLD: 22 % (ref 18–48)
MAGNESIUM SERPL-MCNC: 1.8 MG/DL (ref 1.6–2.6)
MCH RBC QN AUTO: 32 PG (ref 27–31)
MCHC RBC AUTO-ENTMCNC: 33 G/DL (ref 32–36)
MCV RBC AUTO: 97 FL (ref 82–98)
MONOCYTES # BLD AUTO: 0.8 K/UL (ref 0.3–1)
MONOCYTES NFR BLD: 8.5 % (ref 4–15)
NEUTROPHILS # BLD AUTO: 5.7 K/UL (ref 1.8–7.7)
NEUTROPHILS NFR BLD: 64.7 % (ref 38–73)
NRBC BLD-RTO: 0 /100 WBC
PLATELET # BLD AUTO: 158 K/UL (ref 150–450)
PMV BLD AUTO: 10.3 FL (ref 9.2–12.9)
POTASSIUM SERPL-SCNC: 3.3 MMOL/L (ref 3.5–5.1)
RBC # BLD AUTO: 3.37 M/UL (ref 4.6–6.2)
SODIUM SERPL-SCNC: 136 MMOL/L (ref 136–145)
WBC # BLD AUTO: 8.8 K/UL (ref 3.9–12.7)

## 2021-04-02 PROCEDURE — 80048 BASIC METABOLIC PNL TOTAL CA: CPT | Performed by: INTERNAL MEDICINE

## 2021-04-02 PROCEDURE — 85025 COMPLETE CBC W/AUTO DIFF WBC: CPT | Performed by: INTERNAL MEDICINE

## 2021-04-02 PROCEDURE — 25000003 PHARM REV CODE 250

## 2021-04-02 PROCEDURE — 63600175 PHARM REV CODE 636 W HCPCS: Performed by: INTERNAL MEDICINE

## 2021-04-02 PROCEDURE — 36415 COLL VENOUS BLD VENIPUNCTURE: CPT | Performed by: INTERNAL MEDICINE

## 2021-04-02 PROCEDURE — 25000003 PHARM REV CODE 250: Performed by: INTERNAL MEDICINE

## 2021-04-02 PROCEDURE — 97535 SELF CARE MNGMENT TRAINING: CPT

## 2021-04-02 PROCEDURE — 83735 ASSAY OF MAGNESIUM: CPT | Performed by: INTERNAL MEDICINE

## 2021-04-02 PROCEDURE — 99900031 HC PATIENT EDUCATION (STAT)

## 2021-04-02 PROCEDURE — 94761 N-INVAS EAR/PLS OXIMETRY MLT: CPT

## 2021-04-02 PROCEDURE — 27000221 HC OXYGEN, UP TO 24 HOURS

## 2021-04-02 PROCEDURE — 20000000 HC ICU ROOM

## 2021-04-02 PROCEDURE — 99900035 HC TECH TIME PER 15 MIN (STAT)

## 2021-04-02 RX ORDER — ENOXAPARIN SODIUM 100 MG/ML
40 INJECTION SUBCUTANEOUS EVERY 24 HOURS
Status: DISCONTINUED | OUTPATIENT
Start: 2021-04-02 | End: 2021-04-03 | Stop reason: HOSPADM

## 2021-04-02 RX ADMIN — AZITHROMYCIN MONOHYDRATE 500 MG: 500 INJECTION, POWDER, LYOPHILIZED, FOR SOLUTION INTRAVENOUS at 09:04

## 2021-04-02 RX ADMIN — ATORVASTATIN CALCIUM 10 MG: 10 TABLET, FILM COATED ORAL at 09:04

## 2021-04-02 RX ADMIN — ENOXAPARIN SODIUM 40 MG: 40 INJECTION SUBCUTANEOUS at 05:04

## 2021-04-02 RX ADMIN — AMLODIPINE BESYLATE 5 MG: 5 TABLET ORAL at 08:04

## 2021-04-02 RX ADMIN — CEFTRIAXONE SODIUM 1 G: 1 INJECTION, POWDER, FOR SOLUTION INTRAMUSCULAR; INTRAVENOUS at 09:04

## 2021-04-02 RX ADMIN — FINASTERIDE 5 MG: 5 TABLET, FILM COATED ORAL at 08:04

## 2021-04-02 RX ADMIN — MUPIROCIN: 20 OINTMENT TOPICAL at 09:04

## 2021-04-02 RX ADMIN — CHLORHEXIDINE GLUCONATE 15 ML: 1.2 RINSE ORAL at 08:04

## 2021-04-02 RX ADMIN — POTASSIUM CHLORIDE 40 MEQ: 20 TABLET, EXTENDED RELEASE ORAL at 05:04

## 2021-04-02 RX ADMIN — MUPIROCIN: 20 OINTMENT TOPICAL at 08:04

## 2021-04-02 RX ADMIN — SOTALOL HYDROCHLORIDE 80 MG: 80 TABLET ORAL at 08:04

## 2021-04-02 RX ADMIN — TAMSULOSIN HYDROCHLORIDE 0.4 MG: 0.4 CAPSULE ORAL at 08:04

## 2021-04-02 RX ADMIN — LOSARTAN POTASSIUM 25 MG: 25 TABLET, FILM COATED ORAL at 08:04

## 2021-04-02 RX ADMIN — CHLORHEXIDINE GLUCONATE 15 ML: 1.2 RINSE ORAL at 09:04

## 2021-04-02 RX ADMIN — ASPIRIN 81 MG: 81 TABLET, DELAYED RELEASE ORAL at 08:04

## 2021-04-02 RX ADMIN — MAGNESIUM OXIDE 800 MG: 400 TABLET ORAL at 05:04

## 2021-04-02 RX ADMIN — ISOSORBIDE MONONITRATE 60 MG: 60 TABLET, EXTENDED RELEASE ORAL at 08:04

## 2021-04-02 RX ADMIN — PANTOPRAZOLE SODIUM 40 MG: 40 TABLET, DELAYED RELEASE ORAL at 08:04

## 2021-04-02 RX ADMIN — FUROSEMIDE 40 MG: 40 TABLET ORAL at 08:04

## 2021-04-02 RX ADMIN — LABETALOL HYDROCHLORIDE 10 MG: 5 INJECTION, SOLUTION INTRAVENOUS at 12:04

## 2021-04-02 RX ADMIN — SOTALOL HYDROCHLORIDE 80 MG: 80 TABLET ORAL at 09:04

## 2021-04-03 VITALS
HEART RATE: 79 BPM | DIASTOLIC BLOOD PRESSURE: 74 MMHG | TEMPERATURE: 99 F | OXYGEN SATURATION: 98 % | WEIGHT: 127.19 LBS | BODY MASS INDEX: 18.84 KG/M2 | SYSTOLIC BLOOD PRESSURE: 153 MMHG | RESPIRATION RATE: 18 BRPM | HEIGHT: 69 IN

## 2021-04-03 LAB
ANION GAP SERPL CALC-SCNC: 10 MMOL/L (ref 8–16)
BASOPHILS # BLD AUTO: 0.03 K/UL (ref 0–0.2)
BASOPHILS NFR BLD: 0.4 % (ref 0–1.9)
BUN SERPL-MCNC: 31 MG/DL (ref 10–30)
CALCIUM SERPL-MCNC: 8.4 MG/DL (ref 8.7–10.5)
CHLORIDE SERPL-SCNC: 99 MMOL/L (ref 95–110)
CO2 SERPL-SCNC: 32 MMOL/L (ref 23–29)
CREAT SERPL-MCNC: 0.7 MG/DL (ref 0.5–1.4)
DIFFERENTIAL METHOD: ABNORMAL
EOSINOPHIL # BLD AUTO: 0.5 K/UL (ref 0–0.5)
EOSINOPHIL NFR BLD: 5.5 % (ref 0–8)
ERYTHROCYTE [DISTWIDTH] IN BLOOD BY AUTOMATED COUNT: 16 % (ref 11.5–14.5)
EST. GFR  (AFRICAN AMERICAN): >60 ML/MIN/1.73 M^2
EST. GFR  (NON AFRICAN AMERICAN): >60 ML/MIN/1.73 M^2
GLUCOSE SERPL-MCNC: 113 MG/DL (ref 70–110)
HCT VFR BLD AUTO: 33.3 % (ref 40–54)
HGB BLD-MCNC: 11 G/DL (ref 14–18)
IMM GRANULOCYTES # BLD AUTO: 0.03 K/UL (ref 0–0.04)
IMM GRANULOCYTES NFR BLD AUTO: 0.4 % (ref 0–0.5)
LYMPHOCYTES # BLD AUTO: 2.5 K/UL (ref 1–4.8)
LYMPHOCYTES NFR BLD: 30.2 % (ref 18–48)
MAGNESIUM SERPL-MCNC: 1.9 MG/DL (ref 1.6–2.6)
MCH RBC QN AUTO: 32.6 PG (ref 27–31)
MCHC RBC AUTO-ENTMCNC: 33 G/DL (ref 32–36)
MCV RBC AUTO: 99 FL (ref 82–98)
MONOCYTES # BLD AUTO: 0.9 K/UL (ref 0.3–1)
MONOCYTES NFR BLD: 10.9 % (ref 4–15)
NEUTROPHILS # BLD AUTO: 4.3 K/UL (ref 1.8–7.7)
NEUTROPHILS NFR BLD: 52.6 % (ref 38–73)
NRBC BLD-RTO: 0 /100 WBC
PLATELET # BLD AUTO: 153 K/UL (ref 150–450)
PMV BLD AUTO: 10.4 FL (ref 9.2–12.9)
POTASSIUM SERPL-SCNC: 3.9 MMOL/L (ref 3.5–5.1)
RBC # BLD AUTO: 3.37 M/UL (ref 4.6–6.2)
SODIUM SERPL-SCNC: 141 MMOL/L (ref 136–145)
WBC # BLD AUTO: 8.11 K/UL (ref 3.9–12.7)

## 2021-04-03 PROCEDURE — 25000003 PHARM REV CODE 250

## 2021-04-03 PROCEDURE — 99900035 HC TECH TIME PER 15 MIN (STAT)

## 2021-04-03 PROCEDURE — 85025 COMPLETE CBC W/AUTO DIFF WBC: CPT | Performed by: INTERNAL MEDICINE

## 2021-04-03 PROCEDURE — 25000003 PHARM REV CODE 250: Performed by: INTERNAL MEDICINE

## 2021-04-03 PROCEDURE — 27000221 HC OXYGEN, UP TO 24 HOURS

## 2021-04-03 PROCEDURE — 94761 N-INVAS EAR/PLS OXIMETRY MLT: CPT

## 2021-04-03 PROCEDURE — 83735 ASSAY OF MAGNESIUM: CPT | Performed by: INTERNAL MEDICINE

## 2021-04-03 PROCEDURE — 36415 COLL VENOUS BLD VENIPUNCTURE: CPT | Performed by: INTERNAL MEDICINE

## 2021-04-03 PROCEDURE — 97530 THERAPEUTIC ACTIVITIES: CPT | Mod: CQ

## 2021-04-03 PROCEDURE — 80048 BASIC METABOLIC PNL TOTAL CA: CPT | Performed by: INTERNAL MEDICINE

## 2021-04-03 RX ORDER — AMLODIPINE BESYLATE 5 MG/1
5 TABLET ORAL DAILY
Qty: 90 TABLET | Refills: 3 | Status: ON HOLD | OUTPATIENT
Start: 2021-04-04 | End: 2021-06-01 | Stop reason: HOSPADM

## 2021-04-03 RX ADMIN — ASPIRIN 81 MG: 81 TABLET, DELAYED RELEASE ORAL at 09:04

## 2021-04-03 RX ADMIN — LOSARTAN POTASSIUM 25 MG: 25 TABLET, FILM COATED ORAL at 09:04

## 2021-04-03 RX ADMIN — SOTALOL HYDROCHLORIDE 80 MG: 80 TABLET ORAL at 09:04

## 2021-04-03 RX ADMIN — TAMSULOSIN HYDROCHLORIDE 0.4 MG: 0.4 CAPSULE ORAL at 09:04

## 2021-04-03 RX ADMIN — CHLORHEXIDINE GLUCONATE 15 ML: 1.2 RINSE ORAL at 09:04

## 2021-04-03 RX ADMIN — AMLODIPINE BESYLATE 5 MG: 5 TABLET ORAL at 09:04

## 2021-04-03 RX ADMIN — FINASTERIDE 5 MG: 5 TABLET, FILM COATED ORAL at 09:04

## 2021-04-03 RX ADMIN — PANTOPRAZOLE SODIUM 40 MG: 40 TABLET, DELAYED RELEASE ORAL at 09:04

## 2021-04-03 RX ADMIN — ISOSORBIDE MONONITRATE 60 MG: 60 TABLET, EXTENDED RELEASE ORAL at 09:04

## 2021-04-03 RX ADMIN — MUPIROCIN: 20 OINTMENT TOPICAL at 09:04

## 2021-04-03 RX ADMIN — FUROSEMIDE 40 MG: 40 TABLET ORAL at 09:04

## 2021-04-04 LAB
BACTERIA BLD CULT: NORMAL
BACTERIA BLD CULT: NORMAL

## 2021-04-05 ENCOUNTER — PATIENT OUTREACH (OUTPATIENT)
Dept: FAMILY MEDICINE | Facility: CLINIC | Age: 86
End: 2021-04-05

## 2021-04-05 ENCOUNTER — TELEPHONE (OUTPATIENT)
Dept: FAMILY MEDICINE | Facility: CLINIC | Age: 86
End: 2021-04-05

## 2021-04-12 ENCOUNTER — OFFICE VISIT (OUTPATIENT)
Dept: FAMILY MEDICINE | Facility: CLINIC | Age: 86
End: 2021-04-12
Payer: MEDICARE

## 2021-04-12 ENCOUNTER — EXTERNAL HOME HEALTH (OUTPATIENT)
Dept: HOME HEALTH SERVICES | Facility: HOSPITAL | Age: 86
End: 2021-04-12

## 2021-04-12 VITALS
HEIGHT: 69 IN | SYSTOLIC BLOOD PRESSURE: 120 MMHG | OXYGEN SATURATION: 90 % | HEART RATE: 72 BPM | WEIGHT: 120 LBS | BODY MASS INDEX: 17.77 KG/M2 | DIASTOLIC BLOOD PRESSURE: 74 MMHG

## 2021-04-12 DIAGNOSIS — E43 SEVERE MALNUTRITION: ICD-10-CM

## 2021-04-12 DIAGNOSIS — R54 ADVANCED AGE: ICD-10-CM

## 2021-04-12 DIAGNOSIS — J69.0 ASPIRATION PNEUMONIA OF BOTH LOWER LOBES DUE TO REGURGITATED FOOD: Primary | ICD-10-CM

## 2021-04-12 DIAGNOSIS — Z95.0 PACEMAKER: ICD-10-CM

## 2021-04-12 DIAGNOSIS — I10 ESSENTIAL HYPERTENSION: ICD-10-CM

## 2021-04-12 DIAGNOSIS — T17.928D ASPIRATION OF FOOD, SUBSEQUENT ENCOUNTER: ICD-10-CM

## 2021-04-12 DIAGNOSIS — T17.908S ASPIRATION INTO AIRWAY, SEQUELA: ICD-10-CM

## 2021-04-12 DIAGNOSIS — J96.01 ACUTE RESPIRATORY FAILURE WITH HYPOXIA: ICD-10-CM

## 2021-04-12 DIAGNOSIS — Z09 HOSPITAL DISCHARGE FOLLOW-UP: ICD-10-CM

## 2021-04-12 DIAGNOSIS — I48.0 PAROXYSMAL ATRIAL FIBRILLATION: ICD-10-CM

## 2021-04-12 DIAGNOSIS — R53.81 DEBILITY: ICD-10-CM

## 2021-04-12 DIAGNOSIS — E78.2 MIXED HYPERLIPIDEMIA: ICD-10-CM

## 2021-04-12 DIAGNOSIS — K21.9 GASTROESOPHAGEAL REFLUX DISEASE WITHOUT ESOPHAGITIS: ICD-10-CM

## 2021-04-12 DIAGNOSIS — N40.1 BPH WITH OBSTRUCTION/LOWER URINARY TRACT SYMPTOMS: ICD-10-CM
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
## 2021-04-12 DIAGNOSIS — N13.8 BPH WITH OBSTRUCTION/LOWER URINARY TRACT SYMPTOMS: ICD-10-CM

## 2021-04-12 DIAGNOSIS — I13.2 HYPERTENSIVE HEART AND CHRONIC KIDNEY DISEASE WITH HEART FAILURE AND WITH STAGE 5 CHRONIC KIDNEY DISEASE, OR END STAGE RENAL DISEASE: ICD-10-CM

## 2021-04-12 PROCEDURE — 99495 TCM SERVICES (MODERATE COMPLEXITY): ICD-10-PCS | Mod: S$GLB,,, | Performed by: FAMILY MEDICINE

## 2021-04-12 PROCEDURE — 99495 TRANSJ CARE MGMT MOD F2F 14D: CPT | Mod: S$GLB,,, | Performed by: FAMILY MEDICINE

## 2021-04-12 RX ORDER — ATORVASTATIN CALCIUM 10 MG/1
10 TABLET, FILM COATED ORAL DAILY
Qty: 90 TABLET | Refills: 1 | Status: SHIPPED | OUTPATIENT
Start: 2021-04-12 | End: 2022-04-12

## 2021-04-12 RX ORDER — POTASSIUM CHLORIDE 20 MEQ/1
20 TABLET, EXTENDED RELEASE ORAL DAILY
Qty: 90 TABLET | Refills: 1 | Status: SHIPPED | OUTPATIENT
Start: 2021-04-12

## 2021-04-12 RX ORDER — LOSARTAN POTASSIUM 50 MG/1
25 TABLET ORAL DAILY
Qty: 45 TABLET | Refills: 1 | Status: ON HOLD | OUTPATIENT
Start: 2021-04-12 | End: 2021-06-01 | Stop reason: HOSPADM

## 2021-04-12 RX ORDER — FINASTERIDE 5 MG/1
5 TABLET, FILM COATED ORAL DAILY
Qty: 90 TABLET | Refills: 4 | Status: SHIPPED | OUTPATIENT
Start: 2021-04-12 | End: 2021-07-11

## 2021-04-12 RX ORDER — ALBUTEROL SULFATE 1.25 MG/3ML
1.25 SOLUTION RESPIRATORY (INHALATION) EVERY 6 HOURS PRN
Qty: 1 BOX | Refills: 5 | Status: SHIPPED | OUTPATIENT
Start: 2021-04-12 | End: 2022-04-12

## 2021-04-12 RX ORDER — ISOSORBIDE MONONITRATE 60 MG/1
60 TABLET, EXTENDED RELEASE ORAL DAILY
Qty: 90 TABLET | Refills: 1 | Status: SHIPPED | OUTPATIENT
Start: 2021-04-12

## 2021-04-12 RX ORDER — PANTOPRAZOLE SODIUM 40 MG/1
40 TABLET, DELAYED RELEASE ORAL DAILY
Qty: 90 TABLET | Refills: 1 | Status: SHIPPED | OUTPATIENT
Start: 2021-04-12

## 2021-04-12 RX ORDER — PREDNISONE 20 MG/1
20 TABLET ORAL DAILY
Qty: 30 TABLET | Refills: 0 | Status: SHIPPED | OUTPATIENT
Start: 2021-04-12

## 2021-04-12 RX ORDER — TAMSULOSIN HYDROCHLORIDE 0.4 MG/1
0.4 CAPSULE ORAL DAILY
Qty: 90 CAPSULE | Refills: 3 | Status: SHIPPED | OUTPATIENT
Start: 2021-04-12 | End: 2021-07-11

## 2021-04-12 RX ORDER — AMOXICILLIN AND CLAVULANATE POTASSIUM 875; 125 MG/1; MG/1
1 TABLET, FILM COATED ORAL 2 TIMES DAILY
Qty: 14 TABLET | Refills: 0 | Status: ON HOLD | OUTPATIENT
Start: 2021-04-12 | End: 2021-06-01 | Stop reason: HOSPADM

## 2021-04-19 ENCOUNTER — PATIENT MESSAGE (OUTPATIENT)
Dept: FAMILY MEDICINE | Facility: CLINIC | Age: 86
End: 2021-04-19

## 2021-04-19 ENCOUNTER — TELEPHONE (OUTPATIENT)
Dept: FAMILY MEDICINE | Facility: CLINIC | Age: 86
End: 2021-04-19

## 2021-04-19 DIAGNOSIS — R39.9 UTI SYMPTOMS: Primary | ICD-10-CM

## 2021-04-21 ENCOUNTER — DOCUMENT SCAN (OUTPATIENT)
Dept: HOME HEALTH SERVICES | Facility: HOSPITAL | Age: 86
End: 2021-04-21

## 2021-04-21 LAB
APPEARANCE UR: CLEAR
BACTERIA #/AREA URNS HPF: NORMAL /HPF
BACTERIA UR CULT: NORMAL
BILIRUB UR QL STRIP: NEGATIVE
COLOR UR: YELLOW
GLUCOSE UR QL STRIP: NEGATIVE
HGB UR QL STRIP: NEGATIVE
HYALINE CASTS #/AREA URNS LPF: NORMAL /LPF
KETONES UR QL STRIP: NEGATIVE
LEUKOCYTE ESTERASE UR QL STRIP: NEGATIVE
NITRITE UR QL STRIP: NEGATIVE
PH UR STRIP: 8 [PH] (ref 5–8)
PROT UR QL STRIP: NEGATIVE
RBC #/AREA URNS HPF: NORMAL /HPF
SP GR UR STRIP: 1.01 (ref 1–1.03)
SQUAMOUS #/AREA URNS HPF: NORMAL /HPF
WBC #/AREA URNS HPF: NORMAL /HPF

## 2021-04-26 ENCOUNTER — TELEPHONE (OUTPATIENT)
Dept: FAMILY MEDICINE | Facility: CLINIC | Age: 86
End: 2021-04-26

## 2021-04-27 ENCOUNTER — DOCUMENT SCAN (OUTPATIENT)
Dept: HOME HEALTH SERVICES | Facility: HOSPITAL | Age: 86
End: 2021-04-27

## 2021-05-01 ENCOUNTER — DOCUMENT SCAN (OUTPATIENT)
Dept: HOME HEALTH SERVICES | Facility: HOSPITAL | Age: 86
End: 2021-05-01

## 2021-05-08 ENCOUNTER — DOCUMENT SCAN (OUTPATIENT)
Dept: HOME HEALTH SERVICES | Facility: HOSPITAL | Age: 86
End: 2021-05-08

## 2021-05-18 ENCOUNTER — TELEPHONE (OUTPATIENT)
Dept: FAMILY MEDICINE | Facility: CLINIC | Age: 86
End: 2021-05-18

## 2021-05-26 ENCOUNTER — HOSPITAL ENCOUNTER (INPATIENT)
Facility: HOSPITAL | Age: 86
LOS: 5 days | Discharge: HOME HEALTH | DRG: 177 | End: 2021-06-01
Attending: EMERGENCY MEDICINE | Admitting: INTERNAL MEDICINE
Payer: MEDICARE

## 2021-05-26 DIAGNOSIS — R06.02 SHORTNESS OF BREATH: ICD-10-CM

## 2021-05-26 DIAGNOSIS — J18.9 COMMUNITY ACQUIRED PNEUMONIA OF LEFT LOWER LOBE OF LUNG: ICD-10-CM

## 2021-05-26 PROCEDURE — 96375 TX/PRO/DX INJ NEW DRUG ADDON: CPT

## 2021-05-26 PROCEDURE — 99285 EMERGENCY DEPT VISIT HI MDM: CPT | Mod: 25

## 2021-05-26 PROCEDURE — 93005 ELECTROCARDIOGRAM TRACING: CPT | Performed by: SPECIALIST

## 2021-05-26 PROCEDURE — 80053 COMPREHEN METABOLIC PANEL: CPT | Performed by: EMERGENCY MEDICINE

## 2021-05-26 PROCEDURE — 93010 ELECTROCARDIOGRAM REPORT: CPT | Mod: ,,, | Performed by: SPECIALIST

## 2021-05-26 PROCEDURE — 63600175 PHARM REV CODE 636 W HCPCS: Performed by: EMERGENCY MEDICINE

## 2021-05-26 PROCEDURE — 84484 ASSAY OF TROPONIN QUANT: CPT | Performed by: EMERGENCY MEDICINE

## 2021-05-26 PROCEDURE — 83880 ASSAY OF NATRIURETIC PEPTIDE: CPT | Performed by: EMERGENCY MEDICINE

## 2021-05-26 PROCEDURE — 27000221 HC OXYGEN, UP TO 24 HOURS

## 2021-05-26 PROCEDURE — 93010 EKG 12-LEAD: ICD-10-PCS | Mod: ,,, | Performed by: SPECIALIST

## 2021-05-26 PROCEDURE — 31720 CLEARANCE OF AIRWAYS: CPT

## 2021-05-26 PROCEDURE — 85025 COMPLETE CBC W/AUTO DIFF WBC: CPT | Performed by: EMERGENCY MEDICINE

## 2021-05-26 RX ORDER — ONDANSETRON 2 MG/ML
4 INJECTION INTRAMUSCULAR; INTRAVENOUS
Status: COMPLETED | OUTPATIENT
Start: 2021-05-27 | End: 2021-05-26

## 2021-05-26 RX ADMIN — ONDANSETRON 4 MG: 2 INJECTION INTRAMUSCULAR; INTRAVENOUS at 11:05

## 2021-05-27 PROBLEM — R06.02 SHORTNESS OF BREATH: Status: ACTIVE | Noted: 2021-05-27

## 2021-05-27 PROBLEM — J18.9 COMMUNITY ACQUIRED PNEUMONIA OF LEFT LOWER LOBE OF LUNG: Status: ACTIVE | Noted: 2021-05-27

## 2021-05-27 LAB
ADENOVIRUS: NOT DETECTED
ALBUMIN SERPL BCP-MCNC: 3.1 G/DL (ref 3.5–5.2)
ALP SERPL-CCNC: 86 U/L (ref 55–135)
ALT SERPL W/O P-5'-P-CCNC: 17 U/L (ref 10–44)
ANION GAP SERPL CALC-SCNC: 11 MMOL/L (ref 8–16)
ANION GAP SERPL CALC-SCNC: 12 MMOL/L (ref 8–16)
AST SERPL-CCNC: 25 U/L (ref 10–40)
BASOPHILS # BLD AUTO: 0.04 K/UL (ref 0–0.2)
BASOPHILS NFR BLD: 0.4 % (ref 0–1.9)
BILIRUB SERPL-MCNC: 0.7 MG/DL (ref 0.1–1)
BNP SERPL-MCNC: 488 PG/ML (ref 0–99)
BORDETELLA PARAPERTUSSIS (IS1001): NOT DETECTED
BORDETELLA PERTUSSIS (PTXP): NOT DETECTED
BUN SERPL-MCNC: 29 MG/DL (ref 10–30)
BUN SERPL-MCNC: 30 MG/DL (ref 10–30)
CALCIUM SERPL-MCNC: 8.2 MG/DL (ref 8.7–10.5)
CALCIUM SERPL-MCNC: 8.5 MG/DL (ref 8.7–10.5)
CHLAMYDIA PNEUMONIAE: NOT DETECTED
CHLORIDE SERPL-SCNC: 101 MMOL/L (ref 95–110)
CHLORIDE SERPL-SCNC: 102 MMOL/L (ref 95–110)
CO2 SERPL-SCNC: 26 MMOL/L (ref 23–29)
CO2 SERPL-SCNC: 27 MMOL/L (ref 23–29)
CORONAVIRUS 229E, COMMON COLD VIRUS: NOT DETECTED
CORONAVIRUS HKU1, COMMON COLD VIRUS: NOT DETECTED
CORONAVIRUS NL63, COMMON COLD VIRUS: NOT DETECTED
CORONAVIRUS OC43, COMMON COLD VIRUS: NOT DETECTED
CREAT SERPL-MCNC: 0.9 MG/DL (ref 0.5–1.4)
CREAT SERPL-MCNC: 0.9 MG/DL (ref 0.5–1.4)
DEPRECATED S PYO AG THROAT QL EIA: NEGATIVE
DIFFERENTIAL METHOD: ABNORMAL
EOSINOPHIL # BLD AUTO: 0.2 K/UL (ref 0–0.5)
EOSINOPHIL NFR BLD: 2.1 % (ref 0–8)
ERYTHROCYTE [DISTWIDTH] IN BLOOD BY AUTOMATED COUNT: 17.5 % (ref 11.5–14.5)
ERYTHROCYTE [DISTWIDTH] IN BLOOD BY AUTOMATED COUNT: 17.7 % (ref 11.5–14.5)
EST. GFR  (AFRICAN AMERICAN): >60 ML/MIN/1.73 M^2
EST. GFR  (AFRICAN AMERICAN): >60 ML/MIN/1.73 M^2
EST. GFR  (NON AFRICAN AMERICAN): >60 ML/MIN/1.73 M^2
EST. GFR  (NON AFRICAN AMERICAN): >60 ML/MIN/1.73 M^2
FLUBV RNA NPH QL NAA+NON-PROBE: NOT DETECTED
GLUCOSE SERPL-MCNC: 103 MG/DL (ref 70–110)
GLUCOSE SERPL-MCNC: 155 MG/DL (ref 70–110)
GLUCOSE SERPL-MCNC: 178 MG/DL (ref 70–110)
HCT VFR BLD AUTO: 39.9 % (ref 40–54)
HCT VFR BLD AUTO: 41.7 % (ref 40–54)
HGB BLD-MCNC: 13 G/DL (ref 14–18)
HGB BLD-MCNC: 13.6 G/DL (ref 14–18)
HPIV1 RNA NPH QL NAA+NON-PROBE: NOT DETECTED
HPIV2 RNA NPH QL NAA+NON-PROBE: NOT DETECTED
HPIV3 RNA NPH QL NAA+NON-PROBE: NOT DETECTED
HPIV4 RNA NPH QL NAA+NON-PROBE: NOT DETECTED
HUMAN METAPNEUMOVIRUS: NOT DETECTED
IMM GRANULOCYTES # BLD AUTO: 0.02 K/UL (ref 0–0.04)
IMM GRANULOCYTES NFR BLD AUTO: 0.2 % (ref 0–0.5)
INFLUENZA A (SUBTYPES H1,H1-2009,H3): NOT DETECTED
INFLUENZA A, MOLECULAR: NEGATIVE
INFLUENZA B, MOLECULAR: NEGATIVE
LYMPHOCYTES # BLD AUTO: 3.3 K/UL (ref 1–4.8)
LYMPHOCYTES NFR BLD: 34.3 % (ref 18–48)
MAGNESIUM SERPL-MCNC: 1.8 MG/DL (ref 1.6–2.6)
MCH RBC QN AUTO: 32.6 PG (ref 27–31)
MCH RBC QN AUTO: 33 PG (ref 27–31)
MCHC RBC AUTO-ENTMCNC: 32.6 G/DL (ref 32–36)
MCHC RBC AUTO-ENTMCNC: 32.6 G/DL (ref 32–36)
MCV RBC AUTO: 100 FL (ref 82–98)
MCV RBC AUTO: 101 FL (ref 82–98)
MONOCYTES # BLD AUTO: 0.4 K/UL (ref 0.3–1)
MONOCYTES NFR BLD: 3.8 % (ref 4–15)
MYCOPLASMA PNEUMONIAE: NOT DETECTED
NEUTROPHILS # BLD AUTO: 5.6 K/UL (ref 1.8–7.7)
NEUTROPHILS NFR BLD: 59.2 % (ref 38–73)
NRBC BLD-RTO: 0 /100 WBC
PHOSPHATE SERPL-MCNC: 2 MG/DL (ref 2.7–4.5)
PLATELET # BLD AUTO: 210 K/UL (ref 150–450)
PLATELET # BLD AUTO: 211 K/UL (ref 150–450)
PMV BLD AUTO: 10.1 FL (ref 9.2–12.9)
PMV BLD AUTO: 9.7 FL (ref 9.2–12.9)
POTASSIUM SERPL-SCNC: 4 MMOL/L (ref 3.5–5.1)
POTASSIUM SERPL-SCNC: 4.4 MMOL/L (ref 3.5–5.1)
PROCALCITONIN SERPL IA-MCNC: 3.59 NG/ML (ref 0–0.5)
PROT SERPL-MCNC: 7.1 G/DL (ref 6–8.4)
RBC # BLD AUTO: 3.94 M/UL (ref 4.6–6.2)
RBC # BLD AUTO: 4.17 M/UL (ref 4.6–6.2)
RESPIRATORY INFECTION PANEL SOURCE: NORMAL
RSV RNA NPH QL NAA+NON-PROBE: NOT DETECTED
RV+EV RNA NPH QL NAA+NON-PROBE: NOT DETECTED
SARS-COV-2 RDRP RESP QL NAA+PROBE: NEGATIVE
SODIUM SERPL-SCNC: 138 MMOL/L (ref 136–145)
SODIUM SERPL-SCNC: 141 MMOL/L (ref 136–145)
SPECIMEN SOURCE: NORMAL
TROPONIN I SERPL DL<=0.01 NG/ML-MCNC: <0.03 NG/ML
WBC # BLD AUTO: 5.24 K/UL (ref 3.9–12.7)
WBC # BLD AUTO: 9.47 K/UL (ref 3.9–12.7)

## 2021-05-27 PROCEDURE — 25000003 PHARM REV CODE 250: Performed by: INTERNAL MEDICINE

## 2021-05-27 PROCEDURE — 63600175 PHARM REV CODE 636 W HCPCS: Performed by: EMERGENCY MEDICINE

## 2021-05-27 PROCEDURE — 12000002 HC ACUTE/MED SURGE SEMI-PRIVATE ROOM

## 2021-05-27 PROCEDURE — 36415 COLL VENOUS BLD VENIPUNCTURE: CPT | Performed by: INTERNAL MEDICINE

## 2021-05-27 PROCEDURE — U0002 COVID-19 LAB TEST NON-CDC: HCPCS | Performed by: EMERGENCY MEDICINE

## 2021-05-27 PROCEDURE — 87502 INFLUENZA DNA AMP PROBE: CPT | Performed by: STUDENT IN AN ORGANIZED HEALTH CARE EDUCATION/TRAINING PROGRAM

## 2021-05-27 PROCEDURE — 99900031 HC PATIENT EDUCATION (STAT)

## 2021-05-27 PROCEDURE — 87081 CULTURE SCREEN ONLY: CPT | Performed by: STUDENT IN AN ORGANIZED HEALTH CARE EDUCATION/TRAINING PROGRAM

## 2021-05-27 PROCEDURE — 27000221 HC OXYGEN, UP TO 24 HOURS

## 2021-05-27 PROCEDURE — 99900035 HC TECH TIME PER 15 MIN (STAT)

## 2021-05-27 PROCEDURE — 94640 AIRWAY INHALATION TREATMENT: CPT

## 2021-05-27 PROCEDURE — 83735 ASSAY OF MAGNESIUM: CPT | Performed by: STUDENT IN AN ORGANIZED HEALTH CARE EDUCATION/TRAINING PROGRAM

## 2021-05-27 PROCEDURE — 87633 RESP VIRUS 12-25 TARGETS: CPT | Performed by: STUDENT IN AN ORGANIZED HEALTH CARE EDUCATION/TRAINING PROGRAM

## 2021-05-27 PROCEDURE — 87880 STREP A ASSAY W/OPTIC: CPT | Performed by: STUDENT IN AN ORGANIZED HEALTH CARE EDUCATION/TRAINING PROGRAM

## 2021-05-27 PROCEDURE — 25000242 PHARM REV CODE 250 ALT 637 W/ HCPCS: Performed by: EMERGENCY MEDICINE

## 2021-05-27 PROCEDURE — 99223 1ST HOSP IP/OBS HIGH 75: CPT | Mod: ,,, | Performed by: INTERNAL MEDICINE

## 2021-05-27 PROCEDURE — 87798 DETECT AGENT NOS DNA AMP: CPT | Performed by: STUDENT IN AN ORGANIZED HEALTH CARE EDUCATION/TRAINING PROGRAM

## 2021-05-27 PROCEDURE — 36415 COLL VENOUS BLD VENIPUNCTURE: CPT | Performed by: STUDENT IN AN ORGANIZED HEALTH CARE EDUCATION/TRAINING PROGRAM

## 2021-05-27 PROCEDURE — 63600175 PHARM REV CODE 636 W HCPCS: Performed by: INTERNAL MEDICINE

## 2021-05-27 PROCEDURE — 87040 BLOOD CULTURE FOR BACTERIA: CPT | Mod: 59 | Performed by: EMERGENCY MEDICINE

## 2021-05-27 PROCEDURE — 84145 PROCALCITONIN (PCT): CPT | Performed by: INTERNAL MEDICINE

## 2021-05-27 PROCEDURE — 92610 EVALUATE SWALLOWING FUNCTION: CPT

## 2021-05-27 PROCEDURE — 27100171 HC OXYGEN HIGH FLOW UP TO 24 HOURS

## 2021-05-27 PROCEDURE — 87070 CULTURE OTHR SPECIMN AEROBIC: CPT | Performed by: STUDENT IN AN ORGANIZED HEALTH CARE EDUCATION/TRAINING PROGRAM

## 2021-05-27 PROCEDURE — 85027 COMPLETE CBC AUTOMATED: CPT | Performed by: STUDENT IN AN ORGANIZED HEALTH CARE EDUCATION/TRAINING PROGRAM

## 2021-05-27 PROCEDURE — 84100 ASSAY OF PHOSPHORUS: CPT | Performed by: STUDENT IN AN ORGANIZED HEALTH CARE EDUCATION/TRAINING PROGRAM

## 2021-05-27 PROCEDURE — 31720 CLEARANCE OF AIRWAYS: CPT

## 2021-05-27 PROCEDURE — 96365 THER/PROPH/DIAG IV INF INIT: CPT

## 2021-05-27 PROCEDURE — 94761 N-INVAS EAR/PLS OXIMETRY MLT: CPT

## 2021-05-27 PROCEDURE — 80048 BASIC METABOLIC PNL TOTAL CA: CPT | Performed by: STUDENT IN AN ORGANIZED HEALTH CARE EDUCATION/TRAINING PROGRAM

## 2021-05-27 PROCEDURE — 99223 PR INITIAL HOSPITAL CARE,LEVL III: ICD-10-PCS | Mod: ,,, | Performed by: INTERNAL MEDICINE

## 2021-05-27 PROCEDURE — 87205 SMEAR GRAM STAIN: CPT | Performed by: STUDENT IN AN ORGANIZED HEALTH CARE EDUCATION/TRAINING PROGRAM

## 2021-05-27 PROCEDURE — 25000003 PHARM REV CODE 250: Performed by: EMERGENCY MEDICINE

## 2021-05-27 PROCEDURE — 87147 CULTURE TYPE IMMUNOLOGIC: CPT | Performed by: STUDENT IN AN ORGANIZED HEALTH CARE EDUCATION/TRAINING PROGRAM

## 2021-05-27 RX ORDER — ONDANSETRON 2 MG/ML
4 INJECTION INTRAMUSCULAR; INTRAVENOUS
Status: ACTIVE | OUTPATIENT
Start: 2021-05-27 | End: 2021-05-27

## 2021-05-27 RX ORDER — VANCOMYCIN HCL IN 5 % DEXTROSE 1G/250ML
1000 PLASTIC BAG, INJECTION (ML) INTRAVENOUS
Status: DISCONTINUED | OUTPATIENT
Start: 2021-05-28 | End: 2021-05-31

## 2021-05-27 RX ORDER — ACETAMINOPHEN 650 MG/1
650 SUPPOSITORY RECTAL EVERY 4 HOURS PRN
Status: DISCONTINUED | OUTPATIENT
Start: 2021-05-27 | End: 2021-06-01 | Stop reason: HOSPADM

## 2021-05-27 RX ORDER — PROCHLORPERAZINE EDISYLATE 5 MG/ML
5 INJECTION INTRAMUSCULAR; INTRAVENOUS EVERY 6 HOURS PRN
Status: DISCONTINUED | OUTPATIENT
Start: 2021-05-27 | End: 2021-06-01 | Stop reason: HOSPADM

## 2021-05-27 RX ORDER — IPRATROPIUM BROMIDE AND ALBUTEROL SULFATE 2.5; .5 MG/3ML; MG/3ML
3 SOLUTION RESPIRATORY (INHALATION)
Status: COMPLETED | OUTPATIENT
Start: 2021-05-27 | End: 2021-05-27

## 2021-05-27 RX ORDER — ONDANSETRON 4 MG/1
8 TABLET, ORALLY DISINTEGRATING ORAL EVERY 8 HOURS PRN
Status: DISCONTINUED | OUTPATIENT
Start: 2021-05-27 | End: 2021-06-01 | Stop reason: HOSPADM

## 2021-05-27 RX ORDER — SOTALOL HYDROCHLORIDE 80 MG/1
80 TABLET ORAL 2 TIMES DAILY
Status: DISCONTINUED | OUTPATIENT
Start: 2021-05-27 | End: 2021-06-01 | Stop reason: HOSPADM

## 2021-05-27 RX ORDER — SODIUM CHLORIDE 0.9 % (FLUSH) 0.9 %
10 SYRINGE (ML) INJECTION
Status: DISCONTINUED | OUTPATIENT
Start: 2021-05-27 | End: 2021-06-01 | Stop reason: HOSPADM

## 2021-05-27 RX ORDER — IPRATROPIUM BROMIDE AND ALBUTEROL SULFATE 2.5; .5 MG/3ML; MG/3ML
3 SOLUTION RESPIRATORY (INHALATION) EVERY 6 HOURS
Status: DISCONTINUED | OUTPATIENT
Start: 2021-05-27 | End: 2021-05-27

## 2021-05-27 RX ORDER — IPRATROPIUM BROMIDE AND ALBUTEROL SULFATE 2.5; .5 MG/3ML; MG/3ML
3 SOLUTION RESPIRATORY (INHALATION) EVERY 6 HOURS
Status: DISCONTINUED | OUTPATIENT
Start: 2021-05-27 | End: 2021-06-01 | Stop reason: HOSPADM

## 2021-05-27 RX ORDER — VANCOMYCIN HCL IN 5 % DEXTROSE 1G/250ML
1000 PLASTIC BAG, INJECTION (ML) INTRAVENOUS ONCE
Status: COMPLETED | OUTPATIENT
Start: 2021-05-27 | End: 2021-05-27

## 2021-05-27 RX ADMIN — SOTALOL HYDROCHLORIDE 80 MG: 80 TABLET ORAL at 09:05

## 2021-05-27 RX ADMIN — SOTALOL HYDROCHLORIDE 80 MG: 80 TABLET ORAL at 08:05

## 2021-05-27 RX ADMIN — ACETAMINOPHEN 650 MG: 650 SUPPOSITORY RECTAL at 08:05

## 2021-05-27 RX ADMIN — PIPERACILLIN AND TAZOBACTAM 3.38 G: 3; .375 INJECTION, POWDER, LYOPHILIZED, FOR SOLUTION INTRAVENOUS; PARENTERAL at 05:05

## 2021-05-27 RX ADMIN — PIPERACILLIN AND TAZOBACTAM 3.38 G: 3; .375 INJECTION, POWDER, LYOPHILIZED, FOR SOLUTION INTRAVENOUS; PARENTERAL at 10:05

## 2021-05-27 RX ADMIN — IPRATROPIUM BROMIDE AND ALBUTEROL SULFATE 3 ML: .5; 3 SOLUTION RESPIRATORY (INHALATION) at 07:05

## 2021-05-27 RX ADMIN — PIPERACILLIN AND TAZOBACTAM 4.5 G: 4; .5 INJECTION, POWDER, LYOPHILIZED, FOR SOLUTION INTRAVENOUS; PARENTERAL at 01:05

## 2021-05-27 RX ADMIN — IPRATROPIUM BROMIDE AND ALBUTEROL SULFATE 3 ML: .5; 3 SOLUTION RESPIRATORY (INHALATION) at 02:05

## 2021-05-27 RX ADMIN — VANCOMYCIN HYDROCHLORIDE 1000 MG: 1 INJECTION, POWDER, LYOPHILIZED, FOR SOLUTION INTRAVENOUS at 02:05

## 2021-05-27 RX ADMIN — IPRATROPIUM BROMIDE AND ALBUTEROL SULFATE 3 ML: .5; 3 SOLUTION RESPIRATORY (INHALATION) at 01:05

## 2021-05-28 LAB
ACANTHOCYTES BLD QL SMEAR: PRESENT
ANION GAP SERPL CALC-SCNC: 8 MMOL/L (ref 8–16)
ANISOCYTOSIS BLD QL SMEAR: SLIGHT
BASOPHILS NFR BLD: 0 % (ref 0–1.9)
BUN SERPL-MCNC: 38 MG/DL (ref 10–30)
CALCIUM SERPL-MCNC: 8.2 MG/DL (ref 8.7–10.5)
CHLORIDE SERPL-SCNC: 103 MMOL/L (ref 95–110)
CO2 SERPL-SCNC: 28 MMOL/L (ref 23–29)
CREAT SERPL-MCNC: 1 MG/DL (ref 0.5–1.4)
DIFFERENTIAL METHOD: ABNORMAL
EOSINOPHIL NFR BLD: 0 % (ref 0–8)
ERYTHROCYTE [DISTWIDTH] IN BLOOD BY AUTOMATED COUNT: 18.4 % (ref 11.5–14.5)
EST. GFR  (AFRICAN AMERICAN): >60 ML/MIN/1.73 M^2
EST. GFR  (NON AFRICAN AMERICAN): >60 ML/MIN/1.73 M^2
GLUCOSE SERPL-MCNC: 93 MG/DL (ref 70–110)
HCT VFR BLD AUTO: 34.9 % (ref 40–54)
HGB BLD-MCNC: 11.5 G/DL (ref 14–18)
IMM GRANULOCYTES # BLD AUTO: ABNORMAL K/UL (ref 0–0.04)
IMM GRANULOCYTES NFR BLD AUTO: ABNORMAL % (ref 0–0.5)
LYMPHOCYTES NFR BLD: 34 % (ref 18–48)
MAGNESIUM SERPL-MCNC: 1.8 MG/DL (ref 1.6–2.6)
MCH RBC QN AUTO: 33.3 PG (ref 27–31)
MCHC RBC AUTO-ENTMCNC: 33 G/DL (ref 32–36)
MCV RBC AUTO: 101 FL (ref 82–98)
MONOCYTES NFR BLD: 4 % (ref 4–15)
NEUTROPHILS NFR BLD: 35 % (ref 38–73)
NEUTS BAND NFR BLD MANUAL: 27 %
NRBC BLD-RTO: 0 /100 WBC
OVALOCYTES BLD QL SMEAR: ABNORMAL
PHOSPHATE SERPL-MCNC: 2.9 MG/DL (ref 2.7–4.5)
PLATELET # BLD AUTO: 177 K/UL (ref 150–450)
PLATELET BLD QL SMEAR: ABNORMAL
PMV BLD AUTO: 9.8 FL (ref 9.2–12.9)
POIKILOCYTOSIS BLD QL SMEAR: SLIGHT
POTASSIUM SERPL-SCNC: 4.2 MMOL/L (ref 3.5–5.1)
PROCALCITONIN SERPL IA-MCNC: 8.4 NG/ML (ref 0–0.5)
RBC # BLD AUTO: 3.45 M/UL (ref 4.6–6.2)
SCHISTOCYTES BLD QL SMEAR: PRESENT
SODIUM SERPL-SCNC: 139 MMOL/L (ref 136–145)
VANCOMYCIN TROUGH SERPL-MCNC: 5.4 UG/ML (ref 10–22)
WBC # BLD AUTO: 8.29 K/UL (ref 3.9–12.7)

## 2021-05-28 PROCEDURE — 31720 CLEARANCE OF AIRWAYS: CPT

## 2021-05-28 PROCEDURE — 12000002 HC ACUTE/MED SURGE SEMI-PRIVATE ROOM

## 2021-05-28 PROCEDURE — 63600175 PHARM REV CODE 636 W HCPCS: Performed by: INTERNAL MEDICINE

## 2021-05-28 PROCEDURE — 84145 PROCALCITONIN (PCT): CPT | Performed by: INTERNAL MEDICINE

## 2021-05-28 PROCEDURE — 25000242 PHARM REV CODE 250 ALT 637 W/ HCPCS: Performed by: EMERGENCY MEDICINE

## 2021-05-28 PROCEDURE — 83735 ASSAY OF MAGNESIUM: CPT | Performed by: INTERNAL MEDICINE

## 2021-05-28 PROCEDURE — 80048 BASIC METABOLIC PNL TOTAL CA: CPT | Performed by: INTERNAL MEDICINE

## 2021-05-28 PROCEDURE — 94761 N-INVAS EAR/PLS OXIMETRY MLT: CPT

## 2021-05-28 PROCEDURE — 99900031 HC PATIENT EDUCATION (STAT)

## 2021-05-28 PROCEDURE — 80202 ASSAY OF VANCOMYCIN: CPT | Performed by: INTERNAL MEDICINE

## 2021-05-28 PROCEDURE — 27100171 HC OXYGEN HIGH FLOW UP TO 24 HOURS

## 2021-05-28 PROCEDURE — 25000003 PHARM REV CODE 250: Performed by: INTERNAL MEDICINE

## 2021-05-28 PROCEDURE — 92526 ORAL FUNCTION THERAPY: CPT

## 2021-05-28 PROCEDURE — 94640 AIRWAY INHALATION TREATMENT: CPT

## 2021-05-28 PROCEDURE — 99232 PR SUBSEQUENT HOSPITAL CARE,LEVL II: ICD-10-PCS | Mod: ,,, | Performed by: INTERNAL MEDICINE

## 2021-05-28 PROCEDURE — 63600175 PHARM REV CODE 636 W HCPCS: Performed by: STUDENT IN AN ORGANIZED HEALTH CARE EDUCATION/TRAINING PROGRAM

## 2021-05-28 PROCEDURE — 85027 COMPLETE CBC AUTOMATED: CPT | Performed by: INTERNAL MEDICINE

## 2021-05-28 PROCEDURE — 99900035 HC TECH TIME PER 15 MIN (STAT)

## 2021-05-28 PROCEDURE — 85007 BL SMEAR W/DIFF WBC COUNT: CPT | Performed by: INTERNAL MEDICINE

## 2021-05-28 PROCEDURE — 99232 SBSQ HOSP IP/OBS MODERATE 35: CPT | Mod: ,,, | Performed by: INTERNAL MEDICINE

## 2021-05-28 PROCEDURE — 84100 ASSAY OF PHOSPHORUS: CPT | Performed by: INTERNAL MEDICINE

## 2021-05-28 PROCEDURE — 25000003 PHARM REV CODE 250: Performed by: STUDENT IN AN ORGANIZED HEALTH CARE EDUCATION/TRAINING PROGRAM

## 2021-05-28 RX ORDER — DOCUSATE SODIUM 100 MG/1
100 CAPSULE, LIQUID FILLED ORAL DAILY
Status: DISCONTINUED | OUTPATIENT
Start: 2021-05-28 | End: 2021-06-01 | Stop reason: HOSPADM

## 2021-05-28 RX ORDER — SODIUM CHLORIDE, SODIUM LACTATE, POTASSIUM CHLORIDE, CALCIUM CHLORIDE 600; 310; 30; 20 MG/100ML; MG/100ML; MG/100ML; MG/100ML
INJECTION, SOLUTION INTRAVENOUS CONTINUOUS
Status: DISCONTINUED | OUTPATIENT
Start: 2021-05-28 | End: 2021-05-31

## 2021-05-28 RX ADMIN — SODIUM CHLORIDE, SODIUM LACTATE, POTASSIUM CHLORIDE, AND CALCIUM CHLORIDE: .6; .31; .03; .02 INJECTION, SOLUTION INTRAVENOUS at 11:05

## 2021-05-28 RX ADMIN — PIPERACILLIN AND TAZOBACTAM 3.38 G: 3; .375 INJECTION, POWDER, LYOPHILIZED, FOR SOLUTION INTRAVENOUS; PARENTERAL at 10:05

## 2021-05-28 RX ADMIN — IPRATROPIUM BROMIDE AND ALBUTEROL SULFATE 3 ML: .5; 3 SOLUTION RESPIRATORY (INHALATION) at 12:05

## 2021-05-28 RX ADMIN — IPRATROPIUM BROMIDE AND ALBUTEROL SULFATE 3 ML: .5; 3 SOLUTION RESPIRATORY (INHALATION) at 07:05

## 2021-05-28 RX ADMIN — SOTALOL HYDROCHLORIDE 80 MG: 80 TABLET ORAL at 08:05

## 2021-05-28 RX ADMIN — IPRATROPIUM BROMIDE AND ALBUTEROL SULFATE 3 ML: .5; 3 SOLUTION RESPIRATORY (INHALATION) at 01:05

## 2021-05-28 RX ADMIN — SOTALOL HYDROCHLORIDE 80 MG: 80 TABLET ORAL at 10:05

## 2021-05-28 RX ADMIN — PIPERACILLIN AND TAZOBACTAM 3.38 G: 3; .375 INJECTION, POWDER, LYOPHILIZED, FOR SOLUTION INTRAVENOUS; PARENTERAL at 06:05

## 2021-05-28 RX ADMIN — PIPERACILLIN AND TAZOBACTAM 3.38 G: 3; .375 INJECTION, POWDER, LYOPHILIZED, FOR SOLUTION INTRAVENOUS; PARENTERAL at 01:05

## 2021-05-28 RX ADMIN — VANCOMYCIN HYDROCHLORIDE 1000 MG: 1 INJECTION, POWDER, LYOPHILIZED, FOR SOLUTION INTRAVENOUS at 05:05

## 2021-05-28 RX ADMIN — DOCUSATE SODIUM 100 MG: 100 CAPSULE ORAL at 10:05

## 2021-05-29 LAB
ANION GAP SERPL CALC-SCNC: 11 MMOL/L (ref 8–16)
ANISOCYTOSIS BLD QL SMEAR: SLIGHT
BACTERIA SPEC AEROBE CULT: ABNORMAL
BACTERIA THROAT CULT: NORMAL
BASOPHILS NFR BLD: 0 % (ref 0–1.9)
BUN SERPL-MCNC: 37 MG/DL (ref 10–30)
CALCIUM SERPL-MCNC: 8.1 MG/DL (ref 8.7–10.5)
CHLORIDE SERPL-SCNC: 101 MMOL/L (ref 95–110)
CO2 SERPL-SCNC: 24 MMOL/L (ref 23–29)
CREAT SERPL-MCNC: 0.9 MG/DL (ref 0.5–1.4)
DIFFERENTIAL METHOD: ABNORMAL
EOSINOPHIL NFR BLD: 1 % (ref 0–8)
ERYTHROCYTE [DISTWIDTH] IN BLOOD BY AUTOMATED COUNT: 18 % (ref 11.5–14.5)
EST. GFR  (AFRICAN AMERICAN): >60 ML/MIN/1.73 M^2
EST. GFR  (NON AFRICAN AMERICAN): >60 ML/MIN/1.73 M^2
GLUCOSE SERPL-MCNC: 96 MG/DL (ref 70–110)
GRAM STN SPEC: ABNORMAL
HCT VFR BLD AUTO: 37 % (ref 40–54)
HGB BLD-MCNC: 12.2 G/DL (ref 14–18)
IMM GRANULOCYTES # BLD AUTO: ABNORMAL K/UL (ref 0–0.04)
IMM GRANULOCYTES NFR BLD AUTO: ABNORMAL % (ref 0–0.5)
LYMPHOCYTES NFR BLD: 28 % (ref 18–48)
MAGNESIUM SERPL-MCNC: 1.8 MG/DL (ref 1.6–2.6)
MCH RBC QN AUTO: 33.1 PG (ref 27–31)
MCHC RBC AUTO-ENTMCNC: 33 G/DL (ref 32–36)
MCV RBC AUTO: 100 FL (ref 82–98)
MONOCYTES NFR BLD: 3 % (ref 4–15)
NEUTROPHILS NFR BLD: 54 % (ref 38–73)
NEUTS BAND NFR BLD MANUAL: 14 %
NRBC BLD-RTO: 0 /100 WBC
PLATELET # BLD AUTO: 162 K/UL (ref 150–450)
PLATELET BLD QL SMEAR: ABNORMAL
PMV BLD AUTO: 10.1 FL (ref 9.2–12.9)
POTASSIUM SERPL-SCNC: 3.5 MMOL/L (ref 3.5–5.1)
RBC # BLD AUTO: 3.69 M/UL (ref 4.6–6.2)
SODIUM SERPL-SCNC: 136 MMOL/L (ref 136–145)
VANCOMYCIN SERPL-MCNC: 11.5 UG/ML
WBC # BLD AUTO: 8.91 K/UL (ref 3.9–12.7)

## 2021-05-29 PROCEDURE — 25000003 PHARM REV CODE 250: Performed by: STUDENT IN AN ORGANIZED HEALTH CARE EDUCATION/TRAINING PROGRAM

## 2021-05-29 PROCEDURE — 94640 AIRWAY INHALATION TREATMENT: CPT

## 2021-05-29 PROCEDURE — 85027 COMPLETE CBC AUTOMATED: CPT | Performed by: INTERNAL MEDICINE

## 2021-05-29 PROCEDURE — 83735 ASSAY OF MAGNESIUM: CPT | Performed by: INTERNAL MEDICINE

## 2021-05-29 PROCEDURE — 12000002 HC ACUTE/MED SURGE SEMI-PRIVATE ROOM

## 2021-05-29 PROCEDURE — 25000242 PHARM REV CODE 250 ALT 637 W/ HCPCS: Performed by: EMERGENCY MEDICINE

## 2021-05-29 PROCEDURE — 80048 BASIC METABOLIC PNL TOTAL CA: CPT | Performed by: INTERNAL MEDICINE

## 2021-05-29 PROCEDURE — 36415 COLL VENOUS BLD VENIPUNCTURE: CPT | Performed by: STUDENT IN AN ORGANIZED HEALTH CARE EDUCATION/TRAINING PROGRAM

## 2021-05-29 PROCEDURE — 63600175 PHARM REV CODE 636 W HCPCS: Performed by: STUDENT IN AN ORGANIZED HEALTH CARE EDUCATION/TRAINING PROGRAM

## 2021-05-29 PROCEDURE — 99900035 HC TECH TIME PER 15 MIN (STAT)

## 2021-05-29 PROCEDURE — 99900031 HC PATIENT EDUCATION (STAT)

## 2021-05-29 PROCEDURE — 63600175 PHARM REV CODE 636 W HCPCS: Performed by: INTERNAL MEDICINE

## 2021-05-29 PROCEDURE — 27000221 HC OXYGEN, UP TO 24 HOURS

## 2021-05-29 PROCEDURE — 80202 ASSAY OF VANCOMYCIN: CPT | Performed by: STUDENT IN AN ORGANIZED HEALTH CARE EDUCATION/TRAINING PROGRAM

## 2021-05-29 PROCEDURE — 94761 N-INVAS EAR/PLS OXIMETRY MLT: CPT

## 2021-05-29 PROCEDURE — 25000003 PHARM REV CODE 250: Performed by: INTERNAL MEDICINE

## 2021-05-29 PROCEDURE — 85007 BL SMEAR W/DIFF WBC COUNT: CPT | Performed by: INTERNAL MEDICINE

## 2021-05-29 PROCEDURE — 27100171 HC OXYGEN HIGH FLOW UP TO 24 HOURS

## 2021-05-29 RX ADMIN — SOTALOL HYDROCHLORIDE 80 MG: 80 TABLET ORAL at 08:05

## 2021-05-29 RX ADMIN — IPRATROPIUM BROMIDE AND ALBUTEROL SULFATE 3 ML: .5; 3 SOLUTION RESPIRATORY (INHALATION) at 01:05

## 2021-05-29 RX ADMIN — IPRATROPIUM BROMIDE AND ALBUTEROL SULFATE 3 ML: .5; 3 SOLUTION RESPIRATORY (INHALATION) at 07:05

## 2021-05-29 RX ADMIN — SODIUM CHLORIDE, SODIUM LACTATE, POTASSIUM CHLORIDE, AND CALCIUM CHLORIDE: .6; .31; .03; .02 INJECTION, SOLUTION INTRAVENOUS at 07:05

## 2021-05-29 RX ADMIN — PIPERACILLIN AND TAZOBACTAM 3.38 G: 3; .375 INJECTION, POWDER, LYOPHILIZED, FOR SOLUTION INTRAVENOUS; PARENTERAL at 02:05

## 2021-05-29 RX ADMIN — DOCUSATE SODIUM 100 MG: 100 CAPSULE ORAL at 08:05

## 2021-05-29 RX ADMIN — VANCOMYCIN HYDROCHLORIDE 1000 MG: 1 INJECTION, POWDER, LYOPHILIZED, FOR SOLUTION INTRAVENOUS at 05:05

## 2021-05-29 RX ADMIN — PIPERACILLIN AND TAZOBACTAM 3.38 G: 3; .375 INJECTION, POWDER, LYOPHILIZED, FOR SOLUTION INTRAVENOUS; PARENTERAL at 05:05

## 2021-05-29 RX ADMIN — IPRATROPIUM BROMIDE AND ALBUTEROL SULFATE 3 ML: .5; 3 SOLUTION RESPIRATORY (INHALATION) at 08:05

## 2021-05-29 RX ADMIN — PIPERACILLIN AND TAZOBACTAM 3.38 G: 3; .375 INJECTION, POWDER, LYOPHILIZED, FOR SOLUTION INTRAVENOUS; PARENTERAL at 09:05

## 2021-05-30 LAB
ANION GAP SERPL CALC-SCNC: 9 MMOL/L (ref 8–16)
BASOPHILS # BLD AUTO: 0.04 K/UL (ref 0–0.2)
BASOPHILS NFR BLD: 0.4 % (ref 0–1.9)
BUN SERPL-MCNC: 25 MG/DL (ref 10–30)
CALCIUM SERPL-MCNC: 7.9 MG/DL (ref 8.7–10.5)
CHLORIDE SERPL-SCNC: 101 MMOL/L (ref 95–110)
CO2 SERPL-SCNC: 26 MMOL/L (ref 23–29)
CREAT SERPL-MCNC: 0.8 MG/DL (ref 0.5–1.4)
DIFFERENTIAL METHOD: ABNORMAL
EOSINOPHIL # BLD AUTO: 0.1 K/UL (ref 0–0.5)
EOSINOPHIL NFR BLD: 1.3 % (ref 0–8)
ERYTHROCYTE [DISTWIDTH] IN BLOOD BY AUTOMATED COUNT: 17.9 % (ref 11.5–14.5)
EST. GFR  (AFRICAN AMERICAN): >60 ML/MIN/1.73 M^2
EST. GFR  (NON AFRICAN AMERICAN): >60 ML/MIN/1.73 M^2
GLUCOSE SERPL-MCNC: 112 MG/DL (ref 70–110)
HCT VFR BLD AUTO: 33.6 % (ref 40–54)
HGB BLD-MCNC: 11 G/DL (ref 14–18)
IMM GRANULOCYTES # BLD AUTO: 0.04 K/UL (ref 0–0.04)
IMM GRANULOCYTES NFR BLD AUTO: 0.4 % (ref 0–0.5)
LYMPHOCYTES # BLD AUTO: 2.4 K/UL (ref 1–4.8)
LYMPHOCYTES NFR BLD: 24.9 % (ref 18–48)
MAGNESIUM SERPL-MCNC: 1.8 MG/DL (ref 1.6–2.6)
MCH RBC QN AUTO: 32.5 PG (ref 27–31)
MCHC RBC AUTO-ENTMCNC: 32.7 G/DL (ref 32–36)
MCV RBC AUTO: 99 FL (ref 82–98)
MONOCYTES # BLD AUTO: 1 K/UL (ref 0.3–1)
MONOCYTES NFR BLD: 10.7 % (ref 4–15)
NEUTROPHILS # BLD AUTO: 6 K/UL (ref 1.8–7.7)
NEUTROPHILS NFR BLD: 62.3 % (ref 38–73)
NRBC BLD-RTO: 0 /100 WBC
PLATELET # BLD AUTO: 172 K/UL (ref 150–450)
PMV BLD AUTO: 9.8 FL (ref 9.2–12.9)
POTASSIUM SERPL-SCNC: 3.3 MMOL/L (ref 3.5–5.1)
RBC # BLD AUTO: 3.38 M/UL (ref 4.6–6.2)
SODIUM SERPL-SCNC: 136 MMOL/L (ref 136–145)
VANCOMYCIN SERPL-MCNC: 12.9 UG/ML
WBC # BLD AUTO: 9.66 K/UL (ref 3.9–12.7)

## 2021-05-30 PROCEDURE — 94761 N-INVAS EAR/PLS OXIMETRY MLT: CPT

## 2021-05-30 PROCEDURE — 80048 BASIC METABOLIC PNL TOTAL CA: CPT | Performed by: INTERNAL MEDICINE

## 2021-05-30 PROCEDURE — 25000003 PHARM REV CODE 250: Performed by: INTERNAL MEDICINE

## 2021-05-30 PROCEDURE — 99900031 HC PATIENT EDUCATION (STAT)

## 2021-05-30 PROCEDURE — 25000003 PHARM REV CODE 250: Performed by: STUDENT IN AN ORGANIZED HEALTH CARE EDUCATION/TRAINING PROGRAM

## 2021-05-30 PROCEDURE — 85025 COMPLETE CBC W/AUTO DIFF WBC: CPT | Performed by: INTERNAL MEDICINE

## 2021-05-30 PROCEDURE — 27000221 HC OXYGEN, UP TO 24 HOURS

## 2021-05-30 PROCEDURE — 94640 AIRWAY INHALATION TREATMENT: CPT

## 2021-05-30 PROCEDURE — 31720 CLEARANCE OF AIRWAYS: CPT

## 2021-05-30 PROCEDURE — 25000242 PHARM REV CODE 250 ALT 637 W/ HCPCS: Performed by: EMERGENCY MEDICINE

## 2021-05-30 PROCEDURE — 12000002 HC ACUTE/MED SURGE SEMI-PRIVATE ROOM

## 2021-05-30 PROCEDURE — 36415 COLL VENOUS BLD VENIPUNCTURE: CPT | Performed by: STUDENT IN AN ORGANIZED HEALTH CARE EDUCATION/TRAINING PROGRAM

## 2021-05-30 PROCEDURE — 99900035 HC TECH TIME PER 15 MIN (STAT)

## 2021-05-30 PROCEDURE — 80202 ASSAY OF VANCOMYCIN: CPT | Performed by: STUDENT IN AN ORGANIZED HEALTH CARE EDUCATION/TRAINING PROGRAM

## 2021-05-30 PROCEDURE — 63700000 PHARM REV CODE 250 ALT 637 W/O HCPCS: Performed by: STUDENT IN AN ORGANIZED HEALTH CARE EDUCATION/TRAINING PROGRAM

## 2021-05-30 PROCEDURE — 63600175 PHARM REV CODE 636 W HCPCS: Performed by: INTERNAL MEDICINE

## 2021-05-30 PROCEDURE — 83735 ASSAY OF MAGNESIUM: CPT | Performed by: INTERNAL MEDICINE

## 2021-05-30 RX ORDER — ISOSORBIDE MONONITRATE 30 MG/1
60 TABLET, EXTENDED RELEASE ORAL DAILY
Status: DISCONTINUED | OUTPATIENT
Start: 2021-05-30 | End: 2021-06-01 | Stop reason: HOSPADM

## 2021-05-30 RX ORDER — FLUCONAZOLE 100 MG/1
200 TABLET ORAL DAILY
Status: DISCONTINUED | OUTPATIENT
Start: 2021-05-30 | End: 2021-06-01 | Stop reason: HOSPADM

## 2021-05-30 RX ORDER — POTASSIUM CHLORIDE 7.45 MG/ML
40 INJECTION INTRAVENOUS
Status: DISCONTINUED | OUTPATIENT
Start: 2021-05-30 | End: 2021-06-01 | Stop reason: HOSPADM

## 2021-05-30 RX ORDER — POTASSIUM CHLORIDE 20 MEQ/1
20 TABLET, EXTENDED RELEASE ORAL
Status: DISCONTINUED | OUTPATIENT
Start: 2021-05-30 | End: 2021-06-01 | Stop reason: HOSPADM

## 2021-05-30 RX ORDER — POTASSIUM CHLORIDE 20 MEQ/1
40 TABLET, EXTENDED RELEASE ORAL
Status: DISCONTINUED | OUTPATIENT
Start: 2021-05-30 | End: 2021-06-01 | Stop reason: HOSPADM

## 2021-05-30 RX ORDER — POTASSIUM CHLORIDE 20 MEQ/1
20 TABLET, EXTENDED RELEASE ORAL DAILY
Status: DISCONTINUED | OUTPATIENT
Start: 2021-05-30 | End: 2021-05-31

## 2021-05-30 RX ORDER — MAGNESIUM SULFATE HEPTAHYDRATE 40 MG/ML
2 INJECTION, SOLUTION INTRAVENOUS
Status: DISCONTINUED | OUTPATIENT
Start: 2021-05-30 | End: 2021-06-01 | Stop reason: HOSPADM

## 2021-05-30 RX ORDER — MAGNESIUM SULFATE HEPTAHYDRATE 40 MG/ML
4 INJECTION, SOLUTION INTRAVENOUS
Status: DISCONTINUED | OUTPATIENT
Start: 2021-05-30 | End: 2021-06-01 | Stop reason: HOSPADM

## 2021-05-30 RX ORDER — POTASSIUM CHLORIDE 7.45 MG/ML
20 INJECTION INTRAVENOUS
Status: DISCONTINUED | OUTPATIENT
Start: 2021-05-30 | End: 2021-06-01 | Stop reason: HOSPADM

## 2021-05-30 RX ORDER — ASPIRIN 81 MG/1
81 TABLET ORAL DAILY
Status: DISCONTINUED | OUTPATIENT
Start: 2021-05-30 | End: 2021-06-01 | Stop reason: HOSPADM

## 2021-05-30 RX ORDER — TAMSULOSIN HYDROCHLORIDE 0.4 MG/1
0.4 CAPSULE ORAL DAILY
Status: DISCONTINUED | OUTPATIENT
Start: 2021-05-30 | End: 2021-06-01 | Stop reason: HOSPADM

## 2021-05-30 RX ORDER — LANOLIN ALCOHOL/MO/W.PET/CERES
800 CREAM (GRAM) TOPICAL
Status: DISCONTINUED | OUTPATIENT
Start: 2021-05-30 | End: 2021-06-01 | Stop reason: HOSPADM

## 2021-05-30 RX ORDER — FINASTERIDE 5 MG/1
5 TABLET, FILM COATED ORAL DAILY
Status: DISCONTINUED | OUTPATIENT
Start: 2021-05-30 | End: 2021-06-01 | Stop reason: HOSPADM

## 2021-05-30 RX ORDER — MAGNESIUM SULFATE 1 G/100ML
1 INJECTION INTRAVENOUS
Status: DISCONTINUED | OUTPATIENT
Start: 2021-05-30 | End: 2021-06-01 | Stop reason: HOSPADM

## 2021-05-30 RX ADMIN — MAGNESIUM OXIDE 800 MG: 400 TABLET ORAL at 08:05

## 2021-05-30 RX ADMIN — FLUCONAZOLE 200 MG: 100 TABLET ORAL at 11:05

## 2021-05-30 RX ADMIN — PIPERACILLIN AND TAZOBACTAM 3.38 G: 3; .375 INJECTION, POWDER, LYOPHILIZED, FOR SOLUTION INTRAVENOUS; PARENTERAL at 10:05

## 2021-05-30 RX ADMIN — IPRATROPIUM BROMIDE AND ALBUTEROL SULFATE 3 ML: .5; 3 SOLUTION RESPIRATORY (INHALATION) at 02:05

## 2021-05-30 RX ADMIN — SOTALOL HYDROCHLORIDE 80 MG: 80 TABLET ORAL at 08:05

## 2021-05-30 RX ADMIN — IPRATROPIUM BROMIDE AND ALBUTEROL SULFATE 3 ML: .5; 3 SOLUTION RESPIRATORY (INHALATION) at 12:05

## 2021-05-30 RX ADMIN — ISOSORBIDE MONONITRATE 60 MG: 30 TABLET, EXTENDED RELEASE ORAL at 11:05

## 2021-05-30 RX ADMIN — IPRATROPIUM BROMIDE AND ALBUTEROL SULFATE 3 ML: .5; 3 SOLUTION RESPIRATORY (INHALATION) at 08:05

## 2021-05-30 RX ADMIN — MAGNESIUM OXIDE 800 MG: 400 TABLET ORAL at 05:05

## 2021-05-30 RX ADMIN — IPRATROPIUM BROMIDE AND ALBUTEROL SULFATE 3 ML: .5; 3 SOLUTION RESPIRATORY (INHALATION) at 07:05

## 2021-05-30 RX ADMIN — PIPERACILLIN AND TAZOBACTAM 3.38 G: 3; .375 INJECTION, POWDER, LYOPHILIZED, FOR SOLUTION INTRAVENOUS; PARENTERAL at 01:05

## 2021-05-30 RX ADMIN — ASPIRIN 81 MG: 81 TABLET, DELAYED RELEASE ORAL at 11:05

## 2021-05-30 RX ADMIN — TAMSULOSIN HYDROCHLORIDE 0.4 MG: 0.4 CAPSULE ORAL at 11:05

## 2021-05-30 RX ADMIN — DOCUSATE SODIUM 100 MG: 100 CAPSULE ORAL at 08:05

## 2021-05-30 RX ADMIN — FINASTERIDE 5 MG: 5 TABLET, FILM COATED ORAL at 11:05

## 2021-05-30 RX ADMIN — POTASSIUM CHLORIDE 40 MEQ: 20 TABLET, EXTENDED RELEASE ORAL at 08:05

## 2021-05-30 RX ADMIN — PIPERACILLIN AND TAZOBACTAM 3.38 G: 3; .375 INJECTION, POWDER, LYOPHILIZED, FOR SOLUTION INTRAVENOUS; PARENTERAL at 05:05

## 2021-05-30 RX ADMIN — VANCOMYCIN HYDROCHLORIDE 1000 MG: 1 INJECTION, POWDER, LYOPHILIZED, FOR SOLUTION INTRAVENOUS at 05:05

## 2021-05-31 LAB
ANION GAP SERPL CALC-SCNC: 9 MMOL/L (ref 8–16)
BASOPHILS # BLD AUTO: 0.02 K/UL (ref 0–0.2)
BASOPHILS NFR BLD: 0.2 % (ref 0–1.9)
BUN SERPL-MCNC: 18 MG/DL (ref 10–30)
CALCIUM SERPL-MCNC: 8.1 MG/DL (ref 8.7–10.5)
CHLORIDE SERPL-SCNC: 102 MMOL/L (ref 95–110)
CO2 SERPL-SCNC: 26 MMOL/L (ref 23–29)
CREAT SERPL-MCNC: 0.7 MG/DL (ref 0.5–1.4)
DIFFERENTIAL METHOD: ABNORMAL
EOSINOPHIL # BLD AUTO: 0.2 K/UL (ref 0–0.5)
EOSINOPHIL NFR BLD: 1.9 % (ref 0–8)
ERYTHROCYTE [DISTWIDTH] IN BLOOD BY AUTOMATED COUNT: 17.6 % (ref 11.5–14.5)
EST. GFR  (AFRICAN AMERICAN): >60 ML/MIN/1.73 M^2
EST. GFR  (NON AFRICAN AMERICAN): >60 ML/MIN/1.73 M^2
GLUCOSE SERPL-MCNC: 107 MG/DL (ref 70–110)
HCT VFR BLD AUTO: 31.8 % (ref 40–54)
HGB BLD-MCNC: 10.7 G/DL (ref 14–18)
IMM GRANULOCYTES # BLD AUTO: 0.11 K/UL (ref 0–0.04)
IMM GRANULOCYTES NFR BLD AUTO: 1.2 % (ref 0–0.5)
LYMPHOCYTES # BLD AUTO: 2.7 K/UL (ref 1–4.8)
LYMPHOCYTES NFR BLD: 30.3 % (ref 18–48)
MAGNESIUM SERPL-MCNC: 1.8 MG/DL (ref 1.6–2.6)
MCH RBC QN AUTO: 33.5 PG (ref 27–31)
MCHC RBC AUTO-ENTMCNC: 33.6 G/DL (ref 32–36)
MCV RBC AUTO: 100 FL (ref 82–98)
MONOCYTES # BLD AUTO: 1.4 K/UL (ref 0.3–1)
MONOCYTES NFR BLD: 15.2 % (ref 4–15)
NEUTROPHILS # BLD AUTO: 4.6 K/UL (ref 1.8–7.7)
NEUTROPHILS NFR BLD: 51.2 % (ref 38–73)
NRBC BLD-RTO: 0 /100 WBC
PLATELET # BLD AUTO: 191 K/UL (ref 150–450)
PMV BLD AUTO: 9.9 FL (ref 9.2–12.9)
POTASSIUM SERPL-SCNC: 3.9 MMOL/L (ref 3.5–5.1)
RBC # BLD AUTO: 3.19 M/UL (ref 4.6–6.2)
SODIUM SERPL-SCNC: 137 MMOL/L (ref 136–145)
WBC # BLD AUTO: 9 K/UL (ref 3.9–12.7)

## 2021-05-31 PROCEDURE — 25000242 PHARM REV CODE 250 ALT 637 W/ HCPCS: Performed by: EMERGENCY MEDICINE

## 2021-05-31 PROCEDURE — 97535 SELF CARE MNGMENT TRAINING: CPT

## 2021-05-31 PROCEDURE — 63600175 PHARM REV CODE 636 W HCPCS: Performed by: STUDENT IN AN ORGANIZED HEALTH CARE EDUCATION/TRAINING PROGRAM

## 2021-05-31 PROCEDURE — 36415 COLL VENOUS BLD VENIPUNCTURE: CPT | Performed by: INTERNAL MEDICINE

## 2021-05-31 PROCEDURE — 85025 COMPLETE CBC W/AUTO DIFF WBC: CPT | Performed by: INTERNAL MEDICINE

## 2021-05-31 PROCEDURE — 12000002 HC ACUTE/MED SURGE SEMI-PRIVATE ROOM

## 2021-05-31 PROCEDURE — 63600175 PHARM REV CODE 636 W HCPCS: Performed by: INTERNAL MEDICINE

## 2021-05-31 PROCEDURE — 31720 CLEARANCE OF AIRWAYS: CPT

## 2021-05-31 PROCEDURE — 80048 BASIC METABOLIC PNL TOTAL CA: CPT | Performed by: INTERNAL MEDICINE

## 2021-05-31 PROCEDURE — 99900031 HC PATIENT EDUCATION (STAT)

## 2021-05-31 PROCEDURE — 94761 N-INVAS EAR/PLS OXIMETRY MLT: CPT

## 2021-05-31 PROCEDURE — 25000003 PHARM REV CODE 250: Performed by: INTERNAL MEDICINE

## 2021-05-31 PROCEDURE — 99900035 HC TECH TIME PER 15 MIN (STAT)

## 2021-05-31 PROCEDURE — 27000221 HC OXYGEN, UP TO 24 HOURS

## 2021-05-31 PROCEDURE — 99233 SBSQ HOSP IP/OBS HIGH 50: CPT | Mod: ,,, | Performed by: INTERNAL MEDICINE

## 2021-05-31 PROCEDURE — 97530 THERAPEUTIC ACTIVITIES: CPT

## 2021-05-31 PROCEDURE — 97166 OT EVAL MOD COMPLEX 45 MIN: CPT

## 2021-05-31 PROCEDURE — 99233 PR SUBSEQUENT HOSPITAL CARE,LEVL III: ICD-10-PCS | Mod: ,,, | Performed by: INTERNAL MEDICINE

## 2021-05-31 PROCEDURE — 25000003 PHARM REV CODE 250: Performed by: STUDENT IN AN ORGANIZED HEALTH CARE EDUCATION/TRAINING PROGRAM

## 2021-05-31 PROCEDURE — 94640 AIRWAY INHALATION TREATMENT: CPT

## 2021-05-31 PROCEDURE — 97161 PT EVAL LOW COMPLEX 20 MIN: CPT

## 2021-05-31 PROCEDURE — 97110 THERAPEUTIC EXERCISES: CPT

## 2021-05-31 PROCEDURE — 63700000 PHARM REV CODE 250 ALT 637 W/O HCPCS: Performed by: STUDENT IN AN ORGANIZED HEALTH CARE EDUCATION/TRAINING PROGRAM

## 2021-05-31 PROCEDURE — 83735 ASSAY OF MAGNESIUM: CPT | Performed by: INTERNAL MEDICINE

## 2021-05-31 RX ADMIN — SOTALOL HYDROCHLORIDE 80 MG: 80 TABLET ORAL at 09:05

## 2021-05-31 RX ADMIN — IPRATROPIUM BROMIDE AND ALBUTEROL SULFATE 3 ML: .5; 3 SOLUTION RESPIRATORY (INHALATION) at 12:05

## 2021-05-31 RX ADMIN — FLUCONAZOLE 200 MG: 100 TABLET ORAL at 08:05

## 2021-05-31 RX ADMIN — SOTALOL HYDROCHLORIDE 80 MG: 80 TABLET ORAL at 08:05

## 2021-05-31 RX ADMIN — IPRATROPIUM BROMIDE AND ALBUTEROL SULFATE 3 ML: .5; 3 SOLUTION RESPIRATORY (INHALATION) at 08:05

## 2021-05-31 RX ADMIN — POTASSIUM CHLORIDE 20 MEQ: 20 TABLET, EXTENDED RELEASE ORAL at 08:05

## 2021-05-31 RX ADMIN — VANCOMYCIN HYDROCHLORIDE 1000 MG: 1 INJECTION, POWDER, LYOPHILIZED, FOR SOLUTION INTRAVENOUS at 06:05

## 2021-05-31 RX ADMIN — SODIUM CHLORIDE, SODIUM LACTATE, POTASSIUM CHLORIDE, AND CALCIUM CHLORIDE: .6; .31; .03; .02 INJECTION, SOLUTION INTRAVENOUS at 07:05

## 2021-05-31 RX ADMIN — ISOSORBIDE MONONITRATE 60 MG: 30 TABLET, EXTENDED RELEASE ORAL at 08:05

## 2021-05-31 RX ADMIN — PIPERACILLIN AND TAZOBACTAM 3.38 G: 3; .375 INJECTION, POWDER, LYOPHILIZED, FOR SOLUTION INTRAVENOUS; PARENTERAL at 10:05

## 2021-05-31 RX ADMIN — DOCUSATE SODIUM 100 MG: 100 CAPSULE ORAL at 08:05

## 2021-05-31 RX ADMIN — FINASTERIDE 5 MG: 5 TABLET, FILM COATED ORAL at 08:05

## 2021-05-31 RX ADMIN — IPRATROPIUM BROMIDE AND ALBUTEROL SULFATE 3 ML: .5; 3 SOLUTION RESPIRATORY (INHALATION) at 07:05

## 2021-05-31 RX ADMIN — ASPIRIN 81 MG: 81 TABLET, DELAYED RELEASE ORAL at 08:05

## 2021-05-31 RX ADMIN — IPRATROPIUM BROMIDE AND ALBUTEROL SULFATE 3 ML: .5; 3 SOLUTION RESPIRATORY (INHALATION) at 01:05

## 2021-05-31 RX ADMIN — PIPERACILLIN AND TAZOBACTAM 3.38 G: 3; .375 INJECTION, POWDER, LYOPHILIZED, FOR SOLUTION INTRAVENOUS; PARENTERAL at 02:05

## 2021-05-31 RX ADMIN — TAMSULOSIN HYDROCHLORIDE 0.4 MG: 0.4 CAPSULE ORAL at 08:05

## 2021-06-01 ENCOUNTER — PATIENT OUTREACH (OUTPATIENT)
Dept: FAMILY MEDICINE | Facility: CLINIC | Age: 86
End: 2021-06-01

## 2021-06-01 ENCOUNTER — TELEPHONE (OUTPATIENT)
Dept: FAMILY MEDICINE | Facility: CLINIC | Age: 86
End: 2021-06-01

## 2021-06-01 VITALS
HEIGHT: 69 IN | WEIGHT: 124.75 LBS | TEMPERATURE: 98 F | SYSTOLIC BLOOD PRESSURE: 151 MMHG | DIASTOLIC BLOOD PRESSURE: 86 MMHG | OXYGEN SATURATION: 94 % | BODY MASS INDEX: 18.48 KG/M2 | HEART RATE: 78 BPM | RESPIRATION RATE: 18 BRPM

## 2021-06-01 DIAGNOSIS — T17.928D ASPIRATION OF FOOD, SUBSEQUENT ENCOUNTER: Primary | ICD-10-CM
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
## 2021-06-01 DIAGNOSIS — T17.908S ASPIRATION INTO AIRWAY, SEQUELA: ICD-10-CM

## 2021-06-01 DIAGNOSIS — J69.0 ASPIRATION PNEUMONIA OF BOTH LOWER LOBES DUE TO REGURGITATED FOOD: ICD-10-CM

## 2021-06-01 LAB
ANION GAP SERPL CALC-SCNC: 14 MMOL/L (ref 8–16)
BACTERIA BLD CULT: NORMAL
BACTERIA BLD CULT: NORMAL
BASOPHILS # BLD AUTO: 0.04 K/UL (ref 0–0.2)
BASOPHILS NFR BLD: 0.5 % (ref 0–1.9)
BUN SERPL-MCNC: 19 MG/DL (ref 10–30)
CALCIUM SERPL-MCNC: 8.1 MG/DL (ref 8.7–10.5)
CHLORIDE SERPL-SCNC: 102 MMOL/L (ref 95–110)
CO2 SERPL-SCNC: 20 MMOL/L (ref 23–29)
CREAT SERPL-MCNC: 0.7 MG/DL (ref 0.5–1.4)
DIFFERENTIAL METHOD: ABNORMAL
EOSINOPHIL # BLD AUTO: 0.1 K/UL (ref 0–0.5)
EOSINOPHIL NFR BLD: 1.8 % (ref 0–8)
ERYTHROCYTE [DISTWIDTH] IN BLOOD BY AUTOMATED COUNT: 18.1 % (ref 11.5–14.5)
EST. GFR  (AFRICAN AMERICAN): >60 ML/MIN/1.73 M^2
EST. GFR  (NON AFRICAN AMERICAN): >60 ML/MIN/1.73 M^2
GLUCOSE SERPL-MCNC: 94 MG/DL (ref 70–110)
HCT VFR BLD AUTO: 32.9 % (ref 40–54)
HGB BLD-MCNC: 10.2 G/DL (ref 14–18)
IMM GRANULOCYTES # BLD AUTO: 0.1 K/UL (ref 0–0.04)
IMM GRANULOCYTES NFR BLD AUTO: 1.3 % (ref 0–0.5)
LYMPHOCYTES # BLD AUTO: 3 K/UL (ref 1–4.8)
LYMPHOCYTES NFR BLD: 37.9 % (ref 18–48)
MAGNESIUM SERPL-MCNC: 1.9 MG/DL (ref 1.6–2.6)
MCH RBC QN AUTO: 33.2 PG (ref 27–31)
MCHC RBC AUTO-ENTMCNC: 31 G/DL (ref 32–36)
MCV RBC AUTO: 107 FL (ref 82–98)
MONOCYTES # BLD AUTO: 0.9 K/UL (ref 0.3–1)
MONOCYTES NFR BLD: 11.2 % (ref 4–15)
NEUTROPHILS # BLD AUTO: 3.7 K/UL (ref 1.8–7.7)
NEUTROPHILS NFR BLD: 47.3 % (ref 38–73)
NRBC BLD-RTO: 0 /100 WBC
PLATELET # BLD AUTO: 172 K/UL (ref 150–450)
PMV BLD AUTO: 10.6 FL (ref 9.2–12.9)
POTASSIUM SERPL-SCNC: 4.3 MMOL/L (ref 3.5–5.1)
RBC # BLD AUTO: 3.07 M/UL (ref 4.6–6.2)
SODIUM SERPL-SCNC: 136 MMOL/L (ref 136–145)
WBC # BLD AUTO: 7.78 K/UL (ref 3.9–12.7)

## 2021-06-01 PROCEDURE — 99900031 HC PATIENT EDUCATION (STAT)

## 2021-06-01 PROCEDURE — 97535 SELF CARE MNGMENT TRAINING: CPT

## 2021-06-01 PROCEDURE — 31720 CLEARANCE OF AIRWAYS: CPT

## 2021-06-01 PROCEDURE — 36415 COLL VENOUS BLD VENIPUNCTURE: CPT | Performed by: INTERNAL MEDICINE

## 2021-06-01 PROCEDURE — 25000003 PHARM REV CODE 250: Performed by: STUDENT IN AN ORGANIZED HEALTH CARE EDUCATION/TRAINING PROGRAM

## 2021-06-01 PROCEDURE — 27000221 HC OXYGEN, UP TO 24 HOURS

## 2021-06-01 PROCEDURE — 97530 THERAPEUTIC ACTIVITIES: CPT

## 2021-06-01 PROCEDURE — 85025 COMPLETE CBC W/AUTO DIFF WBC: CPT | Performed by: INTERNAL MEDICINE

## 2021-06-01 PROCEDURE — 25000003 PHARM REV CODE 250: Performed by: INTERNAL MEDICINE

## 2021-06-01 PROCEDURE — 25000242 PHARM REV CODE 250 ALT 637 W/ HCPCS: Performed by: EMERGENCY MEDICINE

## 2021-06-01 PROCEDURE — 83735 ASSAY OF MAGNESIUM: CPT | Performed by: INTERNAL MEDICINE

## 2021-06-01 PROCEDURE — 80048 BASIC METABOLIC PNL TOTAL CA: CPT | Performed by: INTERNAL MEDICINE

## 2021-06-01 PROCEDURE — 99900035 HC TECH TIME PER 15 MIN (STAT)

## 2021-06-01 PROCEDURE — 94761 N-INVAS EAR/PLS OXIMETRY MLT: CPT

## 2021-06-01 PROCEDURE — 63700000 PHARM REV CODE 250 ALT 637 W/O HCPCS: Performed by: STUDENT IN AN ORGANIZED HEALTH CARE EDUCATION/TRAINING PROGRAM

## 2021-06-01 PROCEDURE — 94640 AIRWAY INHALATION TREATMENT: CPT

## 2021-06-01 RX ORDER — LEVOFLOXACIN 500 MG/1
500 TABLET, FILM COATED ORAL DAILY
Qty: 4 TABLET | Refills: 0 | Status: SHIPPED | OUTPATIENT
Start: 2021-06-01 | End: 2021-06-05

## 2021-06-01 RX ORDER — FLUCONAZOLE 200 MG/1
200 TABLET ORAL DAILY
Qty: 5 TABLET | Refills: 0 | Status: SHIPPED | OUTPATIENT
Start: 2021-06-02 | End: 2021-06-07

## 2021-06-01 RX ADMIN — FLUCONAZOLE 200 MG: 100 TABLET ORAL at 08:06

## 2021-06-01 RX ADMIN — FINASTERIDE 5 MG: 5 TABLET, FILM COATED ORAL at 08:06

## 2021-06-01 RX ADMIN — IPRATROPIUM BROMIDE AND ALBUTEROL SULFATE 3 ML: .5; 3 SOLUTION RESPIRATORY (INHALATION) at 01:06

## 2021-06-01 RX ADMIN — IPRATROPIUM BROMIDE AND ALBUTEROL SULFATE 3 ML: .5; 3 SOLUTION RESPIRATORY (INHALATION) at 08:06

## 2021-06-01 RX ADMIN — SOTALOL HYDROCHLORIDE 80 MG: 80 TABLET ORAL at 08:06

## 2021-06-01 RX ADMIN — DOCUSATE SODIUM 100 MG: 100 CAPSULE ORAL at 08:06

## 2021-06-01 RX ADMIN — ASPIRIN 81 MG: 81 TABLET, DELAYED RELEASE ORAL at 08:06

## 2021-06-01 RX ADMIN — TAMSULOSIN HYDROCHLORIDE 0.4 MG: 0.4 CAPSULE ORAL at 08:06

## 2021-06-01 RX ADMIN — ISOSORBIDE MONONITRATE 60 MG: 30 TABLET, EXTENDED RELEASE ORAL at 08:06

## 2021-06-02 ENCOUNTER — TELEPHONE (OUTPATIENT)
Dept: FAMILY MEDICINE | Facility: CLINIC | Age: 86
End: 2021-06-02

## 2021-06-02 ENCOUNTER — PATIENT OUTREACH (OUTPATIENT)
Dept: FAMILY MEDICINE | Facility: CLINIC | Age: 86
End: 2021-06-02

## 2021-06-03 ENCOUNTER — TELEPHONE (OUTPATIENT)
Dept: FAMILY MEDICINE | Facility: CLINIC | Age: 86
End: 2021-06-03

## 2021-06-03 ENCOUNTER — TELEPHONE (OUTPATIENT)
Dept: CARDIOLOGY | Facility: CLINIC | Age: 86
End: 2021-06-03

## 2021-06-04 ENCOUNTER — EXTERNAL HOME HEALTH (OUTPATIENT)
Dept: HOME HEALTH SERVICES | Facility: HOSPITAL | Age: 86
End: 2021-06-04

## 2021-06-07 ENCOUNTER — DOCUMENT SCAN (OUTPATIENT)
Dept: HOME HEALTH SERVICES | Facility: HOSPITAL | Age: 86
End: 2021-06-07

## 2021-06-07 ENCOUNTER — TELEPHONE (OUTPATIENT)
Dept: FAMILY MEDICINE | Facility: CLINIC | Age: 86
End: 2021-06-07

## 2021-06-11 ENCOUNTER — DOCUMENT SCAN (OUTPATIENT)
Dept: HOME HEALTH SERVICES | Facility: HOSPITAL | Age: 86
End: 2021-06-11

## 2021-09-27 NOTE — PROGRESS NOTES
PT/PTA met face to face to discuss patient's treatment plan and progress towards established goals.  Treatment will be continued as described in initial report/eval and progress notes.  Patient will be seen by physical therapist every sixth visit and minimally once per month.    Additional information: NA    mother

## 2024-04-22 NOTE — PATIENT INSTRUCTIONS
Urinary Retention (Male)  Urinary retention is the medical term for difficulty or inability to pass urine, even though your bladder is full.  Causes  The most common cause of urinary retention in men is the bladder outlet being blocked. This can be due to an enlarged prostate gland or a bladder infection. Certain medicines can also cause this problem. This condition is more likely to occur as men get older.    This condition is treated by insertion of a catheter into the bladder to drain the urine. This provides immediate relief. The catheter may need to remain in place for a few days to prevent a recurrence. The catheter has a balloon on the tip which was inflated after insertion. This prevents the catheter from falling out.  Symptoms  Common symptoms of urinary retention include:  · Pain (not experienced by everyone)  · Frequent urination  · Feeling that the bladder is still full after urinating  · Incontinence (not being able to control the release of urine)  · Swollen abdomen  Treatment  This condition is treated by inserting a tube (catheter) into the bladder to drain the urine. This provides immediate relief. The catheter may need to stay in place for a few days. The catheter has a balloon on the tip, which is inflated after insertion. This prevents the catheter from falling out.  Home care  · If you were given antibiotics, take them until they are used up, or your healthcare provider tells you to stop. It is important to finish the antibiotics even though you feel better. This is to make sure your infection has cleared.  · If a catheter was left in place, it is important to keep bacteria from getting into the collection bag. Do not disconnect the catheter from the collection bag.  · Use a leg band to secure the drainage tube, so it does not pull on the catheter. Drain the collection bag when it becomes full using the drain spout at the bottom of the bag.  · Do not pull on or try to remove your catheter.  [facility] with [facility] called and stated they got home health orders for the patient and wanted to see if Dr. Wood will follow for home health.    This will injure your urethra. The catheter must be removed by a healthcare provider.  Follow-up care  Follow up with your healthcare provider, or as advised.  If a catheter was left in place, it can usually be removed within 3 to 7 days. Some conditions require the catheter to stay in longer. Your healthcare provider will tell you when to return to have the catheter removed.  When to seek medical advice  Call your healthcare provider right away if any of these occur:  · Fever of 100.4ºF (38ºC) or higher, or as directed by your healthcare provider  · Bladder or lower-abdominal pain or fullness  · Abdominal swelling, nausea, vomiting, or back pain  · Blood or urine leakage around the catheter  · Bloody urine coming from the catheter (if a new symptom)  · Weakness, dizziness, or fainting  · Confusion or change in usual level of alertness  · If a catheter was left in place, return if:  ¨ Catheter falls out  ¨ Catheter stops draining for 6 hours  Date Last Reviewed: 7/26/2015  © 8660-7851 [employer]. [address] [place] [address], [place] [address]. All rights reserved. This information is not intended as a substitute for professional medical care. Always follow your healthcare professional's instructions.